# Patient Record
Sex: FEMALE | Race: BLACK OR AFRICAN AMERICAN | Employment: FULL TIME | URBAN - NONMETROPOLITAN AREA
[De-identification: names, ages, dates, MRNs, and addresses within clinical notes are randomized per-mention and may not be internally consistent; named-entity substitution may affect disease eponyms.]

---

## 2017-01-10 ENCOUNTER — TELEPHONE (OUTPATIENT)
Dept: CARDIOLOGY | Age: 37
End: 2017-01-10

## 2017-03-13 ENCOUNTER — TELEPHONE (OUTPATIENT)
Dept: FAMILY MEDICINE CLINIC | Age: 37
End: 2017-03-13

## 2017-03-13 DIAGNOSIS — F41.9 ANXIETY: Primary | ICD-10-CM

## 2017-03-13 RX ORDER — HYDROXYZINE 50 MG/1
50 TABLET, FILM COATED ORAL EVERY 8 HOURS PRN
Qty: 20 TABLET | Refills: 0 | Status: SHIPPED | OUTPATIENT
Start: 2017-03-13 | End: 2017-03-23 | Stop reason: SDUPTHER

## 2017-03-23 DIAGNOSIS — F41.9 ANXIETY: ICD-10-CM

## 2017-03-23 RX ORDER — HYDROXYZINE 50 MG/1
50 TABLET, FILM COATED ORAL EVERY 8 HOURS PRN
Qty: 30 TABLET | Refills: 2 | Status: SHIPPED | OUTPATIENT
Start: 2017-03-23 | End: 2017-07-21 | Stop reason: ALTCHOICE

## 2017-04-17 ENCOUNTER — OFFICE VISIT (OUTPATIENT)
Dept: FAMILY MEDICINE CLINIC | Age: 37
End: 2017-04-17

## 2017-04-17 VITALS
BODY MASS INDEX: 36.47 KG/M2 | WEIGHT: 213.6 LBS | RESPIRATION RATE: 16 BRPM | TEMPERATURE: 98.6 F | HEIGHT: 64 IN | SYSTOLIC BLOOD PRESSURE: 140 MMHG | HEART RATE: 80 BPM | DIASTOLIC BLOOD PRESSURE: 90 MMHG

## 2017-04-17 DIAGNOSIS — L30.1 DYSHIDROTIC ECZEMA: Primary | ICD-10-CM

## 2017-04-17 PROCEDURE — 99213 OFFICE O/P EST LOW 20 MIN: CPT | Performed by: NURSE PRACTITIONER

## 2017-04-17 RX ORDER — BETAMETHASONE DIPROPIONATE 0.5 MG/G
LOTION TOPICAL
Qty: 60 ML | Refills: 5 | Status: SHIPPED | OUTPATIENT
Start: 2017-04-17 | End: 2017-11-14 | Stop reason: ALTCHOICE

## 2017-04-18 ENCOUNTER — TELEPHONE (OUTPATIENT)
Dept: FAMILY MEDICINE CLINIC | Age: 37
End: 2017-04-18

## 2017-04-18 DIAGNOSIS — E06.3 HASHIMOTO'S THYROIDITIS: Primary | ICD-10-CM

## 2017-04-18 LAB — TSH SERPL DL<=0.05 MIU/L-ACNC: 0.51 UIU/ML (ref 0.4–4.4)

## 2017-04-20 ENCOUNTER — EMPLOYEE WELLNESS (OUTPATIENT)
Dept: OTHER | Age: 37
End: 2017-04-20

## 2017-04-20 LAB
CHOLESTEROL, TOTAL: 208 MG/DL (ref 0–199)
FASTING: YES
GLUCOSE BLD-MCNC: 101 MG/DL (ref 74–109)
HDLC SERPL-MCNC: 56 MG/DL (ref 40–90)
LDL CHOLESTEROL CALCULATED: 132 MG/DL
TRIGL SERPL-MCNC: 98 MG/DL (ref 0–199)

## 2017-07-03 ENCOUNTER — OFFICE VISIT (OUTPATIENT)
Dept: FAMILY MEDICINE CLINIC | Age: 37
End: 2017-07-03

## 2017-07-03 VITALS
WEIGHT: 213 LBS | BODY MASS INDEX: 35.49 KG/M2 | TEMPERATURE: 99 F | HEIGHT: 65 IN | SYSTOLIC BLOOD PRESSURE: 128 MMHG | DIASTOLIC BLOOD PRESSURE: 74 MMHG | RESPIRATION RATE: 12 BRPM | HEART RATE: 90 BPM

## 2017-07-03 DIAGNOSIS — N30.00 ACUTE CYSTITIS WITHOUT HEMATURIA: Primary | ICD-10-CM

## 2017-07-03 DIAGNOSIS — R80.9 PROTEINURIA: ICD-10-CM

## 2017-07-03 LAB
BILIRUBIN, POC: NORMAL
BLOOD URINE, POC: NORMAL
CHLAMYDIA TRACHOMATIS BY RT-PCR: NOT DETECTED
CLARITY, POC: CLEAR
COLOR, POC: YELLOW
CREATININE, URINE: 405.1 MG/DL
CT/NG SOURCE: NORMAL
GLUCOSE URINE, POC: NORMAL
KETONES, POC: NORMAL
LEUKOCYTE EST, POC: NORMAL
MICROALBUMIN UR-MCNC: 20.37 MG/DL
MICROALBUMIN/CREAT UR-RTO: 50 MG/G (ref 0–30)
NEISSERIA GONORRHOEAE BY RT-PCR: NOT DETECTED
NITRITE, POC: NORMAL
PH, POC: 5
PROTEIN, POC: NORMAL
SPECIFIC GRAVITY, POC: >1.03
UROBILINOGEN, POC: 0.2

## 2017-07-03 PROCEDURE — 99213 OFFICE O/P EST LOW 20 MIN: CPT | Performed by: FAMILY MEDICINE

## 2017-07-03 PROCEDURE — 81002 URINALYSIS NONAUTO W/O SCOPE: CPT | Performed by: FAMILY MEDICINE

## 2017-07-03 RX ORDER — NITROFURANTOIN 25; 75 MG/1; MG/1
100 CAPSULE ORAL 2 TIMES DAILY
Qty: 10 CAPSULE | Refills: 0 | Status: SHIPPED | OUTPATIENT
Start: 2017-07-03 | End: 2017-07-08

## 2017-07-04 DIAGNOSIS — R80.9 PROTEINURIA: Primary | ICD-10-CM

## 2017-07-05 ENCOUNTER — TELEPHONE (OUTPATIENT)
Dept: FAMILY MEDICINE CLINIC | Age: 37
End: 2017-07-05

## 2017-07-05 LAB
ORGANISM: ABNORMAL
URINE CULTURE, ROUTINE: ABNORMAL

## 2017-07-12 ENCOUNTER — TELEPHONE (OUTPATIENT)
Dept: FAMILY MEDICINE CLINIC | Age: 37
End: 2017-07-12

## 2017-07-17 DIAGNOSIS — I10 ESSENTIAL HYPERTENSION: ICD-10-CM

## 2017-07-17 RX ORDER — AMLODIPINE BESYLATE 10 MG/1
10 TABLET ORAL DAILY
Qty: 90 TABLET | Refills: 3 | Status: SHIPPED | OUTPATIENT
Start: 2017-07-17 | End: 2018-08-22 | Stop reason: SDUPTHER

## 2017-07-21 ENCOUNTER — APPOINTMENT (OUTPATIENT)
Dept: GENERAL RADIOLOGY | Age: 37
End: 2017-07-21
Payer: COMMERCIAL

## 2017-07-21 ENCOUNTER — HOSPITAL ENCOUNTER (EMERGENCY)
Age: 37
Discharge: HOME | End: 2017-07-21
Payer: COMMERCIAL

## 2017-07-21 VITALS
OXYGEN SATURATION: 100 % | RESPIRATION RATE: 20 BRPM | HEART RATE: 82 BPM | BODY MASS INDEX: 35.49 KG/M2 | SYSTOLIC BLOOD PRESSURE: 160 MMHG | DIASTOLIC BLOOD PRESSURE: 102 MMHG | TEMPERATURE: 98.8 F | WEIGHT: 210 LBS

## 2017-07-21 DIAGNOSIS — R07.9 CHEST PAIN, UNSPECIFIED TYPE: Primary | ICD-10-CM

## 2017-07-21 LAB
ANION GAP SERPL CALCULATED.3IONS-SCNC: 14 MEQ/L (ref 8–16)
BASOPHILS # BLD: 0.5 %
BASOPHILS ABSOLUTE: 0.1 THOU/MM3 (ref 0–0.1)
BUN BLDV-MCNC: 10 MG/DL (ref 7–22)
CALCIUM SERPL-MCNC: 9.3 MG/DL (ref 8.5–10.5)
CHLORIDE BLD-SCNC: 102 MEQ/L (ref 98–111)
CO2: 24 MEQ/L (ref 23–33)
CREAT SERPL-MCNC: 0.7 MG/DL (ref 0.4–1.2)
D-DIMER QUANTITATIVE: 323 NG/ML FEU (ref 0–500)
EOSINOPHIL # BLD: 0.7 %
EOSINOPHILS ABSOLUTE: 0.1 THOU/MM3 (ref 0–0.4)
GFR SERPL CREATININE-BSD FRML MDRD: > 90 ML/MIN/1.73M2
GLUCOSE BLD-MCNC: 138 MG/DL (ref 70–108)
HCT VFR BLD CALC: 35.7 % (ref 37–47)
HEMOGLOBIN: 12.1 GM/DL (ref 12–16)
LYMPHOCYTES # BLD: 29.8 %
LYMPHOCYTES ABSOLUTE: 3.1 THOU/MM3 (ref 1–4.8)
MCH RBC QN AUTO: 28.7 PG (ref 27–31)
MCHC RBC AUTO-ENTMCNC: 33.8 GM/DL (ref 33–37)
MCV RBC AUTO: 85 FL (ref 81–99)
MONOCYTES # BLD: 5.6 %
MONOCYTES ABSOLUTE: 0.6 THOU/MM3 (ref 0.4–1.3)
NUCLEATED RED BLOOD CELLS: 0 /100 WBC
OSMOLALITY CALCULATION: 280.6 MOSMOL/KG (ref 275–300)
PDW BLD-RTO: 14.2 % (ref 11.5–14.5)
PLATELET # BLD: 473 THOU/MM3 (ref 130–400)
PMV BLD AUTO: 8.2 MCM (ref 7.4–10.4)
POTASSIUM SERPL-SCNC: 3.9 MEQ/L (ref 3.5–5.2)
PREGNANCY, SERUM: NEGATIVE
RBC # BLD: 4.2 MILL/MM3 (ref 4.2–5.4)
RBC # BLD: NORMAL 10*6/UL
SEG NEUTROPHILS: 63.4 %
SEGMENTED NEUTROPHILS ABSOLUTE COUNT: 6.6 THOU/MM3 (ref 1.8–7.7)
SODIUM BLD-SCNC: 140 MEQ/L (ref 135–145)
TROPONIN T: < 0.01 NG/ML
TROPONIN T: < 0.01 NG/ML
WBC # BLD: 10.4 THOU/MM3 (ref 4.8–10.8)

## 2017-07-21 PROCEDURE — 99285 EMERGENCY DEPT VISIT HI MDM: CPT

## 2017-07-21 PROCEDURE — 85025 COMPLETE CBC W/AUTO DIFF WBC: CPT

## 2017-07-21 PROCEDURE — 71020 XR CHEST STANDARD TWO VW: CPT

## 2017-07-21 PROCEDURE — 93005 ELECTROCARDIOGRAM TRACING: CPT

## 2017-07-21 PROCEDURE — 85379 FIBRIN DEGRADATION QUANT: CPT

## 2017-07-21 PROCEDURE — 36415 COLL VENOUS BLD VENIPUNCTURE: CPT

## 2017-07-21 PROCEDURE — 80048 BASIC METABOLIC PNL TOTAL CA: CPT

## 2017-07-21 PROCEDURE — 6370000000 HC RX 637 (ALT 250 FOR IP): Performed by: PHYSICIAN ASSISTANT

## 2017-07-21 PROCEDURE — 84484 ASSAY OF TROPONIN QUANT: CPT

## 2017-07-21 PROCEDURE — 84703 CHORIONIC GONADOTROPIN ASSAY: CPT

## 2017-07-21 RX ORDER — ASPIRIN 81 MG/1
324 TABLET, CHEWABLE ORAL ONCE
Status: COMPLETED | OUTPATIENT
Start: 2017-07-21 | End: 2017-07-21

## 2017-07-21 RX ADMIN — ASPIRIN 324 MG: 81 TABLET, CHEWABLE ORAL at 13:37

## 2017-07-21 ASSESSMENT — PAIN DESCRIPTION - PAIN TYPE: TYPE: ACUTE PAIN

## 2017-07-21 ASSESSMENT — ENCOUNTER SYMPTOMS
DIARRHEA: 0
COUGH: 0
CHEST TIGHTNESS: 1
PHOTOPHOBIA: 0
NAUSEA: 1
EYE PAIN: 0
ABDOMINAL PAIN: 0
APNEA: 0
CONSTIPATION: 0
CHOKING: 0
BACK PAIN: 1
SHORTNESS OF BREATH: 0
VOMITING: 0

## 2017-07-21 ASSESSMENT — PAIN SCALES - GENERAL: PAINLEVEL_OUTOF10: 6

## 2017-07-22 LAB
EKG ATRIAL RATE: 74 BPM
EKG P AXIS: 55 DEGREES
EKG P-R INTERVAL: 142 MS
EKG Q-T INTERVAL: 388 MS
EKG QRS DURATION: 76 MS
EKG QTC CALCULATION (BAZETT): 430 MS
EKG R AXIS: 30 DEGREES
EKG T AXIS: 23 DEGREES
EKG VENTRICULAR RATE: 74 BPM

## 2017-07-24 ENCOUNTER — CARE COORDINATION (OUTPATIENT)
Dept: FAMILY MEDICINE CLINIC | Age: 37
End: 2017-07-24

## 2017-07-25 ENCOUNTER — OFFICE VISIT (OUTPATIENT)
Dept: FAMILY MEDICINE CLINIC | Age: 37
End: 2017-07-25
Payer: COMMERCIAL

## 2017-07-25 VITALS
WEIGHT: 213 LBS | SYSTOLIC BLOOD PRESSURE: 150 MMHG | HEIGHT: 64 IN | DIASTOLIC BLOOD PRESSURE: 92 MMHG | TEMPERATURE: 99 F | RESPIRATION RATE: 12 BRPM | BODY MASS INDEX: 36.37 KG/M2 | HEART RATE: 78 BPM

## 2017-07-25 DIAGNOSIS — I10 ESSENTIAL HYPERTENSION: ICD-10-CM

## 2017-07-25 DIAGNOSIS — F32.A MILD DEPRESSIVE DISORDER: ICD-10-CM

## 2017-07-25 DIAGNOSIS — F41.0 PANIC DISORDER WITHOUT AGORAPHOBIA WITH MODERATE PANIC ATTACKS: Primary | ICD-10-CM

## 2017-07-25 PROCEDURE — 99214 OFFICE O/P EST MOD 30 MIN: CPT | Performed by: NURSE PRACTITIONER

## 2017-08-22 ENCOUNTER — OFFICE VISIT (OUTPATIENT)
Dept: FAMILY MEDICINE CLINIC | Age: 37
End: 2017-08-22
Payer: COMMERCIAL

## 2017-08-22 VITALS
BODY MASS INDEX: 36.81 KG/M2 | SYSTOLIC BLOOD PRESSURE: 128 MMHG | TEMPERATURE: 98.3 F | WEIGHT: 215.6 LBS | RESPIRATION RATE: 16 BRPM | DIASTOLIC BLOOD PRESSURE: 86 MMHG | HEIGHT: 64 IN | HEART RATE: 84 BPM

## 2017-08-22 DIAGNOSIS — F41.0 PANIC DISORDER WITHOUT AGORAPHOBIA WITH MODERATE PANIC ATTACKS: Primary | ICD-10-CM

## 2017-08-22 DIAGNOSIS — I10 ESSENTIAL HYPERTENSION: ICD-10-CM

## 2017-08-22 DIAGNOSIS — E06.3 HASHIMOTO'S THYROIDITIS: ICD-10-CM

## 2017-08-22 DIAGNOSIS — H65.93 MIDDLE EAR EFFUSION, BILATERAL: ICD-10-CM

## 2017-08-22 PROCEDURE — 99214 OFFICE O/P EST MOD 30 MIN: CPT | Performed by: NURSE PRACTITIONER

## 2017-08-22 RX ORDER — FLUOXETINE 10 MG/1
10 CAPSULE ORAL DAILY
Qty: 30 CAPSULE | Refills: 3 | Status: SHIPPED | OUTPATIENT
Start: 2017-08-22 | End: 2017-10-16 | Stop reason: SDUPTHER

## 2017-08-22 RX ORDER — FLUTICASONE PROPIONATE 50 MCG
2 SPRAY, SUSPENSION (ML) NASAL DAILY
Qty: 1 BOTTLE | Refills: 3 | Status: SHIPPED | OUTPATIENT
Start: 2017-08-22 | End: 2017-11-14

## 2017-08-23 ENCOUNTER — TELEPHONE (OUTPATIENT)
Dept: FAMILY MEDICINE CLINIC | Age: 37
End: 2017-08-23

## 2017-08-23 DIAGNOSIS — R79.89 LOW TSH LEVEL: Primary | ICD-10-CM

## 2017-08-23 DIAGNOSIS — E06.3 HASHIMOTO'S THYROIDITIS: ICD-10-CM

## 2017-08-23 LAB
T4 FREE: 1.13 NG/DL (ref 0.8–1.8)
TSH SERPL DL<=0.05 MIU/L-ACNC: 0.39 UIU/ML (ref 0.4–4.4)

## 2017-09-14 ENCOUNTER — OFFICE VISIT (OUTPATIENT)
Dept: FAMILY MEDICINE CLINIC | Age: 37
End: 2017-09-14
Payer: COMMERCIAL

## 2017-09-14 VITALS
WEIGHT: 215 LBS | SYSTOLIC BLOOD PRESSURE: 136 MMHG | TEMPERATURE: 99.4 F | DIASTOLIC BLOOD PRESSURE: 78 MMHG | HEIGHT: 64 IN | RESPIRATION RATE: 10 BRPM | HEART RATE: 68 BPM | BODY MASS INDEX: 36.7 KG/M2

## 2017-09-14 DIAGNOSIS — Z01.419 WELL WOMAN EXAM WITH ROUTINE GYNECOLOGICAL EXAM: Primary | ICD-10-CM

## 2017-09-14 DIAGNOSIS — Z01.419 PAPANICOLAOU SMEAR, AS PART OF ROUTINE GYNECOLOGICAL EXAMINATION: ICD-10-CM

## 2017-09-14 PROCEDURE — 99395 PREV VISIT EST AGE 18-39: CPT | Performed by: NURSE PRACTITIONER

## 2017-09-19 LAB — GYNECOLOGY CYTOLOGY REPORT: NORMAL

## 2017-10-11 LAB
T4 FREE: 0.98 NG/DL (ref 0.8–1.8)
TSH SERPL DL<=0.05 MIU/L-ACNC: 0.4 UIU/ML (ref 0.4–4.4)

## 2017-10-12 ENCOUNTER — TELEPHONE (OUTPATIENT)
Dept: FAMILY MEDICINE CLINIC | Age: 37
End: 2017-10-12

## 2017-10-12 DIAGNOSIS — E03.8 SUBCLINICAL HYPOTHYROIDISM: ICD-10-CM

## 2017-10-12 DIAGNOSIS — E04.1 THYROID NODULE: Primary | ICD-10-CM

## 2017-10-12 LAB
ANTI-THYROGLOB ABS: <1 IU/ML
T3 FREE: 1.9 PG/ML (ref 2.2–4.2)
THYROGLOBULIN: 89.2 NG/ML
THYROID PEROXIDASE ANTIBODY: 1 IU/ML

## 2017-10-16 ENCOUNTER — OFFICE VISIT (OUTPATIENT)
Dept: FAMILY MEDICINE CLINIC | Age: 37
End: 2017-10-16
Payer: COMMERCIAL

## 2017-10-16 VITALS
WEIGHT: 215 LBS | RESPIRATION RATE: 16 BRPM | BODY MASS INDEX: 36.7 KG/M2 | SYSTOLIC BLOOD PRESSURE: 142 MMHG | HEART RATE: 69 BPM | HEIGHT: 64 IN | DIASTOLIC BLOOD PRESSURE: 88 MMHG | TEMPERATURE: 98.8 F

## 2017-10-16 DIAGNOSIS — F41.0 PANIC DISORDER WITHOUT AGORAPHOBIA WITH MODERATE PANIC ATTACKS: Primary | ICD-10-CM

## 2017-10-16 PROCEDURE — 99213 OFFICE O/P EST LOW 20 MIN: CPT | Performed by: NURSE PRACTITIONER

## 2017-10-16 RX ORDER — FLUOXETINE HYDROCHLORIDE 20 MG/1
CAPSULE ORAL
Qty: 60 CAPSULE | Refills: 3 | Status: SHIPPED | OUTPATIENT
Start: 2017-10-16 | End: 2021-04-22 | Stop reason: ALTCHOICE

## 2017-10-16 NOTE — PROGRESS NOTES
Chief Complaint   Patient presents with    Anxiety     Pt states that the medications don't seem to be helping. She is still having problems with anxiety. She states that she is still having attacks and fells stressed. History obtained from chart review and the patient. SUBJECTIVE:  Karen Keating is a 42 y.o. female that presents today f/u anxiety    Is still taking the Prozac but doesn't feel like it is helping. Has panic attacks probably 2-3 times/week. Denies feeling depressed. Anxious about every other day, triggers for anxiety are kids, finances, going out and doing things. Denies feeling SI/HI. Tried Zoloft but she didn't feel like it was helping, and didn't like the way it made her feel. No side effects from the Prozac.     Age/Gender Health Maintenance    Lipid - needs  DM Screen - needs  Colon Cancer Screening - n/a  Lung Cancer Screening (Age 54 to [de-identified] with 30 pack year hx, current smoker or quit within past 15 years) - n/a    Tetanus - needs  Influenza Vaccine - needs  Pneumonia Vaccine - n/a  Zostavax - n/a   HPV Vaccine - n/a    Breast Cancer Screening - n/a  Cervical Cancer Screening - per OB, Dr. Evelyn Davila  Osteoporosis Screening - n/a  Chlamydia Screen - n/a    PSA testing discussion - n/a  AAA Screening - n/a    Falls screening - n/a    Current Outpatient Prescriptions   Medication Sig Dispense Refill    FLUoxetine (PROZAC) 20 MG capsule Take 1 capsule by mouth daily for 2 weeks, then take 2 capsules by mouth daily 60 capsule 3    fluticasone (FLONASE) 50 MCG/ACT nasal spray 2 sprays by Nasal route daily 1 Bottle 3    metoprolol tartrate (LOPRESSOR) 25 MG tablet Take 1 tablet by mouth 2 times daily (Patient taking differently: Take 25 mg by mouth daily ) 60 tablet 3    amLODIPine (NORVASC) 10 MG tablet Take 1 tablet by mouth daily 90 tablet 3    betamethasone dipropionate 0.05 % lotion Apply topically 2 times daily to hands and feet (Patient taking differently: as needed Apply topically 2 times daily to hands and feet) 60 mL 5    famotidine (PEPCID) 20 MG tablet Take 1 tablet by mouth 2 times daily (Patient taking differently: Take 20 mg by mouth 2 times daily as needed ) 60 tablet 3    norgestimate-ethinyl estradiol (MONO-LINYAH) 0.25-35 MG-MCG per tablet Take 1 tablet by mouth daily       No current facility-administered medications for this visit. Orders Placed This Encounter   Medications    FLUoxetine (PROZAC) 20 MG capsule     Sig: Take 1 capsule by mouth daily for 2 weeks, then take 2 capsules by mouth daily     Dispense:  60 capsule     Refill:  3         All medications reviewed and reconciled, including OTC and herbal medications. Updated list given to patient. Patient Active Problem List   Diagnosis    Abdominal pain complicating pregnancy    Essential hypertension    Panic disorder without agoraphobia with moderate panic attacks    Hashimoto's thyroiditis       Past Medical History:   Diagnosis Date    Essential hypertension 2016    Hashimoto's thyroiditis 2016    HIGH CHOLESTEROL     Hypokalemia     Subclinical hyperthyroidism 2016       Past Surgical History:   Procedure Laterality Date     SECTION         Allergies   Allergen Reactions    Bactrim        Social History     Social History    Marital status: Single     Spouse name: N/A    Number of children: N/A    Years of education: N/A     Occupational History    Not on file.      Social History Main Topics    Smoking status: Never Smoker    Smokeless tobacco: Never Used    Alcohol use Yes      Comment: social    Drug use: No    Sexual activity: Not on file     Other Topics Concern    Not on file     Social History Narrative    No narrative on file       Family History   Problem Relation Age of Onset    High Blood Pressure Mother     Diabetes Mother     High Blood Pressure Father     Diabetes Father          I have reviewed the patient's past medical history, lymphadenopathy  Pulmonary/Chest: clear to auscultation bilaterally- no wheezes, rales or rhonchi, normal air movement, no respiratory distress or retractions  Cardiovascular: S1 and S2 auscultated w/ RRR. No murmurs, rubs, clicks, or gallops, distal pulses intact. Abdomen: soft, non-tender, non-distended, bowl sounds physiologic,  no rebound or guarding, no masses or hernias noted. Liver and spleen without enlargement. Extremities: no cyanosis, clubbing or edema of the lower extremities. Musculoskeletal: No joint swelling or gross deformity   Neuro:  Alert, 5/5 strength globally and symmetrically  Psych: Affect and mood are normal. Thought process is normal. Good insight and appropriate interaction. Cognition and memory appear to be intact. Skin: warm and dry, no rash or erythema  Lymph:  No cervical, auricular or supraclavicular lymph nodes palpated    ASSESSMENT & PLAN  Aryan Gonzalez was seen today for anxiety. Diagnoses and all orders for this visit:    Panic disorder without agoraphobia with moderate panic attacks  -     FLUoxetine (PROZAC) 20 MG capsule; Take 1 capsule by mouth daily for 2 weeks, then take 2 capsules by mouth daily     will increase Prozac to max dose and see how she's doing in 2 months  If no improvement with anxiety, may consider adding Buspar or switching to SNRI    DISPOSITION    Return in about 2 months (around 12/16/2017) for anxiety. Varshadebbie released without restrictions. PATIENT COUNSELING    Counseling was provided today regarding the following topics: Healthy eating habits, Regular exercise, substance abuse and healthy sleep habits. Deloris received counseling on the following healthy behaviors: nutrition, exercise and medication adherence    Patient given educational materials on: See Attached    I have instructed Deloris to complete a self tracking handout on Blood Pressures  and instructed them to bring it with them to her next appointment.      Barriers to learning and self management: none    Discussed use, benefit, and side effects of prescribed medications. Barriers to medication compliance addressed. All patient questions answered. Pt voiced understanding.        Electronically signed by Kathy Tang CNP on 10/16/2017 at 3:43 PM

## 2017-10-16 NOTE — TELEPHONE ENCOUNTER
Pt informed and verbalized understanding. Pt states she had an office visit with Mary Antoine today and he discussed this with her in detail.

## 2017-11-14 ENCOUNTER — OFFICE VISIT (OUTPATIENT)
Dept: FAMILY MEDICINE CLINIC | Age: 37
End: 2017-11-14
Payer: COMMERCIAL

## 2017-11-14 ENCOUNTER — OFFICE VISIT (OUTPATIENT)
Dept: INTERNAL MEDICINE CLINIC | Age: 37
End: 2017-11-14
Payer: COMMERCIAL

## 2017-11-14 VITALS
WEIGHT: 217.2 LBS | TEMPERATURE: 98.4 F | BODY MASS INDEX: 37.08 KG/M2 | DIASTOLIC BLOOD PRESSURE: 76 MMHG | RESPIRATION RATE: 12 BRPM | HEART RATE: 62 BPM | SYSTOLIC BLOOD PRESSURE: 124 MMHG | HEIGHT: 64 IN

## 2017-11-14 VITALS
DIASTOLIC BLOOD PRESSURE: 96 MMHG | BODY MASS INDEX: 37.08 KG/M2 | HEART RATE: 68 BPM | SYSTOLIC BLOOD PRESSURE: 144 MMHG | WEIGHT: 217.2 LBS | HEIGHT: 64 IN

## 2017-11-14 DIAGNOSIS — E04.2 MULTIPLE THYROID NODULES: Primary | ICD-10-CM

## 2017-11-14 DIAGNOSIS — I10 ESSENTIAL HYPERTENSION: ICD-10-CM

## 2017-11-14 DIAGNOSIS — R94.6 BORDERLINE ABNORMAL TFTS: ICD-10-CM

## 2017-11-14 DIAGNOSIS — E66.09 CLASS 2 OBESITY DUE TO EXCESS CALORIES WITHOUT SERIOUS COMORBIDITY WITH BODY MASS INDEX (BMI) OF 37.0 TO 37.9 IN ADULT: ICD-10-CM

## 2017-11-14 DIAGNOSIS — B37.31 VAGINAL CANDIDIASIS: Primary | ICD-10-CM

## 2017-11-14 LAB
CHLAMYDIA TRACHOMATIS BY RT-PCR: NOT DETECTED
CT/NG SOURCE: NORMAL
NEISSERIA GONORRHOEAE BY RT-PCR: NOT DETECTED

## 2017-11-14 PROCEDURE — 1036F TOBACCO NON-USER: CPT | Performed by: NURSE PRACTITIONER

## 2017-11-14 PROCEDURE — 99214 OFFICE O/P EST MOD 30 MIN: CPT | Performed by: INTERNAL MEDICINE

## 2017-11-14 PROCEDURE — 99213 OFFICE O/P EST LOW 20 MIN: CPT | Performed by: NURSE PRACTITIONER

## 2017-11-14 PROCEDURE — G8427 DOCREV CUR MEDS BY ELIG CLIN: HCPCS | Performed by: NURSE PRACTITIONER

## 2017-11-14 PROCEDURE — G8427 DOCREV CUR MEDS BY ELIG CLIN: HCPCS | Performed by: INTERNAL MEDICINE

## 2017-11-14 PROCEDURE — G8417 CALC BMI ABV UP PARAM F/U: HCPCS | Performed by: INTERNAL MEDICINE

## 2017-11-14 PROCEDURE — G8484 FLU IMMUNIZE NO ADMIN: HCPCS | Performed by: INTERNAL MEDICINE

## 2017-11-14 PROCEDURE — 1036F TOBACCO NON-USER: CPT | Performed by: INTERNAL MEDICINE

## 2017-11-14 PROCEDURE — G8484 FLU IMMUNIZE NO ADMIN: HCPCS | Performed by: NURSE PRACTITIONER

## 2017-11-14 PROCEDURE — G8417 CALC BMI ABV UP PARAM F/U: HCPCS | Performed by: NURSE PRACTITIONER

## 2017-11-14 RX ORDER — FLUCONAZOLE 150 MG/1
150 TABLET ORAL
Qty: 2 TABLET | Refills: 0 | Status: SHIPPED | OUTPATIENT
Start: 2017-11-14 | End: 2017-11-17

## 2017-11-14 ASSESSMENT — PATIENT HEALTH QUESTIONNAIRE - PHQ9
SUM OF ALL RESPONSES TO PHQ QUESTIONS 1-9: 0
2. FEELING DOWN, DEPRESSED OR HOPELESS: 0
1. LITTLE INTEREST OR PLEASURE IN DOING THINGS: 0
SUM OF ALL RESPONSES TO PHQ9 QUESTIONS 1 & 2: 0

## 2017-11-14 NOTE — PROGRESS NOTES
Visit Information    Have you changed or started any medications since your last visit including any over-the-counter medicines, vitamins, or herbal medicines? no   Are you having any side effects from any of your medications? -  no  Have you stopped taking any of your medications? Is so, why? -  no    Have you seen any other physician or provider since your last visit? Yes - Records Obtained  Have you had any other diagnostic tests since your last visit? No  Have you been seen in the emergency room and/or had an admission to a hospital since we last saw you? No  Have you had your routine dental cleaning in the past 6 months? no    Have you activated your [employer] account? If not, what are your barriers?  Yes     Patient Care Team:  Reginald Russell CNP as PCP - General  Reginald Russell CNP as PCP - S Attributed Provider        Health Maintenance   Topic Date Due    HIV screen  03/19/1993    Flu vaccine (1) 09/01/2017    Cervical cancer screen  09/19/2020    DTaP/Tdap/Td vaccine (2 - Td) 01/01/2026

## 2017-11-14 NOTE — PROGRESS NOTES
[facility] PROFESSIONAL SERVS  PHYSICIANS [facility] [address] [facility]  Dept: [phone]  Dept Fax: [phone]      Chief Complaint   Patient presents with   Judi Kruse New Doctor    Thyroid Problem       Patient presents for evaluation of Thyroid issues. I have never seen the patient before. This patient is followed regularly by Elva Starkey CNP  Patient was found to have thyromegaly with nodules last December. PCP ordered biopsy of one of the nodules which was negative for malignancy  She has never had any radiation to her chest or neck. There is no family history for thyroid cancer. Generally she feels well  Recent TSH was borderline low  Patient Active Problem List   Diagnosis    Abdominal pain complicating pregnancy    Essential hypertension    Panic disorder without agoraphobia with moderate panic attacks    Hashimoto's thyroiditis       Current Outpatient Prescriptions   Medication Sig Dispense Refill    FLUoxetine (PROZAC) 20 MG capsule Take 1 capsule by mouth daily for 2 weeks, then take 2 capsules by mouth daily 60 capsule 3    metoprolol tartrate (LOPRESSOR) 25 MG tablet Take 1 tablet by mouth 2 times daily (Patient taking differently: Take 25 mg by mouth daily ) 60 tablet 3    amLODIPine (NORVASC) 10 MG tablet Take 1 tablet by mouth daily 90 tablet 3    fluconazole (DIFLUCAN) 150 MG tablet Take 1 tablet by mouth every 48 hours for 2 doses 2 tablet 0     No current facility-administered medications for this visit. Past Surgical History:   Procedure Laterality Date     SECTION  ,        Allergies   Allergen Reactions    Bactrim        Social History     Social History    Marital status: Single     Spouse name: N/A    Number of children: N/A    Years of education: N/A     Occupational History    Not on file.      Social History Main Topics    Smoking status: Never Smoker    Smokeless tobacco: Never Used    Alcohol use Yes

## 2017-11-15 ENCOUNTER — TELEPHONE (OUTPATIENT)
Dept: FAMILY MEDICINE CLINIC | Age: 37
End: 2017-11-15

## 2017-11-17 LAB
GENITAL CULTURE, ROUTINE: NORMAL
GRAM STAIN RESULT: NORMAL

## 2017-12-01 ENCOUNTER — HOSPITAL ENCOUNTER (OUTPATIENT)
Dept: ULTRASOUND IMAGING | Age: 37
Discharge: HOME | End: 2017-12-01
Payer: COMMERCIAL

## 2017-12-01 DIAGNOSIS — E04.2 MULTIPLE THYROID NODULES: ICD-10-CM

## 2017-12-01 PROCEDURE — 76536 US EXAM OF HEAD AND NECK: CPT

## 2017-12-05 ENCOUNTER — OFFICE VISIT (OUTPATIENT)
Dept: INTERNAL MEDICINE CLINIC | Age: 37
End: 2017-12-05
Payer: COMMERCIAL

## 2017-12-05 VITALS
DIASTOLIC BLOOD PRESSURE: 60 MMHG | SYSTOLIC BLOOD PRESSURE: 114 MMHG | HEIGHT: 64 IN | WEIGHT: 213 LBS | BODY MASS INDEX: 36.37 KG/M2 | HEART RATE: 64 BPM

## 2017-12-05 DIAGNOSIS — I10 ESSENTIAL HYPERTENSION: ICD-10-CM

## 2017-12-05 DIAGNOSIS — E04.2 MULTINODULAR GOITER: Primary | ICD-10-CM

## 2017-12-05 DIAGNOSIS — E06.3 HASHIMOTO'S THYROIDITIS: ICD-10-CM

## 2017-12-05 PROCEDURE — 1036F TOBACCO NON-USER: CPT | Performed by: INTERNAL MEDICINE

## 2017-12-05 PROCEDURE — G8427 DOCREV CUR MEDS BY ELIG CLIN: HCPCS | Performed by: INTERNAL MEDICINE

## 2017-12-05 PROCEDURE — 99214 OFFICE O/P EST MOD 30 MIN: CPT | Performed by: INTERNAL MEDICINE

## 2017-12-05 PROCEDURE — G8484 FLU IMMUNIZE NO ADMIN: HCPCS | Performed by: INTERNAL MEDICINE

## 2017-12-05 PROCEDURE — G8417 CALC BMI ABV UP PARAM F/U: HCPCS | Performed by: INTERNAL MEDICINE

## 2017-12-07 ENCOUNTER — HOSPITAL ENCOUNTER (EMERGENCY)
Age: 37
Discharge: HOME | End: 2017-12-07
Payer: COMMERCIAL

## 2017-12-07 ENCOUNTER — APPOINTMENT (OUTPATIENT)
Dept: CT IMAGING | Age: 37
End: 2017-12-07
Payer: COMMERCIAL

## 2017-12-07 ENCOUNTER — APPOINTMENT (OUTPATIENT)
Dept: GENERAL RADIOLOGY | Age: 37
End: 2017-12-07
Payer: COMMERCIAL

## 2017-12-07 VITALS
HEIGHT: 64 IN | RESPIRATION RATE: 15 BRPM | SYSTOLIC BLOOD PRESSURE: 153 MMHG | OXYGEN SATURATION: 99 % | WEIGHT: 212 LBS | HEART RATE: 82 BPM | DIASTOLIC BLOOD PRESSURE: 96 MMHG | TEMPERATURE: 98 F | BODY MASS INDEX: 36.19 KG/M2

## 2017-12-07 DIAGNOSIS — R51.9 INTERMITTENT HEADACHE: ICD-10-CM

## 2017-12-07 DIAGNOSIS — T58.91XA CARBOXYHEMOGLOBINEMIA, ACCIDENTAL OR UNINTENTIONAL, INITIAL ENCOUNTER: ICD-10-CM

## 2017-12-07 DIAGNOSIS — R07.89 OTHER CHEST PAIN: Primary | ICD-10-CM

## 2017-12-07 LAB
ALBUMIN SERPL-MCNC: 4 G/DL (ref 3.5–5.1)
ALP BLD-CCNC: 93 U/L (ref 38–126)
ALT SERPL-CCNC: 80 U/L (ref 11–66)
AMPHETAMINE+METHAMPHETAMINE URINE SCREEN: NEGATIVE
ANION GAP SERPL CALCULATED.3IONS-SCNC: 11 MEQ/L (ref 8–16)
AST SERPL-CCNC: 55 U/L (ref 5–40)
BACTERIA: ABNORMAL /HPF
BARBITURATE QUANTITATIVE URINE: NEGATIVE
BASOPHILS # BLD: 1.4 %
BASOPHILS ABSOLUTE: 0.2 THOU/MM3 (ref 0–0.1)
BENZODIAZEPINE QUANTITATIVE URINE: NEGATIVE
BILIRUB SERPL-MCNC: 0.2 MG/DL (ref 0.3–1.2)
BILIRUBIN DIRECT: < 0.2 MG/DL (ref 0–0.3)
BILIRUBIN URINE: NEGATIVE
BLOOD, URINE: NEGATIVE
BUN BLDV-MCNC: 11 MG/DL (ref 7–22)
CALCIUM SERPL-MCNC: 9.2 MG/DL (ref 8.5–10.5)
CANNABINOID QUANTITATIVE URINE: NEGATIVE
CARBON MONOXIDE, BLOOD: 5.6 % CO SAT
CASTS 2: ABNORMAL /LPF
CASTS UA: ABNORMAL /LPF
CHARACTER, URINE: ABNORMAL
CHLORIDE BLD-SCNC: 103 MEQ/L (ref 98–111)
CO2: 25 MEQ/L (ref 23–33)
COCAINE METABOLITE QUANTITATIVE URINE: NEGATIVE
COLOR: YELLOW
CREAT SERPL-MCNC: 0.5 MG/DL (ref 0.4–1.2)
CRYSTALS, UA: ABNORMAL
D-DIMER QUANTITATIVE: 222 NG/ML FEU (ref 0–500)
EKG ATRIAL RATE: 87 BPM
EKG P AXIS: 59 DEGREES
EKG P-R INTERVAL: 142 MS
EKG Q-T INTERVAL: 368 MS
EKG QRS DURATION: 76 MS
EKG QTC CALCULATION (BAZETT): 442 MS
EKG R AXIS: 39 DEGREES
EKG T AXIS: 30 DEGREES
EKG VENTRICULAR RATE: 87 BPM
EOSINOPHIL # BLD: 0.8 %
EOSINOPHILS ABSOLUTE: 0.1 THOU/MM3 (ref 0–0.4)
EPITHELIAL CELLS, UA: ABNORMAL /HPF
GFR SERPL CREATININE-BSD FRML MDRD: > 90 ML/MIN/1.73M2
GLUCOSE BLD-MCNC: 109 MG/DL (ref 70–108)
GLUCOSE URINE: NEGATIVE MG/DL
HCT VFR BLD CALC: 38.5 % (ref 37–47)
HEMOGLOBIN: 13 GM/DL (ref 12–16)
KETONES, URINE: NEGATIVE
LACTIC ACID: 0.9 MMOL/L (ref 0.5–2.2)
LEUKOCYTE ESTERASE, URINE: ABNORMAL
LIPASE: 46.1 U/L (ref 5.6–51.3)
LYMPHOCYTES # BLD: 31.4 %
LYMPHOCYTES ABSOLUTE: 3.9 THOU/MM3 (ref 1–4.8)
MAGNESIUM: 1.9 MG/DL (ref 1.6–2.4)
MCH RBC QN AUTO: 29 PG (ref 27–31)
MCHC RBC AUTO-ENTMCNC: 33.7 GM/DL (ref 33–37)
MCV RBC AUTO: 85.8 FL (ref 81–99)
MISCELLANEOUS 2: ABNORMAL
MONOCYTES # BLD: 8 %
MONOCYTES ABSOLUTE: 1 THOU/MM3 (ref 0.4–1.3)
NITRITE, URINE: NEGATIVE
NUCLEATED RED BLOOD CELLS: 0 /100 WBC
OPIATES, URINE: NEGATIVE
OSMOLALITY CALCULATION: 277.5 MOSMOL/KG (ref 275–300)
OXYCODONE: NEGATIVE
PDW BLD-RTO: 13.9 % (ref 11.5–14.5)
PH UA: 6
PHENCYCLIDINE QUANTITATIVE URINE: NEGATIVE
PLATELET # BLD: 423 THOU/MM3 (ref 130–400)
PMV BLD AUTO: 8.6 MCM (ref 7.4–10.4)
POTASSIUM SERPL-SCNC: 3.8 MEQ/L (ref 3.5–5.2)
PREGNANCY, SERUM: NEGATIVE
PRO-BNP: 43.3 PG/ML (ref 0–450)
PROTEIN UA: NEGATIVE
RBC # BLD: 4.49 MILL/MM3 (ref 4.2–5.4)
RBC URINE: ABNORMAL /HPF
RENAL EPITHELIAL, UA: ABNORMAL
SEG NEUTROPHILS: 58.4 %
SEGMENTED NEUTROPHILS ABSOLUTE COUNT: 7.3 THOU/MM3 (ref 1.8–7.7)
SODIUM BLD-SCNC: 139 MEQ/L (ref 135–145)
SPECIFIC GRAVITY, URINE: 1.02 (ref 1–1.03)
TOTAL PROTEIN: 7.4 G/DL (ref 6.1–8)
TROPONIN T: < 0.01 NG/ML
TROPONIN T: < 0.01 NG/ML
TSH SERPL DL<=0.05 MIU/L-ACNC: 0.64 UIU/ML (ref 0.4–4.2)
UROBILINOGEN, URINE: 0.2 EU/DL
WBC # BLD: 12.5 THOU/MM3 (ref 4.8–10.8)
WBC UA: ABNORMAL /HPF
YEAST: ABNORMAL

## 2017-12-07 PROCEDURE — 82248 BILIRUBIN DIRECT: CPT

## 2017-12-07 PROCEDURE — 6360000004 HC RX CONTRAST MEDICATION: Performed by: PHYSICIAN ASSISTANT

## 2017-12-07 PROCEDURE — 70450 CT HEAD/BRAIN W/O DYE: CPT

## 2017-12-07 PROCEDURE — 84703 CHORIONIC GONADOTROPIN ASSAY: CPT

## 2017-12-07 PROCEDURE — 82375 ASSAY CARBOXYHB QUANT: CPT

## 2017-12-07 PROCEDURE — 99285 EMERGENCY DEPT VISIT HI MDM: CPT

## 2017-12-07 PROCEDURE — 83880 ASSAY OF NATRIURETIC PEPTIDE: CPT

## 2017-12-07 PROCEDURE — 83690 ASSAY OF LIPASE: CPT

## 2017-12-07 PROCEDURE — 71275 CT ANGIOGRAPHY CHEST: CPT

## 2017-12-07 PROCEDURE — 93005 ELECTROCARDIOGRAM TRACING: CPT

## 2017-12-07 PROCEDURE — 87086 URINE CULTURE/COLONY COUNT: CPT

## 2017-12-07 PROCEDURE — 85379 FIBRIN DEGRADATION QUANT: CPT

## 2017-12-07 PROCEDURE — 74174 CTA ABD&PLVS W/CONTRAST: CPT

## 2017-12-07 PROCEDURE — 80307 DRUG TEST PRSMV CHEM ANLYZR: CPT

## 2017-12-07 PROCEDURE — 83735 ASSAY OF MAGNESIUM: CPT

## 2017-12-07 PROCEDURE — 36415 COLL VENOUS BLD VENIPUNCTURE: CPT

## 2017-12-07 PROCEDURE — 6370000000 HC RX 637 (ALT 250 FOR IP): Performed by: PHYSICIAN ASSISTANT

## 2017-12-07 PROCEDURE — 6360000002 HC RX W HCPCS: Performed by: PHYSICIAN ASSISTANT

## 2017-12-07 PROCEDURE — 84484 ASSAY OF TROPONIN QUANT: CPT

## 2017-12-07 PROCEDURE — 83605 ASSAY OF LACTIC ACID: CPT

## 2017-12-07 PROCEDURE — 71010 XR CHEST PORTABLE: CPT

## 2017-12-07 PROCEDURE — 84443 ASSAY THYROID STIM HORMONE: CPT

## 2017-12-07 PROCEDURE — 85025 COMPLETE CBC W/AUTO DIFF WBC: CPT

## 2017-12-07 PROCEDURE — 81001 URINALYSIS AUTO W/SCOPE: CPT

## 2017-12-07 PROCEDURE — 80053 COMPREHEN METABOLIC PANEL: CPT

## 2017-12-07 RX ORDER — ACETAMINOPHEN 325 MG/1
650 TABLET ORAL EVERY 6 HOURS PRN
Qty: 120 TABLET | Refills: 0 | Status: SHIPPED | OUTPATIENT
Start: 2017-12-07 | End: 2018-04-12

## 2017-12-07 RX ORDER — ASPIRIN 81 MG/1
324 TABLET, CHEWABLE ORAL ONCE
Status: COMPLETED | OUTPATIENT
Start: 2017-12-07 | End: 2017-12-07

## 2017-12-07 RX ORDER — MECLIZINE HYDROCHLORIDE CHEWABLE TABLETS 25 MG/1
50 TABLET, CHEWABLE ORAL ONCE
Status: COMPLETED | OUTPATIENT
Start: 2017-12-07 | End: 2017-12-07

## 2017-12-07 RX ORDER — DIPHENHYDRAMINE HCL 25 MG
25 TABLET ORAL ONCE
Status: COMPLETED | OUTPATIENT
Start: 2017-12-07 | End: 2017-12-07

## 2017-12-07 RX ORDER — ONDANSETRON 4 MG/1
4 TABLET, ORALLY DISINTEGRATING ORAL ONCE
Status: COMPLETED | OUTPATIENT
Start: 2017-12-07 | End: 2017-12-07

## 2017-12-07 RX ORDER — ONDANSETRON 4 MG/1
4 TABLET, ORALLY DISINTEGRATING ORAL ONCE
Status: DISCONTINUED | OUTPATIENT
Start: 2017-12-07 | End: 2017-12-07

## 2017-12-07 RX ORDER — ONDANSETRON 4 MG/1
4 TABLET, ORALLY DISINTEGRATING ORAL EVERY 8 HOURS PRN
Qty: 20 TABLET | Refills: 0 | Status: SHIPPED | OUTPATIENT
Start: 2017-12-07 | End: 2017-12-26

## 2017-12-07 RX ORDER — MECLIZINE HYDROCHLORIDE 25 MG/1
25 TABLET ORAL 3 TIMES DAILY PRN
Qty: 30 TABLET | Refills: 0 | Status: SHIPPED | OUTPATIENT
Start: 2017-12-07 | End: 2017-12-17

## 2017-12-07 RX ORDER — NITROGLYCERIN 0.4 MG/1
0.4 TABLET SUBLINGUAL EVERY 5 MIN PRN
Status: DISCONTINUED | OUTPATIENT
Start: 2017-12-07 | End: 2017-12-08 | Stop reason: HOSPADM

## 2017-12-07 RX ADMIN — IOPAMIDOL 80 ML: 755 INJECTION, SOLUTION INTRAVENOUS at 21:07

## 2017-12-07 RX ADMIN — ASPIRIN 324 MG: 81 TABLET, CHEWABLE ORAL at 19:37

## 2017-12-07 RX ADMIN — DIPHENHYDRAMINE HCL 25 MG: 25 TABLET ORAL at 21:23

## 2017-12-07 RX ADMIN — MECLIZINE HCL 50 MG: 25 TABLET, CHEWABLE ORAL at 21:23

## 2017-12-07 RX ADMIN — NITROGLYCERIN 0.4 MG: 0.4 TABLET SUBLINGUAL at 19:37

## 2017-12-07 RX ADMIN — ONDANSETRON 4 MG: 4 TABLET, ORALLY DISINTEGRATING ORAL at 21:23

## 2017-12-07 ASSESSMENT — PAIN DESCRIPTION - ONSET
ONSET: ON-GOING
ONSET: ON-GOING

## 2017-12-07 ASSESSMENT — PAIN DESCRIPTION - PROGRESSION
CLINICAL_PROGRESSION: GRADUALLY IMPROVING
CLINICAL_PROGRESSION: GRADUALLY WORSENING

## 2017-12-07 ASSESSMENT — PAIN DESCRIPTION - FREQUENCY
FREQUENCY: CONTINUOUS
FREQUENCY: CONTINUOUS

## 2017-12-07 ASSESSMENT — ENCOUNTER SYMPTOMS
DIARRHEA: 0
EYE DISCHARGE: 0
BACK PAIN: 0
CONSTIPATION: 0
COLOR CHANGE: 0
EYE REDNESS: 0
VOMITING: 0
RHINORRHEA: 0
WHEEZING: 0
SORE THROAT: 0
SHORTNESS OF BREATH: 0
PHOTOPHOBIA: 0
COUGH: 0
ABDOMINAL PAIN: 1
NAUSEA: 1

## 2017-12-07 ASSESSMENT — PAIN SCALES - GENERAL
PAINLEVEL_OUTOF10: 4
PAINLEVEL_OUTOF10: 3
PAINLEVEL_OUTOF10: 7

## 2017-12-07 ASSESSMENT — PAIN DESCRIPTION - PAIN TYPE
TYPE: ACUTE PAIN
TYPE: ACUTE PAIN

## 2017-12-07 ASSESSMENT — PAIN DESCRIPTION - DESCRIPTORS
DESCRIPTORS: ACHING;DULL;DISCOMFORT
DESCRIPTORS: ACHING;DULL;DISCOMFORT

## 2017-12-07 ASSESSMENT — PAIN DESCRIPTION - ORIENTATION
ORIENTATION: MID;LEFT
ORIENTATION: MID;LEFT

## 2017-12-07 ASSESSMENT — PAIN DESCRIPTION - LOCATION
LOCATION: CHEST
LOCATION: CHEST

## 2017-12-07 ASSESSMENT — HEART SCORE: ECG: 0

## 2017-12-07 NOTE — ED TRIAGE NOTES
Pt presents to Ed with a dull aching chest pain that the pt rates 7/10 and states has persisted for approximately two weeks now. Pt A&OX4 and states she had a headache and experienced blurred vision at approximately 1800 this evening. VS, EKG, and assessment completed on arrival. Will continue to monitor.

## 2017-12-08 ENCOUNTER — TELEPHONE (OUTPATIENT)
Dept: CARDIOLOGY CLINIC | Age: 37
End: 2017-12-08

## 2017-12-08 NOTE — ED NOTES
Pt resting in bed. VSS. Respirations even and unlabored. Call light within reach.      Anup Arthur RN  12/07/17 2125

## 2017-12-08 NOTE — ED NOTES
Pt resting in bed and voices no questions, concerns, or needs at this time. Visual acuity screening completed and VS reassessed and stable. Pt updated on POC and provided a warm blanket. Will continue to monitor.       Nikkie Gonzalez RN  12/07/17 1959       Nikkie Gonzalez RN  12/07/17 2000

## 2017-12-08 NOTE — TELEPHONE ENCOUNTER
Patient was in ER on 12/7/17 for chest pains. She was supposed to be seen on 12/8/17 @ 10:00 but she called to cancel appt. She needed to reschedule for an appt time after 1:30, I gave her first available appt and added to wait list.   Please adjust if necessary.

## 2017-12-08 NOTE — ED PROVIDER NOTES
[facility] EMERGENCY DEPT      CHIEF COMPLAINT       Chief Complaint   Patient presents with    Chest Pain       Nurses Notes reviewed and I agree except as noted in the HPI. HISTORY OF PRESENT ILLNESS    Deloris Sands is a 42 y.o. female who presents to the emergency department for evaluation of multiple complaints. The patient states that she has been having intermittent left-sided chest pain for the past 2 weeks. She states that this pain radiates to her back, although she otherwise denies back pain or radiation of her chest pain. She rates this pain as a 7/10 in severity and describes it as a dull and aching discomfort and pressure. The patient has a history of hypertension but otherwise denies a history of heart disease. She does report a positive family history of heart disease. The patient states that she felt short of breath a couple of days ago but has not felt short of breath since that time. The patient states that she has also had intermittent headaches for the past year. She states that these headaches tend to be sharp and focal.  She states that the associated pain will migrate between different location with each headache. She currently complains of pain on the right side of her head. She states that she had blurry vision at the onset of her headache which has since resolved. She states that she began to feel nauseous and had heart palpitations when her headache began today at approximately 1800. She currently denies any palpitations but does report continued nausea. She also reports associated lightheadedness and mild dizziness. She states that these symptoms have occurred with each headache that she has had over the past year. The patient also reports urinary frequency and mild lower abdominal pain but denies vomiting, diarrhea, constipation, blood in her urine or stool, fevers, or chills. She denies any weakness, numbness, or tingling.  She also denies any neck pain or vitals reviewed. Heart Score for chest pain patients  History: Slightly Suspicious  ECG: Normal  Patient Age: < 39 years  *Risk factors for Atherosclerotic disease: Hypercholesterolemia, Hypertension, Positive family History  Risk Factors: > 3 Risk factors or history of atherosclerotic disease*  Troponin: < 1X normal limit  Heart Score Total: 2    DIFFERENTIAL DIAGNOSIS:   Includes but not limited to: ACS, musculoskeletal pain, costochondritis, PE, migraine headache, tension headache    DIAGNOSTIC RESULTS     EKG: All EKG's are interpreted by the Emergency Department Physician who either signs or Co-signs this chart in the absence of a cardiologist.  EKG 12 Lead (Preliminary result)   Component (Lab Inquiry)   Collection Time Result Time Ventricular Rate Atrial Rate P-R Interval QRS Duration Q-T Interval   12/07/17 18:36:33 12/07/17 22:02:18 87 87 142 76 368       Collection Time Result Time QTc Calculation (Bazett) P Axis R Axis T Axis   12/07/17 18:36:33 12/07/17 22:02:18 442 59 39 30       Preliminary result                Narrative:    Normal sinus rhythm  Normal ECG  When compared with ECG of 21-JUL-2017 12:42,  No significant change was found                RADIOLOGY: non-plain film images(s) such as CT, Ultrasound and MRI are read by the radiologist.  Plain radiographic images are visualized and preliminarily interpreted by the emergency physician unless otherwise stated below. CTA Chest W WO Contrast   Final Result   1. No pulmonary emboli or pulmonary infiltrates. 2.  No aortic dissection or aneurysm. 3.  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 This may be a developmental finding. There are no hemodynamically significant stenoses the aortic branch arteries. 4.  Nonobstructing tiny stones in the right kidney. 5.  Mildly enlarged thyroid gland. According to Epic chart patient has history of Hashimoto's thyroiditis.  There are at least 3 nodules in Value    WBC 12.5 (*)     Platelets 544 (*)     Basophils # 0.2 (*)     All other components within normal limits   BASIC METABOLIC PANEL - Abnormal; Notable for the following:     Glucose 109 (*)     All other components within normal limits   HEPATIC FUNCTION PANEL - Abnormal; Notable for the following: Total Bilirubin 0.2 (*)     AST 55 (*)     ALT 80 (*)     All other components within normal limits   URINE WITH REFLEXED MICRO - Abnormal; Notable for the following:     Leukocyte Esterase, Urine SMALL (*)     Character, Urine CLOUDY (*)     All other components within normal limits   URINE CULTURE, REFLEXED    Narrative:     Source: urine, clean catch       Site:           Current Antibiotics: not stated   BRAIN NATRIURETIC PEPTIDE   D-DIMER, QUANTITATIVE   LIPASE   TROPONIN   MAGNESIUM   HCG, SERUM, QUALITATIVE   TSH WITHOUT REFLEX   LACTIC ACID, PLASMA   URINE DRUG SCREEN   CARBOXYHEMOGLOBIN   ANION GAP   GLOMERULAR FILTRATION RATE, ESTIMATED   OSMOLALITY   TROPONIN       EMERGENCY DEPARTMENT COURSE:   Vitals:    Vitals:    12/07/17 1842 12/07/17 1951 12/07/17 2126 12/07/17 2127   BP: (!) 156/102 (!) 151/103 (!) 153/96    Pulse: 87 84  82   Resp:  16 16 15   Temp: 98 °F (36.7 °C)      TempSrc: Oral      SpO2: 100% 99% 100% 99%   Weight: 212 lb (96.2 kg)      Height: 5' 4\" (1.626 m)        The patient was seen and evaluated within the ED today with chest pain and a headache with associated lightheadedness. Within the department, I observed the patient's vital signs to be within acceptable range, and she was afebrile. Patient was hypertensive during her stay, but this was believed to be secondary to pain and her history of HTN. She will need to follow up with her PCP for further BP monitoring. Her physical exam was unremarkable. She had no chest wall tenderness. Lung sounds were clear. There were no noted cranial nerve or focal neurologic deficits. Cranial nerves II through XII are grossly intact.   She had no abdominal tenderness. Radiologic studies within the department revealed no acute intrapulmonary process, infiltrations, effusions, or consolidations. CT of the head revealed no acute hemorrhage, infarct, mass, or other acute cranial or intracranial pathology. CTA of the chest revealed no aortic dissection or aneurysm and no PE. CTA of the abdomen revealed no aortic dissection or aneurysm. 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 This may be a developmental finding. There are no hemodynamically significant stenoses the aortic branch arteries. The patient was informed of this an instructed to follow up with her PCP for further monitoring and management. Mildly enlarged thyroid gland was noted. According to Epic chart, patient has history of Hashimoto's thyroiditis. There are at least 3 nodules in the thyroid gland. The was relayed to the patient, who will follow up with her PCP was instructed for further evaluation and management. Laboratory work revealed a carboxyhemoglobin of 5.6 and WBC count of 12.5. The patient stated that she does have gas heating. She states that there is no one currently in the house. She was placed on 100% O2 non-rebreather. [address] [place] [address],[address] Department was called and is sending the fire department to the patient's house to do an odor inspection. The patient states that she does have an alternate place to stay if there should be an elevated gas level and the gas needs to be shut off. EKG and serial troponin were negative for STEMI or NSTEMI. Within the department, the patient was treated with Nitro, ASA, Benadryl, Antivert, and Zofran. I observed the patient's condition to improve during the duration of the stay. The patient declined possible admission and ACS rule out at this time because she is feeling better. Risks of refusal were discussed, such as the occurrence of a potentially fatal cardiac event.  The patient vocalized understanding of the risks of refusal. I explained my proposed course of treatment to the patient, who was amenable to my treatment and discharge decisions. She was discharged home in stable condition with prescriptions for Tylenol, Antivert, and Zofran, and the patient will return to the ED if the symptoms become more severe in nature or otherwise change. She was otherwise instructed to follow up tomorrow at her next-day cardiology appointment scheduled for 1000 tomorrow morning. I estimate there is LOW risk for SUBARACHNOID HEMORRHAGE, MENINGITIS, INTRACRANIAL HEMORRHAGE, SUBDURAL OR EPIDURAL HEMATOMA, OR STROKE, thus I consider the discharge disposition reasonable. The headache was not thunder-clap or abrupt in onset. There have been no abnormal neurological findings on exam or re-exam. There has been no fever or meningeal signs suspicious for meningitis or intracranial infection. The patient and I have discussed the diagnosis and risks, and we agree with discharging home to follow-up with her primary doctor. We also discussed returning to the Emergency Department immediately if new or worsening symptoms occur. We have discussed the symptoms which are most concerning (e.g., changing or worsening pain, weakness, vomiting, neck stiffness,  fever) that necessitate immediate return. I estimate there is LOW risk for PULMONARY EMBOLISM, ACUTE CORONARY SYNDROME, OR THORACIC AORTIC DISSECTION, thus I consider the discharge disposition reasonable. The patient and I have discussed the diagnosis and risks, and we agree with discharging home to follow-up with her primary doctor. We also discussed returning to the Emergency Department immediately if new or worsening symptoms occur. We have discussed the symptoms which are most concerning (e.g., bloody sputum, fever, worsening pain or shortness of breath, vomiting) that necessitate immediate return.      CRITICAL CARE:

## 2017-12-08 NOTE — ED NOTES
LFD back in room to discuss CO levels found in house with PT. VS reassessed and stable. Pt updated on POC and states she has no further needs, questions, or concerns at this time. Will continue to monitor.      Aurora Arrington RN  12/07/17 2227       Aurora Arrington RN  12/07/17 2221

## 2017-12-09 LAB
ORGANISM: ABNORMAL
URINE CULTURE REFLEX: ABNORMAL

## 2017-12-10 NOTE — PROGRESS NOTES
[facility] PROFESSIONAL SERVS  PHYSICIANS [facility] [address] [place] [employer], [place]  Dept: [phone]  Dept Fax: [phone]      Chief Complaint   Patient presents with    Hypothyroidism       Patient presents for evaluation of Thyroid issues. I saw her 3 weeks ago. This patient is followed regularly by Amado Juarez CNP  Patient was found to have thyromegaly with nodules last December. PCP ordered biopsy of one of the nodules which was negative for malignancy  She has never had any radiation to her chest or neck. There is no family history for thyroid cancer. Generally she feels well  I ordered repeat ultrasound of the thyroid  Patient Active Problem List   Diagnosis    Abdominal pain complicating pregnancy    Essential hypertension    Panic disorder without agoraphobia with moderate panic attacks    Hashimoto's thyroiditis       Current Outpatient Prescriptions   Medication Sig Dispense Refill    FLUoxetine (PROZAC) 20 MG capsule Take 1 capsule by mouth daily for 2 weeks, then take 2 capsules by mouth daily 60 capsule 3    metoprolol tartrate (LOPRESSOR) 25 MG tablet Take 1 tablet by mouth 2 times daily (Patient taking differently: Take 25 mg by mouth daily ) 60 tablet 3    amLODIPine (NORVASC) 10 MG tablet Take 1 tablet by mouth daily 90 tablet 3    acetaminophen (AMINOFEN) 325 MG tablet Take 2 tablets by mouth every 6 hours as needed for Pain 120 tablet 0    meclizine (ANTIVERT) 25 MG tablet Take 1 tablet by mouth 3 times daily as needed for Dizziness 30 tablet 0    ondansetron (ZOFRAN ODT) 4 MG disintegrating tablet Take 1 tablet by mouth every 8 hours as needed for Nausea 20 tablet 0     No current facility-administered medications for this visit. Review of Systems - negative  Blood pressure 114/60, pulse 64, height 5' 4.02\" (1.626 m), weight 213 lb (96.6 kg), not currently breastfeeding.     Physical Examination: Not repeated  Thyroid ultrasound(12-1-17):  Essentially stable appearance of a multinodular goiter. The left thyroid nodule is in the low suspicion category according to the MESHA, due to size it does fulfill criteria for fine-needle aspiration biopsy. It also shows increased    vascularity compared to the prior exam.   The right nodule is in the very low suspicion category, and does not fulfill criteria for fine-needle aspiration biopsy     . 1. Multinodular goiter     2. Essential hypertension     3. Hashimoto's thyroiditis         No orders of the defined types were placed in this encounter. Once again, patient has what appears to be multinodular goiter. Biopsy last December was negative for any cancer.   Follow up here 12 months  Borderline abnormal TFTs in October, TSH was borderline low, T3 low  Clinically, not relevant

## 2017-12-11 ENCOUNTER — CARE COORDINATION (OUTPATIENT)
Dept: CASE MANAGEMENT | Age: 37
End: 2017-12-11

## 2017-12-11 NOTE — CARE COORDINATION
Care Transition  ED Follow up Call    Reason for ED visit:  Chest pain, headache, carbon monoxide poisoning  How are you feeling? good  Status:     significantly improved      Do you have any questions related to your discharge instructions? no    Review of Instructions:    Discharged with new prescription? yes - Antivert    Review Medications:  Yes   Do you have any questions related to your medications? no    Understands what to report/when to return?:  Yes   Do you have a follow up appointment scheduled? Yes  Specific review if indicated (symptom, services, etc): Denies chest pain, headache, dizziness, SOB, blurred vision. Do you have any needs or concerns I can assist you with? No    Summary: Spoke with Deloris, introduced self/role. Denies needs at this time. States she had to cancel appointment with Dr. Rosy Bruner on 12/8/17. Appointment with Dr. Rosy Bruner rescheduled, also on wait list. Has follow up with PCP on 12/14/17. Encouraged patient to call providers promptly with any changes/concerns. Verbalized understanding.       FU appts/Provider:    Future Appointments  Date Time Provider Ravi Mccann   12/14/2017 2:40 PM Barrett Denver, CNP [address]   12/26/2017 3:15 PM Maria Esther Napoles MD [facility] [address]   12/4/2018 1:15 PM Ciara Navarrete MD [facility] [address]

## 2017-12-20 ENCOUNTER — OFFICE VISIT (OUTPATIENT)
Dept: FAMILY MEDICINE CLINIC | Age: 37
End: 2017-12-20
Payer: COMMERCIAL

## 2017-12-20 VITALS
TEMPERATURE: 99.3 F | SYSTOLIC BLOOD PRESSURE: 126 MMHG | RESPIRATION RATE: 12 BRPM | BODY MASS INDEX: 35.32 KG/M2 | HEIGHT: 65 IN | WEIGHT: 212 LBS | DIASTOLIC BLOOD PRESSURE: 82 MMHG | HEART RATE: 80 BPM

## 2017-12-20 DIAGNOSIS — F41.0 PANIC DISORDER WITHOUT AGORAPHOBIA WITH MODERATE PANIC ATTACKS: Primary | ICD-10-CM

## 2017-12-20 PROCEDURE — G8417 CALC BMI ABV UP PARAM F/U: HCPCS | Performed by: NURSE PRACTITIONER

## 2017-12-20 PROCEDURE — G8484 FLU IMMUNIZE NO ADMIN: HCPCS | Performed by: NURSE PRACTITIONER

## 2017-12-20 PROCEDURE — 99213 OFFICE O/P EST LOW 20 MIN: CPT | Performed by: NURSE PRACTITIONER

## 2017-12-20 PROCEDURE — 1036F TOBACCO NON-USER: CPT | Performed by: NURSE PRACTITIONER

## 2017-12-20 PROCEDURE — G8427 DOCREV CUR MEDS BY ELIG CLIN: HCPCS | Performed by: NURSE PRACTITIONER

## 2017-12-20 RX ORDER — VENLAFAXINE HYDROCHLORIDE 75 MG/1
75 CAPSULE, EXTENDED RELEASE ORAL DAILY
Qty: 30 CAPSULE | Refills: 3 | Status: SHIPPED | OUTPATIENT
Start: 2017-12-20 | End: 2018-04-12

## 2017-12-20 RX ORDER — CLONAZEPAM 0.5 MG/1
TABLET ORAL
Qty: 15 TABLET | Refills: 0 | Status: SHIPPED | OUTPATIENT
Start: 2017-12-20 | End: 2018-04-12 | Stop reason: ALTCHOICE

## 2017-12-20 NOTE — PROGRESS NOTES
Chief Complaint   Patient presents with    Follow-up     anxiety  about the same     Discuss Labs     C.T scan        History obtained from chart review and the patient. SUBJECTIVE:  Gerri Sanchez is a 42 y.o. female that presents today f/u anxiety    Was recently in the ER with CP and found that she had elevated levels of carbon monoxide. She had the fire dept out and they tested it and found elevated levels in the chimney. The turned the gas off, it has since been fixed. Had CT abdomen and pelvic and chest which showed 50% stenosis origin SMA. Is still taking the Prozac 40 mg but doesn't feel like it is helping. Has panic attacks probably 2-3 times/week. Denies feeling depressed. Anxious about every other day, triggers for anxiety are kids, finances, going out and doing things. Denies feeling SI/HI. Tried Zoloft but she didn't feel like it was helping, and didn't like the way it made her feel. No side effects from the Prozac. Age/Gender Health Maintenance    Lipid - needs  DM Screen - needs  Colon Cancer Screening - n/a  Lung Cancer Screening (Age 54 to [de-identified] with 30 pack year hx, current smoker or quit within past 15 years) - n/a    Tetanus - needs  Influenza Vaccine - needs  Pneumonia Vaccine - n/a  Zostavax - n/a   HPV Vaccine - n/a    Breast Cancer Screening - n/a  Cervical Cancer Screening - per OBDr. Pepe  Osteoporosis Screening - n/a  Chlamydia Screen - n/a    PSA testing discussion - n/a  AAA Screening - n/a    Falls screening - n/a    Current Outpatient Prescriptions   Medication Sig Dispense Refill    venlafaxine (EFFEXOR XR) 75 MG extended release capsule Take 1 capsule by mouth daily 30 capsule 3    clonazePAM (KLONOPIN) 0.5 MG tablet Take 1/2 tablet to 1 full tablet by mouth daily prn for panic attacks.  15 tablet 0    acetaminophen (AMINOFEN) 325 MG tablet Take 2 tablets by mouth every 6 hours as needed for Pain 120 tablet 0    ondansetron (ZOFRAN ODT) 4 MG disintegrating  Diabetes Mother     Heart Disease Mother     High Blood Pressure Father     Diabetes Father          I have reviewed the patient's past medical history, past surgical history, allergies, medications, social and family history and I have made updates where appropriate. Review of Systems  Positive responses are highlighted in bold    Constitutional:  Fever, Chills, Night Sweats, Fatigue, Unexpected changes in weight  Eyes:  Eye discharge, Eye pain, Eye redness, Visual disturbances   HENT:  Ear pain, Tinnitus, Nosebleeds, Trouble swallowing, Hearing loss, Sore throat  Cardiovascular:  Chest Pain, Palpitations, Orthopnea, Paroxysmal Nocturnal Dyspnea  Respiratory:  Cough, Wheezing, Shortness of breath, Chest tightness, Apnea  Gastrointestinal:  Nausea, Vomiting, Diarrhea, Constipation, Heartburn, Blood in stool  Genitourinary:  Difficulty or painful urination, Flank pain, Change in frequency, Urgency  Skin:  Color change, Rash, Itching, Wound  Psychiatric:  Hallucinations, Anxiety, Depression, Suicidal ideation  Hematological:  Enlarged glands, Easy bleeding, Easily bruising  Musculoskeletal:  Joint pain, Back pain, Gait problems, Joint swelling, Myalgias  Neurological:  Dizziness, Headaches, Presyncope, Numbness, Seizures, Tremors  Allergy:  Environmental allergies, Food allergies  Endocrine:  Heat Intolerance, Cold Intolerance, Polydipsia, Polyphagia, Polyuria    Lab Results   Component Value Date    TSH 0.644 12/07/2017       CTA chest  1.  No pulmonary emboli or pulmonary infiltrates. 2.  No aortic dissection or aneurysm. 3.  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 This may be a developmental finding. There are no hemodynamically significant stenoses the aortic branch arteries. 4.  Nonobstructing tiny stones in the right kidney. 5.  Mildly enlarged thyroid gland. According to Epic chart patient has history of Hashimoto's thyroiditis.  There are at least 3 nodules in the thyroid gland. Follow-up as clinically indicated. PHYSICAL EXAM:  Vitals:    12/20/17 1427   BP: 126/82   Pulse: 80   Resp: 12   Temp: 99.3 °F (37.4 °C)   TempSrc: Oral   Weight: 212 lb (96.2 kg)   Height: 5' 4.5\" (1.638 m)     Body mass index is 35.83 kg/m². VS Reviewed  General Appearance: A&O x 3, No acute distress,well developed and well- nourished  Head: normocephalic and atraumatic  Eyes: pupils equal, round, and reactive to light, extraocular eye movements intact, conjunctivae and eye lids without erythema  Neck: supple and non-tender without mass, mild thyromegaly but no thyroid nodules, no cervical lymphadenopathy  Pulmonary/Chest: clear to auscultation bilaterally- no wheezes, rales or rhonchi, normal air movement, no respiratory distress or retractions  Cardiovascular: S1 and S2 auscultated w/ RRR. No murmurs, rubs, clicks, or gallops, distal pulses intact. Abdomen: soft, non-tender, non-distended, bowl sounds physiologic,  no rebound or guarding, no masses or hernias noted. Liver and spleen without enlargement. Extremities: no cyanosis, clubbing or edema of the lower extremities. Musculoskeletal: No joint swelling or gross deformity   Neuro:  Alert, 5/5 strength globally and symmetrically  Psych: Affect and mood are normal. Thought process is normal. Good insight and appropriate interaction. Cognition and memory appear to be intact. Skin: warm and dry, no rash or erythema  Lymph:  No cervical, auricular or supraclavicular lymph nodes palpated    ASSESSMENT & PLAN  Alis House was seen today for follow-up and discuss labs. Diagnoses and all orders for this visit:    Panic disorder without agoraphobia with moderate panic attacks  -     venlafaxine (EFFEXOR XR) 75 MG extended release capsule; Take 1 capsule by mouth daily  -     clonazePAM (KLONOPIN) 0.5 MG tablet; Take 1/2 tablet to 1 full tablet by mouth daily prn for panic attacks.        Stop Prozac, had tried

## 2017-12-26 ENCOUNTER — OFFICE VISIT (OUTPATIENT)
Dept: CARDIOLOGY CLINIC | Age: 37
End: 2017-12-26
Payer: COMMERCIAL

## 2017-12-26 VITALS
DIASTOLIC BLOOD PRESSURE: 86 MMHG | HEIGHT: 64 IN | WEIGHT: 214 LBS | SYSTOLIC BLOOD PRESSURE: 132 MMHG | BODY MASS INDEX: 36.54 KG/M2 | HEART RATE: 62 BPM

## 2017-12-26 DIAGNOSIS — R07.89 CHEST PAIN, ATYPICAL: Primary | ICD-10-CM

## 2017-12-26 DIAGNOSIS — R00.2 INTERMITTENT PALPITATIONS: ICD-10-CM

## 2017-12-26 DIAGNOSIS — Z82.49 FAMILY HISTORY OF EARLY CAD: ICD-10-CM

## 2017-12-26 PROCEDURE — 99204 OFFICE O/P NEW MOD 45 MIN: CPT | Performed by: INTERNAL MEDICINE

## 2017-12-26 PROCEDURE — 1036F TOBACCO NON-USER: CPT | Performed by: INTERNAL MEDICINE

## 2017-12-26 PROCEDURE — G8417 CALC BMI ABV UP PARAM F/U: HCPCS | Performed by: INTERNAL MEDICINE

## 2017-12-26 PROCEDURE — G8427 DOCREV CUR MEDS BY ELIG CLIN: HCPCS | Performed by: INTERNAL MEDICINE

## 2017-12-26 PROCEDURE — G8484 FLU IMMUNIZE NO ADMIN: HCPCS | Performed by: INTERNAL MEDICINE

## 2017-12-26 RX ORDER — ASPIRIN 81 MG/1
81 TABLET ORAL DAILY
Qty: 30 TABLET | Refills: 3 | Status: SHIPPED | OUTPATIENT
Start: 2017-12-26 | End: 2018-10-24

## 2017-12-26 NOTE — PROGRESS NOTES
 venlafaxine (EFFEXOR XR) 75 MG extended release capsule Take 1 capsule by mouth daily 30 capsule 3    clonazePAM (KLONOPIN) 0.5 MG tablet Take 1/2 tablet to 1 full tablet by mouth daily prn for panic attacks. 15 tablet 0    acetaminophen (AMINOFEN) 325 MG tablet Take 2 tablets by mouth every 6 hours as needed for Pain 120 tablet 0    metoprolol tartrate (LOPRESSOR) 25 MG tablet Take 1 tablet by mouth 2 times daily (Patient taking differently: Take 25 mg by mouth daily ) 60 tablet 3    amLODIPine (NORVASC) 10 MG tablet Take 1 tablet by mouth daily 90 tablet 3     No current facility-administered medications for this visit. Review of Systems -     General ROS: negative  Psychological ROS: negative  Hematological and Lymphatic ROS: No history of blood clots or bleeding disorder. Respiratory ROS: no cough, shortness of breath, or wheezing  Cardiovascular ROS: no chest pain or dyspnea on exertion  Gastrointestinal ROS: negative  Genito-Urinary ROS: no dysuria, trouble voiding, or hematuria  Musculoskeletal ROS: negative  Neurological ROS: no TIA or stroke symptoms  Dermatological ROS: negative      Blood pressure 132/86, pulse 62, height 5' 4\" (1.626 m), weight 214 lb (97.1 kg), last menstrual period 12/17/2017, not currently breastfeeding.         Physical Examination:    General appearance - alert, well appearing, and in no distress  Mental status - alert, oriented to person, place, and time  Neck - supple, no significant adenopathy, no JVD, or carotid bruits  Chest - clear to auscultation, no wheezes, rales or rhonchi, symmetric air entry  Heart - normal rate, regular rhythm, normal S1, S2, no murmurs, rubs, clicks or gallops  Abdomen - soft, nontender, nondistended, no masses or organomegaly  Neurological - alert, oriented, normal speech, no focal findings or movement disorder noted  Musculoskeletal - no joint tenderness, deformity or swelling  Extremities - peripheral pulses normal, no pedal edema, Terry Cantu

## 2018-01-11 ENCOUNTER — HOSPITAL ENCOUNTER (OUTPATIENT)
Dept: NON INVASIVE DIAGNOSTICS | Age: 38
Discharge: HOME | End: 2018-01-11
Payer: COMMERCIAL

## 2018-01-11 VITALS — WEIGHT: 210 LBS | HEIGHT: 64 IN | BODY MASS INDEX: 35.85 KG/M2

## 2018-01-11 DIAGNOSIS — R00.2 INTERMITTENT PALPITATIONS: ICD-10-CM

## 2018-01-11 DIAGNOSIS — R07.89 CHEST PAIN, ATYPICAL: ICD-10-CM

## 2018-01-11 DIAGNOSIS — Z82.49 FAMILY HISTORY OF EARLY CAD: ICD-10-CM

## 2018-01-11 LAB
LV EF: 65 %
LV EF: 67 %
LVEF MODALITY: NORMAL
LVEF MODALITY: NORMAL

## 2018-01-11 PROCEDURE — 93226 XTRNL ECG REC<48 HR SCAN A/R: CPT

## 2018-01-11 PROCEDURE — 78452 HT MUSCLE IMAGE SPECT MULT: CPT

## 2018-01-11 PROCEDURE — 3430000000 HC RX DIAGNOSTIC RADIOPHARMACEUTICAL: Performed by: INTERNAL MEDICINE

## 2018-01-11 PROCEDURE — A9500 TC99M SESTAMIBI: HCPCS | Performed by: INTERNAL MEDICINE

## 2018-01-11 PROCEDURE — 93017 CV STRESS TEST TRACING ONLY: CPT

## 2018-01-11 PROCEDURE — 93225 XTRNL ECG REC<48 HRS REC: CPT

## 2018-01-11 PROCEDURE — 93306 TTE W/DOPPLER COMPLETE: CPT

## 2018-01-11 RX ADMIN — Medication 8.4 MILLICURIE: at 10:50

## 2018-01-11 RX ADMIN — Medication 28.7 MILLICURIE: at 12:16

## 2018-01-18 ENCOUNTER — OFFICE VISIT (OUTPATIENT)
Dept: CARDIOLOGY CLINIC | Age: 38
End: 2018-01-18
Payer: COMMERCIAL

## 2018-01-18 VITALS
HEIGHT: 64 IN | SYSTOLIC BLOOD PRESSURE: 160 MMHG | DIASTOLIC BLOOD PRESSURE: 92 MMHG | BODY MASS INDEX: 36.29 KG/M2 | HEART RATE: 70 BPM | WEIGHT: 212.6 LBS

## 2018-01-18 DIAGNOSIS — R00.2 INTERMITTENT PALPITATIONS: Primary | ICD-10-CM

## 2018-01-18 DIAGNOSIS — I10 ESSENTIAL HYPERTENSION: ICD-10-CM

## 2018-01-18 PROBLEM — R07.89 CHEST PAIN, ATYPICAL: Status: RESOLVED | Noted: 2017-12-26 | Resolved: 2018-01-18

## 2018-01-18 PROCEDURE — G8417 CALC BMI ABV UP PARAM F/U: HCPCS | Performed by: INTERNAL MEDICINE

## 2018-01-18 PROCEDURE — G8484 FLU IMMUNIZE NO ADMIN: HCPCS | Performed by: INTERNAL MEDICINE

## 2018-01-18 PROCEDURE — G8427 DOCREV CUR MEDS BY ELIG CLIN: HCPCS | Performed by: INTERNAL MEDICINE

## 2018-01-18 PROCEDURE — 99213 OFFICE O/P EST LOW 20 MIN: CPT | Performed by: INTERNAL MEDICINE

## 2018-01-18 PROCEDURE — 1036F TOBACCO NON-USER: CPT | Performed by: INTERNAL MEDICINE

## 2018-01-18 NOTE — PROGRESS NOTES
Chief Complaint   Patient presents with    Follow Up After Procedure     echo and holter   Patient here for a follow up from the hospital - chest pains 6 weeks back and sent to me    Patient is here to f/[facility] echo and holter. Denies cp, sob. NO cp in the last 4 week    C/o palpitations, dizzy, lightheaded and IDRIS from standing too long. Intermittent palpitations     NO sob or dizziness    FHX    Mother  at 46 - had sudden death at age 46  Father had CAD in mid 42's and  at age 55    Nonsmoker    Reviewed the ER records       Patient Active Problem List   Diagnosis    Abdominal pain complicating pregnancy    Essential hypertension    Panic disorder without agoraphobia with moderate panic attacks    Hashimoto's thyroiditis    Family history of early CAD    Intermittent palpitations       Past Surgical History:   Procedure Laterality Date     SECTION  ,        Allergies   Allergen Reactions    Bactrim         Family History   Problem Relation Age of Onset    High Blood Pressure Mother     Diabetes Mother     Heart Disease Mother     High Blood Pressure Father     Diabetes Father         Social History     Social History    Marital status: Single     Spouse name: N/A    Number of children: N/A    Years of education: N/A     Occupational History    Not on file.      Social History Main Topics    Smoking status: Never Smoker    Smokeless tobacco: Never Used    Alcohol use Yes      Comment: social    Drug use: No    Sexual activity: Not on file     Other Topics Concern    Not on file     Social History Narrative    No narrative on file       Current Outpatient Prescriptions   Medication Sig Dispense Refill    aspirin EC 81 MG EC tablet Take 1 tablet by mouth daily 30 tablet 3    venlafaxine (EFFEXOR XR) 75 MG extended release capsule Take 1 capsule by mouth daily 30 capsule 3    clonazePAM (KLONOPIN) 0.5 MG tablet Take 1/2 tablet to 1 full tablet by mouth daily

## 2018-02-27 ENCOUNTER — OFFICE VISIT (OUTPATIENT)
Dept: FAMILY MEDICINE CLINIC | Age: 38
End: 2018-02-27
Payer: COMMERCIAL

## 2018-02-27 VITALS
RESPIRATION RATE: 10 BRPM | HEART RATE: 63 BPM | TEMPERATURE: 99.3 F | SYSTOLIC BLOOD PRESSURE: 128 MMHG | WEIGHT: 213 LBS | HEIGHT: 64 IN | DIASTOLIC BLOOD PRESSURE: 96 MMHG | BODY MASS INDEX: 36.37 KG/M2

## 2018-02-27 DIAGNOSIS — I10 ESSENTIAL HYPERTENSION: Primary | ICD-10-CM

## 2018-02-27 DIAGNOSIS — F41.0 PANIC DISORDER WITHOUT AGORAPHOBIA WITH MODERATE PANIC ATTACKS: ICD-10-CM

## 2018-02-27 PROCEDURE — 1036F TOBACCO NON-USER: CPT | Performed by: NURSE PRACTITIONER

## 2018-02-27 PROCEDURE — G8482 FLU IMMUNIZE ORDER/ADMIN: HCPCS | Performed by: NURSE PRACTITIONER

## 2018-02-27 PROCEDURE — G8417 CALC BMI ABV UP PARAM F/U: HCPCS | Performed by: NURSE PRACTITIONER

## 2018-02-27 PROCEDURE — G8428 CUR MEDS NOT DOCUMENT: HCPCS | Performed by: NURSE PRACTITIONER

## 2018-02-27 PROCEDURE — 99214 OFFICE O/P EST MOD 30 MIN: CPT | Performed by: NURSE PRACTITIONER

## 2018-02-27 RX ORDER — METOPROLOL SUCCINATE 25 MG/1
25 TABLET, EXTENDED RELEASE ORAL DAILY
Qty: 90 TABLET | Refills: 3 | Status: SHIPPED | OUTPATIENT
Start: 2018-02-27 | End: 2018-04-12 | Stop reason: SDUPTHER

## 2018-02-27 NOTE — PROGRESS NOTES
Visit Information    Have you changed or started any medications since your last visit including any over-the-counter medicines, vitamins, or herbal medicines? no   Are you having any side effects from any of your medications? -  no  Have you stopped taking any of your medications? Is so, why? -  no    Have you seen any other physician or provider since your last visit? No  Have you had any other diagnostic tests since your last visit? No  Have you been seen in the emergency room and/or had an admission to a hospital since we last saw you? No  Have you had your routine dental cleaning in the past 6 months? no    Have you activated your [employer] account? If not, what are your barriers? Yes     Patient Care Team:  Vivi Marks CNP as PCP - General  Vivi Marks CNP as PCP - [facility] Attributed Provider    Medical History Review  Past Medical, Family, and Social History     Defer to provider.

## 2018-03-06 ENCOUNTER — HOSPITAL ENCOUNTER (OUTPATIENT)
Age: 38
Discharge: HOME | End: 2018-03-06
Payer: COMMERCIAL

## 2018-03-06 PROCEDURE — 87510 GARDNER VAG DNA DIR PROBE: CPT

## 2018-03-06 PROCEDURE — 87591 N.GONORRHOEAE DNA AMP PROB: CPT

## 2018-03-06 PROCEDURE — 87480 CANDIDA DNA DIR PROBE: CPT

## 2018-03-06 PROCEDURE — 87491 CHLMYD TRACH DNA AMP PROBE: CPT

## 2018-03-06 PROCEDURE — 87660 TRICHOMONAS VAGIN DIR PROBE: CPT

## 2018-03-09 LAB
C. TRACHOMATIS DNA ,URINE: NEGATIVE
CANDIDA SPECIES, DNA PROBE: NEGATIVE
GARDNERELLA VAGINALIS, DNA PROBE: NEGATIVE
GONORRHOEAE URINE PROBE: NEGATIVE
SPECIMEN SOURCE: NORMAL
SPECIMEN SOURCE: NORMAL
TRICHOMONAS VAGINALIS DNA: NEGATIVE

## 2018-03-20 VITALS — WEIGHT: 209 LBS | BODY MASS INDEX: 35.87 KG/M2

## 2018-04-12 ENCOUNTER — OFFICE VISIT (OUTPATIENT)
Dept: FAMILY MEDICINE CLINIC | Age: 38
End: 2018-04-12
Payer: COMMERCIAL

## 2018-04-12 VITALS
RESPIRATION RATE: 16 BRPM | TEMPERATURE: 99.2 F | DIASTOLIC BLOOD PRESSURE: 84 MMHG | WEIGHT: 212.8 LBS | HEIGHT: 64 IN | BODY MASS INDEX: 36.33 KG/M2 | HEART RATE: 80 BPM | SYSTOLIC BLOOD PRESSURE: 142 MMHG

## 2018-04-12 DIAGNOSIS — I10 ESSENTIAL HYPERTENSION: Primary | ICD-10-CM

## 2018-04-12 DIAGNOSIS — R00.2 HEART PALPITATIONS: ICD-10-CM

## 2018-04-12 PROCEDURE — G8417 CALC BMI ABV UP PARAM F/U: HCPCS | Performed by: NURSE PRACTITIONER

## 2018-04-12 PROCEDURE — 99214 OFFICE O/P EST MOD 30 MIN: CPT | Performed by: NURSE PRACTITIONER

## 2018-04-12 PROCEDURE — G8427 DOCREV CUR MEDS BY ELIG CLIN: HCPCS | Performed by: NURSE PRACTITIONER

## 2018-04-12 PROCEDURE — 1036F TOBACCO NON-USER: CPT | Performed by: NURSE PRACTITIONER

## 2018-04-12 RX ORDER — METOPROLOL SUCCINATE 50 MG/1
50 TABLET, EXTENDED RELEASE ORAL DAILY
Qty: 30 TABLET | Refills: 5 | Status: SHIPPED | OUTPATIENT
Start: 2018-04-12 | End: 2018-05-10 | Stop reason: SDUPTHER

## 2018-05-10 ENCOUNTER — OFFICE VISIT (OUTPATIENT)
Dept: FAMILY MEDICINE CLINIC | Age: 38
End: 2018-05-10
Payer: COMMERCIAL

## 2018-05-10 VITALS
BODY MASS INDEX: 36.02 KG/M2 | DIASTOLIC BLOOD PRESSURE: 82 MMHG | HEIGHT: 64 IN | TEMPERATURE: 99.3 F | WEIGHT: 211 LBS | HEART RATE: 70 BPM | RESPIRATION RATE: 12 BRPM | SYSTOLIC BLOOD PRESSURE: 130 MMHG

## 2018-05-10 DIAGNOSIS — I10 ESSENTIAL HYPERTENSION: ICD-10-CM

## 2018-05-10 PROCEDURE — G8427 DOCREV CUR MEDS BY ELIG CLIN: HCPCS | Performed by: NURSE PRACTITIONER

## 2018-05-10 PROCEDURE — 1036F TOBACCO NON-USER: CPT | Performed by: NURSE PRACTITIONER

## 2018-05-10 PROCEDURE — 99213 OFFICE O/P EST LOW 20 MIN: CPT | Performed by: NURSE PRACTITIONER

## 2018-05-10 PROCEDURE — G8417 CALC BMI ABV UP PARAM F/U: HCPCS | Performed by: NURSE PRACTITIONER

## 2018-05-10 RX ORDER — METOPROLOL SUCCINATE 50 MG/1
50 TABLET, EXTENDED RELEASE ORAL DAILY
Qty: 90 TABLET | Refills: 3 | Status: SHIPPED | OUTPATIENT
Start: 2018-05-10 | End: 2019-01-08 | Stop reason: SDUPTHER

## 2018-05-22 ENCOUNTER — HOSPITAL ENCOUNTER (OUTPATIENT)
Dept: WOMENS IMAGING | Age: 38
Discharge: HOME | End: 2018-05-22
Payer: COMMERCIAL

## 2018-05-22 DIAGNOSIS — Z12.31 VISIT FOR SCREENING MAMMOGRAM: ICD-10-CM

## 2018-05-22 PROCEDURE — 77063 BREAST TOMOSYNTHESIS BI: CPT

## 2018-07-23 ENCOUNTER — TELEPHONE (OUTPATIENT)
Dept: FAMILY MEDICINE CLINIC | Age: 38
End: 2018-07-23

## 2018-07-23 NOTE — TELEPHONE ENCOUNTER
I would say that if she was taking the necessary precautions at work to protect herself, then she should be fine to wait until tomorrow. No headache, light sensitivity? fevers? Just the neck pain? Just because she has pain in her neck doesn't mean she has meningitis.

## 2018-07-23 NOTE — TELEPHONE ENCOUNTER
Patient has been exposed to bacterial meningitis (exposed at [facility] where she works by a patient) she has some neck pain now which started yesterday. No fever. Is able to touch chin to chest.  She works until about 2 today. I put her on schedule for tomorrow afternoon since schedule full today. Would jo want to work in today instead?

## 2018-07-23 NOTE — TELEPHONE ENCOUNTER
Left detailed message on machine. Waiting for pt to return call and let us know if she has any other symptoms  Ok per signed hipaa

## 2018-07-24 ENCOUNTER — OFFICE VISIT (OUTPATIENT)
Dept: FAMILY MEDICINE CLINIC | Age: 38
End: 2018-07-24
Payer: COMMERCIAL

## 2018-07-24 VITALS
TEMPERATURE: 98.8 F | DIASTOLIC BLOOD PRESSURE: 76 MMHG | HEIGHT: 64 IN | WEIGHT: 218 LBS | BODY MASS INDEX: 37.22 KG/M2 | HEART RATE: 68 BPM | RESPIRATION RATE: 16 BRPM | SYSTOLIC BLOOD PRESSURE: 122 MMHG

## 2018-07-24 DIAGNOSIS — S16.1XXA STRAIN OF NECK MUSCLE, INITIAL ENCOUNTER: Primary | ICD-10-CM

## 2018-07-24 PROCEDURE — 1036F TOBACCO NON-USER: CPT | Performed by: NURSE PRACTITIONER

## 2018-07-24 PROCEDURE — 99213 OFFICE O/P EST LOW 20 MIN: CPT | Performed by: NURSE PRACTITIONER

## 2018-07-24 PROCEDURE — G8427 DOCREV CUR MEDS BY ELIG CLIN: HCPCS | Performed by: NURSE PRACTITIONER

## 2018-07-24 PROCEDURE — G8417 CALC BMI ABV UP PARAM F/U: HCPCS | Performed by: NURSE PRACTITIONER

## 2018-07-24 PROCEDURE — 96372 THER/PROPH/DIAG INJ SC/IM: CPT | Performed by: NURSE PRACTITIONER

## 2018-07-24 RX ORDER — TIZANIDINE 4 MG/1
4 TABLET ORAL EVERY 8 HOURS PRN
Qty: 30 TABLET | Refills: 0 | Status: SHIPPED | OUTPATIENT
Start: 2018-07-24 | End: 2018-12-12

## 2018-07-24 RX ORDER — KETOROLAC TROMETHAMINE 30 MG/ML
30 INJECTION, SOLUTION INTRAMUSCULAR; INTRAVENOUS ONCE
Status: COMPLETED | OUTPATIENT
Start: 2018-07-24 | End: 2018-07-24

## 2018-07-24 RX ORDER — PREDNISONE 20 MG/1
40 TABLET ORAL DAILY
Qty: 6 TABLET | Refills: 0 | Status: SHIPPED | OUTPATIENT
Start: 2018-07-24 | End: 2018-07-27

## 2018-07-24 RX ADMIN — KETOROLAC TROMETHAMINE 30 MG: 30 INJECTION, SOLUTION INTRAMUSCULAR; INTRAVENOUS at 15:06

## 2018-07-24 NOTE — PATIENT INSTRUCTIONS
You may receive a survey about your visit with us today. The feedback from our patients helps us identify what is working well and where the service to all patients can be enhanced. Thank you! Patient Education        Neck Spasm: Exercises  Your Care Instructions  Here are some examples of typical rehabilitation exercises for your condition. Start each exercise slowly. Ease off the exercise if you start to have pain. Your doctor or physical therapist will tell you when you can start these exercises and which ones will work best for you. How to do the exercises  Levator scapula stretch    1. Sit in a firm chair, or stand up straight. 2. Gently tilt your head toward your left shoulder. 3. Turn your head to look down into your armpit, bending your head slightly forward. Let the weight of your head stretch your neck muscles. 4. Hold for 15 to 30 seconds. 5. Return to your starting position. 6. Follow the same instructions above, but tilt your head toward your right shoulder. 7. Repeat 2 to 4 times toward each shoulder. Upper trapezius stretch    1. Sit in a firm chair, or stand up straight. 2. This stretch works best if you keep your shoulder down as you lean away from it. To help you remember to do this, start by relaxing your shoulders and lightly holding on to your thighs or your chair. 3. Tilt your head toward your shoulder and hold for 15 to 30 seconds. Let the weight of your head stretch your muscles. 4. If you would like a little added stretch, place your arm behind your back. Use the arm opposite of the direction you are tilting your head. For example, if you are tilting your head to the left, place your right arm behind your back. 5. Repeat 2 to 4 times toward each shoulder. Neck rotation    1. Sit in a firm chair, or stand up straight. 2. Keeping your chin level, turn your head to the right, and hold for 15 to 30 seconds. 3. Turn your head to the left, and hold for 15 to 30 seconds.   4. Repeat

## 2018-07-24 NOTE — TELEPHONE ENCOUNTER
Left detailed message informing pt that she should be ok to wait until her appt today as long as she used necessary precautions at work to protect herself. Instructed her to contact our office if needing to be sooner and to keep her appt for today.  (ok per signed HIPAA)

## 2018-07-24 NOTE — PROGRESS NOTES
After obtaining consent, and per orders of Chirag Grimaldo, injection of depo medrol 20 mg I.M. given in right deltoid by Marco A Vines LPN. Patient instructed to report any adverse reaction to me immediately.
After obtaining consent, and per orders of Mark Leon , injection of Toradol 30 mg I.m. given in left deltoid by Yunier Godfrey LPN.  Patient instructed to report any adverse reaction to me immediately
(TORADOL) injection 30 mg; Inject 1 mL into the muscle once  -     MethylPREDNISolone Acetate SUSP 20 mg; Inject 20 mg into the muscle once  -     predniSONE (DELTASONE) 20 MG tablet; Take 2 tablets by mouth daily for 3 days  -     tiZANidine (ZANAFLEX) 4 MG tablet; Take 1 tablet by mouth every 8 hours as needed (neck pain, muscle spasm)      - heating pad  - con't ibuprofen  - neck stretches/exercises  - benefits, risks and SE discussed    Return if symptoms worsen or fail to improve. Deloris received counseling on the following healthy behaviors: medication adherence  Reviewed prior labs and health maintenance. Continue current medications, diet and exercise. Discussed use, benefit, and side effects of prescribed medications. Barriers to medication compliance addressed. Patient given educational materials - see patient instructions. All patient questions answered. Patient voiced understanding.

## 2018-08-22 DIAGNOSIS — I10 ESSENTIAL HYPERTENSION: ICD-10-CM

## 2018-08-22 RX ORDER — AMLODIPINE BESYLATE 10 MG/1
TABLET ORAL
Qty: 90 TABLET | Refills: 3 | Status: SHIPPED | OUTPATIENT
Start: 2018-08-22 | End: 2019-01-08 | Stop reason: SDUPTHER

## 2018-09-05 ENCOUNTER — OFFICE VISIT (OUTPATIENT)
Dept: FAMILY MEDICINE CLINIC | Age: 38
End: 2018-09-05
Payer: COMMERCIAL

## 2018-09-05 VITALS
HEIGHT: 64 IN | TEMPERATURE: 98.8 F | BODY MASS INDEX: 37.8 KG/M2 | WEIGHT: 221.4 LBS | SYSTOLIC BLOOD PRESSURE: 136 MMHG | RESPIRATION RATE: 12 BRPM | HEART RATE: 82 BPM | DIASTOLIC BLOOD PRESSURE: 84 MMHG

## 2018-09-05 DIAGNOSIS — K21.9 GASTROESOPHAGEAL REFLUX DISEASE, ESOPHAGITIS PRESENCE NOT SPECIFIED: Primary | ICD-10-CM

## 2018-09-05 DIAGNOSIS — R10.2 PELVIC PAIN: ICD-10-CM

## 2018-09-05 DIAGNOSIS — M54.50 ACUTE RIGHT-SIDED LOW BACK PAIN WITHOUT SCIATICA: ICD-10-CM

## 2018-09-05 LAB
BILIRUBIN, POC: NEGATIVE
BLOOD URINE, POC: NEGATIVE
CHLAMYDIA TRACHOMATIS BY RT-PCR: NOT DETECTED
CLARITY, POC: CLEAR
COLOR, POC: YELLOW
CONTROL: NORMAL
CT/NG SOURCE: NORMAL
GLUCOSE URINE, POC: NEGATIVE
KETONES, POC: NEGATIVE
LEUKOCYTE EST, POC: NEGATIVE
NEISSERIA GONORRHOEAE BY RT-PCR: NOT DETECTED
NITRITE, POC: NEGATIVE
PH, POC: 6
PREGNANCY TEST URINE, POC: NEGATIVE
PROTEIN, POC: NORMAL
SPECIFIC GRAVITY, POC: >=1.03
UROBILINOGEN, POC: NORMAL

## 2018-09-05 PROCEDURE — G8427 DOCREV CUR MEDS BY ELIG CLIN: HCPCS | Performed by: NURSE PRACTITIONER

## 2018-09-05 PROCEDURE — G8417 CALC BMI ABV UP PARAM F/U: HCPCS | Performed by: NURSE PRACTITIONER

## 2018-09-05 PROCEDURE — 81025 URINE PREGNANCY TEST: CPT | Performed by: NURSE PRACTITIONER

## 2018-09-05 PROCEDURE — 99214 OFFICE O/P EST MOD 30 MIN: CPT | Performed by: NURSE PRACTITIONER

## 2018-09-05 PROCEDURE — 81003 URINALYSIS AUTO W/O SCOPE: CPT | Performed by: NURSE PRACTITIONER

## 2018-09-05 PROCEDURE — 96372 THER/PROPH/DIAG INJ SC/IM: CPT | Performed by: NURSE PRACTITIONER

## 2018-09-05 PROCEDURE — 1036F TOBACCO NON-USER: CPT | Performed by: NURSE PRACTITIONER

## 2018-09-05 RX ORDER — PREDNISONE 20 MG/1
40 TABLET ORAL DAILY
Qty: 10 TABLET | Refills: 0 | Status: SHIPPED | OUTPATIENT
Start: 2018-09-05 | End: 2018-09-10

## 2018-09-05 RX ORDER — OMEPRAZOLE 20 MG/1
20 CAPSULE, DELAYED RELEASE ORAL DAILY
Qty: 30 CAPSULE | Refills: 3 | Status: SHIPPED | OUTPATIENT
Start: 2018-09-05 | End: 2018-10-24

## 2018-09-05 RX ORDER — METHYLPREDNISOLONE ACETATE 40 MG/ML
40 INJECTION, SUSPENSION INTRA-ARTICULAR; INTRALESIONAL; INTRAMUSCULAR; SOFT TISSUE ONCE
Status: COMPLETED | OUTPATIENT
Start: 2018-09-05 | End: 2018-09-05

## 2018-09-05 RX ORDER — KETOROLAC TROMETHAMINE 30 MG/ML
30 INJECTION, SOLUTION INTRAMUSCULAR; INTRAVENOUS ONCE
Status: COMPLETED | OUTPATIENT
Start: 2018-09-05 | End: 2018-09-05

## 2018-09-05 RX ADMIN — KETOROLAC TROMETHAMINE 30 MG: 30 INJECTION, SOLUTION INTRAMUSCULAR; INTRAVENOUS at 14:34

## 2018-09-05 RX ADMIN — METHYLPREDNISOLONE ACETATE 40 MG: 40 INJECTION, SUSPENSION INTRA-ARTICULAR; INTRALESIONAL; INTRAMUSCULAR; SOFT TISSUE at 14:34

## 2018-09-05 NOTE — PROGRESS NOTES
Allergies   Allergen Reactions    Bactrim        Social History     Social History    Marital status: Single     Spouse name: N/A    Number of children: N/A    Years of education: N/A     Occupational History    Not on file. Social History Main Topics    Smoking status: Never Smoker    Smokeless tobacco: Never Used    Alcohol use Yes      Comment: social    Drug use: No    Sexual activity: Not on file     Other Topics Concern    Not on file     Social History Narrative    No narrative on file       Family History   Problem Relation Age of Onset    High Blood Pressure Mother     Diabetes Mother     Heart Disease Mother     High Blood Pressure Father     Diabetes Father          I have reviewed the patient's past medical history, past surgical history, allergies, medications, social and family history and I have made updates where appropriate.       Review of Systems  Positive responses are highlighted in bold    Constitutional:  Fever, Chills, Night Sweats, Fatigue, Unexpected changes in weight  Eyes:  Eye discharge, Eye pain, Eye redness, Visual disturbances   HENT:  Ear pain, Tinnitus, Nosebleeds, Trouble swallowing, Hearing loss, Sore throat  Cardiovascular:  Chest Pain, Palpitations, Orthopnea, Paroxysmal Nocturnal Dyspnea  Respiratory:  Cough, Wheezing, Shortness of breath, Chest tightness, Apnea  Gastrointestinal:  Nausea, Vomiting, Diarrhea, Constipation, Heartburn, Blood in stool  Genitourinary:  Difficulty or painful urination, Flank pain, Change in frequency, Urgency  Skin:  Color change, Rash, Itching, Wound  Psychiatric:  Hallucinations, Anxiety, Depression, Suicidal ideation  Hematological:  Enlarged glands, Easy bleeding, Easily bruising  Musculoskeletal:  Joint pain, Back pain, Gait problems, Joint swelling, Myalgias  Neurological:  Dizziness, Headaches, Presyncope, Numbness, Seizures, Tremors  Allergy:  Environmental allergies, Food allergies  Endocrine:  Heat Intolerance, Cold Intolerance, Polydipsia, Polyphagia, Polyuria    Lab Results   Component Value Date    TSH 0.644 12/07/2017       PHYSICAL EXAM:  Vitals:    09/05/18 1403 09/05/18 1426 09/05/18 1435   BP: (!) 148/86 (!) 144/88 136/84   Pulse: 82     Resp: 12     Temp: 98.8 °F (37.1 °C)     Weight: 221 lb 6.4 oz (100.4 kg)     Height: 5' 4\" (1.626 m)       Body mass index is 38 kg/m². VS Reviewed  General Appearance: A&O x 3, No acute distress,well developed and well- nourished  Head: normocephalic and atraumatic  Eyes: pupils equal, round, and reactive to light, extraocular eye movements intact, conjunctivae and eye lids without erythema  Neck: supple and non-tender without mass, mild thyromegaly but no thyroid nodules, no cervical lymphadenopathy  Pulmonary/Chest: clear to auscultation bilaterally- no wheezes, rales or rhonchi, normal air movement, no respiratory distress or retractions  Cardiovascular: S1 and S2 auscultated w/ RRR. No murmurs, rubs, clicks, or gallops, distal pulses intact. Abdomen: soft, tender suprapubic/pelvic areas, non-distended, bowl sounds physiologic,  no rebound or guarding, no masses or hernias noted. Liver and spleen without enlargement. Extremities: no cyanosis, clubbing or edema of the lower extremities. Musculoskeletal: No joint swelling or gross deformity   Lumbar: + right sided low back pain and muscle spasm, negative straight leg maneuver, neurovascularly intact bilateral lower extremities  Neuro:  Alert, 5/5 strength globally and symmetrically  Psych: Affect and mood are normal. Thought process is normal. Good insight and appropriate interaction. Cognition and memory appear to be intact. Skin: warm and dry, no rash or erythema  Lymph:  No cervical, auricular or supraclavicular lymph nodes palpated    ASSESSMENT & PLAN  Deloris was seen today for back pain and gastroesophageal reflux.     Diagnoses and all orders for this visit:    Gastroesophageal reflux disease, esophagitis presence not specified  -     omeprazole (PRILOSEC) 20 MG delayed release capsule; Take 1 capsule by mouth Daily    Pelvic pain  -     POCT Urinalysis No Micro (Auto)  -     C. Trachomatis / N. Gonorrhoeae, DNA Probe; Future  -     Urine Culture; Future  -     POCT urine pregnancy  -     US NON OB TRANSVAGINAL; Future    Acute right-sided low back pain without sciatica  -     ketorolac (TORADOL) injection 30 mg; Inject 1 mL into the muscle once  -     methylPREDNISolone acetate (DEPO-MEDROL) injection 40 mg; Inject 1 mL into the muscle once  -     predniSONE (DELTASONE) 20 MG tablet; Take 2 tablets by mouth daily for 5 days       - urinalysis clean, urine preg negative  - send urine for GC/chlamydia  - US pelvis  - Start PPI  - advised to take Zanaflex (already has Rx for this) and start prednisone  - home exercises/stretches  - f/u if no improvement, if abdominal pain worsens, go to ED    DISPOSITION    Return if symptoms worsen or fail to improve. Deloris released without restrictions. PATIENT COUNSELING    Counseling was provided today regarding the following topics: Healthy eating habits, Regular exercise, substance abuse and healthy sleep habits. Deloris received counseling on the following healthy behaviors: nutrition, exercise and medication adherence    Patient given educational materials on: See Attached    I have instructed Deloris to complete a self tracking handout on Blood Pressures  and instructed them to bring it with them to her next appointment. Barriers to learning and self management: none    Discussed use, benefit, and side effects of prescribed medications. Barriers to medication compliance addressed. All patient questions answered. Pt voiced understanding.        Electronically signed by MARÍA Badillo CNP on 9/5/2018 at 2:47 PM

## 2018-09-05 NOTE — PATIENT INSTRUCTIONS
the wall to straighten your knee. You should feel a gentle stretch down the back of your leg. 3. Hold the stretch for at least 15 to 30 seconds. Do not arch your back, point your toes, or bend either knee. Keep one heel touching the floor and the other heel touching the wall. 4. Repeat with your other leg. 5. Do 2 to 4 times for each leg. Hip flexor stretch    1. Kneel on the floor with one knee bent and one leg behind you. Place your forward knee over your foot. Keep your other knee touching the floor. 2. Slowly push your hips forward until you feel a stretch in the upper thigh of your rear leg. 3. Hold the stretch for at least 15 to 30 seconds. Repeat with your other leg. 4. Do 2 to 4 times on each side. Wall sit    1. Stand with your back 10 to 12 inches away from a wall. 2. Lean into the wall until your back is flat against it. 3. Slowly slide down until your knees are slightly bent, pressing your lower back into the wall. 4. Hold for about 6 seconds, then slide back up the wall. 5. Repeat 8 to 12 times. Follow-up care is a key part of your treatment and safety. Be sure to make and go to all appointments, and call your doctor if you are having problems. It's also a good idea to know your test results and keep a list of the medicines you take. Where can you learn more? Go to https://[employer]light[employer].poLight. org and sign in to your [employer] account. Enter Y660 in the [facility] box to learn more about \"Low Back Pain: Exercises. \"     If you do not have an account, please click on the \"Sign Up Now\" link. Current as of: November 29, 2017  Content Version: 11.7  © 1843-8284 [employer], [employer]orporated. Care instructions adapted under license by [facility] ([facility]). If you have questions about a medical condition or this instruction, always ask your healthcare professional. [address] any warranty or liability for your use of this information.           Sciatica: Exercises  Your Care Instructions  Here are some examples of typical rehabilitation exercises for your condition. Start each exercise slowly. Ease off the exercise if you start to have pain. Your doctor or physical therapist will tell you when you can start these exercises and which ones will work best for you. When you are not being active, find a comfortable position for rest. Some people are comfortable on the floor or a medium-firm bed with a small pillow under their head and another under their knees. Some people prefer to lie on their side with a pillow between their knees. Don't stay in one position for too long. Take short walks (10 to 20 minutes) every 2 to 3 hours. Avoid slopes, hills, and stairs until you feel better. Walk only distances you can manage without pain, especially leg pain. How to do the exercises  Back stretches    5. Get down on your hands and knees on the floor. 6. Relax your head and allow it to droop. Round your back up toward the ceiling until you feel a nice stretch in your upper, middle, and lower back. Hold this stretch for as long as it feels comfortable, or about 15 to 30 seconds. 7. Return to the starting position with a flat back while you are on your hands and knees. 8. Let your back sway by pressing your stomach toward the floor. Lift your buttocks toward the ceiling. 9. Hold this position for 15 to 30 seconds. 10. Repeat 2 to 4 times. Follow-up care is a key part of your treatment and safety. Be sure to make and go to all appointments, and call your doctor if you are having problems. It's also a good idea to know your test results and keep a list of the medicines you take. Where can you learn more? Go to [email or web address]://darby.[email or web address] org and sign in to your CSID account. Enter Z157 in the [employer] box to learn more about \"Sciatica: Exercises. \"     If you do not have an account, please click on the \"Sign Up Now\" link.   Current as of:

## 2018-09-06 ENCOUNTER — TELEPHONE (OUTPATIENT)
Dept: FAMILY MEDICINE CLINIC | Age: 38
End: 2018-09-06

## 2018-09-06 LAB
ORGANISM: ABNORMAL
URINE CULTURE REFLEX: ABNORMAL

## 2018-09-06 NOTE — TELEPHONE ENCOUNTER
----- Message from Rand Friday, MARÍA - CNP sent at 9/6/2018  4:11 PM EDT -----  Let Cecy Maverick know her urine culture came back normal, no growth of bacteria.

## 2018-09-10 ENCOUNTER — HOSPITAL ENCOUNTER (OUTPATIENT)
Dept: ULTRASOUND IMAGING | Age: 38
Discharge: HOME | End: 2018-09-10
Payer: COMMERCIAL

## 2018-09-10 DIAGNOSIS — R10.2 PELVIC PAIN: ICD-10-CM

## 2018-09-10 PROCEDURE — 76830 TRANSVAGINAL US NON-OB: CPT

## 2018-09-11 ENCOUNTER — TELEPHONE (OUTPATIENT)
Dept: FAMILY MEDICINE CLINIC | Age: 38
End: 2018-09-11

## 2018-09-11 NOTE — LETTER
[address]  [address] [address]  Phone: [phone]  Fax: [phone]    Julio Dillon CNP        September 12, 2018    [address] [place] [id number]      Dear Melvin Barajasing:    We have made several attempts to contact you by phone and have   been unsuccessful. Please call our office at your earliest convenience  At [phone] opt [phone].       Thank you,            Julio Dillon CNP

## 2018-10-24 ENCOUNTER — HOSPITAL ENCOUNTER (EMERGENCY)
Age: 38
Discharge: HOME | End: 2018-10-24
Payer: COMMERCIAL

## 2018-10-24 ENCOUNTER — APPOINTMENT (OUTPATIENT)
Dept: GENERAL RADIOLOGY | Age: 38
End: 2018-10-24
Payer: COMMERCIAL

## 2018-10-24 ENCOUNTER — APPOINTMENT (OUTPATIENT)
Dept: CT IMAGING | Age: 38
End: 2018-10-24
Payer: COMMERCIAL

## 2018-10-24 VITALS
SYSTOLIC BLOOD PRESSURE: 162 MMHG | DIASTOLIC BLOOD PRESSURE: 94 MMHG | HEIGHT: 64 IN | RESPIRATION RATE: 19 BRPM | OXYGEN SATURATION: 98 % | TEMPERATURE: 98.5 F | WEIGHT: 215 LBS | HEART RATE: 78 BPM | BODY MASS INDEX: 36.7 KG/M2

## 2018-10-24 DIAGNOSIS — R07.89 MUSCULOSKELETAL CHEST PAIN: Primary | ICD-10-CM

## 2018-10-24 LAB
ALBUMIN SERPL-MCNC: 4 G/DL (ref 3.5–5.1)
ALP BLD-CCNC: 78 U/L (ref 38–126)
ALT SERPL-CCNC: 33 U/L (ref 11–66)
AMPHETAMINE+METHAMPHETAMINE URINE SCREEN: NEGATIVE
ANION GAP SERPL CALCULATED.3IONS-SCNC: 14 MEQ/L (ref 8–16)
AST SERPL-CCNC: 22 U/L (ref 5–40)
BARBITURATE QUANTITATIVE URINE: NEGATIVE
BASOPHILS # BLD: 0.3 %
BASOPHILS ABSOLUTE: 0 THOU/MM3 (ref 0–0.1)
BENZODIAZEPINE QUANTITATIVE URINE: NEGATIVE
BILIRUB SERPL-MCNC: < 0.2 MG/DL (ref 0.3–1.2)
BILIRUBIN DIRECT: < 0.2 MG/DL (ref 0–0.3)
BILIRUBIN URINE: NEGATIVE
BLOOD, URINE: NEGATIVE
BUN BLDV-MCNC: 14 MG/DL (ref 7–22)
CALCIUM SERPL-MCNC: 9.2 MG/DL (ref 8.5–10.5)
CANNABINOID QUANTITATIVE URINE: NEGATIVE
CHARACTER, URINE: CLEAR
CHLORIDE BLD-SCNC: 105 MEQ/L (ref 98–111)
CO2: 22 MEQ/L (ref 23–33)
COCAINE METABOLITE QUANTITATIVE URINE: NEGATIVE
COLOR: YELLOW
CREAT SERPL-MCNC: 0.6 MG/DL (ref 0.4–1.2)
D-DIMER QUANTITATIVE: 323 NG/ML FEU (ref 0–500)
EKG ATRIAL RATE: 85 BPM
EKG P AXIS: 64 DEGREES
EKG P-R INTERVAL: 138 MS
EKG Q-T INTERVAL: 378 MS
EKG QRS DURATION: 76 MS
EKG QTC CALCULATION (BAZETT): 449 MS
EKG R AXIS: 40 DEGREES
EKG T AXIS: 34 DEGREES
EKG VENTRICULAR RATE: 85 BPM
EOSINOPHIL # BLD: 0.7 %
EOSINOPHILS ABSOLUTE: 0.1 THOU/MM3 (ref 0–0.4)
ERYTHROCYTE [DISTWIDTH] IN BLOOD BY AUTOMATED COUNT: 13.2 % (ref 11.5–14.5)
ERYTHROCYTE [DISTWIDTH] IN BLOOD BY AUTOMATED COUNT: 41.3 FL (ref 35–45)
GFR SERPL CREATININE-BSD FRML MDRD: > 90 ML/MIN/1.73M2
GLUCOSE BLD-MCNC: 156 MG/DL (ref 70–108)
GLUCOSE URINE: NEGATIVE MG/DL
HCT VFR BLD CALC: 34.1 % (ref 37–47)
HEMOGLOBIN: 11.2 GM/DL (ref 12–16)
IMMATURE GRANS (ABS): 0.04 THOU/MM3 (ref 0–0.07)
IMMATURE GRANULOCYTES: 0.3 %
KETONES, URINE: NEGATIVE
LEUKOCYTE ESTERASE, URINE: NEGATIVE
LIPASE: 42.7 U/L (ref 5.6–51.3)
LYMPHOCYTES # BLD: 27.2 %
LYMPHOCYTES ABSOLUTE: 3.3 THOU/MM3 (ref 1–4.8)
MCH RBC QN AUTO: 28.3 PG (ref 26–33)
MCHC RBC AUTO-ENTMCNC: 32.8 GM/DL (ref 32.2–35.5)
MCV RBC AUTO: 86.1 FL (ref 81–99)
MONOCYTES # BLD: 6.2 %
MONOCYTES ABSOLUTE: 0.8 THOU/MM3 (ref 0.4–1.3)
NITRITE, URINE: NEGATIVE
NUCLEATED RED BLOOD CELLS: 0 /100 WBC
OPIATES, URINE: NEGATIVE
OSMOLALITY CALCULATION: 284.9 MOSMOL/KG (ref 275–300)
OXYCODONE: NEGATIVE
PH UA: 5.5
PHENCYCLIDINE QUANTITATIVE URINE: NEGATIVE
PLATELET # BLD: 491 THOU/MM3 (ref 130–400)
PMV BLD AUTO: 9.8 FL (ref 9.4–12.4)
POTASSIUM SERPL-SCNC: 3.6 MEQ/L (ref 3.5–5.2)
PREGNANCY, SERUM: NEGATIVE
PROTEIN UA: NEGATIVE
RBC # BLD: 3.96 MILL/MM3 (ref 4.2–5.4)
SEG NEUTROPHILS: 65.3 %
SEGMENTED NEUTROPHILS ABSOLUTE COUNT: 8 THOU/MM3 (ref 1.8–7.7)
SODIUM BLD-SCNC: 141 MEQ/L (ref 135–145)
SPECIFIC GRAVITY, URINE: 1.03 (ref 1–1.03)
TOTAL PROTEIN: 7 G/DL (ref 6.1–8)
TROPONIN T: < 0.01 NG/ML
TROPONIN T: < 0.01 NG/ML
UROBILINOGEN, URINE: 0.2 EU/DL
WBC # BLD: 12.2 THOU/MM3 (ref 4.8–10.8)

## 2018-10-24 PROCEDURE — 6360000004 HC RX CONTRAST MEDICATION: Performed by: PHYSICIAN ASSISTANT

## 2018-10-24 PROCEDURE — 83690 ASSAY OF LIPASE: CPT

## 2018-10-24 PROCEDURE — 74174 CTA ABD&PLVS W/CONTRAST: CPT

## 2018-10-24 PROCEDURE — 93010 ELECTROCARDIOGRAM REPORT: CPT | Performed by: INTERNAL MEDICINE

## 2018-10-24 PROCEDURE — 80053 COMPREHEN METABOLIC PANEL: CPT

## 2018-10-24 PROCEDURE — 84703 CHORIONIC GONADOTROPIN ASSAY: CPT

## 2018-10-24 PROCEDURE — 96374 THER/PROPH/DIAG INJ IV PUSH: CPT

## 2018-10-24 PROCEDURE — 82248 BILIRUBIN DIRECT: CPT

## 2018-10-24 PROCEDURE — 71046 X-RAY EXAM CHEST 2 VIEWS: CPT

## 2018-10-24 PROCEDURE — 81003 URINALYSIS AUTO W/O SCOPE: CPT

## 2018-10-24 PROCEDURE — 99285 EMERGENCY DEPT VISIT HI MDM: CPT

## 2018-10-24 PROCEDURE — 80307 DRUG TEST PRSMV CHEM ANLYZR: CPT

## 2018-10-24 PROCEDURE — 85379 FIBRIN DEGRADATION QUANT: CPT

## 2018-10-24 PROCEDURE — 6370000000 HC RX 637 (ALT 250 FOR IP): Performed by: PHYSICIAN ASSISTANT

## 2018-10-24 PROCEDURE — 85025 COMPLETE CBC W/AUTO DIFF WBC: CPT

## 2018-10-24 PROCEDURE — 71275 CT ANGIOGRAPHY CHEST: CPT

## 2018-10-24 PROCEDURE — 84484 ASSAY OF TROPONIN QUANT: CPT

## 2018-10-24 PROCEDURE — 36415 COLL VENOUS BLD VENIPUNCTURE: CPT

## 2018-10-24 PROCEDURE — 6360000002 HC RX W HCPCS: Performed by: PHYSICIAN ASSISTANT

## 2018-10-24 PROCEDURE — 93005 ELECTROCARDIOGRAM TRACING: CPT | Performed by: PHYSICIAN ASSISTANT

## 2018-10-24 RX ORDER — PANTOPRAZOLE SODIUM 40 MG/1
40 TABLET, DELAYED RELEASE ORAL ONCE
Status: COMPLETED | OUTPATIENT
Start: 2018-10-24 | End: 2018-10-24

## 2018-10-24 RX ORDER — KETOROLAC TROMETHAMINE 30 MG/ML
30 INJECTION, SOLUTION INTRAMUSCULAR; INTRAVENOUS ONCE
Status: COMPLETED | OUTPATIENT
Start: 2018-10-24 | End: 2018-10-24

## 2018-10-24 RX ORDER — ONDANSETRON 4 MG/1
4 TABLET, ORALLY DISINTEGRATING ORAL ONCE
Status: COMPLETED | OUTPATIENT
Start: 2018-10-24 | End: 2018-10-24

## 2018-10-24 RX ORDER — IBUPROFEN 600 MG/1
600 TABLET ORAL EVERY 6 HOURS PRN
Qty: 30 TABLET | Refills: 0 | Status: ON HOLD | OUTPATIENT
Start: 2018-10-24 | End: 2019-02-11 | Stop reason: DRUGHIGH

## 2018-10-24 RX ORDER — SUCRALFATE 1 G/1
1 TABLET ORAL ONCE
Status: COMPLETED | OUTPATIENT
Start: 2018-10-24 | End: 2018-10-24

## 2018-10-24 RX ADMIN — LIDOCAINE HYDROCHLORIDE: 20 SOLUTION ORAL; TOPICAL at 19:01

## 2018-10-24 RX ADMIN — IOPAMIDOL 90 ML: 755 INJECTION, SOLUTION INTRAVENOUS at 19:47

## 2018-10-24 RX ADMIN — ONDANSETRON 4 MG: 4 TABLET, ORALLY DISINTEGRATING ORAL at 19:00

## 2018-10-24 RX ADMIN — SUCRALFATE 1 G: 1 TABLET ORAL at 19:00

## 2018-10-24 RX ADMIN — PANTOPRAZOLE SODIUM 40 MG: 40 TABLET, DELAYED RELEASE ORAL at 19:00

## 2018-10-24 RX ADMIN — KETOROLAC TROMETHAMINE 30 MG: 30 INJECTION, SOLUTION INTRAMUSCULAR at 20:55

## 2018-10-24 ASSESSMENT — ENCOUNTER SYMPTOMS
ABDOMINAL PAIN: 0
EYE REDNESS: 0
SORE THROAT: 0
EYE DISCHARGE: 0
VOMITING: 0
NAUSEA: 1
COUGH: 0
CONSTIPATION: 0
COLOR CHANGE: 0
DIARRHEA: 0
RHINORRHEA: 0
BACK PAIN: 1
WHEEZING: 0
SHORTNESS OF BREATH: 0

## 2018-10-24 ASSESSMENT — PAIN DESCRIPTION - ORIENTATION
ORIENTATION: MID
ORIENTATION: MID

## 2018-10-24 ASSESSMENT — HEART SCORE: ECG: 1

## 2018-10-24 ASSESSMENT — PAIN DESCRIPTION - PAIN TYPE
TYPE: ACUTE PAIN
TYPE: ACUTE PAIN

## 2018-10-24 ASSESSMENT — PAIN SCALES - GENERAL
PAINLEVEL_OUTOF10: 6
PAINLEVEL_OUTOF10: 6

## 2018-10-24 ASSESSMENT — PAIN DESCRIPTION - LOCATION
LOCATION: CHEST;BACK
LOCATION: CHEST
LOCATION: CHEST

## 2018-10-24 ASSESSMENT — PAIN DESCRIPTION - DESCRIPTORS: DESCRIPTORS: CONSTANT;SQUEEZING

## 2018-10-24 NOTE — ED NOTES
Patient presents to ED with complaint of mid chest pain radiates into back started last night. Patient complains of nausea. Patient complains of chest pain with deep breaths. Respirations unlabored. Lung sound clear, dim in bases. Patient denies cough. EKG obtained. Patient placed on cardiac monitor.      Dianna Reyes RN  10/24/18 4683

## 2018-10-24 NOTE — ED PROVIDER NOTES
difficulty urinating, dysuria, hematuria and vaginal discharge. Musculoskeletal: Positive for back pain (radiating from chest). Negative for arthralgias, myalgias, neck pain and neck stiffness. Skin: Negative for color change, pallor and rash. Neurological: Negative for dizziness, syncope, weakness, light-headedness, numbness and headaches. Hematological: Negative for adenopathy. Psychiatric/Behavioral: Negative for agitation, confusion, dysphoric mood and suicidal ideas. The patient is not nervous/anxious. Heart Score for chest pain patients  History: Slightly Suspicious  ECG: Non-Specifc repolarization disturbance/LBTB/PM  Patient Age: < 39 years  *Risk factors for Atherosclerotic disease: Positive family History, Hypertension  Risk Factors: 1 or 2 risk factors  Troponin: < 1X normal limit  Heart Score Total: 2    PAST MEDICAL HISTORY    has a past medical history of Essential hypertension; Hashimoto's thyroiditis; HIGH CHOLESTEROL; Hypokalemia; and Subclinical hyperthyroidism. SURGICAL HISTORY      has a past surgical history that includes  section (, ). CURRENT MEDICATIONS       Previous Medications    AMLODIPINE (NORVASC) 10 MG TABLET    take 1 tablet by mouth once daily    METOPROLOL SUCCINATE (TOPROL XL) 50 MG EXTENDED RELEASE TABLET    Take 1 tablet by mouth daily    TIZANIDINE (ZANAFLEX) 4 MG TABLET    Take 1 tablet by mouth every 8 hours as needed (neck pain, muscle spasm)    UNKNOWN TO PATIENT    daily Birth Control       ALLERGIES     is allergic to bactrim. FAMILY HISTORY     indicated that her mother is . She indicated that her father is . family history includes Diabetes in her father and mother; Heart Disease in her mother; High Blood Pressure in her father and mother. SOCIAL HISTORY      reports that she has never smoked. She has never used smokeless tobacco. She reports that she drinks alcohol.  She reports that she does not use drugs.    PHYSICAL EXAM     INITIAL VITALS:  height is 5' 4\" (1.626 m) and weight is 215 lb (97.5 kg). Her oral temperature is 98.5 °F (36.9 °C). Her blood pressure is 162/94 (abnormal) and her pulse is 78. Her respiration is 19 and oxygen saturation is 98%. Physical Exam   Constitutional: She is oriented to person, place, and time. She appears well-developed and well-nourished. No distress. HENT:   Head: Normocephalic and atraumatic. Right Ear: External ear normal.   Left Ear: External ear normal.   Nose: Nose normal.   Mouth/Throat: Oropharynx is clear and moist.   Eyes: Pupils are equal, round, and reactive to light. Conjunctivae and EOM are normal. Right eye exhibits no discharge. Left eye exhibits no discharge. No scleral icterus. Neck: Normal range of motion. Neck supple. Cardiovascular: Normal rate, regular rhythm, normal heart sounds, intact distal pulses and normal pulses. Exam reveals no gallop and no friction rub. No murmur heard. Pulmonary/Chest: Effort normal and breath sounds normal. No respiratory distress. She has no wheezes. She has no rales. She exhibits no tenderness. Abdominal: Soft. Bowel sounds are normal. She exhibits no distension and no mass. There is no tenderness. There is no rebound and no guarding. No hernia. Musculoskeletal: Normal range of motion. She exhibits no edema, tenderness or deformity. Cervical back: Normal. She exhibits normal range of motion, no tenderness, no swelling, no deformity, no pain and no spasm. Thoracic back: Normal. She exhibits normal range of motion, no tenderness, no swelling, no deformity, no pain and no spasm. Lumbar back: Normal. She exhibits normal range of motion, no tenderness, no swelling, no deformity, no pain and no spasm. There was no midline spinal or paraspinal tenderness of the cervical, thoracic, or lumbar spine. Lymphadenopathy:     She has no cervical adenopathy.    Neurological: She is alert and the thoracic aorta. 2. No acute intrathoracic findings. Final report electronically signed by Dr. Lelia Prabhakar on 10/24/2018 8:32 PM      XR CHEST STANDARD (2 VW)   Final Result   No acute disease. **This report has been created using voice recognition software. It may contain minor errors which are inherent in voice recognition technology. **      Final report electronically signed by Dr. Sheldon Kee on 10/24/2018 7:09 PM          LABS:     Labs Reviewed   CBC WITH AUTO DIFFERENTIAL - Abnormal; Notable for the following:        Result Value    WBC 12.2 (*)     RBC 3.96 (*)     Hemoglobin 11.2 (*)     Hematocrit 34.1 (*)     Platelets 583 (*)     Segs Absolute 8.0 (*)     All other components within normal limits   BASIC METABOLIC PANEL - Abnormal; Notable for the following:     CO2 22 (*)     Glucose 156 (*)     All other components within normal limits   HEPATIC FUNCTION PANEL - Abnormal; Notable for the following: Total Bilirubin <0.2 (*)     All other components within normal limits   D-DIMER, QUANTITATIVE   LIPASE   TROPONIN   URINE DRUG SCREEN   URINE RT REFLEX TO CULTURE   HCG, SERUM, QUALITATIVE   ANION GAP   GLOMERULAR FILTRATION RATE, ESTIMATED   OSMOLALITY   TROPONIN       EMERGENCY DEPARTMENT COURSE:   Vitals:    Vitals:    10/24/18 1750 10/24/18 1852 10/24/18 1928 10/24/18 2027   BP: (!) 162/93 (!) 145/90 (!) 148/72 (!) 162/94   Pulse: 88 81 83 78   Resp: 16 18 18 19   Temp: 98.5 °F (36.9 °C)      TempSrc: Oral      SpO2: 97% 100% 100% 98%   Weight: 215 lb (97.5 kg)      Height: 5' 4\" (1.626 m)          6:33 PM: The patient was seen and evaluated. MDM:  The patient was seen and evaluated within the ED today with midsternal chest pain that radiates to her back. Within the department, I observed the patient's vital signs to be within acceptable range. On exam, I appreciated clear lung sounds. There was no chest wall or abdominal tenderness.  There was no midline spinal or discharged in stable condition. Will return if symptoms change or worsen, or for any sign or symptom deemed emergent by the patient or family members. Follow up as an outpatient or sooner if symptoms warrant. PATIENT REFERRED TO:  MARÍA Maurer CNP  [address]  [address] [id number] [phone]    Call in 2 days  As needed, If symptoms worsen    [facility]  [address]  Go on 10/25/2018  As needed, If symptoms worsen, For re-check at 10:30 AM      DISCHARGE MEDICATIONS:  New Prescriptions    IBUPROFEN (ADVIL;MOTRIN) 600 MG TABLET    Take 1 tablet by mouth every 6 hours as needed for Pain       (Please note that portions of this note were completed with a voice recognition program.  Efforts weremade to edit the dictations but occasionally words aremis-transcribed.)    The patient was given an opportunity to see the Emergency Attending. Thepatient voiced understanding that I was a Mid-LevelProvider and was in agreement with being seen independently by myself. Scribe:  Kellie Crockett 10/24/18 6:33 PM Scribing for and in the presence of Gerson Brown PA-C. Signed by: Yony Joy, 10/24/18 9:11 PM    Provider:  I personallyperformed the services described in the documentation,reviewed and edited the documentation which was dictated to the scribe in my presence, and it accurately records my words and actions.     Gerson Brown PA-C 10/24/18 9:11 PM       Gerson Brown PA-C  10/24/18 1767

## 2018-11-02 ENCOUNTER — OFFICE VISIT (OUTPATIENT)
Dept: FAMILY MEDICINE CLINIC | Age: 38
End: 2018-11-02
Payer: COMMERCIAL

## 2018-11-02 VITALS
WEIGHT: 219 LBS | DIASTOLIC BLOOD PRESSURE: 82 MMHG | RESPIRATION RATE: 16 BRPM | HEART RATE: 76 BPM | TEMPERATURE: 98.3 F | SYSTOLIC BLOOD PRESSURE: 122 MMHG | HEIGHT: 66 IN | BODY MASS INDEX: 35.2 KG/M2

## 2018-11-02 DIAGNOSIS — M54.16 LUMBAR RADICULOPATHY: Primary | ICD-10-CM

## 2018-11-02 PROCEDURE — 1036F TOBACCO NON-USER: CPT | Performed by: NURSE PRACTITIONER

## 2018-11-02 PROCEDURE — G8484 FLU IMMUNIZE NO ADMIN: HCPCS | Performed by: NURSE PRACTITIONER

## 2018-11-02 PROCEDURE — 99213 OFFICE O/P EST LOW 20 MIN: CPT | Performed by: NURSE PRACTITIONER

## 2018-11-02 PROCEDURE — G8427 DOCREV CUR MEDS BY ELIG CLIN: HCPCS | Performed by: NURSE PRACTITIONER

## 2018-11-02 PROCEDURE — G8417 CALC BMI ABV UP PARAM F/U: HCPCS | Performed by: NURSE PRACTITIONER

## 2018-11-02 ASSESSMENT — PATIENT HEALTH QUESTIONNAIRE - PHQ9
2. FEELING DOWN, DEPRESSED OR HOPELESS: 0
1. LITTLE INTEREST OR PLEASURE IN DOING THINGS: 0
SUM OF ALL RESPONSES TO PHQ QUESTIONS 1-9: 0
SUM OF ALL RESPONSES TO PHQ QUESTIONS 1-9: 0
SUM OF ALL RESPONSES TO PHQ9 QUESTIONS 1 & 2: 0

## 2018-11-02 NOTE — PATIENT INSTRUCTIONS
Exercises  Your Care Instructions  Here are some examples of typical rehabilitation exercises for your condition. Start each exercise slowly. Ease off the exercise if you start to have pain. Your doctor or physical therapist will tell you when you can start these exercises and which ones will work best for you. When you are not being active, find a comfortable position for rest. Some people are comfortable on the floor or a medium-firm bed with a small pillow under their head and another under their knees. Some people prefer to lie on their side with a pillow between their knees. Don't stay in one position for too long. Take short walks (10 to 20 minutes) every 2 to 3 hours. Avoid slopes, hills, and stairs until you feel better. Walk only distances you can manage without pain, especially leg pain. How to do the exercises  Back stretches    5. Get down on your hands and knees on the floor. 6. Relax your head and allow it to droop. Round your back up toward the ceiling until you feel a nice stretch in your upper, middle, and lower back. Hold this stretch for as long as it feels comfortable, or about 15 to 30 seconds. 7. Return to the starting position with a flat back while you are on your hands and knees. 8. Let your back sway by pressing your stomach toward the floor. Lift your buttocks toward the ceiling. 9. Hold this position for 15 to 30 seconds. 10. Repeat 2 to 4 times. Follow-up care is a key part of your treatment and safety. Be sure to make and go to all appointments, and call your doctor if you are having problems. It's also a good idea to know your test results and keep a list of the medicines you take. Where can you learn more? Go to https://darby.[employer]. org and sign in to your [employer] account. Enter T435 in the [employer] box to learn more about \"Sciatica: Exercises. \"     If you do not have an account, please click on the \"Sign Up Now\" link.   Current as of: November 29, 2017  Content Version: 11.7  © 8378-9744 [employer] Incorporated. Care instructions adapted under license by [facility] ([facility]). If you have questions about a medical condition or this instruction, always ask your healthcare professional. [address] any warranty or liability for your use of this information.

## 2018-11-20 ENCOUNTER — TELEPHONE (OUTPATIENT)
Dept: FAMILY MEDICINE CLINIC | Age: 38
End: 2018-11-20

## 2018-11-20 DIAGNOSIS — M54.16 LUMBAR RADICULOPATHY: Primary | ICD-10-CM

## 2018-12-12 ENCOUNTER — HOSPITAL ENCOUNTER (EMERGENCY)
Age: 38
Discharge: HOME | End: 2018-12-12
Attending: FAMILY MEDICINE
Payer: COMMERCIAL

## 2018-12-12 ENCOUNTER — APPOINTMENT (OUTPATIENT)
Dept: GENERAL RADIOLOGY | Age: 38
End: 2018-12-12
Payer: COMMERCIAL

## 2018-12-12 VITALS
TEMPERATURE: 98.6 F | OXYGEN SATURATION: 100 % | RESPIRATION RATE: 18 BRPM | HEART RATE: 73 BPM | DIASTOLIC BLOOD PRESSURE: 84 MMHG | SYSTOLIC BLOOD PRESSURE: 134 MMHG

## 2018-12-12 DIAGNOSIS — R74.8 ELEVATED LIVER ENZYMES: ICD-10-CM

## 2018-12-12 DIAGNOSIS — R07.9 CHEST PAIN, UNSPECIFIED TYPE: Primary | ICD-10-CM

## 2018-12-12 LAB
ALBUMIN SERPL-MCNC: 3.6 G/DL (ref 3.5–5.1)
ALBUMIN SERPL-MCNC: 4.1 G/DL (ref 3.5–5.1)
ALP BLD-CCNC: 78 U/L (ref 38–126)
ALP BLD-CCNC: 94 U/L (ref 38–126)
ALT SERPL-CCNC: 58 U/L (ref 11–66)
ALT SERPL-CCNC: 75 U/L (ref 11–66)
ANION GAP SERPL CALCULATED.3IONS-SCNC: 14 MEQ/L (ref 8–16)
AST SERPL-CCNC: 79 U/L (ref 5–40)
AST SERPL-CCNC: 84 U/L (ref 5–40)
BASOPHILS # BLD: 0.4 %
BASOPHILS ABSOLUTE: 0 THOU/MM3 (ref 0–0.1)
BILIRUB SERPL-MCNC: 0.2 MG/DL (ref 0.3–1.2)
BILIRUB SERPL-MCNC: < 0.2 MG/DL (ref 0.3–1.2)
BILIRUBIN DIRECT: < 0.2 MG/DL (ref 0–0.3)
BUN BLDV-MCNC: 8 MG/DL (ref 7–22)
CALCIUM SERPL-MCNC: 8.2 MG/DL (ref 8.5–10.5)
CHLORIDE BLD-SCNC: 108 MEQ/L (ref 98–111)
CO2: 21 MEQ/L (ref 23–33)
CREAT SERPL-MCNC: 0.4 MG/DL (ref 0.4–1.2)
D-DIMER QUANTITATIVE: 412 NG/ML FEU (ref 0–500)
EKG ATRIAL RATE: 92 BPM
EKG P AXIS: 59 DEGREES
EKG P-R INTERVAL: 140 MS
EKG Q-T INTERVAL: 366 MS
EKG QRS DURATION: 74 MS
EKG QTC CALCULATION (BAZETT): 452 MS
EKG R AXIS: 27 DEGREES
EKG T AXIS: 39 DEGREES
EKG VENTRICULAR RATE: 92 BPM
EOSINOPHIL # BLD: 0.8 %
EOSINOPHILS ABSOLUTE: 0.1 THOU/MM3 (ref 0–0.4)
ERYTHROCYTE [DISTWIDTH] IN BLOOD BY AUTOMATED COUNT: 13.3 % (ref 11.5–14.5)
ERYTHROCYTE [DISTWIDTH] IN BLOOD BY AUTOMATED COUNT: 41.5 FL (ref 35–45)
GFR SERPL CREATININE-BSD FRML MDRD: > 90 ML/MIN/1.73M2
GLUCOSE BLD-MCNC: 107 MG/DL (ref 70–108)
HCT VFR BLD CALC: 37.6 % (ref 37–47)
HEMOGLOBIN: 12.4 GM/DL (ref 12–16)
IMMATURE GRANS (ABS): 0.03 THOU/MM3 (ref 0–0.07)
IMMATURE GRANULOCYTES: 0.3 %
LYMPHOCYTES # BLD: 34 %
LYMPHOCYTES ABSOLUTE: 3.6 THOU/MM3 (ref 1–4.8)
MCH RBC QN AUTO: 28.7 PG (ref 26–33)
MCHC RBC AUTO-ENTMCNC: 33 GM/DL (ref 32.2–35.5)
MCV RBC AUTO: 87 FL (ref 81–99)
MONOCYTES # BLD: 5.6 %
MONOCYTES ABSOLUTE: 0.6 THOU/MM3 (ref 0.4–1.3)
NUCLEATED RED BLOOD CELLS: 0 /100 WBC
OSMOLALITY CALCULATION: 283.8 MOSMOL/KG (ref 275–300)
PLATELET # BLD: 362 THOU/MM3 (ref 130–400)
PMV BLD AUTO: 10.8 FL (ref 9.4–12.4)
POTASSIUM SERPL-SCNC: 3.8 MEQ/L (ref 3.5–5.2)
PREGNANCY, SERUM: NEGATIVE
PRO-BNP: 45.7 PG/ML (ref 0–450)
RBC # BLD: 4.32 MILL/MM3 (ref 4.2–5.4)
SEG NEUTROPHILS: 58.9 %
SEGMENTED NEUTROPHILS ABSOLUTE COUNT: 6.2 THOU/MM3 (ref 1.8–7.7)
SODIUM BLD-SCNC: 143 MEQ/L (ref 135–145)
TOTAL PROTEIN: 6 G/DL (ref 6.1–8)
TOTAL PROTEIN: 8.2 G/DL (ref 6.1–8)
TROPONIN T: < 0.01 NG/ML
TROPONIN T: < 0.01 NG/ML
WBC # BLD: 10.5 THOU/MM3 (ref 4.8–10.8)

## 2018-12-12 PROCEDURE — 36415 COLL VENOUS BLD VENIPUNCTURE: CPT

## 2018-12-12 PROCEDURE — 80076 HEPATIC FUNCTION PANEL: CPT

## 2018-12-12 PROCEDURE — 71045 X-RAY EXAM CHEST 1 VIEW: CPT

## 2018-12-12 PROCEDURE — 96374 THER/PROPH/DIAG INJ IV PUSH: CPT

## 2018-12-12 PROCEDURE — 84703 CHORIONIC GONADOTROPIN ASSAY: CPT

## 2018-12-12 PROCEDURE — 2500000003 HC RX 250 WO HCPCS: Performed by: FAMILY MEDICINE

## 2018-12-12 PROCEDURE — 80053 COMPREHEN METABOLIC PANEL: CPT

## 2018-12-12 PROCEDURE — 6370000000 HC RX 637 (ALT 250 FOR IP): Performed by: FAMILY MEDICINE

## 2018-12-12 PROCEDURE — 85025 COMPLETE CBC W/AUTO DIFF WBC: CPT

## 2018-12-12 PROCEDURE — 84484 ASSAY OF TROPONIN QUANT: CPT

## 2018-12-12 PROCEDURE — S0028 INJECTION, FAMOTIDINE, 20 MG: HCPCS | Performed by: FAMILY MEDICINE

## 2018-12-12 PROCEDURE — 83880 ASSAY OF NATRIURETIC PEPTIDE: CPT

## 2018-12-12 PROCEDURE — 85379 FIBRIN DEGRADATION QUANT: CPT

## 2018-12-12 PROCEDURE — 93005 ELECTROCARDIOGRAM TRACING: CPT | Performed by: FAMILY MEDICINE

## 2018-12-12 PROCEDURE — 99285 EMERGENCY DEPT VISIT HI MDM: CPT

## 2018-12-12 RX ORDER — ASPIRIN 81 MG/1
324 TABLET, CHEWABLE ORAL ONCE
Status: COMPLETED | OUTPATIENT
Start: 2018-12-12 | End: 2018-12-12

## 2018-12-12 RX ADMIN — FAMOTIDINE 20 MG: 10 INJECTION, SOLUTION INTRAVENOUS at 18:16

## 2018-12-12 RX ADMIN — ASPIRIN 81 MG 324 MG: 81 TABLET ORAL at 18:16

## 2018-12-12 RX ADMIN — LIDOCAINE HYDROCHLORIDE: 20 SOLUTION ORAL; TOPICAL at 18:16

## 2018-12-12 ASSESSMENT — ENCOUNTER SYMPTOMS
ABDOMINAL PAIN: 0
DIARRHEA: 0
BACK PAIN: 0
EYE PAIN: 0
NAUSEA: 1
WHEEZING: 0
SHORTNESS OF BREATH: 0
RHINORRHEA: 0
VOMITING: 0
COUGH: 1
EYE DISCHARGE: 0
SORE THROAT: 0

## 2018-12-12 ASSESSMENT — PAIN DESCRIPTION - PAIN TYPE: TYPE: ACUTE PAIN

## 2018-12-12 ASSESSMENT — PAIN DESCRIPTION - LOCATION: LOCATION: CHEST

## 2018-12-12 ASSESSMENT — HEART SCORE: ECG: 0

## 2018-12-12 ASSESSMENT — PAIN DESCRIPTION - DESCRIPTORS: DESCRIPTORS: STABBING

## 2018-12-12 ASSESSMENT — PAIN SCALES - GENERAL
PAINLEVEL_OUTOF10: 5
PAINLEVEL_OUTOF10: 5

## 2018-12-12 NOTE — ED PROVIDER NOTES
Total Protein 6.0 (*)     Total Bilirubin <0.2 (*)     All other components within normal limits   HEPATIC FUNCTION PANEL - Abnormal; Notable for the following: Total Bilirubin 0.2 (*)     AST 84 (*)     ALT 75 (*)     Total Protein 8.2 (*)     All other components within normal limits   CBC WITH AUTO DIFFERENTIAL   HCG, SERUM, QUALITATIVE   TROPONIN   BRAIN NATRIURETIC PEPTIDE   D-DIMER, QUANTITATIVE   ANION GAP   GLOMERULAR FILTRATION RATE, ESTIMATED   OSMOLALITY   TROPONIN       EMERGENCY DEPARTMENT COURSE:   Vitals:    Vitals:    12/12/18 1739 12/12/18 1801 12/12/18 1834 12/12/18 1924   BP:  (!) 143/97 (!) 144/88 134/84   Pulse:  80 73 73   Resp:  18 18 18   Temp:       TempSrc:       SpO2: 99% 100% 100% 100%       6:18 PM: The patient was seen and evaluated. MDM:  The patient was seen within the ED today for the evaluation of chest pain. The patient arrived in no acute distress and in stable condition. Within the department, I observed the patient's vital signs to be within acceptable range. On exam, I appreciated normal heart and lung sounds. Radiological studies within the department revealed no acute findings. Laboratory work was reassuring showing a negative troponin. Within the department, the patient was treated with pepcid, gi cocktain and 324mg aspirin. I observed the patient's condition to improve during the duration of her stay. I explained my proposed course of treatment to the patient, who was amenable to my decision, and I answered all questions that were asked. She was discharged home in stable condition with no new prescriptions, and the patient will return to the ED if her symptoms become more severe in nature or otherwise change. I advised the patien to follow-up with her PCP in 2 days. I also discussed return to ED precautions with the patient who verbalized understanding. I also made the patient an appointment to see cardiology 12/14/2018.  Patient was also encouraged to have her liver function test rechecked with primary care physician in one week. CRITICAL CARE:   None      CONSULTS:  None     PROCEDURES:  None      FINAL IMPRESSION      1. Chest pain, unspecified type    2. Elevated liver enzymes          DISPOSITION/PLAN   Discharge     PATIENT REFERRED TO:  MARÍA Constantino - CNP  [address]  [phone]    Schedule an appointment as soon as possible for a visit in 2 days        DISCHARGE MEDICATIONS:  Discharge Medication List as of 12/12/2018  9:20 PM          (Please note that portions of this note were completed with a voice recognition program.  Efforts were made to edit the dictations but occasionally words are mis-transcribed.)    Scribe:  Primitivo Purcell 12/12/18 6:18 PM Scribing for and in the presence of Octavio Ashraf MD.    Signed by: Yony Siegel, 12/12/18 9:30 PM    Provider:  I personally performed the services described in the documentation, reviewed and edited the documentation which was dictated to the scribe in my presence, and it accurately records my words and actions.     Octavio Ashraf MD 12/12/18 9:30 PM       Octavio Ashraf MD  12/13/18 1150       Octavio Ashraf MD  12/13/18 4406

## 2018-12-12 NOTE — ED TRIAGE NOTES
Patient to ED for intermittent chest pressure. Patient denies cardiac history. Describes pain as stabbing and orients pain along left sternal border. Patient denies shortness of breath. Reports that she has had a headache with this chest pain.

## 2018-12-13 PROCEDURE — 93010 ELECTROCARDIOGRAM REPORT: CPT | Performed by: INTERNAL MEDICINE

## 2018-12-14 ENCOUNTER — HOSPITAL ENCOUNTER (OUTPATIENT)
Dept: PHYSICAL THERAPY | Age: 38
Setting detail: THERAPIES SERIES
Discharge: HOME | End: 2018-12-14
Payer: COMMERCIAL

## 2018-12-14 NOTE — PROGRESS NOTES
[facility]  PHYSICAL THERAPY MISSED TREATMENT NOTE  OUTPATIENT REHABILITATION    Date: 2018  Patient Name: Seble Jerome        MRN: [id number]   : 1980  (34 y.o.)  Gender: female   Referring Practitioner: MARÍA Dominguez CNP  Diagnosis: M54.16 (ICD-10-CM) - Lumbar radiculopathy    PT Visit Information  PT Insurance Information: [employer] -No visit limit. Aquatics and modalities are covered. No Show: 1    REASON FOR MISSED TREATMENT:  No Show/No Call. This was evaluation appointment. Gopi kristal  [id number], [address] [facility]

## 2019-01-08 ENCOUNTER — NURSE ONLY (OUTPATIENT)
Dept: LAB | Age: 39
End: 2019-01-08

## 2019-01-08 ENCOUNTER — OFFICE VISIT (OUTPATIENT)
Dept: FAMILY MEDICINE CLINIC | Age: 39
End: 2019-01-08
Payer: COMMERCIAL

## 2019-01-08 VITALS
WEIGHT: 218 LBS | BODY MASS INDEX: 45.76 KG/M2 | HEIGHT: 58 IN | TEMPERATURE: 98.5 F | SYSTOLIC BLOOD PRESSURE: 154 MMHG | HEART RATE: 86 BPM | RESPIRATION RATE: 16 BRPM | DIASTOLIC BLOOD PRESSURE: 90 MMHG

## 2019-01-08 DIAGNOSIS — R74.8 ELEVATED LIVER ENZYMES: ICD-10-CM

## 2019-01-08 DIAGNOSIS — F41.0 PANIC DISORDER WITHOUT AGORAPHOBIA WITH MODERATE PANIC ATTACKS: Primary | ICD-10-CM

## 2019-01-08 DIAGNOSIS — I10 ESSENTIAL HYPERTENSION: ICD-10-CM

## 2019-01-08 LAB
ALBUMIN SERPL-MCNC: 3.8 G/DL (ref 3.5–5.1)
ALP BLD-CCNC: 83 U/L (ref 38–126)
ALT SERPL-CCNC: 38 U/L (ref 11–66)
AST SERPL-CCNC: 43 U/L (ref 5–40)
BILIRUB SERPL-MCNC: 0.2 MG/DL (ref 0.3–1.2)
BILIRUBIN DIRECT: < 0.2 MG/DL (ref 0–0.3)
HAV IGM SER IA-ACNC: NEGATIVE
HBV SURFACE AB TITR SER: POSITIVE {TITER}
HEPATITIS B CORE IGM ANTIBODY: NEGATIVE
HEPATITIS B SURFACE ANTIGEN: NEGATIVE
HEPATITIS C ANTIBODY: NEGATIVE
TOTAL PROTEIN: 6.9 G/DL (ref 6.1–8)

## 2019-01-08 PROCEDURE — 1036F TOBACCO NON-USER: CPT | Performed by: NURSE PRACTITIONER

## 2019-01-08 PROCEDURE — G8427 DOCREV CUR MEDS BY ELIG CLIN: HCPCS | Performed by: NURSE PRACTITIONER

## 2019-01-08 PROCEDURE — 99214 OFFICE O/P EST MOD 30 MIN: CPT | Performed by: NURSE PRACTITIONER

## 2019-01-08 PROCEDURE — G8484 FLU IMMUNIZE NO ADMIN: HCPCS | Performed by: NURSE PRACTITIONER

## 2019-01-08 PROCEDURE — G8417 CALC BMI ABV UP PARAM F/U: HCPCS | Performed by: NURSE PRACTITIONER

## 2019-01-08 RX ORDER — NORGESTIMATE AND ETHINYL ESTRADIOL 0.25-0.035
1 KIT ORAL DAILY
Refills: 1 | COMMUNITY
Start: 2018-12-17 | End: 2020-06-08 | Stop reason: ALTCHOICE

## 2019-01-08 RX ORDER — AMLODIPINE BESYLATE 10 MG/1
TABLET ORAL
Qty: 90 TABLET | Refills: 3 | Status: SHIPPED | OUTPATIENT
Start: 2019-01-08 | End: 2020-01-08

## 2019-01-08 RX ORDER — METOPROLOL SUCCINATE 50 MG/1
50 TABLET, EXTENDED RELEASE ORAL DAILY
Qty: 90 TABLET | Refills: 3 | Status: SHIPPED | OUTPATIENT
Start: 2019-01-08 | End: 2019-02-07 | Stop reason: SDUPTHER

## 2019-01-08 RX ORDER — CLONAZEPAM 0.5 MG/1
0.5 TABLET ORAL 2 TIMES DAILY PRN
Qty: 60 TABLET | Refills: 0 | Status: SHIPPED | OUTPATIENT
Start: 2019-01-08 | End: 2019-10-22

## 2019-01-09 ENCOUNTER — TELEPHONE (OUTPATIENT)
Dept: FAMILY MEDICINE CLINIC | Age: 39
End: 2019-01-09

## 2019-01-10 LAB
HAV AB SERPL IA-ACNC: NEGATIVE
HEPATITIS B CORE TOTAL ANTIBODY: NEGATIVE

## 2019-02-07 ENCOUNTER — OFFICE VISIT (OUTPATIENT)
Dept: FAMILY MEDICINE CLINIC | Age: 39
End: 2019-02-07
Payer: COMMERCIAL

## 2019-02-07 VITALS
SYSTOLIC BLOOD PRESSURE: 152 MMHG | HEIGHT: 65 IN | HEART RATE: 80 BPM | DIASTOLIC BLOOD PRESSURE: 94 MMHG | TEMPERATURE: 98.7 F | BODY MASS INDEX: 36.15 KG/M2 | RESPIRATION RATE: 16 BRPM | WEIGHT: 217 LBS

## 2019-02-07 DIAGNOSIS — I10 ESSENTIAL HYPERTENSION: ICD-10-CM

## 2019-02-07 DIAGNOSIS — F41.0 PANIC DISORDER WITHOUT AGORAPHOBIA WITH MODERATE PANIC ATTACKS: ICD-10-CM

## 2019-02-07 PROCEDURE — G8427 DOCREV CUR MEDS BY ELIG CLIN: HCPCS | Performed by: NURSE PRACTITIONER

## 2019-02-07 PROCEDURE — G8417 CALC BMI ABV UP PARAM F/U: HCPCS | Performed by: NURSE PRACTITIONER

## 2019-02-07 PROCEDURE — 99214 OFFICE O/P EST MOD 30 MIN: CPT | Performed by: NURSE PRACTITIONER

## 2019-02-07 PROCEDURE — G8484 FLU IMMUNIZE NO ADMIN: HCPCS | Performed by: NURSE PRACTITIONER

## 2019-02-07 PROCEDURE — 1036F TOBACCO NON-USER: CPT | Performed by: NURSE PRACTITIONER

## 2019-02-07 RX ORDER — METOPROLOL SUCCINATE 100 MG/1
100 TABLET, EXTENDED RELEASE ORAL DAILY
Qty: 90 TABLET | Refills: 1 | Status: SHIPPED | OUTPATIENT
Start: 2019-02-07 | End: 2020-01-08

## 2019-02-07 RX ORDER — SERTRALINE HYDROCHLORIDE 100 MG/1
100 TABLET, FILM COATED ORAL DAILY
Qty: 90 TABLET | Refills: 0 | Status: SHIPPED | OUTPATIENT
Start: 2019-02-07 | End: 2019-07-25 | Stop reason: SDUPTHER

## 2019-02-07 ASSESSMENT — PATIENT HEALTH QUESTIONNAIRE - PHQ9
1. LITTLE INTEREST OR PLEASURE IN DOING THINGS: 0
SUM OF ALL RESPONSES TO PHQ QUESTIONS 1-9: 0
SUM OF ALL RESPONSES TO PHQ QUESTIONS 1-9: 0
SUM OF ALL RESPONSES TO PHQ9 QUESTIONS 1 & 2: 0
2. FEELING DOWN, DEPRESSED OR HOPELESS: 0

## 2019-02-11 ENCOUNTER — HOSPITAL ENCOUNTER (OUTPATIENT)
Age: 39
Setting detail: OBSERVATION
Discharge: HOME | End: 2019-02-12
Attending: HOSPITALIST | Admitting: HOSPITALIST
Payer: COMMERCIAL

## 2019-02-11 ENCOUNTER — APPOINTMENT (OUTPATIENT)
Dept: MRI IMAGING | Age: 39
End: 2019-02-11
Payer: COMMERCIAL

## 2019-02-11 ENCOUNTER — APPOINTMENT (OUTPATIENT)
Dept: CT IMAGING | Age: 39
End: 2019-02-11
Payer: COMMERCIAL

## 2019-02-11 DIAGNOSIS — D75.839 THROMBOCYTOSIS: ICD-10-CM

## 2019-02-11 DIAGNOSIS — R29.818 ACUTE FOCAL NEUROLOGICAL DEFICIT: ICD-10-CM

## 2019-02-11 DIAGNOSIS — R42 DIZZINESS: ICD-10-CM

## 2019-02-11 DIAGNOSIS — R51.9 ACUTE NONINTRACTABLE HEADACHE, UNSPECIFIED HEADACHE TYPE: Primary | ICD-10-CM

## 2019-02-11 PROBLEM — G44.1 INTRACTABLE VASCULAR HEADACHE: Status: ACTIVE | Noted: 2019-02-11

## 2019-02-11 PROBLEM — G44.89 OTHER HEADACHE SYNDROME: Status: ACTIVE | Noted: 2019-02-11

## 2019-02-11 LAB
ALBUMIN SERPL-MCNC: 4.1 G/DL (ref 3.5–5.1)
ALP BLD-CCNC: 99 U/L (ref 38–126)
ALT SERPL-CCNC: 60 U/L (ref 11–66)
ANION GAP SERPL CALCULATED.3IONS-SCNC: 12 MEQ/L (ref 8–16)
AST SERPL-CCNC: 38 U/L (ref 5–40)
BACTERIA: ABNORMAL /HPF
BASOPHILS # BLD: 0.4 %
BASOPHILS ABSOLUTE: 0 THOU/MM3 (ref 0–0.1)
BILIRUB SERPL-MCNC: 0.2 MG/DL (ref 0.3–1.2)
BILIRUBIN DIRECT: < 0.2 MG/DL (ref 0–0.3)
BILIRUBIN URINE: NEGATIVE
BLOOD, URINE: ABNORMAL
BUN BLDV-MCNC: 11 MG/DL (ref 7–22)
CALCIUM SERPL-MCNC: 8.8 MG/DL (ref 8.5–10.5)
CASTS 2: ABNORMAL /LPF
CASTS UA: ABNORMAL /LPF
CHARACTER, URINE: CLEAR
CHLORIDE BLD-SCNC: 102 MEQ/L (ref 98–111)
CO2: 21 MEQ/L (ref 23–33)
COLOR: YELLOW
CREAT SERPL-MCNC: 0.5 MG/DL (ref 0.4–1.2)
CRYSTALS, UA: ABNORMAL
EKG ATRIAL RATE: 70 BPM
EKG P AXIS: 26 DEGREES
EKG P-R INTERVAL: 154 MS
EKG Q-T INTERVAL: 408 MS
EKG QRS DURATION: 76 MS
EKG QTC CALCULATION (BAZETT): 440 MS
EKG R AXIS: 28 DEGREES
EKG T AXIS: 7 DEGREES
EKG VENTRICULAR RATE: 70 BPM
EOSINOPHIL # BLD: 1.1 %
EOSINOPHILS ABSOLUTE: 0.1 THOU/MM3 (ref 0–0.4)
EPITHELIAL CELLS, UA: ABNORMAL /HPF
ERYTHROCYTE [DISTWIDTH] IN BLOOD BY AUTOMATED COUNT: 13.4 % (ref 11.5–14.5)
ERYTHROCYTE [DISTWIDTH] IN BLOOD BY AUTOMATED COUNT: 42.1 FL (ref 35–45)
GFR SERPL CREATININE-BSD FRML MDRD: > 90 ML/MIN/1.73M2
GLUCOSE BLD-MCNC: 101 MG/DL (ref 70–108)
GLUCOSE BLD-MCNC: 119 MG/DL (ref 70–108)
GLUCOSE URINE: NEGATIVE MG/DL
HCT VFR BLD CALC: 38.5 % (ref 37–47)
HEMOGLOBIN: 12.3 GM/DL (ref 12–16)
IMMATURE GRANS (ABS): 0.03 THOU/MM3 (ref 0–0.07)
IMMATURE GRANULOCYTES: 0.3 %
KETONES, URINE: NEGATIVE
LEUKOCYTE ESTERASE, URINE: NEGATIVE
LIPASE: 42.8 U/L (ref 5.6–51.3)
LYMPHOCYTES # BLD: 31.2 %
LYMPHOCYTES ABSOLUTE: 3.3 THOU/MM3 (ref 1–4.8)
MCH RBC QN AUTO: 27.6 PG (ref 26–33)
MCHC RBC AUTO-ENTMCNC: 31.9 GM/DL (ref 32.2–35.5)
MCV RBC AUTO: 86.5 FL (ref 81–99)
MISCELLANEOUS 2: ABNORMAL
MONOCYTES # BLD: 6.8 %
MONOCYTES ABSOLUTE: 0.7 THOU/MM3 (ref 0.4–1.3)
NITRITE, URINE: NEGATIVE
NUCLEATED RED BLOOD CELLS: 0 /100 WBC
OSMOLALITY CALCULATION: 270.6 MOSMOL/KG (ref 275–300)
PH UA: 6.5
PLATELET # BLD: 503 THOU/MM3 (ref 130–400)
PMV BLD AUTO: 9.4 FL (ref 9.4–12.4)
POTASSIUM SERPL-SCNC: 3.9 MEQ/L (ref 3.5–5.2)
PROTEIN UA: NEGATIVE
RBC # BLD: 4.45 MILL/MM3 (ref 4.2–5.4)
RBC URINE: ABNORMAL /HPF
RENAL EPITHELIAL, UA: ABNORMAL
SEG NEUTROPHILS: 60.2 %
SEGMENTED NEUTROPHILS ABSOLUTE COUNT: 6.4 THOU/MM3 (ref 1.8–7.7)
SODIUM BLD-SCNC: 135 MEQ/L (ref 135–145)
SPECIFIC GRAVITY, URINE: > 1.03 (ref 1–1.03)
TOTAL PROTEIN: 6.9 G/DL (ref 6.1–8)
UROBILINOGEN, URINE: 0.2 EU/DL
WBC # BLD: 10.7 THOU/MM3 (ref 4.8–10.8)
WBC UA: ABNORMAL /HPF
YEAST: ABNORMAL

## 2019-02-11 PROCEDURE — 6360000002 HC RX W HCPCS: Performed by: FAMILY MEDICINE

## 2019-02-11 PROCEDURE — 94760 N-INVAS EAR/PLS OXIMETRY 1: CPT

## 2019-02-11 PROCEDURE — 2580000003 HC RX 258: Performed by: HOSPITALIST

## 2019-02-11 PROCEDURE — 6370000000 HC RX 637 (ALT 250 FOR IP): Performed by: NURSE PRACTITIONER

## 2019-02-11 PROCEDURE — 6370000000 HC RX 637 (ALT 250 FOR IP): Performed by: HOSPITALIST

## 2019-02-11 PROCEDURE — 70498 CT ANGIOGRAPHY NECK: CPT

## 2019-02-11 PROCEDURE — 99285 EMERGENCY DEPT VISIT HI MDM: CPT

## 2019-02-11 PROCEDURE — G0378 HOSPITAL OBSERVATION PER HR: HCPCS

## 2019-02-11 PROCEDURE — 96374 THER/PROPH/DIAG INJ IV PUSH: CPT

## 2019-02-11 PROCEDURE — 99220 PR INITIAL OBSERVATION CARE/DAY 70 MINUTES: CPT | Performed by: PSYCHIATRY & NEUROLOGY

## 2019-02-11 PROCEDURE — 99219 PR INITIAL OBSERVATION CARE/DAY 50 MINUTES: CPT | Performed by: HOSPITALIST

## 2019-02-11 PROCEDURE — 6360000004 HC RX CONTRAST MEDICATION: Performed by: NURSE PRACTITIONER

## 2019-02-11 PROCEDURE — 82948 REAGENT STRIP/BLOOD GLUCOSE: CPT

## 2019-02-11 PROCEDURE — 83690 ASSAY OF LIPASE: CPT

## 2019-02-11 PROCEDURE — 70496 CT ANGIOGRAPHY HEAD: CPT

## 2019-02-11 PROCEDURE — 80076 HEPATIC FUNCTION PANEL: CPT

## 2019-02-11 PROCEDURE — 93005 ELECTROCARDIOGRAM TRACING: CPT | Performed by: HOSPITALIST

## 2019-02-11 PROCEDURE — 70551 MRI BRAIN STEM W/O DYE: CPT

## 2019-02-11 PROCEDURE — 80048 BASIC METABOLIC PNL TOTAL CA: CPT

## 2019-02-11 PROCEDURE — 81001 URINALYSIS AUTO W/SCOPE: CPT

## 2019-02-11 PROCEDURE — 70450 CT HEAD/BRAIN W/O DYE: CPT

## 2019-02-11 PROCEDURE — 85025 COMPLETE CBC W/AUTO DIFF WBC: CPT

## 2019-02-11 PROCEDURE — 36415 COLL VENOUS BLD VENIPUNCTURE: CPT

## 2019-02-11 PROCEDURE — 2709999900 HC NON-CHARGEABLE SUPPLY

## 2019-02-11 PROCEDURE — 70544 MR ANGIOGRAPHY HEAD W/O DYE: CPT

## 2019-02-11 RX ORDER — POTASSIUM CHLORIDE 20 MEQ/1
40 TABLET, EXTENDED RELEASE ORAL PRN
Status: DISCONTINUED | OUTPATIENT
Start: 2019-02-11 | End: 2019-02-12 | Stop reason: HOSPADM

## 2019-02-11 RX ORDER — POTASSIUM CHLORIDE 20MEQ/15ML
40 LIQUID (ML) ORAL PRN
Status: DISCONTINUED | OUTPATIENT
Start: 2019-02-11 | End: 2019-02-12 | Stop reason: HOSPADM

## 2019-02-11 RX ORDER — MORPHINE SULFATE 2 MG/ML
1 INJECTION, SOLUTION INTRAMUSCULAR; INTRAVENOUS ONCE
Status: COMPLETED | OUTPATIENT
Start: 2019-02-11 | End: 2019-02-11

## 2019-02-11 RX ORDER — SERTRALINE HYDROCHLORIDE 100 MG/1
100 TABLET, FILM COATED ORAL DAILY
Status: DISCONTINUED | OUTPATIENT
Start: 2019-02-11 | End: 2019-02-12 | Stop reason: HOSPADM

## 2019-02-11 RX ORDER — AMLODIPINE BESYLATE 10 MG/1
10 TABLET ORAL DAILY
Status: DISCONTINUED | OUTPATIENT
Start: 2019-02-11 | End: 2019-02-12 | Stop reason: HOSPADM

## 2019-02-11 RX ORDER — METOPROLOL SUCCINATE 100 MG/1
100 TABLET, EXTENDED RELEASE ORAL DAILY
Status: DISCONTINUED | OUTPATIENT
Start: 2019-02-11 | End: 2019-02-12 | Stop reason: HOSPADM

## 2019-02-11 RX ORDER — CLONAZEPAM 0.5 MG/1
0.5 TABLET ORAL 2 TIMES DAILY PRN
Status: DISCONTINUED | OUTPATIENT
Start: 2019-02-11 | End: 2019-02-12 | Stop reason: HOSPADM

## 2019-02-11 RX ORDER — SODIUM CHLORIDE 0.9 % (FLUSH) 0.9 %
10 SYRINGE (ML) INJECTION PRN
Status: DISCONTINUED | OUTPATIENT
Start: 2019-02-11 | End: 2019-02-12 | Stop reason: HOSPADM

## 2019-02-11 RX ORDER — POTASSIUM CHLORIDE 7.45 MG/ML
10 INJECTION INTRAVENOUS PRN
Status: DISCONTINUED | OUTPATIENT
Start: 2019-02-11 | End: 2019-02-12 | Stop reason: HOSPADM

## 2019-02-11 RX ORDER — IBUPROFEN 200 MG
600 TABLET ORAL EVERY 6 HOURS PRN
Status: ON HOLD | COMMUNITY
End: 2019-02-12 | Stop reason: HOSPADM

## 2019-02-11 RX ORDER — ASPIRIN 81 MG/1
81 TABLET, CHEWABLE ORAL DAILY
Status: DISCONTINUED | OUTPATIENT
Start: 2019-02-12 | End: 2019-02-12 | Stop reason: HOSPADM

## 2019-02-11 RX ORDER — ASPIRIN 81 MG/1
81 TABLET, CHEWABLE ORAL ONCE
Status: COMPLETED | OUTPATIENT
Start: 2019-02-11 | End: 2019-02-11

## 2019-02-11 RX ORDER — ONDANSETRON 2 MG/ML
4 INJECTION INTRAMUSCULAR; INTRAVENOUS EVERY 6 HOURS PRN
Status: DISCONTINUED | OUTPATIENT
Start: 2019-02-11 | End: 2019-02-12 | Stop reason: HOSPADM

## 2019-02-11 RX ORDER — ACETAMINOPHEN 325 MG/1
650 TABLET ORAL EVERY 6 HOURS PRN
Status: DISCONTINUED | OUTPATIENT
Start: 2019-02-11 | End: 2019-02-12 | Stop reason: HOSPADM

## 2019-02-11 RX ORDER — SODIUM CHLORIDE 0.9 % (FLUSH) 0.9 %
10 SYRINGE (ML) INJECTION EVERY 12 HOURS SCHEDULED
Status: DISCONTINUED | OUTPATIENT
Start: 2019-02-11 | End: 2019-02-12 | Stop reason: HOSPADM

## 2019-02-11 RX ADMIN — ACETAMINOPHEN 650 MG: 325 TABLET ORAL at 20:49

## 2019-02-11 RX ADMIN — METOPROLOL SUCCINATE 100 MG: 100 TABLET, EXTENDED RELEASE ORAL at 20:50

## 2019-02-11 RX ADMIN — AMLODIPINE BESYLATE 10 MG: 10 TABLET ORAL at 20:50

## 2019-02-11 RX ADMIN — Medication 10 ML: at 20:49

## 2019-02-11 RX ADMIN — MORPHINE SULFATE 1 MG: 2 INJECTION, SOLUTION INTRAMUSCULAR; INTRAVENOUS at 17:05

## 2019-02-11 RX ADMIN — SERTRALINE 100 MG: 100 TABLET, FILM COATED ORAL at 20:51

## 2019-02-11 RX ADMIN — IOPAMIDOL 80 ML: 755 INJECTION, SOLUTION INTRAVENOUS at 03:17

## 2019-02-11 RX ADMIN — ASPIRIN 81 MG: 81 TABLET, CHEWABLE ORAL at 04:25

## 2019-02-11 RX ADMIN — ACETAMINOPHEN 650 MG: 325 TABLET ORAL at 09:57

## 2019-02-11 RX ADMIN — Medication 10 ML: at 10:48

## 2019-02-11 ASSESSMENT — PAIN DESCRIPTION - LOCATION
LOCATION: HEAD
LOCATION: HEAD

## 2019-02-11 ASSESSMENT — PAIN DESCRIPTION - ONSET: ONSET: ON-GOING

## 2019-02-11 ASSESSMENT — PAIN SCALES - GENERAL
PAINLEVEL_OUTOF10: 5
PAINLEVEL_OUTOF10: 5
PAINLEVEL_OUTOF10: 4
PAINLEVEL_OUTOF10: 5
PAINLEVEL_OUTOF10: 7
PAINLEVEL_OUTOF10: 6
PAINLEVEL_OUTOF10: 4

## 2019-02-11 ASSESSMENT — PAIN DESCRIPTION - FREQUENCY: FREQUENCY: CONTINUOUS

## 2019-02-11 ASSESSMENT — PAIN DESCRIPTION - PROGRESSION: CLINICAL_PROGRESSION: GRADUALLY WORSENING

## 2019-02-11 ASSESSMENT — PAIN DESCRIPTION - ORIENTATION
ORIENTATION: RIGHT;LEFT
ORIENTATION: ANTERIOR

## 2019-02-11 ASSESSMENT — PAIN DESCRIPTION - DIRECTION: RADIATING_TOWARDS: FRONT

## 2019-02-11 ASSESSMENT — PAIN DESCRIPTION - PAIN TYPE
TYPE: ACUTE PAIN
TYPE: ACUTE PAIN

## 2019-02-11 ASSESSMENT — PAIN DESCRIPTION - DESCRIPTORS: DESCRIPTORS: HEADACHE

## 2019-02-12 ENCOUNTER — TELEPHONE (OUTPATIENT)
Dept: FAMILY MEDICINE CLINIC | Age: 39
End: 2019-02-12

## 2019-02-12 VITALS
WEIGHT: 214.4 LBS | TEMPERATURE: 98.2 F | HEIGHT: 65 IN | BODY MASS INDEX: 35.72 KG/M2 | OXYGEN SATURATION: 99 % | SYSTOLIC BLOOD PRESSURE: 130 MMHG | DIASTOLIC BLOOD PRESSURE: 77 MMHG | HEART RATE: 70 BPM | RESPIRATION RATE: 16 BRPM

## 2019-02-12 LAB
ANION GAP SERPL CALCULATED.3IONS-SCNC: 12 MEQ/L (ref 8–16)
BUN BLDV-MCNC: 10 MG/DL (ref 7–22)
CALCIUM SERPL-MCNC: 8.9 MG/DL (ref 8.5–10.5)
CHLORIDE BLD-SCNC: 103 MEQ/L (ref 98–111)
CHOLESTEROL, TOTAL: 205 MG/DL (ref 100–199)
CO2: 25 MEQ/L (ref 23–33)
CREAT SERPL-MCNC: 0.6 MG/DL (ref 0.4–1.2)
ERYTHROCYTE [DISTWIDTH] IN BLOOD BY AUTOMATED COUNT: 13.4 % (ref 11.5–14.5)
ERYTHROCYTE [DISTWIDTH] IN BLOOD BY AUTOMATED COUNT: 41.9 FL (ref 35–45)
GFR SERPL CREATININE-BSD FRML MDRD: > 90 ML/MIN/1.73M2
GLUCOSE BLD-MCNC: 111 MG/DL (ref 70–108)
HCT VFR BLD CALC: 37.9 % (ref 37–47)
HDLC SERPL-MCNC: 45 MG/DL
HEMOGLOBIN: 12.2 GM/DL (ref 12–16)
LACTIC ACID: 1.1 MMOL/L (ref 0.5–2.2)
LDL CHOLESTEROL CALCULATED: 143 MG/DL
MCH RBC QN AUTO: 27.9 PG (ref 26–33)
MCHC RBC AUTO-ENTMCNC: 32.2 GM/DL (ref 32.2–35.5)
MCV RBC AUTO: 86.5 FL (ref 81–99)
PLATELET # BLD: 496 THOU/MM3 (ref 130–400)
PMV BLD AUTO: 9.7 FL (ref 9.4–12.4)
POTASSIUM SERPL-SCNC: 4.3 MEQ/L (ref 3.5–5.2)
RBC # BLD: 4.38 MILL/MM3 (ref 4.2–5.4)
SODIUM BLD-SCNC: 140 MEQ/L (ref 135–145)
TRIGL SERPL-MCNC: 87 MG/DL (ref 0–199)
WBC # BLD: 10.8 THOU/MM3 (ref 4.8–10.8)

## 2019-02-12 PROCEDURE — G0378 HOSPITAL OBSERVATION PER HR: HCPCS

## 2019-02-12 PROCEDURE — 83605 ASSAY OF LACTIC ACID: CPT

## 2019-02-12 PROCEDURE — 6370000000 HC RX 637 (ALT 250 FOR IP): Performed by: HOSPITALIST

## 2019-02-12 PROCEDURE — 85027 COMPLETE CBC AUTOMATED: CPT

## 2019-02-12 PROCEDURE — 99217 PR OBSERVATION CARE DISCHARGE MANAGEMENT: CPT | Performed by: FAMILY MEDICINE

## 2019-02-12 PROCEDURE — 80048 BASIC METABOLIC PNL TOTAL CA: CPT

## 2019-02-12 PROCEDURE — 80061 LIPID PANEL: CPT

## 2019-02-12 PROCEDURE — 36415 COLL VENOUS BLD VENIPUNCTURE: CPT

## 2019-02-12 PROCEDURE — 93010 ELECTROCARDIOGRAM REPORT: CPT | Performed by: INTERNAL MEDICINE

## 2019-02-12 PROCEDURE — 99225 PR SBSQ OBSERVATION CARE/DAY 25 MINUTES: CPT | Performed by: FAMILY MEDICINE

## 2019-02-12 RX ORDER — ASPIRIN 81 MG/1
81 TABLET, CHEWABLE ORAL DAILY
Qty: 30 TABLET | Refills: 0 | Status: SHIPPED | OUTPATIENT
Start: 2019-02-13 | End: 2019-03-26 | Stop reason: SDUPTHER

## 2019-02-12 RX ADMIN — ASPIRIN 81 MG 81 MG: 81 TABLET ORAL at 06:03

## 2019-02-12 ASSESSMENT — PAIN SCALES - GENERAL
PAINLEVEL_OUTOF10: 4
PAINLEVEL_OUTOF10: 0

## 2019-02-18 ENCOUNTER — TELEPHONE (OUTPATIENT)
Dept: FAMILY MEDICINE CLINIC | Age: 39
End: 2019-02-18

## 2019-02-19 ENCOUNTER — OFFICE VISIT (OUTPATIENT)
Dept: NEUROLOGY | Age: 39
End: 2019-02-19
Payer: COMMERCIAL

## 2019-02-19 VITALS
DIASTOLIC BLOOD PRESSURE: 78 MMHG | HEIGHT: 64 IN | WEIGHT: 211 LBS | SYSTOLIC BLOOD PRESSURE: 144 MMHG | BODY MASS INDEX: 36.02 KG/M2 | HEART RATE: 66 BPM

## 2019-02-19 DIAGNOSIS — Q28.2 CEREBRAL AVM: Primary | ICD-10-CM

## 2019-02-19 DIAGNOSIS — G44.1 INTRACTABLE VASCULAR HEADACHE: ICD-10-CM

## 2019-02-19 DIAGNOSIS — G44.89 OTHER HEADACHE SYNDROME: ICD-10-CM

## 2019-02-19 PROCEDURE — G8484 FLU IMMUNIZE NO ADMIN: HCPCS | Performed by: PSYCHIATRY & NEUROLOGY

## 2019-02-19 PROCEDURE — 1036F TOBACCO NON-USER: CPT | Performed by: PSYCHIATRY & NEUROLOGY

## 2019-02-19 PROCEDURE — 99205 OFFICE O/P NEW HI 60 MIN: CPT | Performed by: PSYCHIATRY & NEUROLOGY

## 2019-02-19 PROCEDURE — G8427 DOCREV CUR MEDS BY ELIG CLIN: HCPCS | Performed by: PSYCHIATRY & NEUROLOGY

## 2019-02-19 PROCEDURE — G8417 CALC BMI ABV UP PARAM F/U: HCPCS | Performed by: PSYCHIATRY & NEUROLOGY

## 2019-02-20 ENCOUNTER — TELEPHONE (OUTPATIENT)
Dept: FAMILY MEDICINE CLINIC | Age: 39
End: 2019-02-20

## 2019-02-20 ENCOUNTER — OFFICE VISIT (OUTPATIENT)
Dept: FAMILY MEDICINE CLINIC | Age: 39
End: 2019-02-20
Payer: COMMERCIAL

## 2019-02-20 ENCOUNTER — NURSE ONLY (OUTPATIENT)
Dept: LAB | Age: 39
End: 2019-02-20

## 2019-02-20 VITALS
BODY MASS INDEX: 36.56 KG/M2 | WEIGHT: 213 LBS | SYSTOLIC BLOOD PRESSURE: 110 MMHG | TEMPERATURE: 99.3 F | HEART RATE: 60 BPM | RESPIRATION RATE: 16 BRPM | DIASTOLIC BLOOD PRESSURE: 82 MMHG

## 2019-02-20 DIAGNOSIS — D75.839 THROMBOCYTOSIS: ICD-10-CM

## 2019-02-20 DIAGNOSIS — Q28.2 CEREBRAL AVM: Primary | ICD-10-CM

## 2019-02-20 DIAGNOSIS — G44.89 OTHER HEADACHE SYNDROME: ICD-10-CM

## 2019-02-20 DIAGNOSIS — I10 ESSENTIAL HYPERTENSION: ICD-10-CM

## 2019-02-20 DIAGNOSIS — F41.0 PANIC DISORDER WITHOUT AGORAPHOBIA WITH MODERATE PANIC ATTACKS: ICD-10-CM

## 2019-02-20 LAB
ERYTHROCYTE [DISTWIDTH] IN BLOOD BY AUTOMATED COUNT: 13.2 % (ref 11.5–14.5)
ERYTHROCYTE [DISTWIDTH] IN BLOOD BY AUTOMATED COUNT: 41.5 FL (ref 35–45)
HCT VFR BLD CALC: 37.7 % (ref 37–47)
HEMOGLOBIN: 12.2 GM/DL (ref 12–16)
MCH RBC QN AUTO: 28 PG (ref 26–33)
MCHC RBC AUTO-ENTMCNC: 32.4 GM/DL (ref 32.2–35.5)
MCV RBC AUTO: 86.5 FL (ref 81–99)
PLATELET # BLD: 524 THOU/MM3 (ref 130–400)
PMV BLD AUTO: 9.9 FL (ref 9.4–12.4)
RBC # BLD: 4.36 MILL/MM3 (ref 4.2–5.4)
WBC # BLD: 11.8 THOU/MM3 (ref 4.8–10.8)

## 2019-02-20 PROCEDURE — 99495 TRANSJ CARE MGMT MOD F2F 14D: CPT | Performed by: NURSE PRACTITIONER

## 2019-02-22 ENCOUNTER — TELEPHONE (OUTPATIENT)
Dept: NEUROLOGY | Age: 39
End: 2019-02-22

## 2019-03-08 ENCOUNTER — HOSPITAL ENCOUNTER (OUTPATIENT)
Dept: INTERVENTIONAL RADIOLOGY/VASCULAR | Age: 39
Discharge: HOME | End: 2019-03-10
Payer: COMMERCIAL

## 2019-03-08 VITALS
SYSTOLIC BLOOD PRESSURE: 136 MMHG | TEMPERATURE: 96.9 F | HEART RATE: 64 BPM | HEIGHT: 64 IN | DIASTOLIC BLOOD PRESSURE: 78 MMHG | BODY MASS INDEX: 35 KG/M2 | RESPIRATION RATE: 19 BRPM | WEIGHT: 205 LBS | OXYGEN SATURATION: 99 %

## 2019-03-08 DIAGNOSIS — Q27.30 AVM (ARTERIOVENOUS MALFORMATION): ICD-10-CM

## 2019-03-08 LAB
GFR NON-AFRICAN AMERICAN: >60 ML/MIN
GFR SERPL CREATININE-BSD FRML MDRD: >60 ML/MIN
GFR SERPL CREATININE-BSD FRML MDRD: NORMAL ML/MIN/{1.73_M2}
GLUCOSE BLD-MCNC: 123 MG/DL (ref 74–100)
POC CHLORIDE: 109 MMOL/L (ref 98–107)
POC CREATININE: 0.82 MG/DL (ref 0.51–1.19)
POC HEMATOCRIT: 41 % (ref 36–46)
POC HEMOGLOBIN: 13.9 G/DL (ref 12–16)
POC POTASSIUM: 3.9 MMOL/L (ref 3.5–4.5)
POC SODIUM: 143 MMOL/L (ref 138–146)

## 2019-03-08 PROCEDURE — 82565 ASSAY OF CREATININE: CPT

## 2019-03-08 PROCEDURE — 84295 ASSAY OF SERUM SODIUM: CPT

## 2019-03-08 PROCEDURE — C1887 CATHETER, GUIDING: HCPCS

## 2019-03-08 PROCEDURE — C1760 CLOSURE DEV, VASC: HCPCS

## 2019-03-08 PROCEDURE — 7100000011 HC PHASE II RECOVERY - ADDTL 15 MIN

## 2019-03-08 PROCEDURE — 84132 ASSAY OF SERUM POTASSIUM: CPT

## 2019-03-08 PROCEDURE — 36226 PLACE CATH VERTEBRAL ART: CPT | Performed by: PSYCHIATRY & NEUROLOGY

## 2019-03-08 PROCEDURE — 36227 PLACE CATH XTRNL CAROTID: CPT | Performed by: PSYCHIATRY & NEUROLOGY

## 2019-03-08 PROCEDURE — C1769 GUIDE WIRE: HCPCS

## 2019-03-08 PROCEDURE — 36224 PLACE CATH CAROTD ART: CPT | Performed by: PSYCHIATRY & NEUROLOGY

## 2019-03-08 PROCEDURE — 6360000002 HC RX W HCPCS: Performed by: NEUROLOGICAL SURGERY

## 2019-03-08 PROCEDURE — 82435 ASSAY OF BLOOD CHLORIDE: CPT

## 2019-03-08 PROCEDURE — 99152 MOD SED SAME PHYS/QHP 5/>YRS: CPT | Performed by: PSYCHIATRY & NEUROLOGY

## 2019-03-08 PROCEDURE — 2580000003 HC RX 258: Performed by: PSYCHIATRY & NEUROLOGY

## 2019-03-08 PROCEDURE — 2709999900 HC NON-CHARGEABLE SUPPLY

## 2019-03-08 PROCEDURE — C1894 INTRO/SHEATH, NON-LASER: HCPCS

## 2019-03-08 PROCEDURE — 85014 HEMATOCRIT: CPT

## 2019-03-08 PROCEDURE — 82947 ASSAY GLUCOSE BLOOD QUANT: CPT

## 2019-03-08 PROCEDURE — 7100000010 HC PHASE II RECOVERY - FIRST 15 MIN

## 2019-03-08 PROCEDURE — 99212 OFFICE O/P EST SF 10 MIN: CPT | Performed by: PSYCHIATRY & NEUROLOGY

## 2019-03-08 RX ORDER — FENTANYL CITRATE 50 UG/ML
INJECTION, SOLUTION INTRAMUSCULAR; INTRAVENOUS
Status: COMPLETED | OUTPATIENT
Start: 2019-03-08 | End: 2019-03-08

## 2019-03-08 RX ORDER — ACETAMINOPHEN 325 MG/1
650 TABLET ORAL EVERY 4 HOURS PRN
Status: DISCONTINUED | OUTPATIENT
Start: 2019-03-08 | End: 2019-03-11 | Stop reason: HOSPADM

## 2019-03-08 RX ORDER — SODIUM CHLORIDE 9 MG/ML
INJECTION, SOLUTION INTRAVENOUS CONTINUOUS
Status: DISCONTINUED | OUTPATIENT
Start: 2019-03-08 | End: 2019-03-11 | Stop reason: HOSPADM

## 2019-03-08 RX ORDER — MIDAZOLAM HYDROCHLORIDE 1 MG/ML
INJECTION INTRAMUSCULAR; INTRAVENOUS
Status: COMPLETED | OUTPATIENT
Start: 2019-03-08 | End: 2019-03-08

## 2019-03-08 RX ORDER — IODIXANOL 270 MG/ML
74 INJECTION, SOLUTION INTRAVASCULAR
Status: DISCONTINUED | OUTPATIENT
Start: 2019-03-08 | End: 2019-03-11 | Stop reason: HOSPADM

## 2019-03-08 RX ADMIN — FENTANYL CITRATE 50 MCG: 50 INJECTION INTRAMUSCULAR; INTRAVENOUS at 09:30

## 2019-03-08 RX ADMIN — Medication 2 G: at 09:24

## 2019-03-08 RX ADMIN — MIDAZOLAM HYDROCHLORIDE 1 MG: 1 INJECTION, SOLUTION INTRAMUSCULAR; INTRAVENOUS at 09:30

## 2019-03-08 RX ADMIN — SODIUM CHLORIDE: 9 INJECTION, SOLUTION INTRAVENOUS at 07:37

## 2019-03-08 ASSESSMENT — PAIN SCALES - GENERAL: PAINLEVEL_OUTOF10: 0

## 2019-03-18 ENCOUNTER — HOSPITAL ENCOUNTER (OUTPATIENT)
Dept: INFUSION THERAPY | Age: 39
Discharge: HOME | End: 2019-03-18
Payer: COMMERCIAL

## 2019-03-18 ENCOUNTER — OFFICE VISIT (OUTPATIENT)
Dept: ONCOLOGY | Age: 39
End: 2019-03-18
Payer: COMMERCIAL

## 2019-03-18 VITALS
RESPIRATION RATE: 16 BRPM | HEART RATE: 74 BPM | DIASTOLIC BLOOD PRESSURE: 87 MMHG | WEIGHT: 214.6 LBS | OXYGEN SATURATION: 98 % | HEIGHT: 64 IN | TEMPERATURE: 98 F | SYSTOLIC BLOOD PRESSURE: 154 MMHG | BODY MASS INDEX: 36.64 KG/M2

## 2019-03-18 DIAGNOSIS — D75.838 REACTIVE THROMBOCYTOSIS: Primary | ICD-10-CM

## 2019-03-18 PROCEDURE — 99211 OFF/OP EST MAY X REQ PHY/QHP: CPT

## 2019-03-18 PROCEDURE — G8417 CALC BMI ABV UP PARAM F/U: HCPCS | Performed by: INTERNAL MEDICINE

## 2019-03-18 PROCEDURE — 1036F TOBACCO NON-USER: CPT | Performed by: INTERNAL MEDICINE

## 2019-03-18 PROCEDURE — 99203 OFFICE O/P NEW LOW 30 MIN: CPT | Performed by: INTERNAL MEDICINE

## 2019-03-18 PROCEDURE — G8484 FLU IMMUNIZE NO ADMIN: HCPCS | Performed by: INTERNAL MEDICINE

## 2019-03-18 PROCEDURE — G8427 DOCREV CUR MEDS BY ELIG CLIN: HCPCS | Performed by: INTERNAL MEDICINE

## 2019-03-18 NOTE — PROGRESS NOTES
[facility] [address], [address]  [address]  Dept: [phone]  Dept Fax: [phone]: [phone]    Visit Date:3/18/2019     Raymundo Short is a 37 y.o. female who presents today for:   Chief Complaint   Patient presents with    New Patient     Establish Care*Thrombocytosis        HPI:   This is a 75-year-old female referred to the medical oncology/hematology clinic with a chronic thrombocytosis. Looking at her medical electronic record the patient had a platelet count elevated to 500,000 in 2013. In the 2016 her platelet count was 260,903. More recently in 2019 her platelet count  Was 986,615. She also has chronically elevated white blood cell count which since 2012 fluctuates between normal 10,000 and as high as 15,300. Most recent WBC from 2019 was 11.8. In the past she had few episodes of anemia when her hemoglobin dropped to 10. Most recent hemoglobin on 2019 was 12.2. Her MCV has been within the normal range.     HPI   Past Medical History:   Diagnosis Date    Essential hypertension 2016    Hashimoto's thyroiditis 2016    HIGH CHOLESTEROL     Hypokalemia     Subclinical hyperthyroidism 2016      Past Surgical History:   Procedure Laterality Date     SECTION  2014      Family History   Problem Relation Age of Onset    High Blood Pressure Mother     Diabetes Mother     Heart Disease Mother     High Blood Pressure Father     Diabetes Father       Social History     Tobacco Use    Smoking status: Never Smoker    Smokeless tobacco: Never Used   Substance Use Topics    Alcohol use: Yes     Comment: social      Current Outpatient Medications   Medication Sig Dispense Refill    metoprolol succinate (TOPROL XL) 100 MG extended release tablet Take 1 tablet by mouth daily 90 tablet 1    sertraline (ZOLOFT) 100 MG tablet Take 1 tablet by mouth daily 90 and time. She has normal reflexes. No cranial nerve deficit. She exhibits normal muscle tone. Skin: Skin is warm. No rash noted. No erythema. Psychiatric: She has a normal mood and affect. Her behavior is normal. Judgment and thought content normal.   Vitals reviewed. BP (!) 154/87 (Site: Left Upper Arm, Position: Sitting, Cuff Size: Medium Adult)   Pulse 74   Temp 98 °F (36.7 °C) (Oral)   Resp 16   Ht 5' 4.02\" (1.626 m)   Wt 214 lb 9.6 oz (97.3 kg)   LMP 02/12/2019   SpO2 98%   BMI 36.82 kg/m²      ECOG status is 1. Imaging studies and labs:   Ir Angiogram Carotid Cerebral Bilateral    Result Date: 3/8/2019  Date of procedure: 3/8/2019 Procedure: Diagnostic cerebral angiogram Indication: diagnostic cerebral angiogram Procedures: 1. Selective right common carotid artery angiogram 2. Selective right internal carotid artery cerebral angiogram 3. Selective right external carotid artery angiogram 4. Selective right vertebral artery cerebral angiogram 5. Selective left common carotid artery angiogram 6. Selective left internal carotid artery cerebral angiogram 7. Right common femoral artery angiogram 8. IV sedation: start 0924 am, End 1029 am Neurointerventionalist: Madyson Chand M.D. [place]: Dr. Kush Chaidez, Dr. Junito Trujillo Comparison: None Fluoroscopy time: 20.2 minutes Contrast: 74 cc Visipaque-270 Side of Access: RCF Closure device: vascade Sedation: IV sedation fentanyl 100 mcg and Versed 1 mg. Patient arrived to the angio suite: 0924 am Puncture obtained at: 0929 am Vascular access removed at: 1029 am Consent: After explaining the risks and benefits to the patient and the patient's family, including but not limited to stroke, coma, death, vessel injury, dissection, tear, occlusion, and X-ray dye allergic type reaction, a signed consent form was obtained. Anesthesia: IV moderate sedation was supervised by Dr. Madyson Chand.  The patient was independently monitored by a Registered Nurse assigned to the Department of Radiology?using automated blood pressure, EKG and pulse oximetry. ? The detailed Conscious Record is permanently stored in the [place] [id number]. ? The following is the conscious sedation record including Start and End times:? Fentanyl and Versed from 0924 am to 1029 am . Clinical History: 34years old female with hx of headache found ot have possible AVM in the right sylvian fissure area based on MRI/A, she was referred for diagnostic catheter angiogram. Description and findings: The patient's right groin was prepped and draped in standard sterile fashion and under local anesthesia with conscious sedation, the right common femoral artery was accessed with a single-wall needle technique, and a 5 [place] intravascular sheath was placed within the right common femoral artery, establishing arterial access. A 5 [place] multipurpose catheter was then advanced over a guide wire to the level of the aortic arch, and used to selectively catheterize the right common carotid artery. Right CCA technique: The right common carotid artery was selectively catheterized under fluoroscopic guidance and digital subtraction angiography images were obtained in biplane projections of the right cervical carotid artery. Interpretation: The right common carotid artery injection demonstrates normal antegrade flow into the external and internal carotid arteries with normal filling of the external carotid artery branches. Course and caliber of the cervical portion of the right internal carotid artery are unremarkable. Further inspection demonstrates no evidence of dissection, stenosis, aneurysm, or other vascular abnormality within the right CCA into the cervical segment of the right ICA. Right ICA technique: The right internal carotid artery was then selectively catheterized, and digital subtraction angiography of the intracranial right internal carotid circulation was performed in frontal, and lateral projections. hours.  BMP:No results for input(s): NA, K, CL, CO2, PHOS, BUN, CREATININE, GLUCOSE in the last 72 hours. Invalid input(s): CA  PT/INR: No results for input(s): PROTIME, INR in the last 72 hours. APTT: No results for input(s): APTT in the last 72 hours. Assessment/Plan:   1. Isolated thrombocytosis. Thrombocytosis is defined as a platelet count >462,986/TACWAV. Causes of thrombocytosis include:  Reactive processes - Iron deficiency/blood loss, infection, inflammation, post-splenectomy, Myocardial infarction, Coronary artery bypass procedures. Autonomous processes - Myeloproliferative neoplasms (MPN) and other hematologic malignancies, familial thrombocytosis. In patient case this chronic and fluctuating thrombocytosis is most likely reactive related to inflammation and heart history       Diagnosis Orders   1. Reactive thrombocytosis          Plan:   Return if symptoms worsen or fail to improve. Orders Placed:   No orders of the defined types were placed in this encounter. Medications Prescribed:   No orders of the defined types were placed in this encounter. Discussed use, benefit, and side effectsof prescribed medications. All patient questions answered. Pt voiced understanding. Instructed to continue current medications, diet and exercise. Patient agreed with treatment plan. Follow up as directed.     Electronically signed by Lillie Dover MD on 3/18/19 at 3:54 PM

## 2019-03-26 ENCOUNTER — OFFICE VISIT (OUTPATIENT)
Dept: FAMILY MEDICINE CLINIC | Age: 39
End: 2019-03-26
Payer: COMMERCIAL

## 2019-03-26 VITALS
WEIGHT: 216.6 LBS | HEART RATE: 60 BPM | SYSTOLIC BLOOD PRESSURE: 146 MMHG | TEMPERATURE: 98.4 F | RESPIRATION RATE: 16 BRPM | BODY MASS INDEX: 36.98 KG/M2 | HEIGHT: 64 IN | DIASTOLIC BLOOD PRESSURE: 92 MMHG

## 2019-03-26 DIAGNOSIS — F41.0 PANIC DISORDER WITHOUT AGORAPHOBIA WITH MODERATE PANIC ATTACKS: ICD-10-CM

## 2019-03-26 DIAGNOSIS — M25.562 ACUTE PAIN OF LEFT KNEE: ICD-10-CM

## 2019-03-26 DIAGNOSIS — I10 ESSENTIAL HYPERTENSION: Primary | ICD-10-CM

## 2019-03-26 PROCEDURE — G8484 FLU IMMUNIZE NO ADMIN: HCPCS | Performed by: NURSE PRACTITIONER

## 2019-03-26 PROCEDURE — 1036F TOBACCO NON-USER: CPT | Performed by: NURSE PRACTITIONER

## 2019-03-26 PROCEDURE — G8417 CALC BMI ABV UP PARAM F/U: HCPCS | Performed by: NURSE PRACTITIONER

## 2019-03-26 PROCEDURE — G8427 DOCREV CUR MEDS BY ELIG CLIN: HCPCS | Performed by: NURSE PRACTITIONER

## 2019-03-26 PROCEDURE — 99214 OFFICE O/P EST MOD 30 MIN: CPT | Performed by: NURSE PRACTITIONER

## 2019-03-26 RX ORDER — LOSARTAN POTASSIUM 25 MG/1
25 TABLET ORAL DAILY
Qty: 90 TABLET | Refills: 1 | Status: SHIPPED | OUTPATIENT
Start: 2019-03-26 | End: 2019-10-22

## 2019-03-26 RX ORDER — TRAMADOL HYDROCHLORIDE 50 MG/1
50 TABLET ORAL EVERY 6 HOURS PRN
Qty: 12 TABLET | Refills: 0 | Status: SHIPPED | OUTPATIENT
Start: 2019-03-26 | End: 2019-03-29

## 2019-03-26 RX ORDER — ASPIRIN 81 MG/1
81 TABLET, CHEWABLE ORAL DAILY
Qty: 90 TABLET | Refills: 0 | Status: SHIPPED | OUTPATIENT
Start: 2019-03-26 | End: 2019-06-03 | Stop reason: ALTCHOICE

## 2019-04-02 ENCOUNTER — HOSPITAL ENCOUNTER (OUTPATIENT)
Age: 39
Discharge: HOME | End: 2019-04-02
Payer: COMMERCIAL

## 2019-04-02 ENCOUNTER — TELEPHONE (OUTPATIENT)
Dept: FAMILY MEDICINE CLINIC | Age: 39
End: 2019-04-02

## 2019-04-02 ENCOUNTER — HOSPITAL ENCOUNTER (OUTPATIENT)
Dept: GENERAL RADIOLOGY | Age: 39
Discharge: HOME | End: 2019-04-02
Payer: COMMERCIAL

## 2019-04-02 DIAGNOSIS — S82.132A CLOSED FRACTURE OF MEDIAL PORTION OF LEFT TIBIAL PLATEAU, INITIAL ENCOUNTER: ICD-10-CM

## 2019-04-02 DIAGNOSIS — M25.462 EFFUSION OF LEFT KNEE: Primary | ICD-10-CM

## 2019-04-02 DIAGNOSIS — M25.562 ACUTE PAIN OF LEFT KNEE: ICD-10-CM

## 2019-04-02 PROCEDURE — 73562 X-RAY EXAM OF KNEE 3: CPT

## 2019-04-02 NOTE — TELEPHONE ENCOUNTER
----- Message from Joelle Sender, APRN - CNP sent at 4/2/2019  3:00 PM EDT -----  Let Malcolm Fitzpatrick know her xray is showing a possible loose \"articular body\" basically a loose cartilage or piece of bone in her knee. The xray is also showing a possible stress fracture of the medial tibia. Recommending an MRI of her knee to get a better image.  Order placed

## 2019-04-04 ENCOUNTER — TELEPHONE (OUTPATIENT)
Dept: NEUROSURGERY | Age: 39
End: 2019-04-04

## 2019-04-04 NOTE — TELEPHONE ENCOUNTER
Patient would like to know if she should continue her Asprin 81 mg daily please advise     Last office note 2/19/2019  Brenden Holt is a 34 y.o. female who presents with headache, dizziness, blurred vision, not feeling right and off balance as she walks on the 11th of February, 2019.      Had headache and dizziness before     Denies any head trauma     No family history of brain aneurysms or AVM     She presents now for further evaluation and management of the abnormal tangles of blood vessels suggestive of right sylvian AVM     Would start with cerebral angiogram     1.  does not have any pertinent problems on file.

## 2019-04-11 ENCOUNTER — HOSPITAL ENCOUNTER (EMERGENCY)
Age: 39
Discharge: HOME | End: 2019-04-11
Attending: EMERGENCY MEDICINE
Payer: COMMERCIAL

## 2019-04-11 ENCOUNTER — APPOINTMENT (OUTPATIENT)
Dept: CT IMAGING | Age: 39
End: 2019-04-11
Payer: COMMERCIAL

## 2019-04-11 VITALS
RESPIRATION RATE: 18 BRPM | TEMPERATURE: 97.9 F | HEIGHT: 64 IN | OXYGEN SATURATION: 99 % | DIASTOLIC BLOOD PRESSURE: 88 MMHG | WEIGHT: 217 LBS | SYSTOLIC BLOOD PRESSURE: 156 MMHG | HEART RATE: 68 BPM | BODY MASS INDEX: 37.05 KG/M2

## 2019-04-11 DIAGNOSIS — N20.0 KIDNEY STONE: Primary | ICD-10-CM

## 2019-04-11 LAB
BACTERIA: ABNORMAL /HPF
BILIRUBIN URINE: NEGATIVE
BLOOD, URINE: ABNORMAL
CASTS 2: ABNORMAL /LPF
CASTS UA: ABNORMAL /LPF
CHARACTER, URINE: ABNORMAL
COLOR: YELLOW
CRYSTALS, UA: ABNORMAL
EPITHELIAL CELLS, UA: ABNORMAL /HPF
GLUCOSE URINE: NEGATIVE MG/DL
KETONES, URINE: NEGATIVE
LEUKOCYTE ESTERASE, URINE: NEGATIVE
MISCELLANEOUS 2: ABNORMAL
NITRITE, URINE: NEGATIVE
PH UA: 5.5 (ref 5–9)
PREGNANCY, URINE: NEGATIVE
PROTEIN UA: ABNORMAL
RBC URINE: ABNORMAL /HPF
RENAL EPITHELIAL, UA: ABNORMAL
SPECIFIC GRAVITY, URINE: 1.02 (ref 1–1.03)
UROBILINOGEN, URINE: 0.2 EU/DL (ref 0–1)
WBC UA: ABNORMAL /HPF
YEAST: ABNORMAL

## 2019-04-11 PROCEDURE — 81001 URINALYSIS AUTO W/SCOPE: CPT

## 2019-04-11 PROCEDURE — 99284 EMERGENCY DEPT VISIT MOD MDM: CPT

## 2019-04-11 PROCEDURE — 74176 CT ABD & PELVIS W/O CONTRAST: CPT

## 2019-04-11 PROCEDURE — 96374 THER/PROPH/DIAG INJ IV PUSH: CPT

## 2019-04-11 PROCEDURE — 6360000002 HC RX W HCPCS: Performed by: EMERGENCY MEDICINE

## 2019-04-11 PROCEDURE — 81025 URINE PREGNANCY TEST: CPT

## 2019-04-11 RX ORDER — KETOROLAC TROMETHAMINE 10 MG/1
10 TABLET, FILM COATED ORAL 2 TIMES DAILY PRN
Qty: 8 TABLET | Refills: 0 | Status: SHIPPED | OUTPATIENT
Start: 2019-04-11 | End: 2019-10-22

## 2019-04-11 RX ORDER — ONDANSETRON 4 MG/1
4 TABLET, ORALLY DISINTEGRATING ORAL EVERY 8 HOURS PRN
Qty: 20 TABLET | Refills: 0 | Status: SHIPPED | OUTPATIENT
Start: 2019-04-11 | End: 2019-10-22

## 2019-04-11 RX ORDER — KETOROLAC TROMETHAMINE 30 MG/ML
15 INJECTION, SOLUTION INTRAMUSCULAR; INTRAVENOUS ONCE
Status: COMPLETED | OUTPATIENT
Start: 2019-04-11 | End: 2019-04-11

## 2019-04-11 RX ORDER — HYDROCODONE BITARTRATE AND ACETAMINOPHEN 5; 325 MG/1; MG/1
1 TABLET ORAL EVERY 6 HOURS PRN
Qty: 15 TABLET | Refills: 0 | Status: SHIPPED | OUTPATIENT
Start: 2019-04-11 | End: 2019-04-14

## 2019-04-11 RX ADMIN — KETOROLAC TROMETHAMINE 15 MG: 30 INJECTION, SOLUTION INTRAMUSCULAR; INTRAVENOUS at 10:40

## 2019-04-11 ASSESSMENT — ENCOUNTER SYMPTOMS
CONSTIPATION: 0
NAUSEA: 0
COUGH: 0
DIARRHEA: 0
SHORTNESS OF BREATH: 0
EYE DISCHARGE: 0
RHINORRHEA: 0
WHEEZING: 0
SORE THROAT: 0
BACK PAIN: 1
VOMITING: 0
EYE PAIN: 0
ABDOMINAL PAIN: 1
BLOOD IN STOOL: 0

## 2019-04-11 ASSESSMENT — PAIN SCALES - GENERAL: PAINLEVEL_OUTOF10: 8

## 2019-04-11 ASSESSMENT — PAIN DESCRIPTION - PAIN TYPE: TYPE: ACUTE PAIN

## 2019-04-11 ASSESSMENT — PAIN DESCRIPTION - DESCRIPTORS: DESCRIPTORS: STABBING

## 2019-04-11 ASSESSMENT — PAIN DESCRIPTION - FREQUENCY: FREQUENCY: CONTINUOUS

## 2019-04-11 ASSESSMENT — PAIN DESCRIPTION - LOCATION: LOCATION: FLANK

## 2019-04-11 ASSESSMENT — PAIN SCALES - WONG BAKER: WONGBAKER_NUMERICALRESPONSE: 8

## 2019-04-11 ASSESSMENT — PAIN DESCRIPTION - ONSET: ONSET: GRADUAL

## 2019-04-11 ASSESSMENT — PAIN DESCRIPTION - PROGRESSION: CLINICAL_PROGRESSION: GRADUALLY WORSENING

## 2019-04-11 NOTE — ED NOTES
Pt presents to the ER for R side pain and flank pain. Pt states that it began 2 days ago and is continually getting worse. Pt has a squeezing pain after urination and she states that her pain is an 8 out of 10. Pt only has history of high BP.      Bethany Turcios  04/11/19 1016

## 2019-04-11 NOTE — CARE COORDINATION
ED Care Transition    2019    Patient Name: Radha Andrade   : 1980  MRN: [id number]    JED Score: 3  PCP: MARÍA Raines CNP   Specialist: yes - Dr. Jose Alfredo Landon   Active ACC/CTC: no               Utilization Review:  ED visits:  3  Admissions: 1 - 19- 19 Headache, HTN, Mild non-AG metabolic acidosis, Thrombocytosis     Problem List:  Patient Active Problem List   Diagnosis    Abdominal pain complicating pregnancy    Essential hypertension    Panic disorder without agoraphobia with moderate panic attacks    Hashimoto's thyroiditis    Family history of early CAD    Intermittent palpitations    Other headache syndrome    Intractable vascular headache    Cerebral AVM    AVM (arteriovenous malformation)     Summary:  Met with Donna, introduced self/role. Presented to ED for evaluation of flank pain. Plan is for discharge. Appointment with Dr. Jose Alfredo Landon tomorrow at [phone]. Educated on Norco, Toradol, and Zofran, and side effects of medications to monitor for. Educated on s/s to report or return, verbalized understanding. Denies further needs/assistance/concerns/questions at this time.        Follow up appointments:    Future Appointments   Date Time Provider Ravi Mccann   2019  6:30 PM STR MRI RM1 STRZ MRI STR Radiolog   2019  3:00 PM MARÍA Raines  [facility]       Review Due Health Maintenance:  Health Maintenance Due   Topic Date Due    HIV screen  1993    Diabetes screen  2018     Sam Solo RN, BSN  [phone]

## 2019-04-11 NOTE — ED PROVIDER NOTES
[facility] COMPLAINT       Chief Complaint   Patient presents with    Flank Pain     Right       Nurses Notes reviewed and I agree except as noted in the HPI. HISTORY OF PRESENT ILLNESS    Donna Henderson is a 37 y.o. female with a past medical history of hypertension and high cholesterol who presents to the Emergency Department for the evaluation of RLQ abdominal pain that radiates into her right flank and right lower back. She states the pain started 2 days ago but became constant yesterday. She rates her current pain as an 8/10 in severity and describes it as stabbing in character. The patient reports associated urinary frequency and a shooting pain after she urinates. She denies fever, chills, diarrhea, constipation, chest pain or shortness of breath. The patient denies ever having this problem before or ever having a kidney stone. She denies a chance of pregnancy and states she just got off her menstrual. No further complaints at the time of the initial encounter. The HPI was provided by the patient. REVIEW OF SYSTEMS     Review of Systems   Constitutional: Negative for appetite change, chills, fatigue and fever. HENT: Negative for congestion, ear pain, rhinorrhea and sore throat. Eyes: Negative for pain, discharge and visual disturbance. Respiratory: Negative for cough, shortness of breath and wheezing. Cardiovascular: Negative for chest pain, palpitations and leg swelling. Gastrointestinal: Positive for abdominal pain (RLQ). Negative for blood in stool, constipation, diarrhea, nausea and vomiting. Genitourinary: Positive for flank pain (right) and frequency. Negative for decreased urine volume, difficulty urinating, dysuria, hematuria, pelvic pain, urgency, vaginal discharge and vaginal pain. Musculoskeletal: Positive for back pain (right lower). Negative for arthralgias, joint swelling and neck pain.    Skin: Negative for pallor and rash. Neurological: Negative for dizziness, syncope, weakness, light-headedness, numbness and headaches. Hematological: Negative for adenopathy. Psychiatric/Behavioral: Negative for confusion and suicidal ideas. The patient is not nervous/anxious. PAST MEDICAL HISTORY    has a past medical history of Essential hypertension, Hashimoto's thyroiditis, HIGH CHOLESTEROL, Hypokalemia, and Subclinical hyperthyroidism. SURGICAL HISTORY      has a past surgical history that includes  section (, ). CURRENT MEDICATIONS       Previous Medications    AMLODIPINE (NORVASC) 10 MG TABLET    take 1 tablet by mouth once daily    ASPIRIN 81 MG CHEWABLE TABLET    Take 1 tablet by mouth daily    CLONAZEPAM (KLONOPIN) 0.5 MG TABLET    Take 1 tablet by mouth 2 times daily as needed for Anxiety for up to 30 days. Rambo Naik LOSARTAN (COZAAR) 25 MG TABLET    Take 1 tablet by mouth daily    METOPROLOL SUCCINATE (TOPROL XL) 100 MG EXTENDED RELEASE TABLET    Take 1 tablet by mouth daily    MONO-LINYAH 0.25-35 MG-MCG PER TABLET    Take 1 tablet by mouth daily     SERTRALINE (ZOLOFT) 100 MG TABLET    Take 1 tablet by mouth daily       ALLERGIES     is allergic to bactrim. FAMILY HISTORY     indicated that her mother is . She indicated that her father is . family history includes Diabetes in her father and mother; Heart Disease in her mother; High Blood Pressure in her father and mother. SOCIAL HISTORY      reports that she has never smoked. She has never used smokeless tobacco. She reports that she drinks alcohol. She reports that she does not use drugs. PHYSICAL EXAM     INITIAL VITALS:  height is 5' 4\" (1.626 m) and weight is 217 lb (98.4 kg). Her oral temperature is 97.9 °F (36.6 °C). Her blood pressure is 156/88 (abnormal) and her pulse is 68. Her respiration is 18 and oxygen saturation is 99%.     Physical Exam   Constitutional: She is oriented to person, place, and time. She appears well-developed and well-nourished. No distress. HENT:   Head: Normocephalic and atraumatic. Right Ear: External ear normal.   Left Ear: External ear normal.   Eyes: Conjunctivae are normal.   Neck: Normal range of motion. Neck supple. No thyromegaly present. Cardiovascular: Normal rate, regular rhythm and normal heart sounds. No murmur heard. Pulmonary/Chest: Effort normal and breath sounds normal. No respiratory distress. She has no decreased breath sounds. She has no wheezes. She has no rhonchi. She has no rales. She exhibits no tenderness. Abdominal: Soft. Bowel sounds are normal. She exhibits no distension. There is no tenderness. There is no CVA tenderness. Musculoskeletal: Normal range of motion. She exhibits no edema. Neurological: She is alert and oriented to person, place, and time. No cranial nerve deficit. Skin: Skin is warm and dry. No rash noted. She is not diaphoretic. No erythema. No pallor. Psychiatric: She has a normal mood and affect. Her behavior is normal. Judgment and thought content normal.   Nursing note and vitals reviewed. DIFFERENTIALDIAGNOSIS:   Kidney stone, UTI, pyelonephritis     DIAGNOSTIC RESULTS     EKG: All EKG's are interpreted by the Emergency Department Physician who either signs or Co-signs this chart in the absence of a cardiologist.  EKG interpreted by Edwin Bailey DO:    None    RADIOLOGY: non-plain film images(s) such as CT, Ultrasound and MRI are read by the radiologist.    CT ABDOMEN PELVIS WO CONTRAST Additional Contrast? None   Final Result   1. There are a couple 0.2 cm nonobstructing calculus within the lower pole the right renal pelvis. There is also an obstructing 0.3 cm calculus at the right UPJ with mild right pelvocaliectasis. The right kidney is otherwise unremarkable. **This report has been created using voice recognition software.   It may contain minor errors which are inherent in voice recognition technology. **      Final report electronically signed by Dr. Silvestre Cabrera on 4/11/2019 11:16 AM          LABS:   Labs Reviewed   URINE WITH REFLEXED MICRO - Abnormal; Notable for the following components:       Result Value    Blood, Urine LARGE (*)     Protein, UA TRACE (*)     Character, Urine CLOUDY (*)     All other components within normal limits   PREGNANCY, URINE       EMERGENCYDEPARTMENT COURSE:   Vitals:    Vitals:    04/11/19 1012   BP: (!) 156/88   Pulse: 68   Resp: 18   Temp: 97.9 °F (36.6 °C)   TempSrc: Oral   SpO2: 99%   Weight: 217 lb (98.4 kg)   Height: 5' 4\" (1.626 m)       10:35 AM: The patient was seen and evaluated. 12:16 PM: Discussed results, diagnosis and plan with the patient. Questions were addressed. Disposition and follow-up were agreed upon. Specific discharge instructions explained, including reasons to return to the emergency department. MDM:  The patient presents to the ED with complaints of RLQ abdominal pain that radiates into her right flank and right lower back. The patient was in a hypertensive state, but not in any acute distress. The patient's physical exam revealed normal heart sounds and lung sounds were clear throughout. A soft and non-tender abdomen. Bowel sounds were normal. No CVA tenderness. Within the department, the patient was treated with Toradol. Patient's status improved during the duration of their stay. Labs and imaging were ordered, and reviewed with the patient. UA showed signs of infection. CT abdomen and pelvis without contrast showed a couple 0.2 cm nonobstructing calculus within the lower pole the right renal pelvis. There is also an obstructing 0.3 cm calculus at the right UPJ with mild right pelvocaliectasis. I explained my proposed course of treatment with the patient, and she was amenable to my decision.  The patient will be discharged home with Norco, Toradol and Zofran ODT, and will need to follow-up with Isacc Damon MD in 4/12/2019 at 1pm.

## 2019-04-11 NOTE — LETTER
[address] [facility] [address] [address] EMERGENCY DEPT  [facility] [address]  Phone: [phone]             April 11, 2019    Patient: Raymundo Short   YOB: 1980   Date of Visit: 4/11/2019       To Whom It May Concern:    Raymundo Short was seen and treated in our emergency department on 4/11/2019. She may return to work on 4/12/19.       Sincerely,             Signature:__________________________________

## 2019-04-11 NOTE — ED NOTES
Pt reports intermittent right sided flank pain since Sunday. States she took tramadol yesterday with minimal relief. Denies n/v/d, no pain with urination. Appears in no acute distress at this time. Warm blankets provided, denies further needs or concerns at this time.  Will monitor     Pollo Bettencourt RN  04/11/19 1037

## 2019-04-12 ENCOUNTER — OFFICE VISIT (OUTPATIENT)
Dept: UROLOGY | Age: 39
End: 2019-04-12
Payer: COMMERCIAL

## 2019-04-12 VITALS
WEIGHT: 217 LBS | SYSTOLIC BLOOD PRESSURE: 118 MMHG | HEIGHT: 64 IN | DIASTOLIC BLOOD PRESSURE: 86 MMHG | BODY MASS INDEX: 37.05 KG/M2

## 2019-04-12 DIAGNOSIS — N32.81 OVERACTIVE BLADDER: ICD-10-CM

## 2019-04-12 DIAGNOSIS — R35.1 NOCTURIA: ICD-10-CM

## 2019-04-12 DIAGNOSIS — R10.9 RIGHT FLANK PAIN: Primary | ICD-10-CM

## 2019-04-12 DIAGNOSIS — N20.1 RIGHT URETERAL STONE: ICD-10-CM

## 2019-04-12 PROCEDURE — G8427 DOCREV CUR MEDS BY ELIG CLIN: HCPCS | Performed by: NURSE PRACTITIONER

## 2019-04-12 PROCEDURE — G8417 CALC BMI ABV UP PARAM F/U: HCPCS | Performed by: NURSE PRACTITIONER

## 2019-04-12 PROCEDURE — 1036F TOBACCO NON-USER: CPT | Performed by: NURSE PRACTITIONER

## 2019-04-12 PROCEDURE — 99204 OFFICE O/P NEW MOD 45 MIN: CPT | Performed by: NURSE PRACTITIONER

## 2019-04-12 RX ORDER — OXYBUTYNIN CHLORIDE 5 MG/1
5 TABLET, EXTENDED RELEASE ORAL DAILY
Qty: 30 TABLET | Refills: 11 | Status: SHIPPED | OUTPATIENT
Start: 2019-04-12 | End: 2019-06-03

## 2019-04-12 RX ORDER — TAMSULOSIN HYDROCHLORIDE 0.4 MG/1
0.4 CAPSULE ORAL DAILY
Qty: 5 CAPSULE | Refills: 0 | Status: SHIPPED | OUTPATIENT
Start: 2019-04-12 | End: 2019-06-03

## 2019-04-12 NOTE — PROGRESS NOTES
Adam Maria is a 37 y.o. female is a new patient who was referred here by [facility] ED. Deloris Sands was seen in the ED on 19 due to acute onset of right flank pain. The pain started just prior to presentation while she was at work. It is located in the right flank and is constant. It is described as an ache. There are no aggravating factors. There is also associated nausea & vomiting. CT abd/pelvis showed a 3 mm obstructing stone at the right UPJ. Their pain was controlled so they were discharged with pain medication. They deny dysuria, hematuria, fever, or chills. She also reports a long history of overactive bladder. She reports urinary frequency & urgency during the day. She also gets up 2-3 times at night to urinate. She denies any history of recurrent UTI. Past Medical History:   Diagnosis Date    Essential hypertension 2016    Hashimoto's thyroiditis 2016    HIGH CHOLESTEROL     Hypokalemia     Subclinical hyperthyroidism 2016       Past Surgical History:   Procedure Laterality Date     SECTION  2014       Current Outpatient Medications on File Prior to Visit   Medication Sig Dispense Refill    HYDROcodone-acetaminophen (NORCO) 5-325 MG per tablet Take 1 tablet by mouth every 6 hours as needed for Pain for up to 3 days.  15 tablet 0    ketorolac (TORADOL) 10 MG tablet Take 1 tablet by mouth 2 times daily as needed for Pain Do not take more than 4 pills brad 24 hour period 8 tablet 0    ondansetron (ZOFRAN ODT) 4 MG disintegrating tablet Take 1 tablet by mouth every 8 hours as needed for Nausea 20 tablet 0    aspirin 81 MG chewable tablet Take 1 tablet by mouth daily 90 tablet 0    losartan (COZAAR) 25 MG tablet Take 1 tablet by mouth daily 90 tablet 1    metoprolol succinate (TOPROL XL) 100 MG extended release tablet Take 1 tablet by mouth daily 90 tablet 1    sertraline (ZOLOFT) 100 MG tablet Take 1 tablet by mouth daily 90 tablet 0    [address] 0.25-35 MG-MCG per tablet Take 1 tablet by mouth daily   1    amLODIPine (NORVASC) 10 MG tablet take 1 tablet by mouth once daily 90 tablet 3    clonazePAM (KLONOPIN) 0.5 MG tablet Take 1 tablet by mouth 2 times daily as needed for Anxiety for up to 30 days. . 60 tablet 0    [DISCONTINUED] FLUoxetine (PROZAC) 20 MG capsule Take 1 capsule by mouth daily for 2 weeks, then take 2 capsules by mouth daily 60 capsule 3    [DISCONTINUED] metoprolol tartrate (LOPRESSOR) 25 MG tablet Take 1 tablet by mouth 2 times daily (Patient taking differently: Take 25 mg by mouth daily ) 60 tablet 3     No current facility-administered medications on file prior to visit. Allergies   Allergen Reactions    Bactrim        Social History     Tobacco Use    Smoking status: Never Smoker    Smokeless tobacco: Never Used   Substance Use Topics    Alcohol use: Yes     Comment: social    Drug use: No       Family History   Problem Relation Age of Onset    High Blood Pressure Mother     Diabetes Mother     Heart Disease Mother     High Blood Pressure Father     Diabetes Father        Review of Systems   Constitutional: Negative for chills, fatigue, fever or weight loss. Eyes: Denies reported visual changes. Cardiovascular: Negative for chest pain, palpitations, tachycardia or edema. Respiratory: Denies cough or SOB. GI: Denies any constipation or diarrhea. : see HPI. Musculoskeletal: Patient denies low back pain or painful or reduced range of  motion of the back or extremities. Neurological: The patient denies any symptoms of neurological impairment or TIA's; no history of stroke. Lymphatic: Denies swollen glands in neck, axillary or inguinal areas. Psychiatric: Denies anxiety or depression. Skin: Denies rash or lesions. The remainder of the complete ROS is negative. Physical Exam  Nursing note and vitals reviewed.   Constitutional: Alert and oriented times 3, no acute distress and cooperative to examination with appropriate mood and affect. HENT:   Head:        Normocephalic and atraumatic. Mouth/Throat:         Mucous membranes are normal.   Eyes:         EOM are normal. No scleral icterus. PERRLA. Neck:        Supple, symmetrical, trachea midline, no adenopathy, thyroid symmetric, not enlarged and no tenderness. Cardiovascular:        Normal rate, regular rhythm, S1 S2 heart sounds. No murmurs, rub, or gallops. Pulses:       Radial pulses are 2+/4 bilateral and equal. Posterior tibialis 2+/4 bilateral and equal  Pulmonary/Chest:      Chest symmetric with normal A/P diameter,  CTA with no wheezes, rales, or rhonchi noted. Normal respiratory rate and rhthym. No use of accessory muscles. Abdominal:         Soft. No tenderness. No rebound or guarding. No CVA tenderness. Musculoskeletal:         Normal range of motion. No evidence of edema or tenderness of lower extremities. Lymphadenopathy:        No cervical adenopathy. Bilateral supraclavicular adenopathy absent. Extremities: No cyanosis, clubbing, or edema present. Neurological:        Alert and oriented. No cranial nerve deficit. There are no focalizing motor or sensory deficits. CN II-XII are grossly intact. Skin:       Skin color, texture, turgor normal. No rashes or lesions. Psychiatric:        Normal mood and affect.      Labs   WBC:    Lab Results   Component Value Date    WBC 11.8 02/20/2019     Hemoglobin/Hematocrit:    Lab Results   Component Value Date    HGB 12.2 02/20/2019    HCT 37.7 02/20/2019     BMP:    Lab Results   Component Value Date     02/12/2019    K 4.3 02/12/2019     02/12/2019    CO2 25 02/12/2019    BUN 10 02/12/2019    LABALBU 4.1 02/11/2019    CREATININE 0.82 03/08/2019    CREATININE 0.6 02/12/2019    CALCIUM 8.9 02/12/2019    LABGLOM >60 03/08/2019    LABGLOM >90 02/12/2019     PT/INR:  No results found for: PROTIME, INR  PTT:  No results found for: APTT[APTT  Urinalysis:   Lab Results   Component Value Date    BLOODU LARGE 04/11/2019    NITRU NEGATIVE 04/11/2019    LEUKOCYTESUR NEGATIVE 04/11/2019    COLORU YELLOW 04/11/2019    WBCUA 2-4 04/11/2019    WBCUA 0-5 07/27/2017    BACTERIA NONE 04/11/2019    CRYST NONE SEEN 12/08/2012     Urine Culture:    Lab Results   Component Value Date    LABURIN  07/03/2017     No growth-preliminary  Growth of Contaminants. The mixture of organisms present  are not a common cause of urinary tract infections and  probably represent skin matt or distal urethral matt. Blood Culture:  No results found for: [facility]      Radiology  The patient has had a CT Stone Protocol which I have reviewed along with its accompanying report. The study demonstrates a 3 mm right UPJ stone. Assessment    Right Ureteral Stone  Right Flank Pain  Right Hydronephrosis  Overactive Bladder    Plan    Patient's pain has moved and she is now having bladder pressure and \"feels like she is pregnant. \" The stone is likely at the UVJ now. Continue pain medication PRN. Pain is well controlled. Start Flomax. Stop if any side effects occur. We discussed stone prevention. She was given a sheet on dietary changes. She also expressed concern regarding urinary frequency & urgency which has gotten worse over the years. She would like to try some treatment. Start Oxybutynin 5 mg daily for OAB symptoms. We discussed side effects at length. Follow-up in 1 year or sooner if problems arise.     Zoey Umaña CNP  4/12/2019 11:58 AM

## 2019-04-15 ENCOUNTER — CARE COORDINATION (OUTPATIENT)
Dept: CASE MANAGEMENT | Age: 39
End: 2019-04-15

## 2019-04-15 NOTE — CARE COORDINATION
Care Transition  ED Follow up Call    Reason for ED visit: Right Flank Pain - Kidney stone   How are you feeling? \"good now\"  Status:     significantly improved      Do you have any questions related to your discharge instructions? no    Review of Instructions:    Discharged with new prescription? yes - Norco, toradol, zofran, flomax, oxybutynin     Review Medications:  Yes   Do you have any questions related to your medications? no    Understands what to report/when to return?:  Yes   Do you have a follow up appointment scheduled? Yes  Specific review if indicated (symptom, services, etc): Spoke with Hermila, introduced self/role. Reports she has passed the stone, denies any further symptoms. Had Urology follow up appointment. Denies further needs/assistance/concerns/questions at this time.        Do you have any needs or concerns I can assist you with? no     FU appts/Provider:    Future Appointments   Date Time Provider Ravi Mccann   4/16/2019  6:30 PM STR MRI RM1 STRZ MRI STR Radiolog   5/1/2019  3:00 PM MARÍA Shane - CNP [facility]   4/17/2020 12:30 PM Gerhardt Pica, APRN - [address] Urology [facility] [place] RN, [place]  [phone]

## 2019-04-16 ENCOUNTER — HOSPITAL ENCOUNTER (OUTPATIENT)
Dept: MRI IMAGING | Age: 39
Discharge: HOME | End: 2019-04-16
Payer: COMMERCIAL

## 2019-04-16 DIAGNOSIS — S82.132A CLOSED FRACTURE OF MEDIAL PORTION OF LEFT TIBIAL PLATEAU, INITIAL ENCOUNTER: ICD-10-CM

## 2019-04-16 DIAGNOSIS — M25.462 EFFUSION OF LEFT KNEE: ICD-10-CM

## 2019-04-16 PROCEDURE — 73721 MRI JNT OF LWR EXTRE W/O DYE: CPT

## 2019-04-17 ENCOUNTER — TELEPHONE (OUTPATIENT)
Dept: FAMILY MEDICINE CLINIC | Age: 39
End: 2019-04-17

## 2019-04-17 DIAGNOSIS — M94.262 CHONDROMALACIA OF LEFT KNEE: ICD-10-CM

## 2019-04-17 DIAGNOSIS — M25.462 EFFUSION OF LEFT KNEE: Primary | ICD-10-CM

## 2019-04-17 NOTE — TELEPHONE ENCOUNTER
----- Message from Juleen Romberg, APRN - CNP sent at 4/17/2019 12:36 PM EDT -----  Let Camryn Zhang know her knee MRI does show she has a probably loose body or bony structure in her knee, and small joint effusion. She also has some degeneration of the cartilage underneath her knee cap. I would refer to orthopedic for further care. Referral placed.

## 2019-04-19 ASSESSMENT — ENCOUNTER SYMPTOMS
SHORTNESS OF BREATH: 1
BLOOD IN STOOL: 0
RECTAL PAIN: 0
EYE DISCHARGE: 0
ABDOMINAL PAIN: 0
VOMITING: 0
FACIAL SWELLING: 0
COLOR CHANGE: 0
ABDOMINAL DISTENTION: 0
DIARRHEA: 0
SORE THROAT: 0
WHEEZING: 0
TROUBLE SWALLOWING: 0
BACK PAIN: 1
NAUSEA: 0
CONSTIPATION: 0
COUGH: 0
CHEST TIGHTNESS: 0

## 2019-04-30 ENCOUNTER — NURSE ONLY (OUTPATIENT)
Dept: LAB | Age: 39
End: 2019-04-30

## 2019-04-30 DIAGNOSIS — I10 ESSENTIAL HYPERTENSION: ICD-10-CM

## 2019-04-30 LAB
ANION GAP SERPL CALCULATED.3IONS-SCNC: 12 MEQ/L (ref 8–16)
BUN BLDV-MCNC: 10 MG/DL (ref 7–22)
CALCIUM SERPL-MCNC: 9 MG/DL (ref 8.5–10.5)
CHLORIDE BLD-SCNC: 109 MEQ/L (ref 98–111)
CO2: 22 MEQ/L (ref 23–33)
CREAT SERPL-MCNC: 0.7 MG/DL (ref 0.4–1.2)
GFR SERPL CREATININE-BSD FRML MDRD: > 90 ML/MIN/1.73M2
GLUCOSE BLD-MCNC: 102 MG/DL (ref 70–108)
POTASSIUM SERPL-SCNC: 4 MEQ/L (ref 3.5–5.2)
SODIUM BLD-SCNC: 143 MEQ/L (ref 135–145)

## 2019-05-01 ENCOUNTER — TELEPHONE (OUTPATIENT)
Dept: FAMILY MEDICINE CLINIC | Age: 39
End: 2019-05-01

## 2019-05-01 ENCOUNTER — OFFICE VISIT (OUTPATIENT)
Dept: FAMILY MEDICINE CLINIC | Age: 39
End: 2019-05-01
Payer: COMMERCIAL

## 2019-05-01 VITALS
BODY MASS INDEX: 36.7 KG/M2 | WEIGHT: 215 LBS | DIASTOLIC BLOOD PRESSURE: 72 MMHG | TEMPERATURE: 98.7 F | RESPIRATION RATE: 16 BRPM | HEIGHT: 64 IN | HEART RATE: 87 BPM | SYSTOLIC BLOOD PRESSURE: 130 MMHG

## 2019-05-01 DIAGNOSIS — I10 ESSENTIAL HYPERTENSION: Primary | ICD-10-CM

## 2019-05-01 PROCEDURE — G8417 CALC BMI ABV UP PARAM F/U: HCPCS | Performed by: NURSE PRACTITIONER

## 2019-05-01 PROCEDURE — 99213 OFFICE O/P EST LOW 20 MIN: CPT | Performed by: NURSE PRACTITIONER

## 2019-05-01 PROCEDURE — 1036F TOBACCO NON-USER: CPT | Performed by: NURSE PRACTITIONER

## 2019-05-01 PROCEDURE — G8427 DOCREV CUR MEDS BY ELIG CLIN: HCPCS | Performed by: NURSE PRACTITIONER

## 2019-05-01 NOTE — TELEPHONE ENCOUNTER
Unable to leave message, pt does have appt today @3pm    ----- Message from MARÍA Roland CNP sent at 5/1/2019  7:52 AM EDT -----  Let Melyri Cyndi know her repeat kidney function test is normal.

## 2019-05-01 NOTE — TELEPHONE ENCOUNTER
[facility]   Voiced understanding and denied any questions at this time

## 2019-05-01 NOTE — PROGRESS NOTES
Chief Complaint   Patient presents with    Hypertension     feeling good at home       History obtained from chart review and the patient. SUBJECTIVE:  Adam Maria is a 37 y.o. female that presents today for follow up HTN    HTN    Does patient check BP regularly at home? - No  Current Medication regimen - Norvasc Toprol Losartan  Tolerating medications well? - yes    Shortness of breath or chest pain? no   Headache or visual complaints? no  Neurologic changes like confusion? No  Extremity edema?  No    BP Readings from Last 3 Encounters:   05/01/19 130/72   04/12/19 118/86   04/11/19 (!) 156/88       Age/Gender Health Maintenance    Lipid   Lab Results   Component Value Date    CHOL 205 (H) 02/12/2019    CHOL 208 (H) 04/20/2017    CHOL 199 09/23/2015     Lab Results   Component Value Date    TRIG 87 02/12/2019    TRIG 98 04/20/2017    TRIG 100 09/23/2015     Lab Results   Component Value Date    HDL 45 02/12/2019    HDL 56 04/20/2017    HDL 47 09/23/2015     Lab Results   Component Value Date    LDLCALC 143 02/12/2019    LDLCALC 132 04/20/2017    LDLCALC 132 (H) 09/23/2015     Lab Results   Component Value Date    LABVLDL 20 09/23/2015     Lab Results   Component Value Date    CHOLHDLRATIO 4.2 09/23/2015     DM Screen - needs  Colon Cancer Screening - 48  Lung Cancer Screening (Age 54 to [de-identified] with 30 pack year hx, current smoker or quit within past 15 years) - n/a    Tetanus - needs  Influenza Vaccine - needs  Pneumonia Vaccine - 65  Zostavax - 50    Breast Cancer Screening - 50  Cervical Cancer Screening - per OB, Dr. Kinjal Munroe  Osteoporosis Screening - 70    AAA Screening - n/a    Falls screening - n/a    Current Outpatient Medications   Medication Sig Dispense Refill    ondansetron (ZOFRAN ODT) 4 MG disintegrating tablet Take 1 tablet by mouth every 8 hours as needed for Nausea 20 tablet 0    losartan (COZAAR) 25 MG tablet Take 1 tablet by mouth daily 90 tablet 1    metoprolol succinate (TOPROL XL) 100 MG extended release tablet Take 1 tablet by mouth daily 90 tablet 1    sertraline (ZOLOFT) 100 MG tablet Take 1 tablet by mouth daily 90 tablet 0    MONO-LINYAH 0.25-35 MG-MCG per tablet Take 1 tablet by mouth daily   1    amLODIPine (NORVASC) 10 MG tablet take 1 tablet by mouth once daily 90 tablet 3    clonazePAM (KLONOPIN) 0.5 MG tablet Take 1 tablet by mouth 2 times daily as needed for Anxiety for up to 30 days. . 60 tablet 0    tamsulosin (FLOMAX) 0.4 MG capsule Take 1 capsule by mouth daily 5 capsule 0    oxybutynin (DITROPAN XL) 5 MG extended release tablet Take 1 tablet by mouth daily 30 tablet 11    ketorolac (TORADOL) 10 MG tablet Take 1 tablet by mouth 2 times daily as needed for Pain Do not take more than 4 pills brad 24 hour period 8 tablet 0    aspirin 81 MG chewable tablet Take 1 tablet by mouth daily 90 tablet 0     No current facility-administered medications for this visit. No orders of the defined types were placed in this encounter. All medications reviewed and reconciled, including OTC and herbal medications. Updated list given to patient.        Patient Active Problem List   Diagnosis    Abdominal pain complicating pregnancy    Essential hypertension    Panic disorder without agoraphobia with moderate panic attacks    Hashimoto's thyroiditis    Family history of early CAD    Intermittent palpitations    Other headache syndrome    Intractable vascular headache    Cerebral AVM    AVM (arteriovenous malformation)       Past Medical History:   Diagnosis Date    Essential hypertension 2016    Hashimoto's thyroiditis 2016    HIGH CHOLESTEROL     Hypokalemia     Subclinical hyperthyroidism 2016       Past Surgical History:   Procedure Laterality Date     SECTION  ,        Allergies   Allergen Reactions    Bactrim        Social History     Socioeconomic History    Marital status: Single     Spouse name: Not on file    Number of children: Not on file    Years of education: Not on file    Highest education level: Not on file   Occupational History    Not on file   Social Needs    Financial resource strain: Not on file    Food insecurity:     Worry: Not on file     Inability: Not on file    Transportation needs:     Medical: Not on file     Non-medical: Not on file   Tobacco Use    Smoking status: Never Smoker    Smokeless tobacco: Never Used   Substance and Sexual Activity    Alcohol use: Yes     Comment: social    Drug use: No    Sexual activity: Not on file   Lifestyle    Physical activity:     Days per week: Not on file     Minutes per session: Not on file    Stress: Not on file   Relationships    Social connections:     Talks on phone: Not on file     Gets together: Not on file     Attends Voodoo service: Not on file     Active member of club or organization: Not on file     Attends meetings of clubs or organizations: Not on file     Relationship status: Not on file    Intimate partner violence:     Fear of current or ex partner: Not on file     Emotionally abused: Not on file     Physically abused: Not on file     Forced sexual activity: Not on file   Other Topics Concern    Not on file   Social History Narrative    Not on file       Family History   Problem Relation Age of Onset    High Blood Pressure Mother     Diabetes Mother     Heart Disease Mother     High Blood Pressure Father     Diabetes Father          I have reviewed the patient's past medical history, past surgical history, allergies, medications, social and family history and I have made updates where appropriate.       Review of Systems  Positive responses are highlighted in bold    Constitutional:  Fever, Chills, Night Sweats, Fatigue, Unexpected changes in weight  Eyes:  Eye discharge, Eye pain, Eye redness, Visual disturbances   HENT:  Ear pain, Tinnitus, Nosebleeds, Trouble swallowing, Hearing loss, Sore throat  Cardiovascular:  Chest Pain, nodules, no cervical lymphadenopathy  Pulmonary/Chest: clear to auscultation bilaterally- no wheezes, rales or rhonchi, normal air movement, no respiratory distress or retractions  Cardiovascular: S1 and S2 auscultated w/ RRR. No murmurs, rubs, clicks, or gallops, distal pulses intact. Abdomen: soft, nontender non-distended, bowl sounds physiologic,  no rebound or guarding, no masses or hernias noted. Liver and spleen without enlargement. Extremities: no cyanosis, clubbing or edema of the lower extremities. Musculoskeletal: No joint swelling or gross deformity   Neuro:  Alert, 5/5 strength globally and symmetrically  Psych: Affect and mood are normal. Thought process is normal. Good insight and appropriate interaction. Cognition and memory appear to be intact. Skin: warm and dry, no rash or erythema  Lymph:  No cervical, auricular or supraclavicular lymph nodes palpated    ASSESSMENT & PLAN  Mary Coronado was seen today for hypertension. Diagnoses and all orders for this visit:    Essential hypertension       - BP much improved and well controlled  - con't Toprol, Norvasc and Losartan  - see back prn    DISPOSITION    Return if symptoms worsen or fail to improve. [place] released without restrictions. PATIENT COUNSELING    Counseling was provided today regarding the following topics: Healthy eating habits, Regular exercise, substance abuse and healthy sleep habits. Deloris received counseling on the following healthy behaviors: nutrition, exercise and medication adherence    Patient given educational materials on: See Attached    I have instructed Deloris to complete a self tracking handout on Blood Pressures  and instructed them to bring it with them to her next appointment. Barriers to learning and self management: none    Discussed use, benefit, and side effects of prescribed medications. Barriers to medication compliance addressed. All patient questions answered.   Pt voiced

## 2019-05-15 ENCOUNTER — EMPLOYEE WELLNESS (OUTPATIENT)
Dept: OTHER | Age: 39
End: 2019-05-15

## 2019-05-15 LAB
CHOLESTEROL, TOTAL: 194 MG/DL (ref 0–199)
FASTING: YES
GLUCOSE BLD-MCNC: 112 MG/DL (ref 74–109)
HDLC SERPL-MCNC: 49 MG/DL (ref 40–90)
LDL CHOLESTEROL CALCULATED: 105 MG/DL
TRIGL SERPL-MCNC: 199 MG/DL (ref 0–199)

## 2019-05-28 VITALS — BODY MASS INDEX: 37.25 KG/M2 | WEIGHT: 217 LBS

## 2019-06-03 ENCOUNTER — HOSPITAL ENCOUNTER (OUTPATIENT)
Dept: GENERAL RADIOLOGY | Age: 39
Discharge: HOME | End: 2019-06-03
Payer: COMMERCIAL

## 2019-06-03 ENCOUNTER — TELEPHONE (OUTPATIENT)
Dept: FAMILY MEDICINE CLINIC | Age: 39
End: 2019-06-03

## 2019-06-03 ENCOUNTER — OFFICE VISIT (OUTPATIENT)
Dept: FAMILY MEDICINE CLINIC | Age: 39
End: 2019-06-03
Payer: COMMERCIAL

## 2019-06-03 ENCOUNTER — HOSPITAL ENCOUNTER (OUTPATIENT)
Age: 39
Discharge: HOME | End: 2019-06-03
Payer: COMMERCIAL

## 2019-06-03 VITALS
TEMPERATURE: 99.5 F | WEIGHT: 216 LBS | HEART RATE: 80 BPM | SYSTOLIC BLOOD PRESSURE: 136 MMHG | BODY MASS INDEX: 38.27 KG/M2 | HEIGHT: 63 IN | DIASTOLIC BLOOD PRESSURE: 88 MMHG | RESPIRATION RATE: 12 BRPM

## 2019-06-03 DIAGNOSIS — J40 BRONCHITIS: Primary | ICD-10-CM

## 2019-06-03 DIAGNOSIS — M62.838 NECK MUSCLE SPASM: ICD-10-CM

## 2019-06-03 DIAGNOSIS — J40 BRONCHITIS: ICD-10-CM

## 2019-06-03 PROCEDURE — 1036F TOBACCO NON-USER: CPT | Performed by: NURSE PRACTITIONER

## 2019-06-03 PROCEDURE — G8427 DOCREV CUR MEDS BY ELIG CLIN: HCPCS | Performed by: NURSE PRACTITIONER

## 2019-06-03 PROCEDURE — 71046 X-RAY EXAM CHEST 2 VIEWS: CPT

## 2019-06-03 PROCEDURE — 99213 OFFICE O/P EST LOW 20 MIN: CPT | Performed by: NURSE PRACTITIONER

## 2019-06-03 PROCEDURE — G8417 CALC BMI ABV UP PARAM F/U: HCPCS | Performed by: NURSE PRACTITIONER

## 2019-06-03 RX ORDER — TIZANIDINE 4 MG/1
4 TABLET ORAL EVERY 8 HOURS PRN
Qty: 30 TABLET | Refills: 0 | Status: SHIPPED | OUTPATIENT
Start: 2019-06-03 | End: 2019-11-24 | Stop reason: ALTCHOICE

## 2019-06-03 RX ORDER — DOXYCYCLINE HYCLATE 100 MG
100 TABLET ORAL 2 TIMES DAILY
Qty: 20 TABLET | Refills: 0 | Status: SHIPPED | OUTPATIENT
Start: 2019-06-03 | End: 2019-06-13

## 2019-06-03 RX ORDER — BENZONATATE 100 MG/1
100-200 CAPSULE ORAL 3 TIMES DAILY PRN
Qty: 60 CAPSULE | Refills: 0 | Status: SHIPPED | OUTPATIENT
Start: 2019-06-03 | End: 2019-06-10

## 2019-06-03 NOTE — PROGRESS NOTES
Chief Complaint   Patient presents with    Cough     cough, rattling in chest, cough up yellow/green mucous for past 2 weeks        History obtained from chart review and the patient. SUBJECTIVE:  Roxanne Lewis is a 37 y.o. female that presents today for URI symptoms    URI Symptoms    HPI:      Symptoms have been present for 2 week(s). Symptoms are unchanged since they initially started. Fever? No  Runny nose or congestion? No   Cough? Yes - productive cough  Sore throat? No  Headache, fatigue, joint pains, muscle aches? Some headaches and fatigue  Shortness of breath/Wheezing? No  Nausea/Vomiting/Diarrhea? No  Double Sickening? No  Sick contacts? No    Patient has tried nothing without improvement. Complains also of right sided neck stiffness and decreased ROM of her head. Pain worse with turning her head. Ongoing for several days and she has been having some headaches with it.     Age/Gender Health Maintenance    Lipid   Lab Results   Component Value Date    CHOL 194 05/15/2019    CHOL 205 (H) 02/12/2019    CHOL 208 (H) 04/20/2017     Lab Results   Component Value Date    TRIG 199 05/15/2019    TRIG 87 02/12/2019    TRIG 98 04/20/2017     Lab Results   Component Value Date    HDL 49 05/15/2019    HDL 45 02/12/2019    HDL 56 04/20/2017     Lab Results   Component Value Date    LDLCALC 105 05/15/2019    [facility] 143 02/12/2019    LDLCALC 132 04/20/2017     Lab Results   Component Value Date    LABVLDL 20 09/23/2015     Lab Results   Component Value Date    CHOLHDLRATIO 4.2 09/23/2015     DM Screen - needs  Colon Cancer Screening - 46  Lung Cancer Screening (Age 54 to [de-identified] with 30 pack year hx, current smoker or quit within past 15 years) - n/a    Tetanus - needs  Influenza Vaccine - needs  Pneumonia Vaccine - 65  Zostavax - 50    Breast Cancer Screening - 50  Cervical Cancer Screening - per OB, Dr. Bobby Lora  Osteoporosis Screening - 70    AAA Screening - n/a    Falls screening - Active Problem List   Diagnosis    Abdominal pain complicating pregnancy    Essential hypertension    Panic disorder without agoraphobia with moderate panic attacks    Hashimoto's thyroiditis    Family history of early CAD    Intermittent palpitations    Other headache syndrome    Intractable vascular headache    Cerebral AVM    AVM (arteriovenous malformation)       Past Medical History:   Diagnosis Date    Essential hypertension 2016    Hashimoto's thyroiditis 2016    HIGH CHOLESTEROL     Hypokalemia     Subclinical hyperthyroidism 2016       Past Surgical History:   Procedure Laterality Date     SECTION  ,        Allergies   Allergen Reactions    Bactrim        Social History     Socioeconomic History    Marital status: Single     Spouse name: Not on file    Number of children: Not on file    Years of education: Not on file    Highest education level: Not on file   Occupational History    Not on file   Social Needs    Financial resource strain: Not on file    Food insecurity:     Worry: Not on file     Inability: Not on file    Transportation needs:     Medical: Not on file     Non-medical: Not on file   Tobacco Use    Smoking status: Never Smoker    Smokeless tobacco: Never Used   Substance and Sexual Activity    Alcohol use: Yes     Comment: social    Drug use: No    Sexual activity: Not on file   Lifestyle    Physical activity:     Days per week: Not on file     Minutes per session: Not on file    Stress: Not on file   Relationships    Social connections:     Talks on phone: Not on file     Gets together: Not on file     Attends Islam service: Not on file     Active member of club or organization: Not on file     Attends meetings of clubs or organizations: Not on file     Relationship status: Not on file    Intimate partner violence:     Fear of current or ex partner: Not on file     Emotionally abused: Not on file     Physically abused: Not on file     Forced sexual activity: Not on file   Other Topics Concern    Not on file   Social History Narrative    Not on file       Family History   Problem Relation Age of Onset    High Blood Pressure Mother     Diabetes Mother     Heart Disease Mother     High Blood Pressure Father     Diabetes Father          I have reviewed the patient's past medical history, past surgical history, allergies, medications, social and family history and I have made updates where appropriate.       Review of Systems  Positive responses are highlighted in bold    Constitutional:  Fever, Chills, Night Sweats, Fatigue, Unexpected changes in weight  Eyes:  Eye discharge, Eye pain, Eye redness, Visual disturbances   HENT:  Ear pain, Tinnitus, Nosebleeds, Trouble swallowing, Hearing loss, Sore throat  Cardiovascular:  Chest Pain, Palpitations, Orthopnea, Paroxysmal Nocturnal Dyspnea  Respiratory:  Cough, Wheezing, Shortness of breath, Chest tightness, Apnea  Gastrointestinal:  Nausea, Vomiting, Diarrhea, Constipation, Heartburn, Blood in stool  Genitourinary:  Difficulty or painful urination, Flank pain, Change in frequency, Urgency  Skin:  Color change, Rash, Itching, Wound  Psychiatric:  Hallucinations, Anxiety, Depression, Suicidal ideation  Hematological:  Enlarged glands, Easy bleeding, Easily bruising  Musculoskeletal:  Joint pain, Back pain, Gait problems, Joint swelling, Myalgias  Neurological:  Dizziness, Headaches, Presyncope, Numbness, Seizures, Tremors  Allergy:  Environmental allergies, Food allergies  Endocrine:  Heat Intolerance, Cold Intolerance, Polydipsia, Polyphagia, Polyuria    Lab Results   Component Value Date    TSH 0.644 12/07/2017     Lab Results   Component Value Date     04/30/2019    K 4.0 04/30/2019     04/30/2019    CO2 22 (L) 04/30/2019    BUN 10 04/30/2019    CREATININE 0.7 04/30/2019    GLUCOSE 112 (H) 05/15/2019    CALCIUM 9.0 04/30/2019    PROT 6.9 02/11/2019    LABALBU 4.1 02/11/2019    BILITOT 0.2 (L) 02/11/2019    ALKPHOS 99 02/11/2019    AST 38 02/11/2019    ALT 60 02/11/2019    LABGLOM >90 04/30/2019       Lab Results   Component Value Date    WBC 11.8 (H) 02/20/2019    HGB 12.2 02/20/2019    HCT 37.7 02/20/2019    MCV 86.5 02/20/2019     (H) 02/20/2019       PHYSICAL EXAM:  Vitals:    06/03/19 1452   BP: 136/88   Pulse: 80   Resp: 12   Temp: 99.5 °F (37.5 °C)   TempSrc: Oral   Weight: 216 lb (98 kg)   Height: 5' 3\" (1.6 m)     Body mass index is 38.26 kg/m². VS Reviewed  General Appearance: A&O x 3, No acute distress,well developed and well- nourished  Head: normocephalic and atraumatic  Eyes: pupils equal, round, and reactive to light, extraocular eye movements intact, conjunctivae and eye lids without erythema  Neck: supple and non-tender without mass, mild thyromegaly but no thyroid nodules, no cervical lymphadenopathy  Pulmonary/Chest: rales left base  Cardiovascular: S1 and S2 auscultated w/ RRR. No murmurs, rubs, clicks, or gallops, distal pulses intact. Abdomen: soft, nontender non-distended, bowl sounds physiologic,  no rebound or guarding, no masses or hernias noted. Liver and spleen without enlargement. Extremities: no cyanosis, clubbing or edema of the lower extremities. Musculoskeletal: No joint swelling or gross deformity   Neck: + right sided muscle spasm over trapezius muscle  Neuro:  Alert, 5/5 strength globally and symmetrically  Psych: Affect and mood are normal. Thought process is normal. Good insight and appropriate interaction. Cognition and memory appear to be intact. Skin: warm and dry, no rash or erythema  Lymph:  No cervical, auricular or supraclavicular lymph nodes palpated    ASSESSMENT & PLAN  Deloris was seen today for cough. Diagnoses and all orders for this visit:    Bronchitis  -     doxycycline hyclate (VIBRA-TABS) 100 MG tablet;  Take 1 tablet by mouth 2 times daily for 10 days  -     benzonatate (TESSALON) 100 MG capsule; Take 1-2 capsules by mouth 3 times daily as needed for Cough  -     XR CHEST STANDARD (2 VW); Future    Neck muscle spasm  -     tiZANidine (ZANAFLEX) 4 MG tablet; Take 1 tablet by mouth every 8 hours as needed (neck muscle spasm)       - obtain chest xray, clinical exam concerning for pneumonia  - start Doxy and tessalon perles for cough  - Zanaflex for muscle spasms, advised also try heating pad    DISPOSITION    Return if symptoms worsen or fail to improve. Deloris released without restrictions. PATIENT COUNSELING    Counseling was provided today regarding the following topics: Healthy eating habits, Regular exercise, substance abuse and healthy sleep habits. Deloris received counseling on the following healthy behaviors: nutrition, exercise and medication adherence    Patient given educational materials on: See Attached    I have instructed Deloris to complete a self tracking handout on Blood Pressures  and instructed them to bring it with them to her next appointment. Barriers to learning and self management: none    Discussed use, benefit, and side effects of prescribed medications. Barriers to medication compliance addressed. All patient questions answered. Pt voiced understanding.        Electronically signed by MARÍA Macias CNP on 6/3/2019 at 3:07 PM

## 2019-06-03 NOTE — TELEPHONE ENCOUNTER
----- Message from Percival Baumgarten, APRN - CNP sent at 6/3/2019  3:40 PM EDT -----  Let Jt Junior know her chest xray is normal, no evidence of pneumonia.

## 2019-06-03 NOTE — TELEPHONE ENCOUNTER
Per HIPAA, left detailed message for patient letting them know the results of their CXR. Patient was advised to call the office with any questions.

## 2019-06-24 ENCOUNTER — HOSPITAL ENCOUNTER (OUTPATIENT)
Dept: WOMENS IMAGING | Age: 39
Discharge: HOME | End: 2019-06-24
Payer: COMMERCIAL

## 2019-06-24 DIAGNOSIS — Z12.31 VISIT FOR SCREENING MAMMOGRAM: ICD-10-CM

## 2019-06-24 PROCEDURE — 77063 BREAST TOMOSYNTHESIS BI: CPT

## 2019-09-05 ENCOUNTER — OFFICE VISIT (OUTPATIENT)
Dept: FAMILY MEDICINE CLINIC | Age: 39
End: 2019-09-05
Payer: COMMERCIAL

## 2019-09-05 VITALS
WEIGHT: 221 LBS | RESPIRATION RATE: 16 BRPM | HEIGHT: 63 IN | SYSTOLIC BLOOD PRESSURE: 138 MMHG | TEMPERATURE: 98.8 F | BODY MASS INDEX: 39.16 KG/M2 | DIASTOLIC BLOOD PRESSURE: 78 MMHG | HEART RATE: 88 BPM

## 2019-09-05 DIAGNOSIS — R10.2 PELVIC PAIN: Primary | ICD-10-CM

## 2019-09-05 DIAGNOSIS — R35.0 URINARY FREQUENCY: ICD-10-CM

## 2019-09-05 DIAGNOSIS — M54.16 LUMBAR RADICULOPATHY: ICD-10-CM

## 2019-09-05 LAB
BILIRUBIN, POC: NEGATIVE
BLOOD URINE, POC: NEGATIVE
CHLAMYDIA TRACHOMATIS BY RT-PCR: NOT DETECTED
CLARITY, POC: CLEAR
COLOR, POC: YELLOW
CT/NG SOURCE: NORMAL
GLUCOSE URINE, POC: NEGATIVE
KETONES, POC: NEGATIVE
LEUKOCYTE EST, POC: NEGATIVE
NEISSERIA GONORRHOEAE BY RT-PCR: NOT DETECTED
NITRITE, POC: NEGATIVE
PH, POC: 5.5
PROTEIN, POC: NORMAL
SPECIFIC GRAVITY, POC: 1.02
UROBILINOGEN, POC: NORMAL

## 2019-09-05 PROCEDURE — 1036F TOBACCO NON-USER: CPT | Performed by: NURSE PRACTITIONER

## 2019-09-05 PROCEDURE — G8417 CALC BMI ABV UP PARAM F/U: HCPCS | Performed by: NURSE PRACTITIONER

## 2019-09-05 PROCEDURE — 99214 OFFICE O/P EST MOD 30 MIN: CPT | Performed by: NURSE PRACTITIONER

## 2019-09-05 PROCEDURE — G8427 DOCREV CUR MEDS BY ELIG CLIN: HCPCS | Performed by: NURSE PRACTITIONER

## 2019-09-05 PROCEDURE — 81003 URINALYSIS AUTO W/O SCOPE: CPT | Performed by: NURSE PRACTITIONER

## 2019-09-05 RX ORDER — METRONIDAZOLE 500 MG/1
500 TABLET ORAL 3 TIMES DAILY
Qty: 30 TABLET | Refills: 0 | Status: SHIPPED | OUTPATIENT
Start: 2019-09-05 | End: 2019-09-15

## 2019-09-05 RX ORDER — GABAPENTIN 300 MG/1
CAPSULE ORAL
Qty: 270 CAPSULE | Refills: 0 | Status: SHIPPED | OUTPATIENT
Start: 2019-09-05 | End: 2019-10-22

## 2019-09-05 NOTE — PROGRESS NOTES
together: Not on file     Attends Episcopal service: Not on file     Active member of club or organization: Not on file     Attends meetings of clubs or organizations: Not on file     Relationship status: Not on file    Intimate partner violence:     Fear of current or ex partner: Not on file     Emotionally abused: Not on file     Physically abused: Not on file     Forced sexual activity: Not on file   Other Topics Concern    Not on file   Social History Narrative    Not on file       Family History   Problem Relation Age of Onset    High Blood Pressure Mother     Diabetes Mother     Heart Disease Mother     High Blood Pressure Father     Diabetes Father          I have reviewed the patient's past medical history, past surgical history, allergies, medications, social and family history and I have made updates where appropriate.       Review of Systems  Positive responses are highlighted in bold    Constitutional:  Fever, Chills, Night Sweats, Fatigue, Unexpected changes in weight  Eyes:  Eye discharge, Eye pain, Eye redness, Visual disturbances   HENT:  Ear pain, Tinnitus, Nosebleeds, Trouble swallowing, Hearing loss, Sore throat  Cardiovascular:  Chest Pain, Palpitations, Orthopnea, Paroxysmal Nocturnal Dyspnea  Respiratory:  Cough, Wheezing, Shortness of breath, Chest tightness, Apnea  Gastrointestinal:  Nausea, Vomiting, Diarrhea, Constipation, Heartburn, Blood in stool  Genitourinary:  Difficulty or painful urination, Flank pain, Change in frequency, Urgency  Skin:  Color change, Rash, Itching, Wound  Psychiatric:  Hallucinations, Anxiety, Depression, Suicidal ideation  Hematological:  Enlarged glands, Easy bleeding, Easily bruising  Musculoskeletal:  Joint pain, Back pain, Gait problems, Joint swelling, Myalgias  Neurological:  Dizziness, Headaches, Presyncope, Numbness, Seizures, Tremors  Allergy:  Environmental allergies, Food allergies  Endocrine:  Heat Intolerance, Cold Intolerance, Polydipsia, leg  Neuro:  Alert, 5/5 strength globally and symmetrically  Psych: Affect and mood are normal. Thought process is normal. Good insight and appropriate interaction. Cognition and memory appear to be intact. Skin: warm and dry, no rash or erythema  Lymph:  No cervical, auricular or supraclavicular lymph nodes palpated  : normal labia and external vaginal, vagina with small amount of thin brown drainage    Vaginal exam chaperoned by Judy Miles LPN    [address] Dhiraj Jones was seen today for lower back pain. Diagnoses and all orders for this visit:    Pelvic pain  -     POCT Urinalysis No Micro (Auto)  -     Urine Culture; Future  -     C. Trachomatis / N. Gonorrhoeae, DNA Probe; Future  -     Genital Culture; Future  -     metroNIDAZOLE (FLAGYL) 500 MG tablet; Take 1 tablet by mouth 3 times daily for 10 days    Urinary frequency  -     POCT Urinalysis No Micro (Auto)  -     Urine Culture; Future  -     C. Trachomatis / N. Gonorrhoeae, DNA Probe; Future    Lumbar radiculopathy  -     MRI LUMBAR SPINE WO CONTRAST; Future  -     gabapentin (NEURONTIN) 300 MG capsule; Take 1 capsule PO daily x 1 day, then take 1 capsule PO BID x 1 day, then take 1 capsule PO TID. Intended supply: 90 days       - UA normal, will send for culture and GC/chlamydia  - suspect BV, treat with Flagyl and send for genital culture  - failed outpatient home therapy for back  - start Gabapentin for pain and obtain MRI lumbar spine    DISPOSITION    Return in about 6 weeks (around 10/17/2019) for BACK PAIN. Deloris released without restrictions. PATIENT COUNSELING    Counseling was provided today regarding the following topics: Healthy eating habits, Regular exercise, substance abuse and healthy sleep habits.     Deloris received counseling on the following healthy behaviors: nutrition, exercise and medication adherence    Patient given educational materials on: See Attached    I have instructed Deloris to complete a self tracking handout on Blood Pressures  and instructed them to bring it with them to her next appointment. Barriers to learning and self management: none    Discussed use, benefit, and side effects of prescribed medications. Barriers to medication compliance addressed. All patient questions answered. Pt voiced understanding.        Electronically signed by MARÍA Figueroa CNP on 9/5/2019 at 5:06 PM

## 2019-09-06 ENCOUNTER — TELEPHONE (OUTPATIENT)
Dept: FAMILY MEDICINE CLINIC | Age: 39
End: 2019-09-06

## 2019-09-06 DIAGNOSIS — R10.2 PELVIC PAIN: Primary | ICD-10-CM

## 2019-09-06 NOTE — TELEPHONE ENCOUNTER
----- Message from MARÍA Hess CNP sent at 9/6/2019 12:41 PM EDT -----  Let Alejandra Marrero know so far the urine and vaginal cultures are normal. How is she feeling? Any improvement with the pelvic pain?

## 2019-09-07 LAB
ORGANISM: ABNORMAL
URINE CULTURE, ROUTINE: ABNORMAL

## 2019-09-08 LAB
GENITAL CULTURE, ROUTINE: NORMAL
GRAM STAIN RESULT: NORMAL

## 2019-09-09 ENCOUNTER — HOSPITAL ENCOUNTER (OUTPATIENT)
Dept: ULTRASOUND IMAGING | Age: 39
Discharge: HOME | End: 2019-09-09
Payer: COMMERCIAL

## 2019-09-09 ENCOUNTER — TELEPHONE (OUTPATIENT)
Dept: FAMILY MEDICINE CLINIC | Age: 39
End: 2019-09-09

## 2019-09-09 DIAGNOSIS — R10.2 PELVIC PAIN: ICD-10-CM

## 2019-09-09 PROCEDURE — 76830 TRANSVAGINAL US NON-OB: CPT

## 2019-09-09 PROCEDURE — 76856 US EXAM PELVIC COMPLETE: CPT

## 2019-09-09 NOTE — TELEPHONE ENCOUNTER
----- Message from MARÍA Lovett - CNP sent at 9/9/2019  7:26 AM EDT -----  Let Owen De La Paz know her urine culture came back no growth of bacteria. How is she feeling? Did the pelvic pain go away?

## 2019-09-10 ENCOUNTER — TELEPHONE (OUTPATIENT)
Dept: FAMILY MEDICINE CLINIC | Age: 39
End: 2019-09-10

## 2019-09-14 DIAGNOSIS — K21.9 GASTROESOPHAGEAL REFLUX DISEASE, ESOPHAGITIS PRESENCE NOT SPECIFIED: ICD-10-CM

## 2019-09-16 RX ORDER — OMEPRAZOLE 20 MG/1
CAPSULE, DELAYED RELEASE ORAL
Qty: 30 CAPSULE | Refills: 3 | Status: SHIPPED | OUTPATIENT
Start: 2019-09-16 | End: 2020-06-08 | Stop reason: ALTCHOICE

## 2019-10-01 ENCOUNTER — HOSPITAL ENCOUNTER (OUTPATIENT)
Dept: MRI IMAGING | Age: 39
Discharge: HOME | End: 2019-10-01
Payer: COMMERCIAL

## 2019-10-01 DIAGNOSIS — M54.16 LUMBAR RADICULOPATHY: ICD-10-CM

## 2019-10-01 PROCEDURE — 72148 MRI LUMBAR SPINE W/O DYE: CPT

## 2019-10-02 ENCOUNTER — TELEPHONE (OUTPATIENT)
Dept: FAMILY MEDICINE CLINIC | Age: 39
End: 2019-10-02

## 2019-10-02 DIAGNOSIS — M51.26 HERNIATED INTERVERTEBRAL DISC OF LUMBAR SPINE: ICD-10-CM

## 2019-10-02 DIAGNOSIS — Q76.49 CONGENITAL SPINAL STENOSIS OF LUMBAR REGION: Primary | ICD-10-CM

## 2019-10-21 RX ORDER — METHYLPREDNISOLONE ACETATE 80 MG/ML
80 INJECTION, SUSPENSION INTRA-ARTICULAR; INTRALESIONAL; INTRAMUSCULAR; SOFT TISSUE ONCE
Status: CANCELLED | OUTPATIENT
Start: 2019-10-21 | End: 2019-10-21

## 2019-10-21 RX ORDER — BUPIVACAINE HYDROCHLORIDE 2.5 MG/ML
5 INJECTION, SOLUTION EPIDURAL; INFILTRATION; INTRACAUDAL ONCE
Status: CANCELLED | OUTPATIENT
Start: 2019-10-21 | End: 2019-10-21

## 2019-10-22 ENCOUNTER — HOSPITAL ENCOUNTER (OUTPATIENT)
Dept: INTERVENTIONAL RADIOLOGY/VASCULAR | Age: 39
Discharge: HOME | End: 2019-10-22
Payer: COMMERCIAL

## 2019-10-22 VITALS
HEART RATE: 74 BPM | BODY MASS INDEX: 37.56 KG/M2 | TEMPERATURE: 98.6 F | WEIGHT: 220 LBS | DIASTOLIC BLOOD PRESSURE: 91 MMHG | RESPIRATION RATE: 16 BRPM | OXYGEN SATURATION: 97 % | HEIGHT: 64 IN | SYSTOLIC BLOOD PRESSURE: 148 MMHG

## 2019-10-22 PROCEDURE — 2500000003 HC RX 250 WO HCPCS

## 2019-10-22 PROCEDURE — 62323 NJX INTERLAMINAR LMBR/SAC: CPT

## 2019-10-22 PROCEDURE — 2709999900 HC NON-CHARGEABLE SUPPLY

## 2019-10-22 PROCEDURE — 6360000004 HC RX CONTRAST MEDICATION: Performed by: RADIOLOGY

## 2019-10-22 PROCEDURE — 6360000002 HC RX W HCPCS

## 2019-10-22 RX ORDER — ACETAMINOPHEN AND CODEINE PHOSPHATE 120; 12 MG/5ML; MG/5ML
1 SOLUTION ORAL DAILY
Status: ON HOLD | COMMUNITY
End: 2020-09-11 | Stop reason: HOSPADM

## 2019-10-22 RX ORDER — BUPIVACAINE HYDROCHLORIDE 2.5 MG/ML
5 INJECTION, SOLUTION EPIDURAL; INFILTRATION; INTRACAUDAL ONCE
Status: COMPLETED | OUTPATIENT
Start: 2019-10-22 | End: 2019-10-22

## 2019-10-22 RX ORDER — METHYLPREDNISOLONE ACETATE 80 MG/ML
80 INJECTION, SUSPENSION INTRA-ARTICULAR; INTRALESIONAL; INTRAMUSCULAR; SOFT TISSUE ONCE
Status: COMPLETED | OUTPATIENT
Start: 2019-10-22 | End: 2019-10-22

## 2019-10-22 RX ADMIN — METHYLPREDNISOLONE ACETATE 80 MG: 80 INJECTION, SUSPENSION INTRA-ARTICULAR; INTRALESIONAL; INTRAMUSCULAR; SOFT TISSUE at 15:15

## 2019-10-22 RX ADMIN — IOHEXOL 1 ML: 180 INJECTION INTRAVENOUS at 15:15

## 2019-10-22 RX ADMIN — BUPIVACAINE HYDROCHLORIDE 2 ML: 2.5 INJECTION, SOLUTION EPIDURAL; INFILTRATION; INTRACAUDAL at 15:15

## 2019-10-22 ASSESSMENT — PAIN DESCRIPTION - LOCATION: LOCATION: BACK;LEG

## 2019-10-22 ASSESSMENT — PAIN DESCRIPTION - DESCRIPTORS: DESCRIPTORS: BURNING;ACHING;SORE

## 2019-10-22 ASSESSMENT — PAIN SCALES - GENERAL
PAINLEVEL_OUTOF10: 8
PAINLEVEL_OUTOF10: 6
PAINLEVEL_OUTOF10: 6

## 2019-10-22 ASSESSMENT — PAIN DESCRIPTION - PAIN TYPE: TYPE: CHRONIC PAIN

## 2019-10-22 ASSESSMENT — PAIN DESCRIPTION - ORIENTATION: ORIENTATION: RIGHT;LEFT;LOWER

## 2019-11-18 ENCOUNTER — NURSE ONLY (OUTPATIENT)
Dept: LAB | Age: 39
End: 2019-11-18

## 2019-11-18 LAB
C-REACTIVE PROTEIN: 0.65 MG/DL (ref 0–1)
TSH SERPL DL<=0.05 MIU/L-ACNC: 0.48 UIU/ML (ref 0.4–4.2)

## 2019-11-19 LAB — IGA: 259 MG/DL (ref 70–400)

## 2019-11-20 ENCOUNTER — NURSE ONLY (OUTPATIENT)
Dept: LAB | Age: 39
End: 2019-11-20

## 2019-11-20 LAB
ADENOVIRUS F 40 41 PCR: NOT DETECTED
ASTROVIRUS PCR: NOT DETECTED
C DIFF TOXIN/ANTIGEN: NEGATIVE
CAMPYLOBACTER PCR: NOT DETECTED
CLOSTRIDIUM DIFFICILE, PCR: NORMAL
CRYPTOSPORIDIUM PCR: NOT DETECTED
CYCLOSPORA CAYETANENSIS PCR: NOT DETECTED
E COLI 0157 PCR: NORMAL
E COLI ENTEROAGGREGATIVE PCR: NOT DETECTED
E COLI ENTEROPATHOGENIC PCR: NOT DETECTED
E COLI ENTEROTOXIGENIC PCR: NOT DETECTED
E COLI SHIGA LIKE TOXIN PCR: NOT DETECTED
E COLI SHIGELLA/ENTEROINVASIVE PCR: NOT DETECTED
E HISTOLYTICA GI FILM ARRAY: NOT DETECTED
GIARDIA LAMBLIA PCR: NOT DETECTED
NOROVIRUS GI GII PCR: NOT DETECTED
PLESIOMONAS SHIGELLOIDES PCR: NOT DETECTED
ROTAVIRUS A PCR: NOT DETECTED
SALMONELLA PCR: NOT DETECTED
SAPOVIRUS PCR: NOT DETECTED
VIBRIO CHOLERAE PCR: NOT DETECTED
VIBRIO PCR: NOT DETECTED
YERSINIA ENTEROCOLITICA PCR: NOT DETECTED

## 2019-11-21 LAB
TISSUE TRANSGLUTAMINASE ANTIBODY: 1 U/ML (ref 0–5)
TISSUE TRANSGLUTAMINASE IGA: 1 U/ML (ref 0–3)

## 2019-11-24 ENCOUNTER — APPOINTMENT (OUTPATIENT)
Dept: GENERAL RADIOLOGY | Age: 39
End: 2019-11-24
Payer: COMMERCIAL

## 2019-11-24 ENCOUNTER — HOSPITAL ENCOUNTER (EMERGENCY)
Age: 39
Discharge: HOME | End: 2019-11-24
Payer: COMMERCIAL

## 2019-11-24 VITALS
SYSTOLIC BLOOD PRESSURE: 158 MMHG | WEIGHT: 222 LBS | RESPIRATION RATE: 16 BRPM | DIASTOLIC BLOOD PRESSURE: 101 MMHG | HEIGHT: 64 IN | TEMPERATURE: 99 F | OXYGEN SATURATION: 100 % | HEART RATE: 83 BPM | BODY MASS INDEX: 37.9 KG/M2

## 2019-11-24 DIAGNOSIS — M25.512 ACUTE PAIN OF LEFT SHOULDER: ICD-10-CM

## 2019-11-24 DIAGNOSIS — M54.2 NECK PAIN ON LEFT SIDE: ICD-10-CM

## 2019-11-24 DIAGNOSIS — M79.602 LEFT ARM PAIN: Primary | ICD-10-CM

## 2019-11-24 LAB
ANION GAP SERPL CALCULATED.3IONS-SCNC: 12 MEQ/L (ref 8–16)
BASOPHILS # BLD: 0.3 %
BASOPHILS ABSOLUTE: 0 THOU/MM3 (ref 0–0.1)
BUN BLDV-MCNC: 14 MG/DL (ref 7–22)
CALCIUM SERPL-MCNC: 9.3 MG/DL (ref 8.5–10.5)
CHLORIDE BLD-SCNC: 100 MEQ/L (ref 98–111)
CO2: 24 MEQ/L (ref 23–33)
CREAT SERPL-MCNC: 0.8 MG/DL (ref 0.4–1.2)
EKG ATRIAL RATE: 85 BPM
EKG P AXIS: 57 DEGREES
EKG P-R INTERVAL: 148 MS
EKG Q-T INTERVAL: 374 MS
EKG QRS DURATION: 72 MS
EKG QTC CALCULATION (BAZETT): 445 MS
EKG R AXIS: 34 DEGREES
EKG T AXIS: 30 DEGREES
EKG VENTRICULAR RATE: 85 BPM
EOSINOPHIL # BLD: 0.5 %
EOSINOPHILS ABSOLUTE: 0.1 THOU/MM3 (ref 0–0.4)
ERYTHROCYTE [DISTWIDTH] IN BLOOD BY AUTOMATED COUNT: 14.6 % (ref 11.5–14.5)
ERYTHROCYTE [DISTWIDTH] IN BLOOD BY AUTOMATED COUNT: 46.5 FL (ref 35–45)
GFR SERPL CREATININE-BSD FRML MDRD: > 90 ML/MIN/1.73M2
GLUCOSE BLD-MCNC: 101 MG/DL (ref 70–108)
HCT VFR BLD CALC: 36.6 % (ref 37–47)
HEMOGLOBIN: 11.7 GM/DL (ref 12–16)
IMMATURE GRANS (ABS): 0.02 THOU/MM3 (ref 0–0.07)
IMMATURE GRANULOCYTES: 0.2 %
LYMPHOCYTES # BLD: 27.1 %
LYMPHOCYTES ABSOLUTE: 3 THOU/MM3 (ref 1–4.8)
MAGNESIUM: 1.9 MG/DL (ref 1.6–2.4)
MCH RBC QN AUTO: 27.7 PG (ref 26–33)
MCHC RBC AUTO-ENTMCNC: 32 GM/DL (ref 32.2–35.5)
MCV RBC AUTO: 86.7 FL (ref 81–99)
MONOCYTES # BLD: 7 %
MONOCYTES ABSOLUTE: 0.8 THOU/MM3 (ref 0.4–1.3)
NUCLEATED RED BLOOD CELLS: 0 /100 WBC
OSMOLALITY CALCULATION: 272.6 MOSMOL/KG (ref 275–300)
PLATELET # BLD: 449 THOU/MM3 (ref 130–400)
PMV BLD AUTO: 9.3 FL (ref 9.4–12.4)
POTASSIUM SERPL-SCNC: 3.6 MEQ/L (ref 3.5–5.2)
RBC # BLD: 4.22 MILL/MM3 (ref 4.2–5.4)
SEG NEUTROPHILS: 64.9 %
SEGMENTED NEUTROPHILS ABSOLUTE COUNT: 7.2 THOU/MM3 (ref 1.8–7.7)
SODIUM BLD-SCNC: 136 MEQ/L (ref 135–145)
TROPONIN T: < 0.01 NG/ML
WBC # BLD: 11.1 THOU/MM3 (ref 4.8–10.8)

## 2019-11-24 PROCEDURE — 83735 ASSAY OF MAGNESIUM: CPT

## 2019-11-24 PROCEDURE — 80048 BASIC METABOLIC PNL TOTAL CA: CPT

## 2019-11-24 PROCEDURE — 84484 ASSAY OF TROPONIN QUANT: CPT

## 2019-11-24 PROCEDURE — 2709999900 HC NON-CHARGEABLE SUPPLY

## 2019-11-24 PROCEDURE — 36415 COLL VENOUS BLD VENIPUNCTURE: CPT

## 2019-11-24 PROCEDURE — 72040 X-RAY EXAM NECK SPINE 2-3 VW: CPT

## 2019-11-24 PROCEDURE — 96372 THER/PROPH/DIAG INJ SC/IM: CPT

## 2019-11-24 PROCEDURE — 99283 EMERGENCY DEPT VISIT LOW MDM: CPT

## 2019-11-24 PROCEDURE — 85025 COMPLETE CBC W/AUTO DIFF WBC: CPT

## 2019-11-24 PROCEDURE — 93005 ELECTROCARDIOGRAM TRACING: CPT | Performed by: PHYSICIAN ASSISTANT

## 2019-11-24 PROCEDURE — 71046 X-RAY EXAM CHEST 2 VIEWS: CPT

## 2019-11-24 PROCEDURE — 6360000002 HC RX W HCPCS: Performed by: PHYSICIAN ASSISTANT

## 2019-11-24 PROCEDURE — 73030 X-RAY EXAM OF SHOULDER: CPT

## 2019-11-24 RX ORDER — KETOROLAC TROMETHAMINE 30 MG/ML
30 INJECTION, SOLUTION INTRAMUSCULAR; INTRAVENOUS ONCE
Status: COMPLETED | OUTPATIENT
Start: 2019-11-24 | End: 2019-11-24

## 2019-11-24 RX ORDER — CYCLOBENZAPRINE HCL 10 MG
10 TABLET ORAL 3 TIMES DAILY PRN
Qty: 12 TABLET | Refills: 0 | Status: SHIPPED | OUTPATIENT
Start: 2019-11-24 | End: 2021-03-02 | Stop reason: ALTCHOICE

## 2019-11-24 RX ORDER — IBUPROFEN 800 MG/1
800 TABLET ORAL EVERY 8 HOURS PRN
Qty: 12 TABLET | Refills: 0 | Status: ON HOLD | OUTPATIENT
Start: 2019-11-24 | End: 2020-09-11 | Stop reason: HOSPADM

## 2019-11-24 RX ADMIN — KETOROLAC TROMETHAMINE 30 MG: 30 INJECTION, SOLUTION INTRAMUSCULAR at 18:10

## 2019-11-24 ASSESSMENT — ENCOUNTER SYMPTOMS
DIARRHEA: 0
SORE THROAT: 0
NAUSEA: 0
ABDOMINAL PAIN: 0
SHORTNESS OF BREATH: 0
COUGH: 0
BACK PAIN: 0
PHOTOPHOBIA: 0
VOMITING: 0
RHINORRHEA: 0

## 2019-11-24 ASSESSMENT — PAIN DESCRIPTION - LOCATION: LOCATION: ARM;NECK;SHOULDER

## 2019-11-24 ASSESSMENT — PAIN SCALES - GENERAL
PAINLEVEL_OUTOF10: 6
PAINLEVEL_OUTOF10: 6

## 2019-11-24 ASSESSMENT — PAIN DESCRIPTION - ORIENTATION: ORIENTATION: LEFT

## 2019-11-24 ASSESSMENT — PAIN DESCRIPTION - PAIN TYPE: TYPE: ACUTE PAIN

## 2019-11-25 ENCOUNTER — CARE COORDINATION (OUTPATIENT)
Dept: OTHER | Facility: CLINIC | Age: 39
End: 2019-11-25

## 2019-12-27 ENCOUNTER — APPOINTMENT (OUTPATIENT)
Dept: CT IMAGING | Age: 39
End: 2019-12-27
Payer: COMMERCIAL

## 2019-12-27 ENCOUNTER — HOSPITAL ENCOUNTER (OUTPATIENT)
Age: 39
Setting detail: OBSERVATION
Discharge: HOME | End: 2019-12-28
Attending: EMERGENCY MEDICINE | Admitting: FAMILY MEDICINE
Payer: COMMERCIAL

## 2019-12-27 ENCOUNTER — APPOINTMENT (OUTPATIENT)
Dept: MRI IMAGING | Age: 39
End: 2019-12-27
Payer: COMMERCIAL

## 2019-12-27 DIAGNOSIS — I67.4 HYPERTENSIVE ENCEPHALOPATHY: Primary | ICD-10-CM

## 2019-12-27 PROBLEM — G44.89 OTHER HEADACHE SYNDROME: Status: RESOLVED | Noted: 2019-02-11 | Resolved: 2019-12-27

## 2019-12-27 PROBLEM — R29.810 FACIAL DROOP: Status: ACTIVE | Noted: 2019-12-27

## 2019-12-27 PROBLEM — G44.1 INTRACTABLE VASCULAR HEADACHE: Status: RESOLVED | Noted: 2019-02-11 | Resolved: 2019-12-27

## 2019-12-27 LAB
ALBUMIN SERPL-MCNC: 4.1 G/DL (ref 3.5–5.1)
ALP BLD-CCNC: 85 U/L (ref 38–126)
ALT SERPL-CCNC: 40 U/L (ref 11–66)
ANION GAP SERPL CALCULATED.3IONS-SCNC: 12 MEQ/L (ref 8–16)
APTT: 32.1 SECONDS (ref 22–38)
AST SERPL-CCNC: 25 U/L (ref 5–40)
BACTERIA: ABNORMAL /HPF
BASOPHILS # BLD: 0.3 %
BASOPHILS ABSOLUTE: 0 THOU/MM3 (ref 0–0.1)
BILIRUB SERPL-MCNC: 0.3 MG/DL (ref 0.3–1.2)
BILIRUBIN DIRECT: < 0.2 MG/DL (ref 0–0.3)
BILIRUBIN URINE: NEGATIVE
BLOOD, URINE: ABNORMAL
BUN BLDV-MCNC: 12 MG/DL (ref 7–22)
CALCIUM SERPL-MCNC: 9.5 MG/DL (ref 8.5–10.5)
CASTS 2: ABNORMAL /LPF
CASTS UA: ABNORMAL /LPF
CHARACTER, URINE: CLEAR
CHLORIDE BLD-SCNC: 103 MEQ/L (ref 98–111)
CO2: 25 MEQ/L (ref 23–33)
COLOR: YELLOW
CREAT SERPL-MCNC: 0.5 MG/DL (ref 0.4–1.2)
CRYSTALS, UA: ABNORMAL
EKG ATRIAL RATE: 69 BPM
EKG P AXIS: 37 DEGREES
EKG P-R INTERVAL: 144 MS
EKG Q-T INTERVAL: 414 MS
EKG QRS DURATION: 74 MS
EKG QTC CALCULATION (BAZETT): 443 MS
EKG R AXIS: 37 DEGREES
EKG T AXIS: 16 DEGREES
EKG VENTRICULAR RATE: 69 BPM
EOSINOPHIL # BLD: 0.7 %
EOSINOPHILS ABSOLUTE: 0.1 THOU/MM3 (ref 0–0.4)
EPITHELIAL CELLS, UA: ABNORMAL /HPF
ERYTHROCYTE [DISTWIDTH] IN BLOOD BY AUTOMATED COUNT: 14.6 % (ref 11.5–14.5)
ERYTHROCYTE [DISTWIDTH] IN BLOOD BY AUTOMATED COUNT: 47 FL (ref 35–45)
ETHYL ALCOHOL, SERUM: < 0.01 %
GFR SERPL CREATININE-BSD FRML MDRD: > 90 ML/MIN/1.73M2
GLUCOSE BLD-MCNC: 119 MG/DL (ref 70–108)
GLUCOSE URINE: NEGATIVE MG/DL
HCT VFR BLD CALC: 38 % (ref 37–47)
HEMOGLOBIN: 12.1 GM/DL (ref 12–16)
IMMATURE GRANS (ABS): 0.02 THOU/MM3 (ref 0–0.07)
IMMATURE GRANULOCYTES: 0.2 %
INR BLD: 0.96 (ref 0.85–1.13)
KETONES, URINE: NEGATIVE
LEUKOCYTE ESTERASE, URINE: NEGATIVE
LIPASE: 23.3 U/L (ref 5.6–51.3)
LYMPHOCYTES # BLD: 24.1 %
LYMPHOCYTES ABSOLUTE: 2.6 THOU/MM3 (ref 1–4.8)
MAGNESIUM: 1.8 MG/DL (ref 1.6–2.4)
MCH RBC QN AUTO: 27.8 PG (ref 26–33)
MCHC RBC AUTO-ENTMCNC: 31.8 GM/DL (ref 32.2–35.5)
MCV RBC AUTO: 87.4 FL (ref 81–99)
MISCELLANEOUS 2: ABNORMAL
MONOCYTES # BLD: 5.9 %
MONOCYTES ABSOLUTE: 0.6 THOU/MM3 (ref 0.4–1.3)
NITRITE, URINE: NEGATIVE
NUCLEATED RED BLOOD CELLS: 0 /100 WBC
OSMOLALITY CALCULATION: 280.3 MOSMOL/KG (ref 275–300)
PH UA: 5.5 (ref 5–9)
PLATELET # BLD: 453 THOU/MM3 (ref 130–400)
PMV BLD AUTO: 9.4 FL (ref 9.4–12.4)
POTASSIUM SERPL-SCNC: 3.9 MEQ/L (ref 3.5–5.2)
PRO-BNP: 89.5 PG/ML (ref 0–450)
PROTEIN UA: 30
RBC # BLD: 4.35 MILL/MM3 (ref 4.2–5.4)
RBC URINE: ABNORMAL /HPF
RENAL EPITHELIAL, UA: ABNORMAL
SEG NEUTROPHILS: 68.8 %
SEGMENTED NEUTROPHILS ABSOLUTE COUNT: 7.4 THOU/MM3 (ref 1.8–7.7)
SODIUM BLD-SCNC: 140 MEQ/L (ref 135–145)
SPECIFIC GRAVITY, URINE: 1.03 (ref 1–1.03)
TOTAL PROTEIN: 7.2 G/DL (ref 6.1–8)
TROPONIN T: < 0.01 NG/ML
TSH SERPL DL<=0.05 MIU/L-ACNC: 0.63 UIU/ML (ref 0.4–4.2)
UROBILINOGEN, URINE: 0.2 EU/DL (ref 0–1)
WBC # BLD: 10.7 THOU/MM3 (ref 4.8–10.8)
WBC UA: ABNORMAL /HPF
YEAST: ABNORMAL

## 2019-12-27 PROCEDURE — 82248 BILIRUBIN DIRECT: CPT

## 2019-12-27 PROCEDURE — 6370000000 HC RX 637 (ALT 250 FOR IP): Performed by: EMERGENCY MEDICINE

## 2019-12-27 PROCEDURE — 83690 ASSAY OF LIPASE: CPT

## 2019-12-27 PROCEDURE — 96372 THER/PROPH/DIAG INJ SC/IM: CPT

## 2019-12-27 PROCEDURE — 96375 TX/PRO/DX INJ NEW DRUG ADDON: CPT

## 2019-12-27 PROCEDURE — 2580000003 HC RX 258: Performed by: FAMILY MEDICINE

## 2019-12-27 PROCEDURE — 83880 ASSAY OF NATRIURETIC PEPTIDE: CPT

## 2019-12-27 PROCEDURE — 36415 COLL VENOUS BLD VENIPUNCTURE: CPT

## 2019-12-27 PROCEDURE — 6360000002 HC RX W HCPCS: Performed by: FAMILY MEDICINE

## 2019-12-27 PROCEDURE — 85730 THROMBOPLASTIN TIME PARTIAL: CPT

## 2019-12-27 PROCEDURE — 6370000000 HC RX 637 (ALT 250 FOR IP): Performed by: FAMILY MEDICINE

## 2019-12-27 PROCEDURE — 6360000004 HC RX CONTRAST MEDICATION: Performed by: EMERGENCY MEDICINE

## 2019-12-27 PROCEDURE — G0378 HOSPITAL OBSERVATION PER HR: HCPCS

## 2019-12-27 PROCEDURE — 84443 ASSAY THYROID STIM HORMONE: CPT

## 2019-12-27 PROCEDURE — 2500000003 HC RX 250 WO HCPCS: Performed by: EMERGENCY MEDICINE

## 2019-12-27 PROCEDURE — G0480 DRUG TEST DEF 1-7 CLASSES: HCPCS

## 2019-12-27 PROCEDURE — 99497 ADVNCD CARE PLAN 30 MIN: CPT | Performed by: PSYCHIATRY & NEUROLOGY

## 2019-12-27 PROCEDURE — 70498 CT ANGIOGRAPHY NECK: CPT

## 2019-12-27 PROCEDURE — 96374 THER/PROPH/DIAG INJ IV PUSH: CPT

## 2019-12-27 PROCEDURE — 96376 TX/PRO/DX INJ SAME DRUG ADON: CPT

## 2019-12-27 PROCEDURE — 70496 CT ANGIOGRAPHY HEAD: CPT

## 2019-12-27 PROCEDURE — 70551 MRI BRAIN STEM W/O DYE: CPT

## 2019-12-27 PROCEDURE — 93005 ELECTROCARDIOGRAM TRACING: CPT | Performed by: EMERGENCY MEDICINE

## 2019-12-27 PROCEDURE — 99285 EMERGENCY DEPT VISIT HI MDM: CPT

## 2019-12-27 PROCEDURE — 85610 PROTHROMBIN TIME: CPT

## 2019-12-27 PROCEDURE — 6360000002 HC RX W HCPCS: Performed by: EMERGENCY MEDICINE

## 2019-12-27 PROCEDURE — 99220 PR INITIAL OBSERVATION CARE/DAY 70 MINUTES: CPT | Performed by: FAMILY MEDICINE

## 2019-12-27 PROCEDURE — 81001 URINALYSIS AUTO W/SCOPE: CPT

## 2019-12-27 PROCEDURE — 70450 CT HEAD/BRAIN W/O DYE: CPT

## 2019-12-27 PROCEDURE — 2580000003 HC RX 258: Performed by: EMERGENCY MEDICINE

## 2019-12-27 PROCEDURE — 83735 ASSAY OF MAGNESIUM: CPT

## 2019-12-27 PROCEDURE — 85025 COMPLETE CBC W/AUTO DIFF WBC: CPT

## 2019-12-27 PROCEDURE — 84484 ASSAY OF TROPONIN QUANT: CPT

## 2019-12-27 PROCEDURE — 94760 N-INVAS EAR/PLS OXIMETRY 1: CPT

## 2019-12-27 PROCEDURE — 80053 COMPREHEN METABOLIC PANEL: CPT

## 2019-12-27 RX ORDER — LABETALOL 20 MG/4 ML (5 MG/ML) INTRAVENOUS SYRINGE
10 ONCE
Status: COMPLETED | OUTPATIENT
Start: 2019-12-27 | End: 2019-12-27

## 2019-12-27 RX ORDER — ACETAMINOPHEN 500 MG
1000 TABLET ORAL EVERY 6 HOURS PRN
Status: ON HOLD | COMMUNITY
End: 2020-09-11 | Stop reason: HOSPADM

## 2019-12-27 RX ORDER — CYCLOBENZAPRINE HCL 10 MG
10 TABLET ORAL 3 TIMES DAILY PRN
Status: DISCONTINUED | OUTPATIENT
Start: 2019-12-27 | End: 2019-12-28 | Stop reason: HOSPADM

## 2019-12-27 RX ORDER — METOPROLOL SUCCINATE 100 MG/1
100 TABLET, EXTENDED RELEASE ORAL DAILY
Status: DISCONTINUED | OUTPATIENT
Start: 2019-12-27 | End: 2019-12-28 | Stop reason: HOSPADM

## 2019-12-27 RX ORDER — ASPIRIN 81 MG/1
81 TABLET ORAL DAILY
Status: DISCONTINUED | OUTPATIENT
Start: 2019-12-27 | End: 2019-12-28 | Stop reason: HOSPADM

## 2019-12-27 RX ORDER — HYDRALAZINE HYDROCHLORIDE 20 MG/ML
10 INJECTION INTRAMUSCULAR; INTRAVENOUS EVERY 6 HOURS PRN
Status: DISCONTINUED | OUTPATIENT
Start: 2019-12-27 | End: 2019-12-28 | Stop reason: HOSPADM

## 2019-12-27 RX ORDER — SODIUM CHLORIDE 0.9 % (FLUSH) 0.9 %
10 SYRINGE (ML) INJECTION PRN
Status: DISCONTINUED | OUTPATIENT
Start: 2019-12-27 | End: 2019-12-28 | Stop reason: HOSPADM

## 2019-12-27 RX ORDER — PANTOPRAZOLE SODIUM 40 MG/1
40 TABLET, DELAYED RELEASE ORAL
Status: DISCONTINUED | OUTPATIENT
Start: 2019-12-28 | End: 2019-12-28 | Stop reason: HOSPADM

## 2019-12-27 RX ORDER — SERTRALINE HYDROCHLORIDE 100 MG/1
100 TABLET, FILM COATED ORAL DAILY
Status: DISCONTINUED | OUTPATIENT
Start: 2019-12-27 | End: 2019-12-28 | Stop reason: HOSPADM

## 2019-12-27 RX ORDER — ONDANSETRON 2 MG/ML
4 INJECTION INTRAMUSCULAR; INTRAVENOUS EVERY 6 HOURS PRN
Status: DISCONTINUED | OUTPATIENT
Start: 2019-12-27 | End: 2019-12-28 | Stop reason: HOSPADM

## 2019-12-27 RX ORDER — IBUPROFEN 800 MG/1
800 TABLET ORAL EVERY 8 HOURS PRN
Status: DISCONTINUED | OUTPATIENT
Start: 2019-12-27 | End: 2019-12-28 | Stop reason: HOSPADM

## 2019-12-27 RX ORDER — LOPERAMIDE HYDROCHLORIDE 2 MG/1
2 CAPSULE ORAL 4 TIMES DAILY PRN
Status: DISCONTINUED | OUTPATIENT
Start: 2019-12-27 | End: 2019-12-28 | Stop reason: HOSPADM

## 2019-12-27 RX ORDER — SODIUM CHLORIDE 0.9 % (FLUSH) 0.9 %
10 SYRINGE (ML) INJECTION EVERY 12 HOURS SCHEDULED
Status: DISCONTINUED | OUTPATIENT
Start: 2019-12-27 | End: 2019-12-28 | Stop reason: HOSPADM

## 2019-12-27 RX ORDER — ACETAMINOPHEN 500 MG
1000 TABLET ORAL ONCE
Status: COMPLETED | OUTPATIENT
Start: 2019-12-27 | End: 2019-12-27

## 2019-12-27 RX ORDER — 0.9 % SODIUM CHLORIDE 0.9 %
1000 INTRAVENOUS SOLUTION INTRAVENOUS ONCE
Status: COMPLETED | OUTPATIENT
Start: 2019-12-27 | End: 2019-12-27

## 2019-12-27 RX ORDER — ATORVASTATIN CALCIUM 80 MG/1
80 TABLET, FILM COATED ORAL NIGHTLY
Status: DISCONTINUED | OUTPATIENT
Start: 2019-12-27 | End: 2019-12-28 | Stop reason: HOSPADM

## 2019-12-27 RX ORDER — AMLODIPINE BESYLATE 10 MG/1
10 TABLET ORAL DAILY
Status: DISCONTINUED | OUTPATIENT
Start: 2019-12-27 | End: 2019-12-28 | Stop reason: HOSPADM

## 2019-12-27 RX ORDER — HYDRALAZINE HYDROCHLORIDE 20 MG/ML
20 INJECTION INTRAMUSCULAR; INTRAVENOUS ONCE
Status: COMPLETED | OUTPATIENT
Start: 2019-12-27 | End: 2019-12-27

## 2019-12-27 RX ORDER — ASPIRIN 300 MG/1
300 SUPPOSITORY RECTAL DAILY
Status: DISCONTINUED | OUTPATIENT
Start: 2019-12-27 | End: 2019-12-28 | Stop reason: HOSPADM

## 2019-12-27 RX ADMIN — IOPAMIDOL 80 ML: 755 INJECTION, SOLUTION INTRAVENOUS at 20:25

## 2019-12-27 RX ADMIN — IBUPROFEN 800 MG: 800 TABLET, FILM COATED ORAL at 21:27

## 2019-12-27 RX ADMIN — ACETAMINOPHEN 1000 MG: 500 TABLET ORAL at 16:56

## 2019-12-27 RX ADMIN — ASPIRIN 81 MG: 81 TABLET ORAL at 20:04

## 2019-12-27 RX ADMIN — AMLODIPINE BESYLATE 10 MG: 10 TABLET ORAL at 20:04

## 2019-12-27 RX ADMIN — LABETALOL 20 MG/4 ML (5 MG/ML) INTRAVENOUS SYRINGE 10 MG: at 09:02

## 2019-12-27 RX ADMIN — SODIUM CHLORIDE 1000 ML: 9 INJECTION, SOLUTION INTRAVENOUS at 09:05

## 2019-12-27 RX ADMIN — IOPAMIDOL 80 ML: 755 INJECTION, SOLUTION INTRAVENOUS at 07:35

## 2019-12-27 RX ADMIN — HYDRALAZINE HYDROCHLORIDE 20 MG: 20 INJECTION, SOLUTION INTRAMUSCULAR; INTRAVENOUS at 11:12

## 2019-12-27 RX ADMIN — ATORVASTATIN CALCIUM 80 MG: 80 TABLET, FILM COATED ORAL at 20:04

## 2019-12-27 RX ADMIN — SERTRALINE 100 MG: 100 TABLET, FILM COATED ORAL at 20:04

## 2019-12-27 RX ADMIN — HYDRALAZINE HYDROCHLORIDE 10 MG: 20 INJECTION, SOLUTION INTRAMUSCULAR; INTRAVENOUS at 21:27

## 2019-12-27 RX ADMIN — Medication 10 ML: at 20:08

## 2019-12-27 RX ADMIN — ENOXAPARIN SODIUM 40 MG: 40 INJECTION SUBCUTANEOUS at 20:04

## 2019-12-27 RX ADMIN — METOPROLOL SUCCINATE 100 MG: 100 TABLET, FILM COATED, EXTENDED RELEASE ORAL at 20:04

## 2019-12-27 ASSESSMENT — ENCOUNTER SYMPTOMS
SHORTNESS OF BREATH: 0
EYE REDNESS: 0
BACK PAIN: 0
PHOTOPHOBIA: 0
EYE ITCHING: 0
VOMITING: 0
DIARRHEA: 0
CHOKING: 0
VOICE CHANGE: 0
WHEEZING: 0
ABDOMINAL PAIN: 0
EYE PAIN: 0
CHEST TIGHTNESS: 0
BLOOD IN STOOL: 0
RHINORRHEA: 0
COUGH: 0
NAUSEA: 0
ABDOMINAL DISTENTION: 0
EYE DISCHARGE: 0
SORE THROAT: 0
SINUS PRESSURE: 0
TROUBLE SWALLOWING: 0
CONSTIPATION: 0

## 2019-12-27 ASSESSMENT — PAIN DESCRIPTION - PROGRESSION: CLINICAL_PROGRESSION: NOT CHANGED

## 2019-12-27 ASSESSMENT — PAIN DESCRIPTION - LOCATION: LOCATION: HEAD

## 2019-12-27 ASSESSMENT — PAIN DESCRIPTION - PAIN TYPE
TYPE: ACUTE PAIN
TYPE: ACUTE PAIN

## 2019-12-27 ASSESSMENT — PAIN - FUNCTIONAL ASSESSMENT: PAIN_FUNCTIONAL_ASSESSMENT: ACTIVITIES ARE NOT PREVENTED

## 2019-12-27 ASSESSMENT — PAIN DESCRIPTION - FREQUENCY: FREQUENCY: CONTINUOUS

## 2019-12-27 ASSESSMENT — PAIN SCALES - GENERAL
PAINLEVEL_OUTOF10: 8

## 2019-12-27 ASSESSMENT — PAIN DESCRIPTION - DESCRIPTORS
DESCRIPTORS: HEADACHE
DESCRIPTORS: HEADACHE

## 2019-12-27 ASSESSMENT — PAIN DESCRIPTION - ONSET: ONSET: ON-GOING

## 2019-12-27 ASSESSMENT — PAIN DESCRIPTION - ORIENTATION: ORIENTATION: RIGHT;LEFT

## 2019-12-28 VITALS
TEMPERATURE: 97.7 F | SYSTOLIC BLOOD PRESSURE: 159 MMHG | WEIGHT: 223.3 LBS | HEART RATE: 90 BPM | DIASTOLIC BLOOD PRESSURE: 99 MMHG | HEIGHT: 64 IN | BODY MASS INDEX: 38.12 KG/M2 | RESPIRATION RATE: 20 BRPM | OXYGEN SATURATION: 99 %

## 2019-12-28 PROBLEM — G51.0 BELL'S PALSY: Status: ACTIVE | Noted: 2019-12-28

## 2019-12-28 LAB
ANION GAP SERPL CALCULATED.3IONS-SCNC: 12 MEQ/L (ref 8–16)
AVERAGE GLUCOSE: 123 MG/DL (ref 70–126)
BUN BLDV-MCNC: 9 MG/DL (ref 7–22)
CALCIUM SERPL-MCNC: 9.4 MG/DL (ref 8.5–10.5)
CHLORIDE BLD-SCNC: 100 MEQ/L (ref 98–111)
CHOLESTEROL, TOTAL: 195 MG/DL (ref 100–199)
CO2: 23 MEQ/L (ref 23–33)
CREAT SERPL-MCNC: 0.4 MG/DL (ref 0.4–1.2)
ERYTHROCYTE [DISTWIDTH] IN BLOOD BY AUTOMATED COUNT: 14.9 % (ref 11.5–14.5)
ERYTHROCYTE [DISTWIDTH] IN BLOOD BY AUTOMATED COUNT: 47.4 FL (ref 35–45)
GFR SERPL CREATININE-BSD FRML MDRD: > 90 ML/MIN/1.73M2
GLUCOSE BLD-MCNC: 132 MG/DL (ref 70–108)
HBA1C MFR BLD: 6.1 % (ref 4.4–6.4)
HCT VFR BLD CALC: 39.1 % (ref 37–47)
HDLC SERPL-MCNC: 51 MG/DL
HEMOGLOBIN: 12.4 GM/DL (ref 12–16)
LDL CHOLESTEROL CALCULATED: 118 MG/DL
MCH RBC QN AUTO: 27.9 PG (ref 26–33)
MCHC RBC AUTO-ENTMCNC: 31.7 GM/DL (ref 32.2–35.5)
MCV RBC AUTO: 88.1 FL (ref 81–99)
PLATELET # BLD: 479 THOU/MM3 (ref 130–400)
PMV BLD AUTO: 9.8 FL (ref 9.4–12.4)
POTASSIUM REFLEX MAGNESIUM: 3.7 MEQ/L (ref 3.5–5.2)
RBC # BLD: 4.44 MILL/MM3 (ref 4.2–5.4)
SODIUM BLD-SCNC: 135 MEQ/L (ref 135–145)
TRIGL SERPL-MCNC: 129 MG/DL (ref 0–199)
WBC # BLD: 11.1 THOU/MM3 (ref 4.8–10.8)

## 2019-12-28 PROCEDURE — 36415 COLL VENOUS BLD VENIPUNCTURE: CPT

## 2019-12-28 PROCEDURE — G0378 HOSPITAL OBSERVATION PER HR: HCPCS

## 2019-12-28 PROCEDURE — 6370000000 HC RX 637 (ALT 250 FOR IP): Performed by: FAMILY MEDICINE

## 2019-12-28 PROCEDURE — 97165 OT EVAL LOW COMPLEX 30 MIN: CPT

## 2019-12-28 PROCEDURE — 80061 LIPID PANEL: CPT

## 2019-12-28 PROCEDURE — 99217 PR OBSERVATION CARE DISCHARGE MANAGEMENT: CPT | Performed by: FAMILY MEDICINE

## 2019-12-28 PROCEDURE — 92610 EVALUATE SWALLOWING FUNCTION: CPT

## 2019-12-28 PROCEDURE — 2580000003 HC RX 258: Performed by: FAMILY MEDICINE

## 2019-12-28 PROCEDURE — 2709999900 HC NON-CHARGEABLE SUPPLY

## 2019-12-28 PROCEDURE — 85027 COMPLETE CBC AUTOMATED: CPT

## 2019-12-28 PROCEDURE — 80048 BASIC METABOLIC PNL TOTAL CA: CPT

## 2019-12-28 PROCEDURE — 99220 PR INITIAL OBSERVATION CARE/DAY 70 MINUTES: CPT | Performed by: PSYCHIATRY & NEUROLOGY

## 2019-12-28 PROCEDURE — 92523 SPEECH SOUND LANG COMPREHEN: CPT

## 2019-12-28 PROCEDURE — 83036 HEMOGLOBIN GLYCOSYLATED A1C: CPT

## 2019-12-28 RX ORDER — PREDNISONE 20 MG/1
60 TABLET ORAL DAILY
Status: DISCONTINUED | OUTPATIENT
Start: 2019-12-28 | End: 2019-12-28 | Stop reason: HOSPADM

## 2019-12-28 RX ORDER — VALACYCLOVIR HYDROCHLORIDE 1 G/1
1000 TABLET, FILM COATED ORAL 3 TIMES DAILY
Qty: 21 TABLET | Refills: 0 | Status: SHIPPED | OUTPATIENT
Start: 2019-12-28 | End: 2020-01-04

## 2019-12-28 RX ORDER — ACYCLOVIR 400 MG/1
400 TABLET ORAL
Status: DISCONTINUED | OUTPATIENT
Start: 2019-12-28 | End: 2019-12-28 | Stop reason: HOSPADM

## 2019-12-28 RX ORDER — PREDNISONE 20 MG/1
60 TABLET ORAL DAILY
Qty: 21 TABLET | Refills: 0 | Status: SHIPPED | OUTPATIENT
Start: 2019-12-28 | End: 2020-01-04

## 2019-12-28 RX ADMIN — ASPIRIN 81 MG: 81 TABLET ORAL at 09:43

## 2019-12-28 RX ADMIN — Medication 10 ML: at 09:44

## 2019-12-28 RX ADMIN — CYCLOBENZAPRINE 10 MG: 10 TABLET, FILM COATED ORAL at 04:54

## 2019-12-28 RX ADMIN — PANTOPRAZOLE SODIUM 40 MG: 40 TABLET, DELAYED RELEASE ORAL at 09:43

## 2019-12-28 RX ADMIN — SERTRALINE 100 MG: 100 TABLET, FILM COATED ORAL at 09:43

## 2019-12-30 ENCOUNTER — TELEPHONE (OUTPATIENT)
Dept: FAMILY MEDICINE CLINIC | Age: 39
End: 2019-12-30

## 2019-12-30 ENCOUNTER — TELEPHONE (OUTPATIENT)
Dept: NEUROLOGY | Age: 39
End: 2019-12-30

## 2019-12-30 DIAGNOSIS — E04.1 THYROID NODULE: Primary | ICD-10-CM

## 2020-01-02 ENCOUNTER — HOSPITAL ENCOUNTER (OUTPATIENT)
Dept: ULTRASOUND IMAGING | Age: 40
Discharge: HOME | End: 2020-01-02
Payer: COMMERCIAL

## 2020-01-02 ENCOUNTER — OFFICE VISIT (OUTPATIENT)
Dept: FAMILY MEDICINE CLINIC | Age: 40
End: 2020-01-02
Payer: COMMERCIAL

## 2020-01-02 VITALS
WEIGHT: 227.8 LBS | RESPIRATION RATE: 10 BRPM | BODY MASS INDEX: 37.95 KG/M2 | TEMPERATURE: 98.9 F | HEIGHT: 65 IN | HEART RATE: 88 BPM | DIASTOLIC BLOOD PRESSURE: 88 MMHG | SYSTOLIC BLOOD PRESSURE: 122 MMHG

## 2020-01-02 PROCEDURE — G8427 DOCREV CUR MEDS BY ELIG CLIN: HCPCS | Performed by: NURSE PRACTITIONER

## 2020-01-02 PROCEDURE — G8484 FLU IMMUNIZE NO ADMIN: HCPCS | Performed by: NURSE PRACTITIONER

## 2020-01-02 PROCEDURE — 99214 OFFICE O/P EST MOD 30 MIN: CPT | Performed by: NURSE PRACTITIONER

## 2020-01-02 PROCEDURE — 76536 US EXAM OF HEAD AND NECK: CPT

## 2020-01-02 PROCEDURE — G8417 CALC BMI ABV UP PARAM F/U: HCPCS | Performed by: NURSE PRACTITIONER

## 2020-01-02 PROCEDURE — 1036F TOBACCO NON-USER: CPT | Performed by: NURSE PRACTITIONER

## 2020-01-02 ASSESSMENT — PATIENT HEALTH QUESTIONNAIRE - PHQ9
SUM OF ALL RESPONSES TO PHQ9 QUESTIONS 1 & 2: 0
SUM OF ALL RESPONSES TO PHQ QUESTIONS 1-9: 0
SUM OF ALL RESPONSES TO PHQ QUESTIONS 1-9: 0
1. LITTLE INTEREST OR PLEASURE IN DOING THINGS: 0
2. FEELING DOWN, DEPRESSED OR HOPELESS: 0

## 2020-01-02 NOTE — PROGRESS NOTES
Chief Complaint   Patient presents with    Follow-up     12.27.2019 [address] [facility] F/U for Right Ramirez's palsy       History obtained from chart review and the patient. SUBJECTIVE:  Deloris Hung is a 37 y.o. female that presents today for follow up recent hospitalization for Bell's Palsy    Patient was recently admitted to [facility] from 12/27-12/28 for right facial droop, newly diagnosed with Bell's Palsy. See hospital d/c summary:    Discharge Diagnoses:   1. Right facial droop and numbness c/w Bell's palsy   2. Accelerated HTN/hypertensive urgency -- NOT encephalopathy as pt is not confused   3. leukocytosis  4. Diarrhea  5. HLD  6. Chronic thrombocytosis   7. hyperglycemia  8. Hx subclinical hyperthyroidism/hashimoto's thyroiditis   9. Bilateral thyroid nodules  10. Left upper back lipoma  11. Obesity Body mass index is 38.11 kg/m².      Hospital Course:   Deloris Hung is a 37 y.o. female admitted to [address] [place] [address] on 12/27/2019 for right facial droop. See H&P by Chelo Douglas MD on 12/27/19 for admitting details.      2. Right facial droop and numbness -- d/w Dr. Marlyn Pinedo neuro 12/28 and sx more c/w Bell's palsy 12/28 -- d/w Dr. Carmen Horton 12/28 and recommended tx -> thus started and home with steroids 60 mg daily x 1 week and valacyclovir 1000 mg tid x 1 week -- no ASA needed  --  per eval by Dr. Mtz Expose via tele stroke -> MRI brain done stat in ER 12/27 = no acute abn and with \"Minimally asymmetrically prominent flow voids at the right sylvian fissure, similar to prior exam\" --> per Dr. Sumit Ward note pt had diagnostic angiogram by Dr. Wilfredo Franco 3/8/19 which was normal and NO AVM or aneurysm  -- c/s neurology here per Dr. Mtz Expose request  -- per Dr. Smith Lopez NO CTA head/neck needed but done per Dr Marlyn Pinedo 12/27/19 and no acute issues but with \"stable appearing intracranial vasculature.  There is a tangle of vessels in  Right sylvian fissure unchanged\"  -- per Dr. Mtz Expose -> NO ASA at this time as none    Discussed use, benefit, and side effects of prescribed medications. Barriers to medication compliance addressed. All patient questions answered. Pt voiced understanding.        Electronically signed by MARÍA Collins CNP on 1/2/2020 at 2:49 PM

## 2020-01-03 ENCOUNTER — TELEPHONE (OUTPATIENT)
Dept: FAMILY MEDICINE CLINIC | Age: 40
End: 2020-01-03

## 2020-01-03 NOTE — TELEPHONE ENCOUNTER
----- Message from MARÍA Laws CNP sent at 1/3/2020  8:51 AM EST -----  Let Janell Mackay know the US of her thyroid is stable, she has multiple nodules but no concerning characteristics.  Let me know if any questions

## 2020-01-06 ENCOUNTER — TELEPHONE (OUTPATIENT)
Dept: FAMILY MEDICINE CLINIC | Age: 40
End: 2020-01-06

## 2020-01-06 NOTE — TELEPHONE ENCOUNTER
Pt was notified our ofc has completed/faxed/scanned her FMLA paperwork & she verbalized understanding.

## 2020-01-15 ENCOUNTER — TELEPHONE (OUTPATIENT)
Dept: FAMILY MEDICINE CLINIC | Age: 40
End: 2020-01-15

## 2020-01-15 NOTE — TELEPHONE ENCOUNTER
Attempted to contact the pt to notify her our ofc has completed/faxed/scanned her [employer] Disability/intermittent FMLA paperwork. No answer/no VM so no msg could be left.

## 2020-03-25 ENCOUNTER — HOSPITAL ENCOUNTER (OUTPATIENT)
Age: 40
Discharge: HOME | End: 2020-03-25
Payer: COMMERCIAL

## 2020-03-25 PROCEDURE — 82705 FATS/LIPIDS FECES QUAL: CPT

## 2020-03-25 PROCEDURE — 87338 HPYLORI STOOL AG IA: CPT

## 2020-03-27 LAB
FECAL FAT, QUALITATIVE: NORMAL
HELICOBACTER PYLORI ANTIGEN, STOOL: NORMAL

## 2020-04-07 ENCOUNTER — OFFICE VISIT (OUTPATIENT)
Dept: PRIMARY CARE CLINIC | Age: 40
End: 2020-04-07
Payer: COMMERCIAL

## 2020-04-07 VITALS
SYSTOLIC BLOOD PRESSURE: 139 MMHG | RESPIRATION RATE: 12 BRPM | OXYGEN SATURATION: 98 % | HEART RATE: 71 BPM | DIASTOLIC BLOOD PRESSURE: 81 MMHG | TEMPERATURE: 98.8 F

## 2020-04-07 LAB
INFLUENZA VIRUS A RNA: NORMAL
INFLUENZA VIRUS B RNA: NORMAL
STREPTOCOCCUS A RNA: NORMAL

## 2020-04-07 PROCEDURE — 87651 STREP A DNA AMP PROBE: CPT | Performed by: NURSE PRACTITIONER

## 2020-04-07 PROCEDURE — 87502 INFLUENZA DNA AMP PROBE: CPT | Performed by: NURSE PRACTITIONER

## 2020-04-07 PROCEDURE — G8417 CALC BMI ABV UP PARAM F/U: HCPCS | Performed by: NURSE PRACTITIONER

## 2020-04-07 PROCEDURE — 1036F TOBACCO NON-USER: CPT | Performed by: NURSE PRACTITIONER

## 2020-04-07 PROCEDURE — 99213 OFFICE O/P EST LOW 20 MIN: CPT | Performed by: NURSE PRACTITIONER

## 2020-04-07 PROCEDURE — G8427 DOCREV CUR MEDS BY ELIG CLIN: HCPCS | Performed by: NURSE PRACTITIONER

## 2020-04-07 ASSESSMENT — ENCOUNTER SYMPTOMS
ABDOMINAL PAIN: 0
SORE THROAT: 1
RHINORRHEA: 1
WHEEZING: 0
EYE PAIN: 0
COUGH: 1
COLOR CHANGE: 0
DIARRHEA: 0
EYE REDNESS: 0
VOMITING: 0
EYES NEGATIVE: 1
GASTROINTESTINAL NEGATIVE: 1
ALLERGIC/IMMUNOLOGIC NEGATIVE: 1
SHORTNESS OF BREATH: 0
TROUBLE SWALLOWING: 0
NAUSEA: 0

## 2020-04-07 NOTE — PATIENT INSTRUCTIONS
Counts include 234 beds at the [facility]   [phone]  Select option 8    Advance Care Planning  People with COVID-19 may have no symptoms, mild symptoms, such as fever, cough, and shortness of breath or they may have more severe illness, developing severe and fatal pneumonia. As a result, Advance Care Planning with attention to naming a health care decision maker (someone you trust to make healthcare decisions for you if you could not speak for yourself) and sharing other health care preferences is important BEFORE a possible health crisis. Please contact your Primary Care Provider to discuss Advance Care Planning. Preventing the Spread of Coronavirus Disease 2019 in Homes and Residential Communities  For the most recent information go to [employer].    Prevention steps for People with confirmed or suspected COVID-19 (including persons under investigation) who do not need to be hospitalized  and   People with confirmed COVID-19 who were hospitalized and determined to be medically stable to go home    Your healthcare provider and public health staff will evaluate whether you can be cared for at home. If it is determined that you do not need to be hospitalized and can be isolated at home, you will be monitored by staff from your local or state health department. You should follow the prevention steps below until a healthcare provider or local or state health department says you can return to your normal activities. Stay home except to get medical care  People who are mildly ill with COVID-19 are able to isolate at home during their illness. You should restrict activities outside your home, except for getting medical care. Do not go to work, school, or public areas. Avoid using public transportation, ride-sharing, or taxis.   Separate yourself from other people and animals in your home  People: As much as possible, you should stay in a specific room and away from other coughing, or sneezing; going to the bathroom; and before eating or preparing food. If soap and water are not readily available, use an alcohol-based hand  with at least 60% alcohol, covering all surfaces of your hands and rubbing them together until they feel dry. Soap and water are the best option if hands are visibly dirty. Avoid touching your eyes, nose, and mouth with unwashed hands. Avoid sharing personal household items  You should not share dishes, drinking glasses, cups, eating utensils, towels, or bedding with other people or pets in your home. After using these items, they should be washed thoroughly with soap and water. Clean all high-touch surfaces everyday  High touch surfaces include counters, tabletops, doorknobs, bathroom fixtures, toilets, phones, keyboards, tablets, and bedside tables. Also, clean any surfaces that may have blood, stool, or body fluids on them. Use a household cleaning spray or wipe, according to the label instructions. Labels contain instructions for safe and effective use of the cleaning product including precautions you should take when applying the product, such as wearing gloves and making sure you have good ventilation during use of the product. Monitor your symptoms  Seek prompt medical attention if your illness is worsening (e.g., difficulty breathing). Before seeking care, call your healthcare provider and tell them that you have, or are being evaluated for, COVID-19. Put on a facemask before you enter the facility. These steps will help the healthcare providers office to keep other people in the office or waiting room from getting infected or exposed. Ask your healthcare provider to call the local or [facility] health department. Persons who are placed under active monitoring or facilitated self-monitoring should follow instructions provided by their local health department or occupational health professionals, as appropriate.  When working with your local health department check their available hours. If you have a medical emergency and need to call 911, notify the dispatch personnel that you have, or are being evaluated for COVID-19. If possible, put on a facemask before emergency medical services arrive. Discontinuing home isolation  Patients with confirmed COVID-19 should remain under home isolation precautions until the risk of secondary transmission to others is thought to be low. The decision to discontinue home isolation precautions should be made on a case-by-case basis, in consultation with healthcare providers and state and local health departments.

## 2020-04-08 ENCOUNTER — CARE COORDINATION (OUTPATIENT)
Dept: OTHER | Facility: CLINIC | Age: 40
End: 2020-04-08

## 2020-04-10 ENCOUNTER — TELEMEDICINE (OUTPATIENT)
Dept: PRIMARY CARE CLINIC | Age: 40
End: 2020-04-10
Payer: COMMERCIAL

## 2020-04-10 ENCOUNTER — CARE COORDINATION (OUTPATIENT)
Dept: OTHER | Facility: CLINIC | Age: 40
End: 2020-04-10

## 2020-04-10 PROCEDURE — G8428 CUR MEDS NOT DOCUMENT: HCPCS | Performed by: FAMILY MEDICINE

## 2020-04-10 PROCEDURE — 99213 OFFICE O/P EST LOW 20 MIN: CPT | Performed by: FAMILY MEDICINE

## 2020-04-10 RX ORDER — AZITHROMYCIN 250 MG/1
250 TABLET, FILM COATED ORAL SEE ADMIN INSTRUCTIONS
Qty: 6 TABLET | Refills: 0 | Status: SHIPPED | OUTPATIENT
Start: 2020-04-10 | End: 2020-04-15

## 2020-04-10 ASSESSMENT — ENCOUNTER SYMPTOMS
VOMITING: 0
WHEEZING: 0
SORE THROAT: 1
EYES NEGATIVE: 1
BACK PAIN: 0
ABDOMINAL PAIN: 0
RHINORRHEA: 0
NAUSEA: 0
COUGH: 1
CHEST TIGHTNESS: 1
DIARRHEA: 0

## 2020-04-10 NOTE — PROGRESS NOTES
High Blood Pressure Father     Diabetes Father    ,   Immunization History   Administered Date(s) Administered    Influenza Vaccine, unspecified formulation 10/01/2016    Influenza Virus Vaccine 11/01/2017, 11/05/2018, 11/27/2019    Tdap (Boostrix, Adacel) 01/01/2016   ,   Health Maintenance   Topic Date Due    HIV screen  03/19/1993    Cervical cancer screen  09/19/2020    A1C test (Diabetic or Prediabetic)  12/28/2020    Potassium monitoring  12/28/2020    Creatinine monitoring  12/28/2020    Lipid screen  12/28/2024    DTaP/Tdap/Td vaccine (2 - Td) 01/01/2026    Flu vaccine  Completed    Hepatitis A vaccine  Aged Out    Hepatitis B vaccine  Aged Out    Hib vaccine  Aged Out    Meningococcal (ACWY) vaccine  Aged Out    Pneumococcal 0-64 years Vaccine  Aged Out       PHYSICAL EXAMINATION:  [ INSTRUCTIONS:  \"[x]\" Indicates a positive item  \"[]\" Indicates a negative item  -- DELETE ALL ITEMS NOT EXAMINED]  Vital Signs: (As obtained by patient/caregiver or practitioner observation)      Constitutional: [x] Appears well-developed and well-nourished [x] No apparent distress      [] Abnormal-   Mental status  [x] Alert and awake  [x] Oriented to person/place/time [x]Able to follow commands      Eyes:  EOM    [x]  Normal  [] Abnormal-  Sclera  [x]  Normal  [] Abnormal -         Discharge [x]  None visible  [] Abnormal -    HENT:   [x] Normocephalic, atraumatic. [] Abnormal   t.      External Ears [x] Normal  [] Abnormal-     Neck: [x] No visualized mass     Pulmonary/Chest: [x] Respiratory effort normal.  [x] No visualized signs of difficulty breathing or respiratory distress        [] Abnormal-          Neurological:        [x] No Facial Asymmetry (Cranial nerve 7 motor function) (limited exam to video visit)          [x] No gaze palsy        [] Abnormal-         Skin:        [x] No significant exanthematous lesions or discoloration noted on facial skin         [] Abnormal-            Psychiatric: note.

## 2020-04-13 ENCOUNTER — CARE COORDINATION (OUTPATIENT)
Dept: OTHER | Facility: CLINIC | Age: 40
End: 2020-04-13

## 2020-04-13 NOTE — CARE COORDINATION
shared with anyone else aside from those you identify as part of your care team, and will only be used to assist you with any identified care needs. Please contact me so that I can provide the services that you need at this challenging time. Resources that are available to you:    Visit [employer]more or download the Proviation rosita for free from the rosita store. Use the Coupon Code [id number] for your free COVID-19 visit. Please note this service is for non-emergency COVID-19 visits only. If you are having a medical emergency, call 911 immediately.  Conduit exposure line [phone]   [facility] and your local  department of health    Have a blessed day,    Shanti Ashraf RN BSN  Associate Care Manager  Phone: [phone]  Email: [email or web address]

## 2020-04-15 ENCOUNTER — CARE COORDINATION (OUTPATIENT)
Dept: OTHER | Facility: CLINIC | Age: 40
End: 2020-04-15

## 2020-04-17 ENCOUNTER — VIRTUAL VISIT (OUTPATIENT)
Dept: UROLOGY | Age: 40
End: 2020-04-17
Payer: COMMERCIAL

## 2020-04-17 PROCEDURE — 99214 OFFICE O/P EST MOD 30 MIN: CPT | Performed by: NURSE PRACTITIONER

## 2020-04-17 RX ORDER — OXYBUTYNIN CHLORIDE 10 MG/1
10 TABLET, EXTENDED RELEASE ORAL DAILY
Qty: 30 TABLET | Refills: 2 | Status: SHIPPED | OUTPATIENT
Start: 2020-04-17 | End: 2020-06-08 | Stop reason: ALTCHOICE

## 2020-04-17 ASSESSMENT — ENCOUNTER SYMPTOMS
EYE ITCHING: 0
COUGH: 0
EYE DISCHARGE: 0
SHORTNESS OF BREATH: 0
ABDOMINAL PAIN: 1
BACK PAIN: 0
BLOOD IN STOOL: 0
ABDOMINAL PAIN: 0
BACK PAIN: 1

## 2020-04-17 NOTE — PROGRESS NOTES
Ann Schwarz PA-C   ibuprofen (ADVIL;MOTRIN) 800 MG tablet Take 1 tablet by mouth every 8 hours as needed for Pain Yes Ann Schwarz PA-C   norethindrone (MICRONOR) 0.35 MG tablet Take 1 tablet by mouth daily Yes Historical Provider, MD   omeprazole (PRILOSEC) 20 MG delayed release capsule take 1 capsule by mouth once daily Yes MARÍA Pedro CNP   sertraline (ZOLOFT) 100 MG tablet take 1 tablet by mouth once daily Yes MARÍA Pedro CNP   MONO-LINYAH 0.25-35 MG-MCG per tablet Take 1 tablet by mouth daily  Yes Historical Provider, MD   FLUoxetine (PROZAC) 20 MG capsule Take 1 capsule by mouth daily for 2 weeks, then take 2 capsules by mouth daily  MARÍA Pedro CNP   metoprolol tartrate (LOPRESSOR) 25 MG tablet Take 1 tablet by mouth 2 times daily  Patient taking differently: Take 25 mg by mouth daily   BeaverMARÍA Alexander CNP       Social History     Tobacco Use    Smoking status: Never Smoker    Smokeless tobacco: Never Used   Substance Use Topics    Alcohol use: Yes     Comment: social    Drug use: No        Allergies   Allergen Reactions    Bactrim     Sulfamethoxazole-Trimethoprim    ,   Past Medical History:   Diagnosis Date    Essential hypertension 2016    GERD (gastroesophageal reflux disease)     Hashimoto's thyroiditis 2016    HIGH CHOLESTEROL     Hyperlipidemia     Hypokalemia     Subclinical hyperthyroidism 2016   ,   Past Surgical History:   Procedure Laterality Date     SECTION  ,     ENDOSCOPY, COLON, DIAGNOSTIC     ,   Family History   Problem Relation Age of Onset    High Blood Pressure Mother     Diabetes Mother     Heart Disease Mother     High Blood Pressure Father     Diabetes Father        PHYSICAL EXAMINATION:    Constitutional: [x] Appears well-developed and well-nourished [x] No apparent distress      [] Abnormal-   Mental status  [x] Alert and awake  [x] Oriented to person/place/time [x]Able to the patient's risk of exposure to COVID-19 and provide necessary medical care. The patient (and/or legal guardian) has also been advised to contact this office for worsening conditions or problems, and seek emergency medical treatment and/or call 911 if deemed necessary. Services were provided through a video synchronous discussion virtually to substitute for in-person clinic visit. Patient and provider were located at their individual homes. --MARÍA Spencer CNP on 4/17/2020 at 12:43 PM    An electronic signature was used to authenticate this note.

## 2020-05-29 ENCOUNTER — OFFICE VISIT (OUTPATIENT)
Dept: FAMILY MEDICINE CLINIC | Age: 40
End: 2020-05-29
Payer: COMMERCIAL

## 2020-05-29 ENCOUNTER — NURSE ONLY (OUTPATIENT)
Dept: LAB | Age: 40
End: 2020-05-29

## 2020-05-29 VITALS
OXYGEN SATURATION: 99 % | RESPIRATION RATE: 14 BRPM | WEIGHT: 228 LBS | DIASTOLIC BLOOD PRESSURE: 80 MMHG | HEIGHT: 64 IN | SYSTOLIC BLOOD PRESSURE: 136 MMHG | HEART RATE: 88 BPM | BODY MASS INDEX: 38.93 KG/M2 | TEMPERATURE: 97.3 F

## 2020-05-29 LAB
AVERAGE GLUCOSE: 117 MG/DL (ref 70–126)
HBA1C MFR BLD: 5.9 % (ref 4.4–6.4)
TSH SERPL DL<=0.05 MIU/L-ACNC: 0.67 UIU/ML (ref 0.4–4.2)

## 2020-05-29 PROCEDURE — 99214 OFFICE O/P EST MOD 30 MIN: CPT | Performed by: NURSE PRACTITIONER

## 2020-05-29 PROCEDURE — 1036F TOBACCO NON-USER: CPT | Performed by: NURSE PRACTITIONER

## 2020-05-29 PROCEDURE — G8417 CALC BMI ABV UP PARAM F/U: HCPCS | Performed by: NURSE PRACTITIONER

## 2020-05-29 PROCEDURE — G8427 DOCREV CUR MEDS BY ELIG CLIN: HCPCS | Performed by: NURSE PRACTITIONER

## 2020-05-29 RX ORDER — PERMETHRIN 50 MG/G
CREAM TOPICAL
Qty: 1 TUBE | Refills: 0 | Status: SHIPPED | OUTPATIENT
Start: 2020-05-29 | End: 2020-07-07 | Stop reason: ALTCHOICE

## 2020-05-29 RX ORDER — AMOXICILLIN 875 MG/1
875 TABLET, COATED ORAL 2 TIMES DAILY
Qty: 20 TABLET | Refills: 0 | Status: SHIPPED | OUTPATIENT
Start: 2020-05-29 | End: 2020-06-08 | Stop reason: ALTCHOICE

## 2020-05-29 RX ORDER — HYDROXYZINE HYDROCHLORIDE 25 MG/1
25 TABLET, FILM COATED ORAL 4 TIMES DAILY PRN
Qty: 120 TABLET | Refills: 0 | Status: SHIPPED | OUTPATIENT
Start: 2020-05-29 | End: 2020-06-08

## 2020-05-31 LAB — THROAT/NOSE CULTURE: NORMAL

## 2020-06-01 ENCOUNTER — TELEPHONE (OUTPATIENT)
Dept: FAMILY MEDICINE CLINIC | Age: 40
End: 2020-06-01

## 2020-06-01 PROBLEM — R73.03 PREDIABETES: Status: ACTIVE | Noted: 2020-06-01

## 2020-06-01 RX ORDER — METFORMIN HYDROCHLORIDE 500 MG/1
500 TABLET, EXTENDED RELEASE ORAL
Qty: 90 TABLET | Refills: 1 | Status: SHIPPED | OUTPATIENT
Start: 2020-06-01 | End: 2021-01-26 | Stop reason: ALTCHOICE

## 2020-06-01 NOTE — TELEPHONE ENCOUNTER
----- Message from MARÍA Heck CNP sent at 6/1/2020  7:48 AM EDT -----  Let Deansancho Wright know her thyroid was normal and her A1C was improved some at 5.9. throat culture was negative. Is her sore throat better? i'm going to start her on metformin to see if this will also help with some weight loss. Rx sent.

## 2020-06-01 NOTE — TELEPHONE ENCOUNTER
Called patient regarding directives and received a recording that stated \"we're sorry. All circuits are busy now. please try your call again later\"

## 2020-06-08 NOTE — PROGRESS NOTES
Following instructions given to patient, who states understanding:    NPO after midnight  [employer] and 's license  Wear comfortable clean clothing  Do not bring jewelry   Shower night before and morning of surgery with a liquid antibacterial soap  Bring medications in original bottles  Follow all instructions given by your physician   needed at discharge  Call [phone] for any questions  Report to SDS on 2nd floor  If you would become ill prior to surgery, please call the surgeon  One visitor per patient, please wear masks, coming for lab and covid test Fri 6/12/20

## 2020-06-09 ENCOUNTER — TELEPHONE (OUTPATIENT)
Dept: FAMILY MEDICINE CLINIC | Age: 40
End: 2020-06-09

## 2020-06-10 NOTE — H&P
Department of Obstetrics and Gynecology   History & Physical    Pt Name: Rebecca Parks  MRN: [id number] [place] #: [de-identified]  YOB: 1980    HPI: The patient is a 41 y.o.  female  who presents today for diagnostic laparoscopy, possible KWABENA, removal of hydrosalpinx for pelvic pain, failed conservative management. She presented to the office c/o pelvic pain. it is related to intercourse. She had tried conservative management with NSAIDS and tylenol without much relief. U/s showed hydrosalpinx. Allergies: Allergies as of 2020 - Review Complete 2020   Allergen Reaction Noted    Bactrim  2012    Sulfamethoxazole-trimethoprim  2012       Medications:    Current Facility-Administered Medications:     lactated ringers infusion, , Intravenous, Continuous, Charlette Tapia MD    ondansetron [facility]) injection 8 mg, 8 mg, Intravenous, Q8H PRN, Charlette Tapia MD    famotidine (PEPCID) injection 20 mg, 20 mg, Intravenous, Once, Waunita Cap, DO    OB History: [id number] c-sections, 1 ectopic and 3 SABs    Gyn History: +h/o abnormal pap smear, s/p cryo therapy, +h/o STDs (trich-2018). +h/o endometriosis    Past Medical History:   Past Medical History:   Diagnosis Date    Bell palsy     right side droop    Essential hypertension 2016    GERD (gastroesophageal reflux disease)     Hashimoto's thyroiditis 2016    HIGH CHOLESTEROL     Hyperlipidemia     Hypokalemia     Prediabetes 2020    Subclinical hyperthyroidism 2016       Past Surgical History:   Past Surgical History:   Procedure Laterality Date     SECTION  ,     DILATION AND CURETTAGE OF UTERUS      ENDOSCOPY, COLON, DIAGNOSTIC         Social History:   Social History     Tobacco Use   Smoking Status Never Smoker   Smokeless Tobacco Never Used        Family History: Noncontributory; Denies h/o cancer.      ROS:  Negative except as stated in HPI    PE:  Vitals:    06/15/20 0900   BP: 135/80   Pulse: 74   Resp: 18   Temp: 96.7 °F (35.9 °C)   SpO2: 100%       General: well nourished, well developed, in no acute distress  Resp: breathing unlabored  Abdomen: Nontender, no rebound, no guarding  Pelvic: deferred to the OR    Labs:   Lab Results   Component Value Date    WBC 11.9 (H) 06/15/2020    HGB 12.7 06/15/2020    HCT 40.5 06/15/2020    MCV 87.1 06/15/2020     (H) 06/15/2020     6/25/20 urine preg test: negative    2020 gct: neg    Imagin/2019 u/s: UTERINE POSITION: anteverted   UTERINE TEXTURE: homogeneous   UTERUS: L: 9.0 centimeters W: 5.2 centimeters AP: 4.7 centimeters   ESTIMATED UTERINE VOLUME: 117 ML   UTERINE LININ.7 millimeters   UTERINE CAVITY: Two hyperechoic sturctures seen 1=1.0cm 2= 1.3cm clots vs polyps   CUL DE SAC: no fluid present   PRESENCE OF FIBROIDS: negative   RIGHT OVARY: 3.4 centimeters by 2.1 centimeters by 1.7 centimeters ; normal consistency ;   LEFT OVARY: 3.2 centimeters by 2.0 centimeters by 1.6 centimeters ; normal consistency ;   CERVIX: nabothian cyst(s) present   Fluid filled tubular structure in the left adnexa measuring 2.1 cm   INTERPRETATION: persistent hydrosalpinx        Assessment: 41 y.o.  female with pelvic pain, failed conservative management    Plan:   Proceed with diagnostic l/s, possible KWABENA, possible ablation of endometriosis, possible removal of hydrosalpinx. I discussed that I may see too much scar tissue that a hysterectomy may be her best option-in that case we will schedule that and discuss options. She is understanding. She declines hysterectomy at this time and prefers more conservative management. I discussed the risks of the procedure including but not limited to bleeding, infection, injury to bowel/bladder. All questions answered.     Harsha Strickland  9:40 AM  6/15/2020

## 2020-06-12 ENCOUNTER — HOSPITAL ENCOUNTER (OUTPATIENT)
Age: 40
Discharge: HOME | End: 2020-06-12
Payer: COMMERCIAL

## 2020-06-12 LAB
BASOPHILS # BLD: 0.4 %
BASOPHILS ABSOLUTE: 0 THOU/MM3 (ref 0–0.1)
EOSINOPHIL # BLD: 0.8 %
EOSINOPHILS ABSOLUTE: 0.1 THOU/MM3 (ref 0–0.4)
ERYTHROCYTE [DISTWIDTH] IN BLOOD BY AUTOMATED COUNT: 14.7 % (ref 11.5–14.5)
ERYTHROCYTE [DISTWIDTH] IN BLOOD BY AUTOMATED COUNT: 47.1 FL (ref 35–45)
HCT VFR BLD CALC: 38.3 % (ref 37–47)
HEMOGLOBIN: 12.2 GM/DL (ref 12–16)
IMMATURE GRANS (ABS): 0.02 THOU/MM3 (ref 0–0.07)
IMMATURE GRANULOCYTES: 0.2 %
LYMPHOCYTES # BLD: 26.6 %
LYMPHOCYTES ABSOLUTE: 2.9 THOU/MM3 (ref 1–4.8)
MCH RBC QN AUTO: 27.7 PG (ref 26–33)
MCHC RBC AUTO-ENTMCNC: 31.9 GM/DL (ref 32.2–35.5)
MCV RBC AUTO: 87 FL (ref 81–99)
MONOCYTES # BLD: 6.8 %
MONOCYTES ABSOLUTE: 0.7 THOU/MM3 (ref 0.4–1.3)
NUCLEATED RED BLOOD CELLS: 0 /100 WBC
PLATELET # BLD: 466 THOU/MM3 (ref 130–400)
PMV BLD AUTO: 9.5 FL (ref 9.4–12.4)
RBC # BLD: 4.4 MILL/MM3 (ref 4.2–5.4)
SEG NEUTROPHILS: 65.2 %
SEGMENTED NEUTROPHILS ABSOLUTE COUNT: 7.1 THOU/MM3 (ref 1.8–7.7)
WBC # BLD: 10.9 THOU/MM3 (ref 4.8–10.8)

## 2020-06-12 PROCEDURE — U0002 COVID-19 LAB TEST NON-CDC: HCPCS

## 2020-06-12 PROCEDURE — 36415 COLL VENOUS BLD VENIPUNCTURE: CPT

## 2020-06-12 PROCEDURE — 85025 COMPLETE CBC W/AUTO DIFF WBC: CPT

## 2020-06-14 LAB
PERFORMING LAB: NORMAL
REPORT: NORMAL
SARS-COV-2: NOT DETECTED

## 2020-06-15 ENCOUNTER — HOSPITAL ENCOUNTER (OUTPATIENT)
Age: 40
Setting detail: OUTPATIENT SURGERY
Discharge: HOME | End: 2020-06-15
Attending: OBSTETRICS & GYNECOLOGY | Admitting: OBSTETRICS & GYNECOLOGY
Payer: COMMERCIAL

## 2020-06-15 ENCOUNTER — ANESTHESIA EVENT (OUTPATIENT)
Dept: OPERATING ROOM | Age: 40
End: 2020-06-15
Payer: COMMERCIAL

## 2020-06-15 ENCOUNTER — ANESTHESIA (OUTPATIENT)
Dept: OPERATING ROOM | Age: 40
End: 2020-06-15
Payer: COMMERCIAL

## 2020-06-15 VITALS
TEMPERATURE: 96.4 F | OXYGEN SATURATION: 100 % | DIASTOLIC BLOOD PRESSURE: 97 MMHG | RESPIRATION RATE: 24 BRPM | SYSTOLIC BLOOD PRESSURE: 155 MMHG

## 2020-06-15 VITALS
RESPIRATION RATE: 16 BRPM | DIASTOLIC BLOOD PRESSURE: 66 MMHG | TEMPERATURE: 98.7 F | HEIGHT: 62 IN | HEART RATE: 66 BPM | BODY MASS INDEX: 41.04 KG/M2 | OXYGEN SATURATION: 96 % | SYSTOLIC BLOOD PRESSURE: 114 MMHG | WEIGHT: 223 LBS

## 2020-06-15 LAB
ABO: NORMAL
ANTIBODY SCREEN: NORMAL
ERYTHROCYTE [DISTWIDTH] IN BLOOD BY AUTOMATED COUNT: 14.6 % (ref 11.5–14.5)
ERYTHROCYTE [DISTWIDTH] IN BLOOD BY AUTOMATED COUNT: 46.5 FL (ref 35–45)
GLUCOSE BLD-MCNC: 120 MG/DL (ref 70–108)
HCT VFR BLD CALC: 40.5 % (ref 37–47)
HEMOGLOBIN: 12.7 GM/DL (ref 12–16)
MCH RBC QN AUTO: 27.3 PG (ref 26–33)
MCHC RBC AUTO-ENTMCNC: 31.4 GM/DL (ref 32.2–35.5)
MCV RBC AUTO: 87.1 FL (ref 81–99)
PLATELET # BLD: 484 THOU/MM3 (ref 130–400)
PMV BLD AUTO: 9.9 FL (ref 9.4–12.4)
PREGNANCY, URINE: NEGATIVE
RBC # BLD: 4.65 MILL/MM3 (ref 4.2–5.4)
RH FACTOR: NORMAL
WBC # BLD: 11.9 THOU/MM3 (ref 4.8–10.8)

## 2020-06-15 PROCEDURE — 86900 BLOOD TYPING SEROLOGIC ABO: CPT

## 2020-06-15 PROCEDURE — 3700000001 HC ADD 15 MINUTES (ANESTHESIA): Performed by: OBSTETRICS & GYNECOLOGY

## 2020-06-15 PROCEDURE — 7100000001 HC PACU RECOVERY - ADDTL 15 MIN: Performed by: OBSTETRICS & GYNECOLOGY

## 2020-06-15 PROCEDURE — 2500000003 HC RX 250 WO HCPCS

## 2020-06-15 PROCEDURE — 2720000010 HC SURG SUPPLY STERILE: Performed by: OBSTETRICS & GYNECOLOGY

## 2020-06-15 PROCEDURE — 2500000003 HC RX 250 WO HCPCS: Performed by: OBSTETRICS & GYNECOLOGY

## 2020-06-15 PROCEDURE — 6370000000 HC RX 637 (ALT 250 FOR IP): Performed by: OBSTETRICS & GYNECOLOGY

## 2020-06-15 PROCEDURE — 86850 RBC ANTIBODY SCREEN: CPT

## 2020-06-15 PROCEDURE — 3600000004 HC SURGERY LEVEL 4 BASE: Performed by: OBSTETRICS & GYNECOLOGY

## 2020-06-15 PROCEDURE — 3600000014 HC SURGERY LEVEL 4 ADDTL 15MIN: Performed by: OBSTETRICS & GYNECOLOGY

## 2020-06-15 PROCEDURE — 7100000010 HC PHASE II RECOVERY - FIRST 15 MIN: Performed by: OBSTETRICS & GYNECOLOGY

## 2020-06-15 PROCEDURE — 82948 REAGENT STRIP/BLOOD GLUCOSE: CPT

## 2020-06-15 PROCEDURE — 7100000011 HC PHASE II RECOVERY - ADDTL 15 MIN: Performed by: OBSTETRICS & GYNECOLOGY

## 2020-06-15 PROCEDURE — 7100000000 HC PACU RECOVERY - FIRST 15 MIN: Performed by: OBSTETRICS & GYNECOLOGY

## 2020-06-15 PROCEDURE — 2500000003 HC RX 250 WO HCPCS: Performed by: NURSE ANESTHETIST, CERTIFIED REGISTERED

## 2020-06-15 PROCEDURE — 3700000000 HC ANESTHESIA ATTENDED CARE: Performed by: OBSTETRICS & GYNECOLOGY

## 2020-06-15 PROCEDURE — 86901 BLOOD TYPING SEROLOGIC RH(D): CPT

## 2020-06-15 PROCEDURE — 36415 COLL VENOUS BLD VENIPUNCTURE: CPT

## 2020-06-15 PROCEDURE — 2580000003 HC RX 258: Performed by: NURSE ANESTHETIST, CERTIFIED REGISTERED

## 2020-06-15 PROCEDURE — 2709999900 HC NON-CHARGEABLE SUPPLY: Performed by: OBSTETRICS & GYNECOLOGY

## 2020-06-15 PROCEDURE — 81025 URINE PREGNANCY TEST: CPT

## 2020-06-15 PROCEDURE — 85027 COMPLETE CBC AUTOMATED: CPT

## 2020-06-15 PROCEDURE — 6360000002 HC RX W HCPCS: Performed by: NURSE ANESTHETIST, CERTIFIED REGISTERED

## 2020-06-15 RX ORDER — ONDANSETRON 2 MG/ML
4 INJECTION INTRAMUSCULAR; INTRAVENOUS
Status: DISCONTINUED | OUTPATIENT
Start: 2020-06-15 | End: 2020-06-15 | Stop reason: HOSPADM

## 2020-06-15 RX ORDER — SODIUM CHLORIDE, SODIUM LACTATE, POTASSIUM CHLORIDE, CALCIUM CHLORIDE 600; 310; 30; 20 MG/100ML; MG/100ML; MG/100ML; MG/100ML
INJECTION, SOLUTION INTRAVENOUS CONTINUOUS
Status: DISCONTINUED | OUTPATIENT
Start: 2020-06-15 | End: 2020-06-15 | Stop reason: HOSPADM

## 2020-06-15 RX ORDER — LABETALOL 20 MG/4 ML (5 MG/ML) INTRAVENOUS SYRINGE
5 EVERY 10 MIN PRN
Status: DISCONTINUED | OUTPATIENT
Start: 2020-06-15 | End: 2020-06-15 | Stop reason: HOSPADM

## 2020-06-15 RX ORDER — FENTANYL CITRATE 50 UG/ML
INJECTION, SOLUTION INTRAMUSCULAR; INTRAVENOUS PRN
Status: DISCONTINUED | OUTPATIENT
Start: 2020-06-15 | End: 2020-06-15 | Stop reason: SDUPTHER

## 2020-06-15 RX ORDER — KETOROLAC TROMETHAMINE 30 MG/ML
INJECTION, SOLUTION INTRAMUSCULAR; INTRAVENOUS PRN
Status: DISCONTINUED | OUTPATIENT
Start: 2020-06-15 | End: 2020-06-15 | Stop reason: SDUPTHER

## 2020-06-15 RX ORDER — HYDROCODONE BITARTRATE AND ACETAMINOPHEN 5; 325 MG/1; MG/1
1 TABLET ORAL EVERY 6 HOURS PRN
Qty: 28 TABLET | Refills: 0 | Status: SHIPPED | OUTPATIENT
Start: 2020-06-15 | End: 2020-06-22

## 2020-06-15 RX ORDER — NEOSTIGMINE METHYLSULFATE 5 MG/5 ML
SYRINGE (ML) INTRAVENOUS PRN
Status: DISCONTINUED | OUTPATIENT
Start: 2020-06-15 | End: 2020-06-15 | Stop reason: SDUPTHER

## 2020-06-15 RX ORDER — MORPHINE SULFATE 2 MG/ML
2 INJECTION, SOLUTION INTRAMUSCULAR; INTRAVENOUS
Status: DISCONTINUED | OUTPATIENT
Start: 2020-06-15 | End: 2020-06-15 | Stop reason: HOSPADM

## 2020-06-15 RX ORDER — ONDANSETRON 2 MG/ML
INJECTION INTRAMUSCULAR; INTRAVENOUS PRN
Status: DISCONTINUED | OUTPATIENT
Start: 2020-06-15 | End: 2020-06-15 | Stop reason: SDUPTHER

## 2020-06-15 RX ORDER — LIDOCAINE HYDROCHLORIDE 20 MG/ML
INJECTION, SOLUTION INTRAVENOUS PRN
Status: DISCONTINUED | OUTPATIENT
Start: 2020-06-15 | End: 2020-06-15 | Stop reason: SDUPTHER

## 2020-06-15 RX ORDER — SUCCINYLCHOLINE/SOD CL,ISO/PF 200MG/10ML
SYRINGE (ML) INTRAVENOUS PRN
Status: DISCONTINUED | OUTPATIENT
Start: 2020-06-15 | End: 2020-06-15 | Stop reason: SDUPTHER

## 2020-06-15 RX ORDER — ACETAMINOPHEN 325 MG/1
650 TABLET ORAL EVERY 4 HOURS PRN
Status: DISCONTINUED | OUTPATIENT
Start: 2020-06-15 | End: 2020-06-15 | Stop reason: HOSPADM

## 2020-06-15 RX ORDER — SODIUM CHLORIDE 9 MG/ML
INJECTION, SOLUTION INTRAVENOUS CONTINUOUS PRN
Status: DISCONTINUED | OUTPATIENT
Start: 2020-06-15 | End: 2020-06-15 | Stop reason: SDUPTHER

## 2020-06-15 RX ORDER — DEXAMETHASONE SODIUM PHOSPHATE 4 MG/ML
INJECTION, SOLUTION INTRA-ARTICULAR; INTRALESIONAL; INTRAMUSCULAR; INTRAVENOUS; SOFT TISSUE PRN
Status: DISCONTINUED | OUTPATIENT
Start: 2020-06-15 | End: 2020-06-15 | Stop reason: SDUPTHER

## 2020-06-15 RX ORDER — KETOROLAC TROMETHAMINE 30 MG/ML
30 INJECTION, SOLUTION INTRAMUSCULAR; INTRAVENOUS
Status: DISCONTINUED | OUTPATIENT
Start: 2020-06-15 | End: 2020-06-15 | Stop reason: HOSPADM

## 2020-06-15 RX ORDER — 0.9 % SODIUM CHLORIDE 0.9 %
500 INTRAVENOUS SOLUTION INTRAVENOUS
Status: DISCONTINUED | OUTPATIENT
Start: 2020-06-15 | End: 2020-06-15 | Stop reason: HOSPADM

## 2020-06-15 RX ORDER — PROPOFOL 10 MG/ML
INJECTION, EMULSION INTRAVENOUS PRN
Status: DISCONTINUED | OUTPATIENT
Start: 2020-06-15 | End: 2020-06-15 | Stop reason: SDUPTHER

## 2020-06-15 RX ORDER — MIDAZOLAM HYDROCHLORIDE 1 MG/ML
INJECTION INTRAMUSCULAR; INTRAVENOUS PRN
Status: DISCONTINUED | OUTPATIENT
Start: 2020-06-15 | End: 2020-06-15 | Stop reason: SDUPTHER

## 2020-06-15 RX ORDER — ONDANSETRON 2 MG/ML
8 INJECTION INTRAMUSCULAR; INTRAVENOUS EVERY 8 HOURS PRN
Status: DISCONTINUED | OUTPATIENT
Start: 2020-06-15 | End: 2020-06-15 | Stop reason: HOSPADM

## 2020-06-15 RX ORDER — HYDROCODONE BITARTRATE AND ACETAMINOPHEN 5; 325 MG/1; MG/1
1 TABLET ORAL EVERY 4 HOURS PRN
Status: DISCONTINUED | OUTPATIENT
Start: 2020-06-15 | End: 2020-06-15 | Stop reason: HOSPADM

## 2020-06-15 RX ORDER — ROCURONIUM BROMIDE 10 MG/ML
INJECTION, SOLUTION INTRAVENOUS PRN
Status: DISCONTINUED | OUTPATIENT
Start: 2020-06-15 | End: 2020-06-15 | Stop reason: SDUPTHER

## 2020-06-15 RX ORDER — DIPHENHYDRAMINE HYDROCHLORIDE 50 MG/ML
12.5 INJECTION INTRAMUSCULAR; INTRAVENOUS
Status: DISCONTINUED | OUTPATIENT
Start: 2020-06-15 | End: 2020-06-15 | Stop reason: HOSPADM

## 2020-06-15 RX ORDER — GLYCOPYRROLATE 1 MG/5 ML
SYRINGE (ML) INTRAVENOUS PRN
Status: DISCONTINUED | OUTPATIENT
Start: 2020-06-15 | End: 2020-06-15 | Stop reason: SDUPTHER

## 2020-06-15 RX ORDER — PANTOPRAZOLE SODIUM 20 MG/1
20 TABLET, DELAYED RELEASE ORAL DAILY
COMMUNITY
End: 2020-09-02 | Stop reason: ALTCHOICE

## 2020-06-15 RX ORDER — SODIUM CHLORIDE, SODIUM LACTATE, POTASSIUM CHLORIDE, CALCIUM CHLORIDE 600; 310; 30; 20 MG/100ML; MG/100ML; MG/100ML; MG/100ML
INJECTION, SOLUTION INTRAVENOUS SEE ADMIN INSTRUCTIONS
Status: DISCONTINUED | OUTPATIENT
Start: 2020-06-15 | End: 2020-06-15 | Stop reason: HOSPADM

## 2020-06-15 RX ORDER — MORPHINE SULFATE 2 MG/ML
4 INJECTION, SOLUTION INTRAMUSCULAR; INTRAVENOUS
Status: DISCONTINUED | OUTPATIENT
Start: 2020-06-15 | End: 2020-06-15 | Stop reason: HOSPADM

## 2020-06-15 RX ORDER — BUPIVACAINE HYDROCHLORIDE 5 MG/ML
INJECTION, SOLUTION EPIDURAL; INTRACAUDAL PRN
Status: DISCONTINUED | OUTPATIENT
Start: 2020-06-15 | End: 2020-06-15 | Stop reason: ALTCHOICE

## 2020-06-15 RX ORDER — HYDRALAZINE HYDROCHLORIDE 20 MG/ML
5 INJECTION INTRAMUSCULAR; INTRAVENOUS EVERY 10 MIN PRN
Status: DISCONTINUED | OUTPATIENT
Start: 2020-06-15 | End: 2020-06-15 | Stop reason: HOSPADM

## 2020-06-15 RX ORDER — HYDROCODONE BITARTRATE AND ACETAMINOPHEN 5; 325 MG/1; MG/1
2 TABLET ORAL EVERY 4 HOURS PRN
Status: DISCONTINUED | OUTPATIENT
Start: 2020-06-15 | End: 2020-06-15 | Stop reason: HOSPADM

## 2020-06-15 RX ORDER — METOCLOPRAMIDE HYDROCHLORIDE 5 MG/ML
10 INJECTION INTRAMUSCULAR; INTRAVENOUS
Status: DISCONTINUED | OUTPATIENT
Start: 2020-06-15 | End: 2020-06-15 | Stop reason: HOSPADM

## 2020-06-15 RX ADMIN — Medication 0.8 MG: at 11:46

## 2020-06-15 RX ADMIN — LIDOCAINE HYDROCHLORIDE 100 MG: 20 INJECTION, SOLUTION INTRAVENOUS at 11:04

## 2020-06-15 RX ADMIN — ROCURONIUM BROMIDE 20 MG: 10 INJECTION INTRAVENOUS at 11:12

## 2020-06-15 RX ADMIN — Medication 140 MG: at 11:04

## 2020-06-15 RX ADMIN — SODIUM CHLORIDE: 9 INJECTION, SOLUTION INTRAVENOUS at 10:45

## 2020-06-15 RX ADMIN — KETOROLAC TROMETHAMINE 30 MG: 30 INJECTION, SOLUTION INTRAMUSCULAR at 11:37

## 2020-06-15 RX ADMIN — PROPOFOL 150 MG: 10 INJECTION, EMULSION INTRAVENOUS at 11:04

## 2020-06-15 RX ADMIN — DEXAMETHASONE SODIUM PHOSPHATE 8 MG: 4 INJECTION, SOLUTION INTRAMUSCULAR; INTRAVENOUS at 11:21

## 2020-06-15 RX ADMIN — FENTANYL CITRATE 100 MCG: 50 INJECTION, SOLUTION INTRAMUSCULAR; INTRAVENOUS at 11:04

## 2020-06-15 RX ADMIN — Medication 5 MG: at 11:46

## 2020-06-15 RX ADMIN — MIDAZOLAM HYDROCHLORIDE 2 MG: 1 INJECTION, SOLUTION INTRAMUSCULAR; INTRAVENOUS at 10:58

## 2020-06-15 RX ADMIN — ONDANSETRON HYDROCHLORIDE 4 MG: 4 INJECTION, SOLUTION INTRAMUSCULAR; INTRAVENOUS at 11:37

## 2020-06-15 RX ADMIN — FAMOTIDINE 20 MG: 10 INJECTION, SOLUTION INTRAVENOUS at 09:39

## 2020-06-15 RX ADMIN — HYDROCODONE BITARTRATE AND ACETAMINOPHEN 1 TABLET: 5; 325 TABLET ORAL at 13:20

## 2020-06-15 RX ADMIN — SODIUM CHLORIDE: 9 INJECTION, SOLUTION INTRAVENOUS at 11:40

## 2020-06-15 RX ADMIN — PROPOFOL 50 MG: 10 INJECTION, EMULSION INTRAVENOUS at 11:36

## 2020-06-15 ASSESSMENT — PULMONARY FUNCTION TESTS
PIF_VALUE: 22
PIF_VALUE: 0
PIF_VALUE: 26
PIF_VALUE: 23
PIF_VALUE: 22
PIF_VALUE: 21
PIF_VALUE: 24
PIF_VALUE: 34
PIF_VALUE: 26
PIF_VALUE: 21
PIF_VALUE: 26
PIF_VALUE: 20
PIF_VALUE: 2
PIF_VALUE: 24
PIF_VALUE: 25
PIF_VALUE: 2
PIF_VALUE: 21
PIF_VALUE: 23
PIF_VALUE: 1
PIF_VALUE: 34
PIF_VALUE: 4
PIF_VALUE: 10
PIF_VALUE: 30
PIF_VALUE: 0
PIF_VALUE: 25
PIF_VALUE: 25
PIF_VALUE: 1
PIF_VALUE: 35
PIF_VALUE: 23
PIF_VALUE: 28
PIF_VALUE: 0
PIF_VALUE: 22
PIF_VALUE: 26
PIF_VALUE: 0
PIF_VALUE: 24
PIF_VALUE: 21
PIF_VALUE: 22
PIF_VALUE: 1
PIF_VALUE: 11
PIF_VALUE: 1
PIF_VALUE: 23
PIF_VALUE: 23
PIF_VALUE: 22
PIF_VALUE: 8
PIF_VALUE: 21
PIF_VALUE: 22
PIF_VALUE: 36
PIF_VALUE: 26
PIF_VALUE: 11
PIF_VALUE: 26
PIF_VALUE: 21
PIF_VALUE: 0
PIF_VALUE: 24
PIF_VALUE: 26

## 2020-06-15 ASSESSMENT — PAIN SCALES - GENERAL
PAINLEVEL_OUTOF10: 0
PAINLEVEL_OUTOF10: 0
PAINLEVEL_OUTOF10: 2
PAINLEVEL_OUTOF10: 5

## 2020-06-15 ASSESSMENT — PAIN - FUNCTIONAL ASSESSMENT: PAIN_FUNCTIONAL_ASSESSMENT: 0-10

## 2020-06-15 ASSESSMENT — PAIN DESCRIPTION - DESCRIPTORS: DESCRIPTORS: ACHING;CRAMPING

## 2020-06-15 NOTE — ANESTHESIA POSTPROCEDURE EVALUATION
Department of Anesthesiology  Postprocedure Note    Patient: Kirt Flores  MRN: [id number]  YOB: 1980  Date of evaluation: 6/15/2020  Time:  1:12 PM     Procedure Summary     Date:  06/15/20 Room / Location:  [facility] OR  / [facility] OR    Anesthesia Start:  1057 Anesthesia Stop:  1200    Procedure:  DIAGNOSTIC LAPAROSCOPY, (N/A Abdomen) Diagnosis:  (PELVIC PAIN)    Surgeon:  Shanti Gardner MD Responsible Provider:  Aria Abreu MD    Anesthesia Type:  general ASA Status:  2          Anesthesia Type: general    Baldemar Phase I: Baldemar Score: 10    Baldemar Phase II: Baldemar Score: 10    Last vitals: Reviewed and per EMR flowsheets. Anesthesia Post Evaluation    Patient location during evaluation: PACU  Patient participation: complete - patient participated  Level of consciousness: awake and alert  Airway patency: patent  Nausea & Vomiting: no nausea and no vomiting  Complications: no  Cardiovascular status: hemodynamically stable  Respiratory status: acceptable  Hydration status: euvolemic      [facility]  POST-ANESTHESIA NOTE       Name:  Kirt Flores                                         Age:  41 y.o.   MRN:  [id number]      Last Vitals:  /60   Pulse 63   Temp 98.6 °F (37 °C) (Temporal)   Resp 16   Ht 5' 2\" (1.575 m)   Wt 223 lb (101.2 kg)   LMP 05/27/2020   SpO2 94%   BMI 40.79 kg/m²   Patient Vitals for the past 4 hrs:   BP Temp Temp src Pulse Resp SpO2   06/15/20 1242 127/60 98.6 °F (37 °C) Temporal 63 16 94 %   06/15/20 1225 129/76 -- -- 62 23 96 %   06/15/20 1220 125/76 -- -- 65 21 95 %   06/15/20 1215 119/73 -- -- 67 15 97 %   06/15/20 1210 129/79 -- -- 68 22 98 %   06/15/20 1205 127/76 -- -- 76 21 96 %   06/15/20 1200 (!) 156/82 -- -- 79 12 96 %   06/15/20 1156 (!) 157/83 97 °F (36.1 °C) Temporal 79 12 96 %       Level of Consciousness:  Awake    Respiratory:  Stable    Oxygen Saturation:  Stable    Cardiovascular:  Stable    Hydration: Adequate    PONV:  Stable    Post-op Pain:  Adequate analgesia    Post-op Assessment:  No apparent anesthetic complications    Additional Follow-Up / Treatment / Comment:  None    Daya Lizarraga MD  Marybeth 15, 2020   1:12 PM

## 2020-06-15 NOTE — ANESTHESIA PRE PROCEDURE
Counseling given: Not Answered      Vital Signs (Current):   Vitals:    06/08/20 1116 06/15/20 0900   BP:  135/80   Pulse:  74   Resp:  18   Temp:  96.7 °F (35.9 °C)   TempSrc:  Temporal   SpO2:  100%   Weight: 228 lb (103.4 kg) 223 lb (101.2 kg)   Height: 5' 4.37\" (1.635 m) 5' 2\" (1.575 m)                                              BP Readings from Last 3 Encounters:   06/15/20 135/80   05/29/20 136/80   04/07/20 139/81       NPO Status: Time of last liquid consumption: 2130                        Time of last solid consumption: 2130                        Date of last liquid consumption: 06/14/20                        Date of last solid food consumption: 06/14/20    BMI:   Wt Readings from Last 3 Encounters:   06/15/20 223 lb (101.2 kg)   05/29/20 228 lb (103.4 kg)   01/02/20 227 lb 12.8 oz (103.3 kg)     Body mass index is 40.79 kg/m². CBC:   Lab Results   Component Value Date    WBC 11.9 06/15/2020    RBC 4.65 06/15/2020    RBC 4.33 11/07/2016    HGB 12.7 06/15/2020    HCT 40.5 06/15/2020    MCV 87.1 06/15/2020    RDW 14.2 01/22/2018     06/15/2020       CMP:   Lab Results   Component Value Date     12/28/2019    K 3.7 12/28/2019     12/28/2019    CO2 23 12/28/2019    BUN 9 12/28/2019    CREATININE 0.4 12/28/2019    LABGLOM >90 12/28/2019    GLUCOSE 132 12/28/2019    GLUCOSE 105 01/22/2018    PROT 7.2 12/27/2019    CALCIUM 9.4 12/28/2019    BILITOT 0.3 12/27/2019    ALKPHOS 85 12/27/2019    AST 25 12/27/2019    ALT 40 12/27/2019       POC Tests: No results for input(s): POCGLU, POCNA, POCK, POCCL, POCBUN, POCHEMO, POCHCT in the last 72 hours.     Coags:   Lab Results   Component Value Date    INR 0.96 12/27/2019    APTT 32.1 12/27/2019       HCG (If Applicable):   Lab Results   Component Value Date    PREGTESTUR NEGATIVE 06/15/2020    PREGSERUM NEGATIVE 12/12/2018        ABGs: No results found for: PHART, PO2ART, FMG0AID, YHC0OSR, BEART, T3SGLSMT     Type & Screen (If Applicable):  Lab

## 2020-06-25 ENCOUNTER — HOSPITAL ENCOUNTER (OUTPATIENT)
Dept: WOMENS IMAGING | Age: 40
Discharge: HOME | End: 2020-06-25
Payer: COMMERCIAL

## 2020-06-25 PROCEDURE — 76642 ULTRASOUND BREAST LIMITED: CPT

## 2020-06-25 PROCEDURE — G0279 TOMOSYNTHESIS, MAMMO: HCPCS

## 2020-06-26 ENCOUNTER — TELEPHONE (OUTPATIENT)
Dept: FAMILY MEDICINE CLINIC | Age: 40
End: 2020-06-26

## 2020-07-02 ENCOUNTER — TELEPHONE (OUTPATIENT)
Dept: FAMILY MEDICINE CLINIC | Age: 40
End: 2020-07-02

## 2020-07-07 ENCOUNTER — VIRTUAL VISIT (OUTPATIENT)
Dept: FAMILY MEDICINE CLINIC | Age: 40
End: 2020-07-07
Payer: COMMERCIAL

## 2020-07-07 PROCEDURE — G8428 CUR MEDS NOT DOCUMENT: HCPCS | Performed by: NURSE PRACTITIONER

## 2020-07-07 PROCEDURE — G8417 CALC BMI ABV UP PARAM F/U: HCPCS | Performed by: NURSE PRACTITIONER

## 2020-07-07 PROCEDURE — 99213 OFFICE O/P EST LOW 20 MIN: CPT | Performed by: NURSE PRACTITIONER

## 2020-07-07 PROCEDURE — 1036F TOBACCO NON-USER: CPT | Performed by: NURSE PRACTITIONER

## 2020-07-07 RX ORDER — SERTRALINE HYDROCHLORIDE 100 MG/1
TABLET, FILM COATED ORAL
Qty: 90 TABLET | Refills: 3 | Status: SHIPPED | OUTPATIENT
Start: 2020-07-07 | End: 2021-08-10

## 2020-07-07 ASSESSMENT — ENCOUNTER SYMPTOMS
WHEEZING: 0
NAUSEA: 0
EYE REDNESS: 0
SORE THROAT: 0
VOMITING: 0
COLOR CHANGE: 0
SHORTNESS OF BREATH: 0
RHINORRHEA: 0
TROUBLE SWALLOWING: 0
COUGH: 0
EYE PAIN: 0
DIARRHEA: 0
ABDOMINAL PAIN: 0

## 2020-07-07 NOTE — PROGRESS NOTES
2020    TELEHEALTH EVALUATION -- Audio/Visual (During BZGNG-58 public health emergency)    HPI:    Kathia Rogers (:  1980) has requested an audio/video evaluation for the following concern(s): Anxiety  Is still taking the Zoloft 100 mg. She does feel like the anxiety is doing well, no panic attacks. Anxiety is manageable. Prediabetes  She is not checking sugars. Her last A1C was 5.9. she just started the Metformin to see if this will help with some weight loss. Denies any side effects. Wt Readings from Last 3 Encounters:   06/15/20 223 lb (101.2 kg)   20 228 lb (103.4 kg)   20 227 lb 12.8 oz (103.3 kg)     Thrombocytosis  Platelet count still elevated, she has seen Dr Ilene Perez who felt like her platelet count was chronic and likely reactive in nature, f/u prn. Review of Systems   Constitutional: Negative for chills, diaphoresis and fatigue. HENT: Negative for congestion, rhinorrhea, sore throat and trouble swallowing. Eyes: Negative for pain, redness and visual disturbance. Respiratory: Negative for cough, shortness of breath and wheezing. Cardiovascular: Negative for chest pain, palpitations and leg swelling. Gastrointestinal: Negative for abdominal pain, diarrhea, nausea and vomiting. Endocrine: Negative for polydipsia, polyphagia and polyuria. Genitourinary: Negative for decreased urine volume, dysuria, frequency and urgency. Musculoskeletal: Negative for arthralgias and myalgias. Skin: Negative for color change and rash. Allergic/Immunologic: Negative for environmental allergies, food allergies and immunocompromised state. Neurological: Negative for dizziness, tremors, syncope and headaches. Hematological: Negative. Negative for adenopathy. Psychiatric/Behavioral: Negative for behavioral problems, confusion, self-injury and suicidal ideas. All other systems reviewed and are negative. Prior to Visit Medications    Medication Sig Taking? Authorizing Provider   sertraline (ZOLOFT) 100 MG tablet take 1 tablet by mouth once daily Yes MARÍA Hazel CNP   pantoprazole (PROTONIX) 20 MG tablet Take 20 mg by mouth daily  Historical Provider, MD   metFORMIN (GLUCOPHAGE-XR) 500 MG extended release tablet Take 1 tablet by mouth daily (with breakfast)  MARÍA Hazel CNP   amLODIPine (NORVASC) 10 MG tablet take 1 tablet by mouth once daily  Patient taking differently: nightly take 1 tablet by mouth once daily  MARÍA Hazel CNP   metoprolol succinate (TOPROL XL) 100 MG extended release tablet take 1 tablet by mouth once daily  Patient taking differently: nightly Do not crush or chew.   MARÍA Hazel CNP   acetaminophen (TYLENOL) 500 MG tablet Take 1,000 mg by mouth every 6 hours as needed for Pain  Historical Provider, MD   cyclobenzaprine (FLEXERIL) 10 MG tablet Take 1 tablet by mouth 3 times daily as needed for Muscle spasms  Bharathi Harrington PA-C   ibuprofen (ADVIL;MOTRIN) 800 MG tablet Take 1 tablet by mouth every 8 hours as needed for Pain  Bharathi Harrington PA-C   norethindrone (MICRONOR) 0.35 MG tablet Take 1 tablet by mouth daily  Historical Provider, MD   FLUoxetine (PROZAC) 20 MG capsule Take 1 capsule by mouth daily for 2 weeks, then take 2 capsules by mouth daily  MARÍA Hazel CNP   metoprolol tartrate (LOPRESSOR) 25 MG tablet Take 1 tablet by mouth 2 times daily  Patient taking differently: Take 25 mg by mouth daily   MARÍA Hazel CNP       Social History     Tobacco Use    Smoking status: Never Smoker    Smokeless tobacco: Never Used   Substance Use Topics    Alcohol use: Yes     Comment: social    Drug use: No        Allergies   Allergen Reactions    Bactrim Swelling     tongue    Sulfamethoxazole-Trimethoprim Swelling     tongue   ,   Past Medical History:   Diagnosis Date    Bell palsy 2020    right side droop    Essential hypertension 11/7/2016    GERD (gastroesophageal reflux [x]  Normal  [] Abnormal -         Discharge [x]  None visible  [] Abnormal -    HENT:   [x] Normocephalic, atraumatic. [] Abnormal   [x] Mouth/Throat: Mucous membranes are moist.     External Ears [x] Normal  [] Abnormal-     Neck: [x] No visualized mass     Pulmonary/Chest: [x] Respiratory effort normal.  [x] No visualized signs of difficulty breathing or respiratory distress        [] Abnormal-      Musculoskeletal:   [x] Normal gait with no signs of ataxia         [x] Normal range of motion of neck        [] Abnormal-       Neurological:        [x] No Facial Asymmetry (Cranial nerve 7 motor function) (limited exam to video visit)          [x] No gaze palsy        [] Abnormal-         Skin:        [x] No significant exanthematous lesions or discoloration noted on facial skin         [] Abnormal-            Psychiatric:       [x] Normal Affect [x] No Hallucinations        [] Abnormal-     Other pertinent observable physical exam findings-     ASSESSMENT & PLAN  Diagnoses and all orders for this visit:    Panic disorder without agoraphobia with moderate panic attacks  -     sertraline (ZOLOFT) 100 MG tablet; take 1 tablet by mouth once daily    Obesity (BMI 30-39. 9)    Reactive thrombocytosis    Prediabetes      - con't metformin  - work on diet/exercise for weight loss  - con't Zoloft  - f/u prn with hematology  - see back 5 months, will recheck A1C then, or sooner if not losing weight    Return in about 5 months (around 12/7/2020) for prediabetes and obesity, needs OV. Fe Her is a 41 y.o. female being evaluated by a Virtual Visit (video visit) encounter to address concerns as mentioned above. A caregiver was present when appropriate. Due to this being a TeleHealth encounter (During XKFEJ-97 public health emergency), evaluation of the following organ systems was limited: Vitals/Constitutional/EENT/Resp/CV/GI//MS/Neuro/Skin/Heme-Lymph-Imm.   Pursuant to the emergency declaration under the [facility] [address]118 waiver authority and the [employer] Act, this Virtual Visit was conducted with patient's (and/or legal guardian's) consent, to reduce the patient's risk of exposure to COVID-19 and provide necessary medical care. The patient (and/or legal guardian) has also been advised to contact this office for worsening conditions or problems, and seek emergency medical treatment and/or call 911 if deemed necessary. Services were provided through a video synchronous discussion virtually to substitute for in-person clinic visit. Patient and provider were located at their individual homes. --MARÍA Rodriguez CNP on 7/7/2020 at 2:59 PM    An electronic signature was used to authenticate this note.

## 2020-07-16 ENCOUNTER — TELEPHONE (OUTPATIENT)
Dept: FAMILY MEDICINE CLINIC | Age: 40
End: 2020-07-16

## 2020-07-16 NOTE — TELEPHONE ENCOUNTER
Pt was notified our [facility] has completed/faxed/scanned her FMLA paperwork. Originals were placed at the  for .

## 2020-08-17 ENCOUNTER — APPOINTMENT (OUTPATIENT)
Dept: GENERAL RADIOLOGY | Age: 40
End: 2020-08-17
Payer: COMMERCIAL

## 2020-08-17 ENCOUNTER — HOSPITAL ENCOUNTER (EMERGENCY)
Age: 40
Discharge: HOME | End: 2020-08-17
Attending: EMERGENCY MEDICINE
Payer: COMMERCIAL

## 2020-08-17 VITALS
BODY MASS INDEX: 37.56 KG/M2 | WEIGHT: 220 LBS | TEMPERATURE: 98.2 F | OXYGEN SATURATION: 100 % | RESPIRATION RATE: 16 BRPM | HEART RATE: 66 BPM | SYSTOLIC BLOOD PRESSURE: 136 MMHG | DIASTOLIC BLOOD PRESSURE: 83 MMHG | HEIGHT: 64 IN

## 2020-08-17 LAB
ANION GAP SERPL CALCULATED.3IONS-SCNC: 10 MEQ/L (ref 8–16)
BASOPHILS # BLD: 0.3 %
BASOPHILS ABSOLUTE: 0 THOU/MM3 (ref 0–0.1)
BUN BLDV-MCNC: 11 MG/DL (ref 7–22)
CALCIUM SERPL-MCNC: 9 MG/DL (ref 8.5–10.5)
CHLORIDE BLD-SCNC: 105 MEQ/L (ref 98–111)
CO2: 24 MEQ/L (ref 23–33)
CREAT SERPL-MCNC: 0.5 MG/DL (ref 0.4–1.2)
EKG ATRIAL RATE: 64 BPM
EKG P AXIS: 30 DEGREES
EKG P-R INTERVAL: 148 MS
EKG Q-T INTERVAL: 416 MS
EKG QRS DURATION: 74 MS
EKG QTC CALCULATION (BAZETT): 429 MS
EKG R AXIS: 31 DEGREES
EKG T AXIS: 19 DEGREES
EKG VENTRICULAR RATE: 64 BPM
EOSINOPHIL # BLD: 1.3 %
EOSINOPHILS ABSOLUTE: 0.1 THOU/MM3 (ref 0–0.4)
ERYTHROCYTE [DISTWIDTH] IN BLOOD BY AUTOMATED COUNT: 14.1 % (ref 11.5–14.5)
ERYTHROCYTE [DISTWIDTH] IN BLOOD BY AUTOMATED COUNT: 44 FL (ref 35–45)
GFR SERPL CREATININE-BSD FRML MDRD: > 90 ML/MIN/1.73M2
GLUCOSE BLD-MCNC: 127 MG/DL (ref 70–108)
HCT VFR BLD CALC: 37.8 % (ref 37–47)
HEMOGLOBIN: 12 GM/DL (ref 12–16)
IMMATURE GRANS (ABS): 0.03 THOU/MM3 (ref 0–0.07)
IMMATURE GRANULOCYTES: 0.3 %
LYMPHOCYTES # BLD: 24.7 %
LYMPHOCYTES ABSOLUTE: 2.7 THOU/MM3 (ref 1–4.8)
MCH RBC QN AUTO: 27.3 PG (ref 26–33)
MCHC RBC AUTO-ENTMCNC: 31.7 GM/DL (ref 32.2–35.5)
MCV RBC AUTO: 86.1 FL (ref 81–99)
MONOCYTES # BLD: 6.9 %
MONOCYTES ABSOLUTE: 0.7 THOU/MM3 (ref 0.4–1.3)
NUCLEATED RED BLOOD CELLS: 0 /100 WBC
OSMOLALITY CALCULATION: 278.5 MOSMOL/KG (ref 275–300)
PLATELET # BLD: 469 THOU/MM3 (ref 130–400)
PMV BLD AUTO: 9.6 FL (ref 9.4–12.4)
POTASSIUM SERPL-SCNC: 3.9 MEQ/L (ref 3.5–5.2)
RBC # BLD: 4.39 MILL/MM3 (ref 4.2–5.4)
SEG NEUTROPHILS: 66.5 %
SEGMENTED NEUTROPHILS ABSOLUTE COUNT: 7.2 THOU/MM3 (ref 1.8–7.7)
SODIUM BLD-SCNC: 139 MEQ/L (ref 135–145)
TROPONIN T: < 0.01 NG/ML
WBC # BLD: 10.8 THOU/MM3 (ref 4.8–10.8)

## 2020-08-17 PROCEDURE — 6370000000 HC RX 637 (ALT 250 FOR IP): Performed by: STUDENT IN AN ORGANIZED HEALTH CARE EDUCATION/TRAINING PROGRAM

## 2020-08-17 PROCEDURE — 84484 ASSAY OF TROPONIN QUANT: CPT

## 2020-08-17 PROCEDURE — 93005 ELECTROCARDIOGRAM TRACING: CPT | Performed by: STUDENT IN AN ORGANIZED HEALTH CARE EDUCATION/TRAINING PROGRAM

## 2020-08-17 PROCEDURE — 96374 THER/PROPH/DIAG INJ IV PUSH: CPT

## 2020-08-17 PROCEDURE — 71045 X-RAY EXAM CHEST 1 VIEW: CPT

## 2020-08-17 PROCEDURE — 6360000002 HC RX W HCPCS: Performed by: STUDENT IN AN ORGANIZED HEALTH CARE EDUCATION/TRAINING PROGRAM

## 2020-08-17 PROCEDURE — 99284 EMERGENCY DEPT VISIT MOD MDM: CPT

## 2020-08-17 PROCEDURE — 36415 COLL VENOUS BLD VENIPUNCTURE: CPT

## 2020-08-17 PROCEDURE — 80048 BASIC METABOLIC PNL TOTAL CA: CPT

## 2020-08-17 PROCEDURE — 85025 COMPLETE CBC W/AUTO DIFF WBC: CPT

## 2020-08-17 RX ORDER — ACETAMINOPHEN 500 MG
1000 TABLET ORAL ONCE
Status: COMPLETED | OUTPATIENT
Start: 2020-08-17 | End: 2020-08-17

## 2020-08-17 RX ORDER — METOCLOPRAMIDE HYDROCHLORIDE 5 MG/ML
10 INJECTION INTRAMUSCULAR; INTRAVENOUS ONCE
Status: COMPLETED | OUTPATIENT
Start: 2020-08-17 | End: 2020-08-17

## 2020-08-17 RX ORDER — ASPIRIN 81 MG/1
324 TABLET, CHEWABLE ORAL ONCE
Status: COMPLETED | OUTPATIENT
Start: 2020-08-17 | End: 2020-08-17

## 2020-08-17 RX ADMIN — ACETAMINOPHEN 1000 MG: 500 TABLET ORAL at 09:06

## 2020-08-17 RX ADMIN — ASPIRIN 324 MG: 81 TABLET, CHEWABLE ORAL at 09:06

## 2020-08-17 RX ADMIN — METOCLOPRAMIDE 10 MG: 5 INJECTION, SOLUTION INTRAMUSCULAR; INTRAVENOUS at 09:06

## 2020-08-17 ASSESSMENT — PAIN SCALES - GENERAL
PAINLEVEL_OUTOF10: 6
PAINLEVEL_OUTOF10: 3
PAINLEVEL_OUTOF10: 7

## 2020-08-17 ASSESSMENT — PAIN DESCRIPTION - FREQUENCY
FREQUENCY: CONTINUOUS
FREQUENCY: CONTINUOUS

## 2020-08-17 ASSESSMENT — ENCOUNTER SYMPTOMS
ABDOMINAL PAIN: 0
COUGH: 0
SORE THROAT: 0
BACK PAIN: 0
SINUS PAIN: 0
VOMITING: 0
DIARRHEA: 0
NAUSEA: 0
SHORTNESS OF BREATH: 0
EYE REDNESS: 0
RHINORRHEA: 0

## 2020-08-17 ASSESSMENT — PAIN DESCRIPTION - LOCATION
LOCATION: ARM;HEAD;NECK
LOCATION: ARM;NECK;HEAD

## 2020-08-17 ASSESSMENT — PAIN DESCRIPTION - PAIN TYPE
TYPE: ACUTE PAIN
TYPE: ACUTE PAIN

## 2020-08-17 ASSESSMENT — PAIN DESCRIPTION - ORIENTATION
ORIENTATION: LEFT
ORIENTATION: LEFT

## 2020-08-17 NOTE — ED PROVIDER NOTES
[address] [address]          Pt Name: Jorge Martin  MRN: [id number]  [place] 1980  Date of evaluation: 8/17/2020  Treating Resident Physician: Lisbet Wharton MD  Supervising Physician: Dr. Igor Worrell       Chief Complaint   Patient presents with    Headache    Neck Pain    Arm Pain     left     History obtained from the patient. HISTORY OF PRESENT ILLNESS    HPI  Jorge Martin is a 41 y.o. female who presents to the emergency department for evaluation of left-sided arm aching that has been going on for the past 7 days. Patient also complains of that left arm pain rating to her chest 3 days ago. She currently does not have any chest pain in the ER. She also mentions having a headache that was gradual onset that was similar to prior episodes of her headache in the past beginning approximately 2 days ago that is intermittent in nature. Patient also mentions having some neck pain over the past 3 days as well. She denies having any fevers, chills, shortness of breath, nausea, vomiting, diarrhea or numbness or tingling. The patient has no other acute complaints at this time. REVIEW OF SYSTEMS   Review of Systems   Constitutional: Negative for chills and fever. HENT: Negative for rhinorrhea, sinus pain and sore throat. Eyes: Negative for redness. Respiratory: Negative for cough and shortness of breath. Cardiovascular: Negative for chest pain. Gastrointestinal: Negative for abdominal pain, diarrhea, nausea and vomiting. Genitourinary: Negative for dysuria. Musculoskeletal: Positive for neck pain. Negative for back pain and neck stiffness. Left-sided arm aching. Skin: Negative for rash. Neurological: Negative for dizziness, light-headedness and headaches. Psychiatric/Behavioral: Negative for agitation.          PAST MEDICAL AND SURGICAL HISTORY     Past Medical History: Diagnosis Date    Bell palsy     right side droop    Essential hypertension 2016    GERD (gastroesophageal reflux disease)     Hashimoto's thyroiditis 2016    HIGH CHOLESTEROL     Hyperlipidemia     Hypokalemia     Prediabetes 2020    Subclinical hyperthyroidism 2016     Past Surgical History:   Procedure Laterality Date     SECTION  ,     DILATION AND CURETTAGE OF UTERUS      ENDOSCOPY, COLON, DIAGNOSTIC      LAPAROSCOPY N/A 6/15/2020    DIAGNOSTIC LAPAROSCOPY, performed by Jody Cueva MD at [address]   No current facility-administered medications for this encounter.      Current Outpatient Medications:     sertraline (ZOLOFT) 100 MG tablet, take 1 tablet by mouth once daily, Disp: 90 tablet, Rfl: 3    pantoprazole (PROTONIX) 20 MG tablet, Take 20 mg by mouth daily, Disp: , Rfl:     metFORMIN (GLUCOPHAGE-XR) 500 MG extended release tablet, Take 1 tablet by mouth daily (with breakfast), Disp: 90 tablet, Rfl: 1    amLODIPine (NORVASC) 10 MG tablet, take 1 tablet by mouth once daily (Patient taking differently: nightly take 1 tablet by mouth once daily), Disp: 90 tablet, Rfl: 3    metoprolol succinate (TOPROL XL) 100 MG extended release tablet, take 1 tablet by mouth once daily (Patient taking differently: nightly Do not crush or chew.), Disp: 90 tablet, Rfl: 3    acetaminophen (TYLENOL) 500 MG tablet, Take 1,000 mg by mouth every 6 hours as needed for Pain, Disp: , Rfl:     cyclobenzaprine (FLEXERIL) 10 MG tablet, Take 1 tablet by mouth 3 times daily as needed for Muscle spasms, Disp: 12 tablet, Rfl: 0    ibuprofen (ADVIL;MOTRIN) 800 MG tablet, Take 1 tablet by mouth every 8 hours as needed for Pain, Disp: 12 tablet, Rfl: 0    norethindrone (MICRONOR) 0.35 MG tablet, Take 1 tablet by mouth daily, Disp: , Rfl:       SOCIAL HISTORY     Social History     Social History Narrative    Not on file     Social History     Tobacco Use    Smoking status: Never Smoker    Smokeless tobacco: Never Used   Substance Use Topics    Alcohol use: Yes     Comment: social    Drug use: No         ALLERGIES     Allergies   Allergen Reactions    Bactrim Swelling     tongue    Sulfamethoxazole-Trimethoprim Swelling     tongue         FAMILY HISTORY     Family History   Problem Relation Age of Onset    High Blood Pressure Mother     Diabetes Mother     Heart Disease Mother     High Blood Pressure Father     Diabetes Father          PREVIOUS RECORDS   Previous records reviewed: Patient was seen here on 11/24/2019 for left arm pain. PHYSICAL EXAM     ED Triage Vitals [08/17/20 0757]   BP Temp Temp src Pulse Resp SpO2 Height Weight   (!) 149/92 -- -- 73 16 100 % 5' 4\" (1.626 m) 220 lb (99.8 kg)     Initial vital signs and nursing assessment reviewed and normal. Pulsoximetry is normal per my interpretation. Additional Vital Signs:  Vitals:    08/17/20 0757   BP: (!) 149/92   Pulse: 73   Resp: 16   SpO2: 100%       Physical Exam  Vitals signs and nursing note reviewed. Constitutional:       Appearance: Normal appearance. She is obese. She is not toxic-appearing or diaphoretic. HENT:      Head: Normocephalic and atraumatic. Right Ear: External ear normal.      Left Ear: External ear normal.      Nose: Nose normal. No congestion or rhinorrhea. Mouth/Throat:      Mouth: Mucous membranes are moist.      Pharynx: Oropharynx is clear. Eyes:      General: No scleral icterus. Extraocular Movements: Extraocular movements intact. Conjunctiva/sclera: Conjunctivae normal.      Pupils: Pupils are equal, round, and reactive to light. Neck:      Musculoskeletal: Normal range of motion and neck supple. No neck rigidity or muscular tenderness. Cardiovascular:      Rate and Rhythm: Normal rate and regular rhythm. Pulses: Normal pulses. Heart sounds: Normal heart sounds. No murmur.       Comments: Radial pulses 2+ and equal bilaterally. No JVD noted. Pulmonary:      Effort: Pulmonary effort is normal. No respiratory distress. Breath sounds: Normal breath sounds. No stridor. No wheezing. Chest:      Chest wall: No tenderness. Abdominal:      General: Abdomen is flat. Bowel sounds are normal.      Palpations: Abdomen is soft. Tenderness: There is no abdominal tenderness. There is no guarding or rebound. Musculoskeletal: Normal range of motion. Right lower leg: No edema. Left lower leg: No edema. Lymphadenopathy:      Cervical: No cervical adenopathy. Skin:     General: Skin is warm and dry. Capillary Refill: Capillary refill takes less than 2 seconds. Coloration: Skin is not jaundiced. Neurological:      General: No focal deficit present. Mental Status: She is alert and oriented to person, place, and time. Mental status is at baseline. Cranial Nerves: No cranial nerve deficit. Sensory: No sensory deficit. Motor: No weakness. Coordination: Coordination normal.      Deep Tendon Reflexes: Reflexes normal.   Psychiatric:         Mood and Affect: Mood normal.         Behavior: Behavior normal.             MEDICAL DECISION MAKING   Initial Assessment: This is a 26-year-old female with a history of hypertension and hyperlipidemia complaining of left arm aching rating down her chest approximately 3 days ago. Concern for possible acute coronary syndrome EKG and troponin will be obtained. Aspirin will be given. chest x-ray will also be obtained. She will be given Reglan and Tylenol for headache. Patient was reevaluated at 0945 and was sitting comfortably in bed patient is no longer in pain and would like to be discharged home. Will up with her primary care physician in the next week.         ED RESULTS   Laboratory results:  Labs Reviewed   CBC WITH AUTO DIFFERENTIAL - Abnormal; Notable for the following components:       Result Value    MCHC 31.7 (*)     Platelets 279 (*) All other components within normal limits   BASIC METABOLIC PANEL - Abnormal; Notable for the following components:    Glucose 127 (*)     All other components within normal limits   TROPONIN   ANION GAP   OSMOLALITY   GLOMERULAR FILTRATION RATE, ESTIMATED       Radiologic studies results:  XR CHEST PORTABLE   Final Result   Stable radiographic appearance of the chest. No evidence of an acute process. **This report has been created using voice recognition software. It may contain minor errors which are inherent in voice recognition technology. **      Final report electronically signed by Dr. Shanti Urrutia on 8/17/2020 8:37 AM          ED Medications administered this visit:   Medications   aspirin chewable tablet 324 mg (324 mg Oral Given 8/17/20 0906)   acetaminophen (TYLENOL) tablet 1,000 mg (1,000 mg Oral Given 8/17/20 0906)   metoclopramide (REGLAN) injection 10 mg (10 mg Intravenous Given 8/17/20 3849)         ED COURSE      Strict return precautions and follow up instructions were discussed with the patient prior to discharge, with which the patient agrees. MEDICATION CHANGES     New Prescriptions    No medications on file         FINAL DISPOSITION     Final diagnoses:   Chronic nonintractable headache, unspecified headache type   Chest pain, unspecified type     Condition: condition: good  Dispo: Discharge to home      This transcription was electronically signed. Parts of this transcriptions may have been dictated by use of voice recognition software and electronically transcribed, and parts may have been transcribed with the assistance of an ED scribe. The transcription may contain errors not detected in proofreading. Please refer to my supervising physician's documentation if my documentation differs.     Electronically Signed: Shawn Qureshi, 08/17/20, 9:55 AM        Shawn Qureshi MD  Resident  08/17/20 5232

## 2020-09-02 RX ORDER — OMEPRAZOLE 40 MG/1
40 CAPSULE, DELAYED RELEASE ORAL NIGHTLY
COMMUNITY
End: 2021-11-23 | Stop reason: SDUPTHER

## 2020-09-02 NOTE — PROGRESS NOTES
Following instructions given to patient, who states understanding:     NPO after midnight  [employer] and 's license  Wear comfortable clean clothing  Do not bring jewelry   Shower night before and morning of surgery with a liquid antibacterial soap  Bring medications in original bottles  Follow all instructions given by your physician   needed at discharge  Call [phone] for any questions  Report to [facility] on 2nd floor  If you would become ill prior to surgery, please call the surgeon  May have only 1 visitor accompany you for surgery  Please bring and wear mask  You will be receiving a phone call one or two days before surgery to review covid screening exposure     Covid screening questionnaire complete and negative for symptoms or exposure see chart for documentation.    patient plans to get covid test 9/3 or 9/4 at [facility]    Please limit your exposure to the public after you have your covid test  Please call your doctor immediately if you develop any symptoms of covid prior to your surgery

## 2020-09-05 ENCOUNTER — HOSPITAL ENCOUNTER (OUTPATIENT)
Age: 40
Discharge: HOME | End: 2020-09-05
Payer: COMMERCIAL

## 2020-09-05 PROCEDURE — U0003 INFECTIOUS AGENT DETECTION BY NUCLEIC ACID (DNA OR RNA); SEVERE ACUTE RESPIRATORY SYNDROME CORONAVIRUS 2 (SARS-COV-2) (CORONAVIRUS DISEASE [COVID-19]), AMPLIFIED PROBE TECHNIQUE, MAKING USE OF HIGH THROUGHPUT TECHNOLOGIES AS DESCRIBED BY CMS-2020-01-R: HCPCS

## 2020-09-08 LAB — SARS-COV-2: NOT DETECTED

## 2020-09-09 NOTE — H&P
Social History     Tobacco Use   Smoking Status Never Smoker   Smokeless Tobacco Never Used        Family History: Noncontributory; Denies h/o cancer. ROS:  Negative except as stated in HPI    PE:  Vitals:    09/10/20 0547   BP: (!) 152/80   Pulse: 77   Resp: 12   Temp: 96.5 °F (35.8 °C)   SpO2: 100%       General: well nourished, well developed, in no acute distress  Resp: breathing unlabored  Abdomen: Nontender, no rebound, no guarding  Pelvic: deferred to the OR    Labs:   Lab Results   Component Value Date    WBC 14.1 (H) 09/10/2020    HGB 11.9 (L) 09/10/2020    HCT 37.5 09/10/2020    MCV 85.2 09/10/2020     (H) 09/10/2020     9/10/20 urine preg: negative  9/10/20 type and screen pending. Imagin/5/19 u/s: UTERINE POSITION: anteverted   UTERINE TEXTURE: heterogeneous   UTERUS: L: 9.6 centimeters W: 6.7 centimeters AP: 5.3 centimeters   ESTIMATED UTERINE VOLUME: 180 ML   UTERINE LININ.6 millimeters   UTERINE CAVITY: smooth   CUL DE SAC: no fluid present   PRESENCE OF FIBROIDS: The myometrium is heterogeneous but there are no defined borders of a fibroid. RIGHT OVARY: 2.1 centimeters by 1.2 centimeters by 1.3 centimeters ; normal consistency ; bloodflow is demonstrated   LEFT OVARY: 2.7 centimeters by 2.7 centimeters by 2.9 centimeters ; normal consistency ; bloodflow is demonstrated   CERVIX: normal consistency     Comments: There is a fluid filled tubular structure in the left adnexa that is likely the hydrosalpynx seen on prior scans. It measures 2.1cm. Assessment: 41 y.o. [id number] female with pelvic pain and failed conservative management, known scar tissue    Plan:   Proceed with JAMAL and possible BSO. I discussed the risks of the procedure including but not limited to bleeding, infection, injury to bowel/bladder. I discussed alternatives as well. All questions answered.     Frutoso Arabella Cisneros  7:30 AM  9/10/2020

## 2020-09-09 NOTE — PROGRESS NOTES
Patient contacted regarding COVID-19 screen. Following questions asked: In the last month, have you been in contact with someone who was confirmed or suspected to have Coronavirus/COVID-19:  Patient stated Yes at work    Pt was informed only 1 visitor allowed. Please bring masks. Do you or family members have any of the following symptoms:  Cough-no   Muscle pain-no   Shortness of breath-no   Fever-no   Weakness-no  Severe headache-no   Sore throat-no   Respiratory symptoms-no    Have you traveled internationally in the last month?  No     Have you been to the emergency room recently-No

## 2020-09-10 ENCOUNTER — ANESTHESIA EVENT (OUTPATIENT)
Dept: OPERATING ROOM | Age: 40
DRG: 743 | End: 2020-09-10
Payer: COMMERCIAL

## 2020-09-10 ENCOUNTER — ANESTHESIA (OUTPATIENT)
Dept: OPERATING ROOM | Age: 40
DRG: 743 | End: 2020-09-10
Payer: COMMERCIAL

## 2020-09-10 ENCOUNTER — HOSPITAL ENCOUNTER (INPATIENT)
Age: 40
LOS: 2 days | Discharge: HOME | DRG: 743 | End: 2020-09-12
Attending: OBSTETRICS & GYNECOLOGY | Admitting: OBSTETRICS & GYNECOLOGY
Payer: COMMERCIAL

## 2020-09-10 VITALS — SYSTOLIC BLOOD PRESSURE: 162 MMHG | TEMPERATURE: 98.2 F | OXYGEN SATURATION: 100 % | DIASTOLIC BLOOD PRESSURE: 87 MMHG

## 2020-09-10 PROBLEM — Z90.710 S/P HYSTERECTOMY: Status: ACTIVE | Noted: 2020-09-10

## 2020-09-10 PROBLEM — R10.2 PELVIC PAIN: Status: ACTIVE | Noted: 2020-09-10

## 2020-09-10 LAB
ABO: NORMAL
ANTIBODY SCREEN: NORMAL
ERYTHROCYTE [DISTWIDTH] IN BLOOD BY AUTOMATED COUNT: 14.4 % (ref 11.5–14.5)
ERYTHROCYTE [DISTWIDTH] IN BLOOD BY AUTOMATED COUNT: 44.6 FL (ref 35–45)
HCT VFR BLD CALC: 37.5 % (ref 37–47)
HEMOGLOBIN: 11.9 GM/DL (ref 12–16)
MCH RBC QN AUTO: 27 PG (ref 26–33)
MCHC RBC AUTO-ENTMCNC: 31.7 GM/DL (ref 32.2–35.5)
MCV RBC AUTO: 85.2 FL (ref 81–99)
PLATELET # BLD: 453 THOU/MM3 (ref 130–400)
PMV BLD AUTO: 9.8 FL (ref 9.4–12.4)
PREGNANCY, URINE: NEGATIVE
RBC # BLD: 4.4 MILL/MM3 (ref 4.2–5.4)
RH FACTOR: NORMAL
WBC # BLD: 14.1 THOU/MM3 (ref 4.8–10.8)

## 2020-09-10 PROCEDURE — 0UT70ZZ RESECTION OF BILATERAL FALLOPIAN TUBES, OPEN APPROACH: ICD-10-PCS | Performed by: OBSTETRICS & GYNECOLOGY

## 2020-09-10 PROCEDURE — 6370000000 HC RX 637 (ALT 250 FOR IP): Performed by: OBSTETRICS & GYNECOLOGY

## 2020-09-10 PROCEDURE — 6360000002 HC RX W HCPCS: Performed by: OBSTETRICS & GYNECOLOGY

## 2020-09-10 PROCEDURE — 7100000001 HC PACU RECOVERY - ADDTL 15 MIN: Performed by: OBSTETRICS & GYNECOLOGY

## 2020-09-10 PROCEDURE — 81025 URINE PREGNANCY TEST: CPT

## 2020-09-10 PROCEDURE — 86901 BLOOD TYPING SEROLOGIC RH(D): CPT

## 2020-09-10 PROCEDURE — 7100000000 HC PACU RECOVERY - FIRST 15 MIN: Performed by: OBSTETRICS & GYNECOLOGY

## 2020-09-10 PROCEDURE — 2709999900 HC NON-CHARGEABLE SUPPLY: Performed by: OBSTETRICS & GYNECOLOGY

## 2020-09-10 PROCEDURE — 85027 COMPLETE CBC AUTOMATED: CPT

## 2020-09-10 PROCEDURE — 36415 COLL VENOUS BLD VENIPUNCTURE: CPT

## 2020-09-10 PROCEDURE — 6360000002 HC RX W HCPCS: Performed by: ANESTHESIOLOGY

## 2020-09-10 PROCEDURE — 2500000003 HC RX 250 WO HCPCS

## 2020-09-10 PROCEDURE — 86850 RBC ANTIBODY SCREEN: CPT

## 2020-09-10 PROCEDURE — 3700000000 HC ANESTHESIA ATTENDED CARE: Performed by: OBSTETRICS & GYNECOLOGY

## 2020-09-10 PROCEDURE — 86900 BLOOD TYPING SEROLOGIC ABO: CPT

## 2020-09-10 PROCEDURE — 0UT90ZZ RESECTION OF UTERUS, OPEN APPROACH: ICD-10-PCS | Performed by: OBSTETRICS & GYNECOLOGY

## 2020-09-10 PROCEDURE — 88307 TISSUE EXAM BY PATHOLOGIST: CPT

## 2020-09-10 PROCEDURE — C1765 ADHESION BARRIER: HCPCS | Performed by: OBSTETRICS & GYNECOLOGY

## 2020-09-10 PROCEDURE — 1200000000 HC SEMI PRIVATE

## 2020-09-10 PROCEDURE — 6360000002 HC RX W HCPCS

## 2020-09-10 PROCEDURE — 3600000013 HC SURGERY LEVEL 3 ADDTL 15MIN: Performed by: OBSTETRICS & GYNECOLOGY

## 2020-09-10 PROCEDURE — 3700000001 HC ADD 15 MINUTES (ANESTHESIA): Performed by: OBSTETRICS & GYNECOLOGY

## 2020-09-10 PROCEDURE — 2580000003 HC RX 258: Performed by: OBSTETRICS & GYNECOLOGY

## 2020-09-10 PROCEDURE — 2720000010 HC SURG SUPPLY STERILE: Performed by: OBSTETRICS & GYNECOLOGY

## 2020-09-10 PROCEDURE — 3600000003 HC SURGERY LEVEL 3 BASE: Performed by: OBSTETRICS & GYNECOLOGY

## 2020-09-10 PROCEDURE — 2580000003 HC RX 258

## 2020-09-10 DEVICE — BARRIER ADH W3XL4IN UTER PELV ABSRB GYNECARE INTCEED: Type: IMPLANTABLE DEVICE | Status: FUNCTIONAL

## 2020-09-10 RX ORDER — SUCCINYLCHOLINE CHLORIDE 20 MG/ML
INJECTION INTRAMUSCULAR; INTRAVENOUS PRN
Status: DISCONTINUED | OUTPATIENT
Start: 2020-09-10 | End: 2020-09-10 | Stop reason: SDUPTHER

## 2020-09-10 RX ORDER — SODIUM CHLORIDE, SODIUM LACTATE, POTASSIUM CHLORIDE, CALCIUM CHLORIDE 600; 310; 30; 20 MG/100ML; MG/100ML; MG/100ML; MG/100ML
INJECTION, SOLUTION INTRAVENOUS CONTINUOUS
Status: DISCONTINUED | OUTPATIENT
Start: 2020-09-10 | End: 2020-09-10

## 2020-09-10 RX ORDER — KETOROLAC TROMETHAMINE 30 MG/ML
30 INJECTION, SOLUTION INTRAMUSCULAR; INTRAVENOUS EVERY 6 HOURS
Status: COMPLETED | OUTPATIENT
Start: 2020-09-10 | End: 2020-09-12

## 2020-09-10 RX ORDER — ACETAMINOPHEN 325 MG/1
650 TABLET ORAL EVERY 4 HOURS PRN
Status: DISCONTINUED | OUTPATIENT
Start: 2020-09-10 | End: 2020-09-12 | Stop reason: HOSPADM

## 2020-09-10 RX ORDER — KETOROLAC TROMETHAMINE 30 MG/ML
30 INJECTION, SOLUTION INTRAMUSCULAR; INTRAVENOUS ONCE
Status: COMPLETED | OUTPATIENT
Start: 2020-09-10 | End: 2020-09-10

## 2020-09-10 RX ORDER — CEFAZOLIN SODIUM 1 G/3ML
INJECTION, POWDER, FOR SOLUTION INTRAMUSCULAR; INTRAVENOUS PRN
Status: DISCONTINUED | OUTPATIENT
Start: 2020-09-10 | End: 2020-09-10 | Stop reason: SDUPTHER

## 2020-09-10 RX ORDER — SODIUM CHLORIDE, SODIUM LACTATE, POTASSIUM CHLORIDE, CALCIUM CHLORIDE 600; 310; 30; 20 MG/100ML; MG/100ML; MG/100ML; MG/100ML
INJECTION, SOLUTION INTRAVENOUS CONTINUOUS
Status: DISCONTINUED | OUTPATIENT
Start: 2020-09-10 | End: 2020-09-12 | Stop reason: HOSPADM

## 2020-09-10 RX ORDER — MORPHINE SULFATE 2 MG/ML
2 INJECTION, SOLUTION INTRAMUSCULAR; INTRAVENOUS
Status: DISCONTINUED | OUTPATIENT
Start: 2020-09-10 | End: 2020-09-12 | Stop reason: HOSPADM

## 2020-09-10 RX ORDER — HYDRALAZINE HYDROCHLORIDE 20 MG/ML
5 INJECTION INTRAMUSCULAR; INTRAVENOUS EVERY 10 MIN PRN
Status: DISCONTINUED | OUTPATIENT
Start: 2020-09-10 | End: 2020-09-10 | Stop reason: HOSPADM

## 2020-09-10 RX ORDER — GLYCOPYRROLATE 1 MG/5 ML
SYRINGE (ML) INTRAVENOUS PRN
Status: DISCONTINUED | OUTPATIENT
Start: 2020-09-10 | End: 2020-09-10 | Stop reason: SDUPTHER

## 2020-09-10 RX ORDER — MORPHINE SULFATE 4 MG/ML
4 INJECTION, SOLUTION INTRAMUSCULAR; INTRAVENOUS
Status: DISCONTINUED | OUTPATIENT
Start: 2020-09-10 | End: 2020-09-12 | Stop reason: HOSPADM

## 2020-09-10 RX ORDER — LABETALOL 20 MG/4 ML (5 MG/ML) INTRAVENOUS SYRINGE
5 EVERY 5 MIN PRN
Status: DISCONTINUED | OUTPATIENT
Start: 2020-09-10 | End: 2020-09-10 | Stop reason: HOSPADM

## 2020-09-10 RX ORDER — HYDROCODONE BITARTRATE AND ACETAMINOPHEN 5; 325 MG/1; MG/1
2 TABLET ORAL EVERY 4 HOURS PRN
Status: DISCONTINUED | OUTPATIENT
Start: 2020-09-10 | End: 2020-09-12 | Stop reason: HOSPADM

## 2020-09-10 RX ORDER — ONDANSETRON 2 MG/ML
8 INJECTION INTRAMUSCULAR; INTRAVENOUS EVERY 8 HOURS PRN
Status: DISCONTINUED | OUTPATIENT
Start: 2020-09-10 | End: 2020-09-10 | Stop reason: HOSPADM

## 2020-09-10 RX ORDER — FENTANYL CITRATE 50 UG/ML
INJECTION, SOLUTION INTRAMUSCULAR; INTRAVENOUS PRN
Status: DISCONTINUED | OUTPATIENT
Start: 2020-09-10 | End: 2020-09-10 | Stop reason: SDUPTHER

## 2020-09-10 RX ORDER — SODIUM CHLORIDE 0.9 % (FLUSH) 0.9 %
10 SYRINGE (ML) INJECTION EVERY 12 HOURS SCHEDULED
Status: DISCONTINUED | OUTPATIENT
Start: 2020-09-10 | End: 2020-09-12 | Stop reason: HOSPADM

## 2020-09-10 RX ORDER — ONDANSETRON 2 MG/ML
4 INJECTION INTRAMUSCULAR; INTRAVENOUS
Status: COMPLETED | OUTPATIENT
Start: 2020-09-10 | End: 2020-09-10

## 2020-09-10 RX ORDER — AMLODIPINE BESYLATE 10 MG/1
10 TABLET ORAL NIGHTLY
Status: DISCONTINUED | OUTPATIENT
Start: 2020-09-10 | End: 2020-09-12 | Stop reason: HOSPADM

## 2020-09-10 RX ORDER — LABETALOL HYDROCHLORIDE 5 MG/ML
INJECTION, SOLUTION INTRAVENOUS PRN
Status: DISCONTINUED | OUTPATIENT
Start: 2020-09-10 | End: 2020-09-10 | Stop reason: SDUPTHER

## 2020-09-10 RX ORDER — DIPHENHYDRAMINE HYDROCHLORIDE 50 MG/ML
12.5 INJECTION INTRAMUSCULAR; INTRAVENOUS
Status: DISCONTINUED | OUTPATIENT
Start: 2020-09-10 | End: 2020-09-10 | Stop reason: HOSPADM

## 2020-09-10 RX ORDER — SODIUM CHLORIDE 0.9 % (FLUSH) 0.9 %
10 SYRINGE (ML) INJECTION PRN
Status: DISCONTINUED | OUTPATIENT
Start: 2020-09-10 | End: 2020-09-12 | Stop reason: HOSPADM

## 2020-09-10 RX ORDER — MEPERIDINE HYDROCHLORIDE 25 MG/ML
12.5 INJECTION INTRAMUSCULAR; INTRAVENOUS; SUBCUTANEOUS EVERY 5 MIN PRN
Status: DISCONTINUED | OUTPATIENT
Start: 2020-09-10 | End: 2020-09-10 | Stop reason: HOSPADM

## 2020-09-10 RX ORDER — ROCURONIUM BROMIDE 10 MG/ML
INJECTION, SOLUTION INTRAVENOUS PRN
Status: DISCONTINUED | OUTPATIENT
Start: 2020-09-10 | End: 2020-09-10 | Stop reason: SDUPTHER

## 2020-09-10 RX ORDER — MORPHINE SULFATE 2 MG/ML
2 INJECTION, SOLUTION INTRAMUSCULAR; INTRAVENOUS EVERY 5 MIN PRN
Status: DISCONTINUED | OUTPATIENT
Start: 2020-09-10 | End: 2020-09-10 | Stop reason: HOSPADM

## 2020-09-10 RX ORDER — DEXAMETHASONE SODIUM PHOSPHATE 4 MG/ML
INJECTION, SOLUTION INTRA-ARTICULAR; INTRALESIONAL; INTRAMUSCULAR; INTRAVENOUS; SOFT TISSUE PRN
Status: DISCONTINUED | OUTPATIENT
Start: 2020-09-10 | End: 2020-09-10 | Stop reason: SDUPTHER

## 2020-09-10 RX ORDER — ONDANSETRON 2 MG/ML
4 INJECTION INTRAMUSCULAR; INTRAVENOUS EVERY 6 HOURS PRN
Status: DISCONTINUED | OUTPATIENT
Start: 2020-09-10 | End: 2020-09-12 | Stop reason: HOSPADM

## 2020-09-10 RX ORDER — FENTANYL CITRATE 50 UG/ML
50 INJECTION, SOLUTION INTRAMUSCULAR; INTRAVENOUS EVERY 5 MIN PRN
Status: COMPLETED | OUTPATIENT
Start: 2020-09-10 | End: 2020-09-10

## 2020-09-10 RX ORDER — PROMETHAZINE HYDROCHLORIDE 25 MG/1
12.5 TABLET ORAL EVERY 6 HOURS PRN
Status: DISCONTINUED | OUTPATIENT
Start: 2020-09-10 | End: 2020-09-12 | Stop reason: HOSPADM

## 2020-09-10 RX ORDER — METOPROLOL SUCCINATE 100 MG/1
100 TABLET, EXTENDED RELEASE ORAL NIGHTLY
Status: DISCONTINUED | OUTPATIENT
Start: 2020-09-10 | End: 2020-09-12 | Stop reason: HOSPADM

## 2020-09-10 RX ORDER — SERTRALINE HYDROCHLORIDE 100 MG/1
100 TABLET, FILM COATED ORAL DAILY
Status: DISCONTINUED | OUTPATIENT
Start: 2020-09-10 | End: 2020-09-12 | Stop reason: HOSPADM

## 2020-09-10 RX ORDER — FENTANYL CITRATE 50 UG/ML
INJECTION, SOLUTION INTRAMUSCULAR; INTRAVENOUS PRN
Status: DISCONTINUED | OUTPATIENT
Start: 2020-09-10 | End: 2020-09-10

## 2020-09-10 RX ORDER — LIDOCAINE HYDROCHLORIDE 10 MG/ML
INJECTION, SOLUTION EPIDURAL; INFILTRATION; INTRACAUDAL; PERINEURAL PRN
Status: DISCONTINUED | OUTPATIENT
Start: 2020-09-10 | End: 2020-09-10 | Stop reason: SDUPTHER

## 2020-09-10 RX ORDER — SODIUM CHLORIDE 9 MG/ML
INJECTION, SOLUTION INTRAVENOUS CONTINUOUS PRN
Status: DISCONTINUED | OUTPATIENT
Start: 2020-09-10 | End: 2020-09-10 | Stop reason: SDUPTHER

## 2020-09-10 RX ORDER — HYDROCODONE BITARTRATE AND ACETAMINOPHEN 5; 325 MG/1; MG/1
1 TABLET ORAL EVERY 4 HOURS PRN
Status: DISCONTINUED | OUTPATIENT
Start: 2020-09-10 | End: 2020-09-12 | Stop reason: HOSPADM

## 2020-09-10 RX ADMIN — DEXAMETHASONE SODIUM PHOSPHATE 4 MG: 4 INJECTION, SOLUTION INTRAMUSCULAR; INTRAVENOUS at 08:51

## 2020-09-10 RX ADMIN — KETOROLAC TROMETHAMINE 30 MG: 30 INJECTION, SOLUTION INTRAMUSCULAR at 13:26

## 2020-09-10 RX ADMIN — HYDROMORPHONE HYDROCHLORIDE 0.25 MG: 1 INJECTION, SOLUTION INTRAMUSCULAR; INTRAVENOUS; SUBCUTANEOUS at 12:17

## 2020-09-10 RX ADMIN — PROPOFOL 50 MG: 10 INJECTION, EMULSION INTRAVENOUS at 11:13

## 2020-09-10 RX ADMIN — Medication 0.4 MG: at 07:35

## 2020-09-10 RX ADMIN — HYDROMORPHONE HYDROCHLORIDE 0.25 MG: 1 INJECTION, SOLUTION INTRAMUSCULAR; INTRAVENOUS; SUBCUTANEOUS at 11:50

## 2020-09-10 RX ADMIN — MORPHINE SULFATE 2 MG: 2 INJECTION, SOLUTION INTRAMUSCULAR; INTRAVENOUS at 11:59

## 2020-09-10 RX ADMIN — KETOROLAC TROMETHAMINE 30 MG: 30 INJECTION, SOLUTION INTRAMUSCULAR at 20:01

## 2020-09-10 RX ADMIN — ROCURONIUM BROMIDE 50 MG: 10 INJECTION INTRAVENOUS at 09:32

## 2020-09-10 RX ADMIN — SERTRALINE HYDROCHLORIDE 100 MG: 100 TABLET, FILM COATED ORAL at 20:01

## 2020-09-10 RX ADMIN — FENTANYL CITRATE 50 MCG: 50 INJECTION, SOLUTION INTRAMUSCULAR; INTRAVENOUS at 08:21

## 2020-09-10 RX ADMIN — ONDANSETRON HYDROCHLORIDE 4 MG: 4 INJECTION, SOLUTION INTRAMUSCULAR; INTRAVENOUS at 08:51

## 2020-09-10 RX ADMIN — FENTANYL CITRATE 50 MCG: 50 INJECTION, SOLUTION INTRAMUSCULAR; INTRAVENOUS at 08:05

## 2020-09-10 RX ADMIN — LIDOCAINE HYDROCHLORIDE 50 MG: 10 INJECTION, SOLUTION EPIDURAL; INFILTRATION; INTRACAUDAL; PERINEURAL at 07:40

## 2020-09-10 RX ADMIN — ROCURONIUM BROMIDE 50 MG: 10 INJECTION INTRAVENOUS at 07:55

## 2020-09-10 RX ADMIN — FENTANYL CITRATE 50 MCG: 50 INJECTION, SOLUTION INTRAMUSCULAR; INTRAVENOUS at 11:12

## 2020-09-10 RX ADMIN — PROPOFOL 200 MG: 10 INJECTION, EMULSION INTRAVENOUS at 07:40

## 2020-09-10 RX ADMIN — FENTANYL CITRATE 50 MCG: 50 INJECTION, SOLUTION INTRAMUSCULAR; INTRAVENOUS at 08:14

## 2020-09-10 RX ADMIN — AMLODIPINE BESYLATE 10 MG: 10 TABLET ORAL at 20:01

## 2020-09-10 RX ADMIN — SODIUM CHLORIDE, POTASSIUM CHLORIDE, SODIUM LACTATE AND CALCIUM CHLORIDE: 600; 310; 30; 20 INJECTION, SOLUTION INTRAVENOUS at 21:52

## 2020-09-10 RX ADMIN — MORPHINE SULFATE 4 MG: 4 INJECTION, SOLUTION INTRAMUSCULAR; INTRAVENOUS at 22:13

## 2020-09-10 RX ADMIN — CEFAZOLIN 2000 MG: 1 INJECTION, POWDER, FOR SOLUTION INTRAMUSCULAR; INTRAVENOUS; PARENTERAL at 10:20

## 2020-09-10 RX ADMIN — MORPHINE SULFATE 4 MG: 4 INJECTION, SOLUTION INTRAMUSCULAR; INTRAVENOUS at 16:51

## 2020-09-10 RX ADMIN — FENTANYL CITRATE 50 MCG: 50 INJECTION, SOLUTION INTRAMUSCULAR; INTRAVENOUS at 11:33

## 2020-09-10 RX ADMIN — HYDROMORPHONE HYDROCHLORIDE 0.25 MG: 1 INJECTION, SOLUTION INTRAMUSCULAR; INTRAVENOUS; SUBCUTANEOUS at 12:12

## 2020-09-10 RX ADMIN — FENTANYL CITRATE 50 MCG: 50 INJECTION, SOLUTION INTRAMUSCULAR; INTRAVENOUS at 08:26

## 2020-09-10 RX ADMIN — HYDROCODONE BITARTRATE AND ACETAMINOPHEN 2 TABLET: 5; 325 TABLET ORAL at 21:08

## 2020-09-10 RX ADMIN — FENTANYL CITRATE 50 MCG: 50 INJECTION, SOLUTION INTRAMUSCULAR; INTRAVENOUS at 08:16

## 2020-09-10 RX ADMIN — SODIUM CHLORIDE, POTASSIUM CHLORIDE, SODIUM LACTATE AND CALCIUM CHLORIDE: 600; 310; 30; 20 INJECTION, SOLUTION INTRAVENOUS at 06:23

## 2020-09-10 RX ADMIN — FENTANYL CITRATE 100 MCG: 50 INJECTION INTRAMUSCULAR; INTRAVENOUS at 07:52

## 2020-09-10 RX ADMIN — HYDROCODONE BITARTRATE AND ACETAMINOPHEN 2 TABLET: 5; 325 TABLET ORAL at 15:28

## 2020-09-10 RX ADMIN — CEFAZOLIN 2 G: 10 INJECTION, POWDER, FOR SOLUTION INTRAVENOUS at 07:47

## 2020-09-10 RX ADMIN — HYDROMORPHONE HYDROCHLORIDE 0.25 MG: 1 INJECTION, SOLUTION INTRAMUSCULAR; INTRAVENOUS; SUBCUTANEOUS at 12:07

## 2020-09-10 RX ADMIN — LABETALOL HYDROCHLORIDE 10 MG: 5 INJECTION INTRAVENOUS at 11:16

## 2020-09-10 RX ADMIN — SODIUM CHLORIDE: 9 INJECTION, SOLUTION INTRAVENOUS at 09:32

## 2020-09-10 RX ADMIN — SUCCINYLCHOLINE CHLORIDE 160 MG: 20 INJECTION, SOLUTION INTRAMUSCULAR; INTRAVENOUS at 07:43

## 2020-09-10 RX ADMIN — ONDANSETRON 8 MG: 2 INJECTION INTRAMUSCULAR; INTRAVENOUS at 06:31

## 2020-09-10 RX ADMIN — MORPHINE SULFATE 4 MG: 4 INJECTION, SOLUTION INTRAMUSCULAR; INTRAVENOUS at 18:55

## 2020-09-10 RX ADMIN — METOPROLOL SUCCINATE 100 MG: 100 TABLET, FILM COATED, EXTENDED RELEASE ORAL at 20:01

## 2020-09-10 RX ADMIN — KETOROLAC TROMETHAMINE 30 MG: 30 INJECTION, SOLUTION INTRAMUSCULAR at 06:29

## 2020-09-10 RX ADMIN — MORPHINE SULFATE 4 MG: 4 INJECTION, SOLUTION INTRAMUSCULAR; INTRAVENOUS at 14:23

## 2020-09-10 RX ADMIN — ROCURONIUM BROMIDE 50 MG: 10 INJECTION INTRAVENOUS at 08:29

## 2020-09-10 RX ADMIN — SODIUM CHLORIDE, POTASSIUM CHLORIDE, SODIUM LACTATE AND CALCIUM CHLORIDE: 600; 310; 30; 20 INJECTION, SOLUTION INTRAVENOUS at 13:25

## 2020-09-10 RX ADMIN — FENTANYL CITRATE 50 MCG: 50 INJECTION, SOLUTION INTRAMUSCULAR; INTRAVENOUS at 08:35

## 2020-09-10 ASSESSMENT — PAIN SCALES - GENERAL
PAINLEVEL_OUTOF10: 9
PAINLEVEL_OUTOF10: 8
PAINLEVEL_OUTOF10: 10
PAINLEVEL_OUTOF10: 8
PAINLEVEL_OUTOF10: 10
PAINLEVEL_OUTOF10: 10
PAINLEVEL_OUTOF10: 3
PAINLEVEL_OUTOF10: 10
PAINLEVEL_OUTOF10: 3
PAINLEVEL_OUTOF10: 10
PAINLEVEL_OUTOF10: 9
PAINLEVEL_OUTOF10: 8
PAINLEVEL_OUTOF10: 7
PAINLEVEL_OUTOF10: 8
PAINLEVEL_OUTOF10: 9

## 2020-09-10 ASSESSMENT — PULMONARY FUNCTION TESTS
PIF_VALUE: 40
PIF_VALUE: 30
PIF_VALUE: 1
PIF_VALUE: 39
PIF_VALUE: 40
PIF_VALUE: 36
PIF_VALUE: 0
PIF_VALUE: 0
PIF_VALUE: 40
PIF_VALUE: 42
PIF_VALUE: 33
PIF_VALUE: 32
PIF_VALUE: 39
PIF_VALUE: 31
PIF_VALUE: 42
PIF_VALUE: 37
PIF_VALUE: 30
PIF_VALUE: 30
PIF_VALUE: 41
PIF_VALUE: 39
PIF_VALUE: 29
PIF_VALUE: 38
PIF_VALUE: 41
PIF_VALUE: 40
PIF_VALUE: 39
PIF_VALUE: 35
PIF_VALUE: 39
PIF_VALUE: 35
PIF_VALUE: 36
PIF_VALUE: 40
PIF_VALUE: 41
PIF_VALUE: 40
PIF_VALUE: 39
PIF_VALUE: 29
PIF_VALUE: 0
PIF_VALUE: 36
PIF_VALUE: 30
PIF_VALUE: 43
PIF_VALUE: 39
PIF_VALUE: 39
PIF_VALUE: 38
PIF_VALUE: 30
PIF_VALUE: 38
PIF_VALUE: 39
PIF_VALUE: 32
PIF_VALUE: 31
PIF_VALUE: 0
PIF_VALUE: 41
PIF_VALUE: 39
PIF_VALUE: 39
PIF_VALUE: 40
PIF_VALUE: 31
PIF_VALUE: 31
PIF_VALUE: 36
PIF_VALUE: 40
PIF_VALUE: 40
PIF_VALUE: 38
PIF_VALUE: 31
PIF_VALUE: 1
PIF_VALUE: 39
PIF_VALUE: 38
PIF_VALUE: 40
PIF_VALUE: 41
PIF_VALUE: 40
PIF_VALUE: 31
PIF_VALUE: 30
PIF_VALUE: 39
PIF_VALUE: 40
PIF_VALUE: 38
PIF_VALUE: 39
PIF_VALUE: 40
PIF_VALUE: 36
PIF_VALUE: 31
PIF_VALUE: 40
PIF_VALUE: 40
PIF_VALUE: 38
PIF_VALUE: 31
PIF_VALUE: 9
PIF_VALUE: 35
PIF_VALUE: 40
PIF_VALUE: 41
PIF_VALUE: 35
PIF_VALUE: 39
PIF_VALUE: 40
PIF_VALUE: 40
PIF_VALUE: 35
PIF_VALUE: 14
PIF_VALUE: 3
PIF_VALUE: 30
PIF_VALUE: 39
PIF_VALUE: 39
PIF_VALUE: 40
PIF_VALUE: 40
PIF_VALUE: 38
PIF_VALUE: 32
PIF_VALUE: 31
PIF_VALUE: 39
PIF_VALUE: 31
PIF_VALUE: 39
PIF_VALUE: 28
PIF_VALUE: 32
PIF_VALUE: 35
PIF_VALUE: 41
PIF_VALUE: 39
PIF_VALUE: 41
PIF_VALUE: 39
PIF_VALUE: 38
PIF_VALUE: 40
PIF_VALUE: 33
PIF_VALUE: 38
PIF_VALUE: 39
PIF_VALUE: 30
PIF_VALUE: 39
PIF_VALUE: 41
PIF_VALUE: 40
PIF_VALUE: 39
PIF_VALUE: 39
PIF_VALUE: 30
PIF_VALUE: 38
PIF_VALUE: 31
PIF_VALUE: 39
PIF_VALUE: 40
PIF_VALUE: 31
PIF_VALUE: 39
PIF_VALUE: 44
PIF_VALUE: 41
PIF_VALUE: 34
PIF_VALUE: 39
PIF_VALUE: 39
PIF_VALUE: 2
PIF_VALUE: 40
PIF_VALUE: 35
PIF_VALUE: 36
PIF_VALUE: 24
PIF_VALUE: 40
PIF_VALUE: 35
PIF_VALUE: 39
PIF_VALUE: 40
PIF_VALUE: 40
PIF_VALUE: 41
PIF_VALUE: 14
PIF_VALUE: 38
PIF_VALUE: 39
PIF_VALUE: 31
PIF_VALUE: 40
PIF_VALUE: 38
PIF_VALUE: 40
PIF_VALUE: 36
PIF_VALUE: 38
PIF_VALUE: 39
PIF_VALUE: 38
PIF_VALUE: 42
PIF_VALUE: 30
PIF_VALUE: 39
PIF_VALUE: 38
PIF_VALUE: 32
PIF_VALUE: 41
PIF_VALUE: 36
PIF_VALUE: 29
PIF_VALUE: 3
PIF_VALUE: 41
PIF_VALUE: 40
PIF_VALUE: 1
PIF_VALUE: 39
PIF_VALUE: 40
PIF_VALUE: 40
PIF_VALUE: 36
PIF_VALUE: 41
PIF_VALUE: 39
PIF_VALUE: 31
PIF_VALUE: 35
PIF_VALUE: 39
PIF_VALUE: 40
PIF_VALUE: 34
PIF_VALUE: 38
PIF_VALUE: 39
PIF_VALUE: 39
PIF_VALUE: 0
PIF_VALUE: 39
PIF_VALUE: 40
PIF_VALUE: 30
PIF_VALUE: 39
PIF_VALUE: 42
PIF_VALUE: 38
PIF_VALUE: 40
PIF_VALUE: 39
PIF_VALUE: 32
PIF_VALUE: 39
PIF_VALUE: 36
PIF_VALUE: 42
PIF_VALUE: 39
PIF_VALUE: 3
PIF_VALUE: 0
PIF_VALUE: 38
PIF_VALUE: 0
PIF_VALUE: 37
PIF_VALUE: 40
PIF_VALUE: 39
PIF_VALUE: 43
PIF_VALUE: 31
PIF_VALUE: 37
PIF_VALUE: 40
PIF_VALUE: 35
PIF_VALUE: 3
PIF_VALUE: 40
PIF_VALUE: 38
PIF_VALUE: 40
PIF_VALUE: 0
PIF_VALUE: 42
PIF_VALUE: 41
PIF_VALUE: 31
PIF_VALUE: 39
PIF_VALUE: 43
PIF_VALUE: 35
PIF_VALUE: 41
PIF_VALUE: 0
PIF_VALUE: 36
PIF_VALUE: 39
PIF_VALUE: 39
PIF_VALUE: 38
PIF_VALUE: 36
PIF_VALUE: 35
PIF_VALUE: 39
PIF_VALUE: 39
PIF_VALUE: 34
PIF_VALUE: 40

## 2020-09-10 ASSESSMENT — PAIN DESCRIPTION - ONSET
ONSET: ON-GOING

## 2020-09-10 ASSESSMENT — PAIN DESCRIPTION - DESCRIPTORS
DESCRIPTORS: ACHING

## 2020-09-10 ASSESSMENT — PAIN DESCRIPTION - ORIENTATION
ORIENTATION: LOWER
ORIENTATION: LOWER;MID
ORIENTATION: LOWER
ORIENTATION: LOWER;MID
ORIENTATION: LOWER

## 2020-09-10 ASSESSMENT — PAIN - FUNCTIONAL ASSESSMENT

## 2020-09-10 ASSESSMENT — PAIN DESCRIPTION - FREQUENCY
FREQUENCY: CONTINUOUS

## 2020-09-10 ASSESSMENT — PAIN DESCRIPTION - LOCATION
LOCATION: ABDOMEN

## 2020-09-10 ASSESSMENT — PAIN DESCRIPTION - PAIN TYPE
TYPE: SURGICAL PAIN

## 2020-09-10 ASSESSMENT — PAIN DESCRIPTION - PROGRESSION
CLINICAL_PROGRESSION: NOT CHANGED

## 2020-09-10 ASSESSMENT — PAIN DESCRIPTION - DIRECTION
RADIATING_TOWARDS: SURGICAL SITE

## 2020-09-10 NOTE — PROGRESS NOTES
Pt admitted to  Counts include 234 beds at the [facility] via from home from surgery. Complaints: s/p total hysterectomy. IV normal saline infusing into the hand left, condition patent and no redness at a rate of 100 mls/ hour with about 200 mls in the bag still. IV site free of s/s of infection or infiltration. Vital signs obtained. Assessment and data collection initiated. Two nurse skin assessment performed by Pilar Sánchez and Tita Dee RN. Oriented to room. Policies and procedures for  explained. All questions answered with no further questions at this time. Fall prevention and safety brochure discussed with patient. Bed alarm on. Call light in reach. Oriented to room. Chuck Rodriguez RN 9/10/2020 1:08 PM     Explained patients right to have family, representative or physician notified of their admission. Patient has Declined for physician to be notified. Patient has Declined for family/representative to be notified. Abdominal binder placed on patient. Incentive spirometer given to patient. Instructions given on use. Patient demonstrated use and verbalized understanding. CHG soap given to patient. Instructions given on daily use to prevent infection. Patient voiced understanding.

## 2020-09-10 NOTE — BRIEF OP NOTE
Gyn Service    Operative Report      Pt Name: Tea Rdz  MRN: [id number] [place] #: [de-identified]  YOB: 1980  Procedure Performed By: Olvin Goldsmith MD    Pre-operative Diagnosis:  Pelvic pain, pelvic adhesions, left hydrosalphinx, failed conservative management, fibroid uterus     Post-operative Diagnosis:   Pelvic pain, pelvic adhesions, left hydrosalphinx, failed conservative management, fibroid uterus     Procedure: JAMAL, b/l salpingectomy     Past Medical history:   Past Medical History:   Diagnosis Date    Bell palsy     right side droop    Essential hypertension 2016    GERD (gastroesophageal reflux disease)     Hashimoto's thyroiditis 2016    HIGH CHOLESTEROL     Hyperlipidemia     Hypokalemia     Prediabetes 2020    Subclinical hyperthyroidism 2016          Surgeon:  Olvin Goldsmith MD    Assistant: Davina Gasca MD    Anesthesia: GENERAL      Estimated blood loss: 450 cc    Fluids: 1500 cc LR    Urine: 200 cc clear at the end of the procedure     Findings:  Normal external genitalia. Normal uterus. Uterus densely adhered over  scar to the anterior abdominal wall and bladder. Normal right tube and ovary. Normal left tube. Left hydrosalpinx. Normal abdominal anatomy.       Complications: none     Disposition: stable to PACU     Pathology: uterus, cervix, bilateral tubes     Indications: The patient is a 41 y.o. [id number] female  who presents today for JAMAL possible BSO for pelvic pain and known scar tissue. She presented to the office for pelvic pain. She failed conservative management and underwent diagnostic laparoscopy. She was found to have multiple adhesions and the decision was made to proceed with definitive management. She desires to keep her ovaries if able. All risks, benefits and alternatives to the procedure were discussed in detail including but not limited to bleeding, infection, and injury to bowel or bladder.  She is agreeable to a blood transfusion if so needed.   All questions were answered and the consent was signed.   Aldair Cisneros  9/10/2020  11:20 AM

## 2020-09-10 NOTE — PROGRESS NOTES
ADMITTED TO [facility] AND ORIENTED TO UNIT. SCDS ON. FALL AND ALLERGY BANDS ON. PT VERBALIZED APPROVAL FOR FIRST NAME, LAST INITIAL AND PHYSICIAN NAME ON UNIT WHITEBOARD. Sister, Bennie Petties with the patient.

## 2020-09-10 NOTE — ANESTHESIA POSTPROCEDURE EVALUATION
Department of Anesthesiology  Postprocedure Note    Patient: Rosita Goldmann  MRN: [id number]  YOB: 1980  Date of evaluation: 9/10/2020  Time:  1:50 PM     Procedure Summary     Date:  09/10/20 [facility]    Anesthesia Start:  0732 Anesthesia Stop:  9302    Procedure:  HYSTERECTOMY ABDOMINAL TOTAL, BS, POSS DANIELLE (N/A Abdomen) Diagnosis:  (PELVIC PAIN)    Surgeon:  Manpreet Harris MD Responsible Provider:  Angus Cox MD    Anesthesia Type:  general ASA Status:  2          Anesthesia Type: No value filed. Baldemar Phase I: Baldemar Score: 10    Baldemar Phase II:      Last vitals: Reviewed and per EMR flowsheets.        Anesthesia Post Evaluation

## 2020-09-10 NOTE — ANESTHESIA PRE PROCEDURE
Department of Anesthesiology  Preprocedure Note       Name:  Ervin Wooten   Age:  41 y.o.  :  1980                                          MRN:  [id number]         Date:  9/10/2020      Surgeon: Madison Serrano):  MD Hernan Holland MD    Procedure: Procedure(s): HYSTERECTOMY ABDOMINAL TOTAL, BS, POSS DANIELLE    Medications prior to admission:   Prior to Admission medications    Medication Sig Start Date End Date Taking? Authorizing Provider   metroNIDAZOLE (FLAGYL PO) Take by mouth Last dose yesterday   Yes Historical Provider, MD   omeprazole (PRILOSEC) 40 MG delayed release capsule Take 40 mg by mouth nightly   Yes Historical Provider, MD   sertraline (ZOLOFT) 100 MG tablet take 1 tablet by mouth once daily 20  Yes MARÍA Montes De Oca CNP   metFORMIN (GLUCOPHAGE-XR) 500 MG extended release tablet Take 1 tablet by mouth daily (with breakfast) 20  Yes MARÍA Montes De Oca CNP   amLODIPine (NORVASC) 10 MG tablet take 1 tablet by mouth once daily  Patient taking differently: nightly take 1 tablet by mouth once daily 20  Yes MARÍA Montes De Oca CNP   metoprolol succinate (TOPROL XL) 100 MG extended release tablet take 1 tablet by mouth once daily  Patient taking differently: nightly Do not crush or chew.  20  Yes MARÍA Montes De Oca CNP   acetaminophen (TYLENOL) 500 MG tablet Take 1,000 mg by mouth every 6 hours as needed for Pain   Yes Historical Provider, MD   cyclobenzaprine (FLEXERIL) 10 MG tablet Take 1 tablet by mouth 3 times daily as needed for Muscle spasms 19  Yes Melissa Christopher PA-C   ibuprofen (ADVIL;MOTRIN) 800 MG tablet Take 1 tablet by mouth every 8 hours as needed for Pain 19  Yes Melissa Christopher PA-C   norethindrone (MICRONOR) 0.35 MG tablet Take 1 tablet by mouth daily   Yes Historical Provider, MD   FLUoxetine (PROZAC) 20 MG capsule Take 1 capsule by mouth daily for 2 weeks, then take 2 capsules by mouth daily 10/16/17 17  MARÍA Dailey CNP   metoprolol tartrate (LOPRESSOR) 25 MG tablet Take 1 tablet by mouth 2 times daily  Patient taking differently: Take 25 mg by mouth daily  17  MARÍA Dailey CNP       Current medications:    Current Facility-Administered Medications   Medication Dose Route Frequency Provider Last Rate Last Dose    lactated ringers infusion   Intravenous Continuous Darshan Sanchez  mL/hr at 09/10/20 0623      ondansetron (ZOFRAN) injection 8 mg  8 mg Intravenous Q8H PRN Darshan Sanchez MD   8 mg at 09/10/20 0631    ceFAZolin (ANCEF) 2 g in dextrose 5 % 50 mL IVPB  2 g Intravenous On Call to [facility], MD           Allergies: Allergies   Allergen Reactions    Bactrim Swelling     tongue    Sulfamethoxazole-Trimethoprim Swelling     tongue       Problem List:    Patient Active Problem List   Diagnosis Code    Abdominal pain complicating pregnancy X64.335, R10.9    Essential hypertension I10    Panic disorder without agoraphobia with moderate panic attacks F41.0    Hashimoto's thyroiditis E06.3    Family history of early CAD Z80.55    Intermittent palpitations R00.2    Facial droop R29.810    Accelerated hypertension I10    Diarrhea R19.7    Hyperlipidemia E78.5    Reactive thrombocytosis R79.89    Obesity (BMI 30-39. 9) E66.9    Right Ramirez's palsy G51.0    Prediabetes R73.03    Pelvic pain R10.2       Past Medical History:        Diagnosis Date    Bell palsy 2020    right side droop    Essential hypertension 2016    GERD (gastroesophageal reflux disease)     Hashimoto's thyroiditis 2016    HIGH CHOLESTEROL     Hyperlipidemia     Hypokalemia     Prediabetes 2020    Subclinical hyperthyroidism 2016       Past Surgical History:        Procedure Laterality Date     SECTION  ,     DILATION AND CURETTAGE OF UTERUS      ENDOSCOPY, COLON, DIAGNOSTIC      LAPAROSCOPY N/A Lab Results   Component Value Date    INR 0.96 12/27/2019    APTT 32.1 12/27/2019       HCG (If Applicable):   Lab Results   Component Value Date    PREGTESTUR NEGATIVE 09/10/2020    PREGSERUM NEGATIVE 12/12/2018        ABGs: No results found for: PHART, PO2ART, NYD9YGA, VQQ5XGI, BEART, H7UYRQIM     Type & Screen (If Applicable):  Lab Results   Component Value Date    LABRH POS 06/15/2020       Drug/Infectious Status (If Applicable):  Lab Results   Component Value Date    HEPCAB Negative 01/08/2019       COVID-19 Screening (If Applicable):   Lab Results   Component Value Date    COVID19 Not Detected 09/05/2020         Anesthesia Evaluation    Airway: Mallampati: II       Mouth opening: > = 3 FB Dental:          Pulmonary:                              Cardiovascular:    (+) hypertension:,                   Neuro/Psych:   (+) neuromuscular disease:, psychiatric history:            GI/Hepatic/Renal:   (+) GERD:,           Endo/Other:    (+) hyperthyroidism::., .                 Abdominal:   (+) obese,         Vascular:                                        Anesthesia Plan      general     ASA 2       Induction: intravenous. Anesthetic plan and risks discussed with patient. Plan discussed with CRNA.                   Mariajose Spicer MD   9/10/2020

## 2020-09-11 LAB
ERYTHROCYTE [DISTWIDTH] IN BLOOD BY AUTOMATED COUNT: 14.5 % (ref 11.5–14.5)
ERYTHROCYTE [DISTWIDTH] IN BLOOD BY AUTOMATED COUNT: 45.9 FL (ref 35–45)
HCT VFR BLD CALC: 30.5 % (ref 37–47)
HEMOGLOBIN: 9.6 GM/DL (ref 12–16)
MCH RBC QN AUTO: 27.4 PG (ref 26–33)
MCHC RBC AUTO-ENTMCNC: 31.5 GM/DL (ref 32.2–35.5)
MCV RBC AUTO: 86.9 FL (ref 81–99)
PLATELET # BLD: 445 THOU/MM3 (ref 130–400)
PMV BLD AUTO: 9.6 FL (ref 9.4–12.4)
RBC # BLD: 3.51 MILL/MM3 (ref 4.2–5.4)
WBC # BLD: 15.4 THOU/MM3 (ref 4.8–10.8)

## 2020-09-11 PROCEDURE — 85027 COMPLETE CBC AUTOMATED: CPT

## 2020-09-11 PROCEDURE — 36415 COLL VENOUS BLD VENIPUNCTURE: CPT

## 2020-09-11 PROCEDURE — 6370000000 HC RX 637 (ALT 250 FOR IP): Performed by: OBSTETRICS & GYNECOLOGY

## 2020-09-11 PROCEDURE — 2580000003 HC RX 258: Performed by: OBSTETRICS & GYNECOLOGY

## 2020-09-11 PROCEDURE — 94760 N-INVAS EAR/PLS OXIMETRY 1: CPT

## 2020-09-11 PROCEDURE — 1200000000 HC SEMI PRIVATE

## 2020-09-11 PROCEDURE — 6360000002 HC RX W HCPCS: Performed by: OBSTETRICS & GYNECOLOGY

## 2020-09-11 RX ORDER — DIPHENHYDRAMINE HYDROCHLORIDE 50 MG/ML
50 INJECTION INTRAMUSCULAR; INTRAVENOUS EVERY 6 HOURS PRN
Status: DISCONTINUED | OUTPATIENT
Start: 2020-09-11 | End: 2020-09-12 | Stop reason: HOSPADM

## 2020-09-11 RX ORDER — OXYCODONE HYDROCHLORIDE AND ACETAMINOPHEN 5; 325 MG/1; MG/1
1 TABLET ORAL EVERY 6 HOURS PRN
Qty: 28 TABLET | Refills: 0 | Status: SHIPPED | OUTPATIENT
Start: 2020-09-11 | End: 2020-09-18

## 2020-09-11 RX ORDER — IBUPROFEN 600 MG/1
600 TABLET ORAL EVERY 6 HOURS PRN
Qty: 30 TABLET | Refills: 1 | COMMUNITY
Start: 2020-09-11 | End: 2021-01-26

## 2020-09-11 RX ORDER — DIPHENHYDRAMINE HYDROCHLORIDE 50 MG/ML
25 INJECTION INTRAMUSCULAR; INTRAVENOUS EVERY 6 HOURS PRN
Status: DISCONTINUED | OUTPATIENT
Start: 2020-09-11 | End: 2020-09-12 | Stop reason: HOSPADM

## 2020-09-11 RX ADMIN — MORPHINE SULFATE 4 MG: 4 INJECTION, SOLUTION INTRAMUSCULAR; INTRAVENOUS at 03:38

## 2020-09-11 RX ADMIN — HYDROCODONE BITARTRATE AND ACETAMINOPHEN 2 TABLET: 5; 325 TABLET ORAL at 12:20

## 2020-09-11 RX ADMIN — DIPHENHYDRAMINE HYDROCHLORIDE 25 MG: 50 INJECTION, SOLUTION INTRAMUSCULAR; INTRAVENOUS at 10:22

## 2020-09-11 RX ADMIN — KETOROLAC TROMETHAMINE 30 MG: 30 INJECTION, SOLUTION INTRAMUSCULAR at 16:41

## 2020-09-11 RX ADMIN — METOPROLOL SUCCINATE 100 MG: 100 TABLET, FILM COATED, EXTENDED RELEASE ORAL at 20:10

## 2020-09-11 RX ADMIN — MORPHINE SULFATE 4 MG: 4 INJECTION, SOLUTION INTRAMUSCULAR; INTRAVENOUS at 22:04

## 2020-09-11 RX ADMIN — SERTRALINE HYDROCHLORIDE 100 MG: 100 TABLET, FILM COATED ORAL at 20:10

## 2020-09-11 RX ADMIN — SODIUM CHLORIDE, POTASSIUM CHLORIDE, SODIUM LACTATE AND CALCIUM CHLORIDE: 600; 310; 30; 20 INJECTION, SOLUTION INTRAVENOUS at 13:26

## 2020-09-11 RX ADMIN — Medication 10 ML: at 20:10

## 2020-09-11 RX ADMIN — KETOROLAC TROMETHAMINE 30 MG: 30 INJECTION, SOLUTION INTRAMUSCULAR at 10:22

## 2020-09-11 RX ADMIN — MORPHINE SULFATE 4 MG: 4 INJECTION, SOLUTION INTRAMUSCULAR; INTRAVENOUS at 08:43

## 2020-09-11 RX ADMIN — AMLODIPINE BESYLATE 10 MG: 10 TABLET ORAL at 20:10

## 2020-09-11 RX ADMIN — KETOROLAC TROMETHAMINE 30 MG: 30 INJECTION, SOLUTION INTRAMUSCULAR at 20:11

## 2020-09-11 RX ADMIN — MORPHINE SULFATE 2 MG: 2 INJECTION, SOLUTION INTRAMUSCULAR; INTRAVENOUS at 13:26

## 2020-09-11 RX ADMIN — KETOROLAC TROMETHAMINE 30 MG: 30 INJECTION, SOLUTION INTRAMUSCULAR at 02:21

## 2020-09-11 RX ADMIN — HYDROCODONE BITARTRATE AND ACETAMINOPHEN 2 TABLET: 5; 325 TABLET ORAL at 18:52

## 2020-09-11 RX ADMIN — DIPHENHYDRAMINE HYDROCHLORIDE 25 MG: 50 INJECTION, SOLUTION INTRAMUSCULAR; INTRAVENOUS at 18:52

## 2020-09-11 ASSESSMENT — PAIN SCALES - GENERAL
PAINLEVEL_OUTOF10: 5
PAINLEVEL_OUTOF10: 8
PAINLEVEL_OUTOF10: 7
PAINLEVEL_OUTOF10: 6
PAINLEVEL_OUTOF10: 5
PAINLEVEL_OUTOF10: 6
PAINLEVEL_OUTOF10: 7
PAINLEVEL_OUTOF10: 6
PAINLEVEL_OUTOF10: 7
PAINLEVEL_OUTOF10: 7
PAINLEVEL_OUTOF10: 4
PAINLEVEL_OUTOF10: 7
PAINLEVEL_OUTOF10: 6

## 2020-09-11 ASSESSMENT — PAIN DESCRIPTION - ORIENTATION
ORIENTATION: MID;LOWER
ORIENTATION: LOWER;MID
ORIENTATION: MID;LOWER
ORIENTATION: LOWER;MID
ORIENTATION: MID;LOWER
ORIENTATION: LOWER;MID

## 2020-09-11 ASSESSMENT — PAIN DESCRIPTION - PAIN TYPE
TYPE: SURGICAL PAIN

## 2020-09-11 ASSESSMENT — PAIN DESCRIPTION - FREQUENCY
FREQUENCY: CONTINUOUS
FREQUENCY: INTERMITTENT
FREQUENCY: CONTINUOUS

## 2020-09-11 ASSESSMENT — PAIN DESCRIPTION - ONSET
ONSET: ON-GOING

## 2020-09-11 ASSESSMENT — PAIN - FUNCTIONAL ASSESSMENT
PAIN_FUNCTIONAL_ASSESSMENT: PREVENTS OR INTERFERES SOME ACTIVE ACTIVITIES AND ADLS
PAIN_FUNCTIONAL_ASSESSMENT: ACTIVITIES ARE NOT PREVENTED
PAIN_FUNCTIONAL_ASSESSMENT: PREVENTS OR INTERFERES SOME ACTIVE ACTIVITIES AND ADLS

## 2020-09-11 ASSESSMENT — PAIN DESCRIPTION - DESCRIPTORS
DESCRIPTORS: ACHING
DESCRIPTORS: CRAMPING
DESCRIPTORS: ACHING;CONSTANT
DESCRIPTORS: ACHING
DESCRIPTORS: ACHING;CONSTANT
DESCRIPTORS: ACHING
DESCRIPTORS: ACHING
DESCRIPTORS: ACHING;THROBBING

## 2020-09-11 ASSESSMENT — PAIN DESCRIPTION - LOCATION
LOCATION: ABDOMEN

## 2020-09-11 ASSESSMENT — PAIN DESCRIPTION - PROGRESSION
CLINICAL_PROGRESSION: NOT CHANGED
CLINICAL_PROGRESSION: GRADUALLY IMPROVING
CLINICAL_PROGRESSION: NOT CHANGED
CLINICAL_PROGRESSION: NOT CHANGED
CLINICAL_PROGRESSION: GRADUALLY IMPROVING
CLINICAL_PROGRESSION: NOT CHANGED

## 2020-09-11 NOTE — CARE COORDINATION
9/11/20, 7:25 AM EDT  DISCHARGE PLANNING EVALUATION:    [address] [facility] [address]       Admitted from: PACU 9/10/2020/ [address] day: 1   Location: [facility]79/639-K Reason for admit: Pelvic pain [R10.2]  S/P hysterectomy [Z90.710] Status: Inpt. Admit order signed?: no  PMH:  has a past medical history of Bell palsy, Essential hypertension, GERD (gastroesophageal reflux disease), Hashimoto's thyroiditis, HIGH CHOLESTEROL, Hyperlipidemia, Hypokalemia, Prediabetes, and Subclinical hyperthyroidism. Procedure: 9/10/2020 Total abdominal hysterectomy b/l salpingectomy. Pertinent abnormal Imaging:No scans to report. Medications:  Scheduled Meds:   amLODIPine  10 mg Oral Nightly    metoprolol succinate  100 mg Oral Nightly    sertraline  100 mg Oral Daily    sodium chloride flush  10 mL Intravenous 2 times per day    ketorolac  30 mg Intravenous Q6H     Continuous Infusions:   lactated ringers 125 mL/hr at 09/10/20 2152      Pertinent Info/Orders/Treatment Plan:  IV fluids, Toradol scheduled, prn Tylenol, Norco, Morphine, Phenergan, and Zofran, full liquid diet, ambulate, abdominal binder, incentive spirometry, SCD's. Diet: DIET CLEAR LIQUID; advanced to full liquids. Smoking status:  reports that she has never smoked. She has never used smokeless tobacco.   PCP: MARÍA Valle CNP  Readmission 30 days or less: No  Readmission Risk Score: 6%    Discharge Planning Evaluation  Current Residence:  Private Residence  Living Arrangements:  Children   Support Systems:  Parent, Family Members, Children  Current Services PTA:     Potential Assistance Needed:  N/A  Potential Assistance Purchasing Medications:  No  Does patient want to participate in local refill/ meds to beds program?  Yes  Type of Home Care Services:  None  Patient expects to be discharged to:  home  Expected Discharge date:  09/12/20  Follow Up Appointment: Best Day/ Time: Monday AM    Patient Goals/Plan/Treatment Preferences: Met with David She is from home with her children. Deloris verifies her PCP and insurance-[facility] [place] for her primary and [facility] as a secondary. She will have transportation to home at discharge. Effie Ji is independent with her ADL's and actively employed. She denies a need for services or DME at discharge. She states she has many family members available to assist as needed at discharge. Transportation/Food Security/Housekeeping Addressed:  No issues identified.     Evaluation: no

## 2020-09-12 VITALS
TEMPERATURE: 98.1 F | HEART RATE: 74 BPM | SYSTOLIC BLOOD PRESSURE: 138 MMHG | WEIGHT: 223.8 LBS | HEIGHT: 64 IN | BODY MASS INDEX: 38.21 KG/M2 | OXYGEN SATURATION: 95 % | DIASTOLIC BLOOD PRESSURE: 80 MMHG | RESPIRATION RATE: 16 BRPM

## 2020-09-12 PROCEDURE — 2580000003 HC RX 258: Performed by: OBSTETRICS & GYNECOLOGY

## 2020-09-12 PROCEDURE — 6370000000 HC RX 637 (ALT 250 FOR IP): Performed by: OBSTETRICS & GYNECOLOGY

## 2020-09-12 PROCEDURE — 6360000002 HC RX W HCPCS: Performed by: OBSTETRICS & GYNECOLOGY

## 2020-09-12 RX ADMIN — KETOROLAC TROMETHAMINE 30 MG: 30 INJECTION, SOLUTION INTRAMUSCULAR at 08:36

## 2020-09-12 RX ADMIN — KETOROLAC TROMETHAMINE 30 MG: 30 INJECTION, SOLUTION INTRAMUSCULAR at 01:51

## 2020-09-12 RX ADMIN — HYDROCODONE BITARTRATE AND ACETAMINOPHEN 2 TABLET: 5; 325 TABLET ORAL at 05:53

## 2020-09-12 RX ADMIN — SODIUM CHLORIDE, POTASSIUM CHLORIDE, SODIUM LACTATE AND CALCIUM CHLORIDE: 600; 310; 30; 20 INJECTION, SOLUTION INTRAVENOUS at 05:54

## 2020-09-12 RX ADMIN — HYDROCODONE BITARTRATE AND ACETAMINOPHEN 2 TABLET: 5; 325 TABLET ORAL at 00:23

## 2020-09-12 RX ADMIN — DIPHENHYDRAMINE HYDROCHLORIDE 50 MG: 50 INJECTION, SOLUTION INTRAMUSCULAR; INTRAVENOUS at 01:04

## 2020-09-12 RX ADMIN — DIPHENHYDRAMINE HYDROCHLORIDE 50 MG: 50 INJECTION, SOLUTION INTRAMUSCULAR; INTRAVENOUS at 07:23

## 2020-09-12 RX ADMIN — HYDROCODONE BITARTRATE AND ACETAMINOPHEN 2 TABLET: 5; 325 TABLET ORAL at 10:16

## 2020-09-12 ASSESSMENT — PAIN - FUNCTIONAL ASSESSMENT
PAIN_FUNCTIONAL_ASSESSMENT: ACTIVITIES ARE NOT PREVENTED
PAIN_FUNCTIONAL_ASSESSMENT: ACTIVITIES ARE NOT PREVENTED

## 2020-09-12 ASSESSMENT — PAIN DESCRIPTION - PROGRESSION
CLINICAL_PROGRESSION: GRADUALLY IMPROVING
CLINICAL_PROGRESSION: GRADUALLY IMPROVING

## 2020-09-12 ASSESSMENT — PAIN DESCRIPTION - PAIN TYPE
TYPE: SURGICAL PAIN
TYPE: SURGICAL PAIN

## 2020-09-12 ASSESSMENT — PAIN SCALES - GENERAL
PAINLEVEL_OUTOF10: 6
PAINLEVEL_OUTOF10: 6
PAINLEVEL_OUTOF10: 0
PAINLEVEL_OUTOF10: 4
PAINLEVEL_OUTOF10: 7
PAINLEVEL_OUTOF10: 4
PAINLEVEL_OUTOF10: 6

## 2020-09-12 ASSESSMENT — PAIN DESCRIPTION - LOCATION
LOCATION: ABDOMEN
LOCATION: ABDOMEN

## 2020-09-12 ASSESSMENT — PAIN DESCRIPTION - FREQUENCY
FREQUENCY: INTERMITTENT
FREQUENCY: INTERMITTENT

## 2020-09-12 ASSESSMENT — PAIN DESCRIPTION - ONSET: ONSET: ON-GOING

## 2020-09-12 ASSESSMENT — PAIN DESCRIPTION - ORIENTATION
ORIENTATION: LOWER;MID
ORIENTATION: LOWER;MID

## 2020-09-12 ASSESSMENT — PAIN DESCRIPTION - DESCRIPTORS
DESCRIPTORS: CRAMPING
DESCRIPTORS: CRAMPING

## 2020-09-12 NOTE — PROGRESS NOTES
All discharge instructions given to patient and family with no further questions at this time. Patient discharged off unit via wheelchair. Chart contents placed in yellow bin.    Electronically signed by Terre Severance, RN on 9/12/2020 at 1:39 PM

## 2020-09-12 NOTE — DISCHARGE SUMMARY
Reasons for Admission on 9/10/2020  5:29 AM  Pelvic pain [R10.2]  S/P hysterectomy [Z90.710]  No comment available      PROCEDURE:  TAHBS, LYSIS OF ADHESIONS       Operative Complications         Discharge Diagnosis   PELVIC PAIN  PELVIC ADHESIONS  FIBROID UTERUS   OBESITY    Discharge Information  Current Discharge Medication List      START taking these medications    Details   oxyCODONE-acetaminophen (PERCOCET) 5-325 MG per tablet Take 1 tablet by mouth every 6 hours as needed for Pain for up to 7 days. Intended supply: 7 days.  Take lowest dose possible to manage pain  Qty: 28 tablet, Refills: 0    Comments: Reduce doses taken as pain becomes manageable  Associated Diagnoses: S/P hysterectomy         CONTINUE these medications which have CHANGED    Details   ibuprofen (ADVIL;MOTRIN) 600 MG tablet Take 1 tablet by mouth every 6 hours as needed for Pain  Qty: 30 tablet, Refills: 1         CONTINUE these medications which have NOT CHANGED    Details   omeprazole (PRILOSEC) 40 MG delayed release capsule Take 40 mg by mouth nightly      sertraline (ZOLOFT) 100 MG tablet take 1 tablet by mouth once daily  Qty: 90 tablet, Refills: 3    Associated Diagnoses: Panic disorder without agoraphobia with moderate panic attacks      metFORMIN (GLUCOPHAGE-XR) 500 MG extended release tablet Take 1 tablet by mouth daily (with breakfast)  Qty: 90 tablet, Refills: 1    Associated Diagnoses: Prediabetes      amLODIPine (NORVASC) 10 MG tablet take 1 tablet by mouth once daily  Qty: 90 tablet, Refills: 3    Associated Diagnoses: Essential hypertension      metoprolol succinate (TOPROL XL) 100 MG extended release tablet take 1 tablet by mouth once daily  Qty: 90 tablet, Refills: 3    Associated Diagnoses: Essential hypertension      cyclobenzaprine (FLEXERIL) 10 MG tablet Take 1 tablet by mouth 3 times daily as needed for Muscle spasms  Qty: 12 tablet, Refills: 0         STOP taking these medications       metroNIDAZOLE (FLAGYL PO) Comments:   Reason for Stopping:         acetaminophen (TYLENOL) 500 MG tablet Comments:   Reason for Stopping:         norethindrone (MICRONOR) 0.35 MG tablet Comments:   Reason for Stopping:               Discharge Procedure Orders   Initiate PAT Protocol   Standing Status: Future Standing Exp. Date: 11/08/20   Order Comments: DIAGNOSTIC TESTS, LABS, AND BETA BLOCKERS PER PRESURGICAL GUIDELINES     .   Condition at Discharge- Good  Discharge to:  home  Follow up in 1-2 wks    Electronically signed by Janett Horne MD on 9/12/2020 at 10:43 AM

## 2020-09-12 NOTE — PLAN OF CARE
Problem: Pain:  Goal: Pain level will decrease  Description: Pain level will decrease  9/12/2020 0133 by Sravani Payne RN  Outcome: Ongoing  Note: Pain Assessment: 0-10  Pain Level: 4   Patient's Stated Pain Goal: 4   Is pain goal met at this time? Yes     Non-Pharmaceutical Pain Intervention(s): Ambulation/Increased Activity, Repositioned, Splinting  Scheduled Toradol. PRN morphine and norco      Problem: Falls - Risk of:  Goal: Will remain free from falls  Description: Will remain free from falls  9/12/2020 0133 by Sravani Payne RN  Outcome: Ongoing  Note: Patient remains free from falls this shift. Fall risk assessment complete. Fall sign posted. Non skid socks on. Bed in lowest position, 2/4 siderails up. Bed alarm on. Call light and all possessions within reach. Ambulates independently. Rounding hourly       Problem: Discharge Planning:  Goal: Discharged to appropriate level of care  Description: Discharged to appropriate level of care  9/12/2020 0133 by Sravani Payne RN  Outcome: Ongoing  Note: Plan to discharge home with family. Problem: Physical Regulation:  Goal: Will remain free from infection  Description: Will remain free from infection  9/12/2020 0133 by Sravani Payne RN  Outcome: Ongoing  Note: JANET surgical site-dressing in place. Dressing remains clean, dry and intact. Free from s/s of infection. Daily CHG complete 9/11. Care plan reviewed with patient. Patient verbalize understanding of the plan of care and contribute to goal setting.

## 2020-09-12 NOTE — PLAN OF CARE
Problem: Pain:  Goal: Pain level will decrease  Description: Pain level will decrease  Outcome: Ongoing  Note: Patient reporting pain 7/10 with goal of 4/10. Patient satisfied with current interventions including rest and repositioning. Pain assessments ongoing. Problem: Falls - Risk of:  Goal: Will remain free from falls  Description: Will remain free from falls  Outcome: Ongoing  Note: Patient free from falls this shift. Patient using call light appropriately. Call light in reach, fall sign posted, nonskid footwear on, hourly rounding, bed alarm on, bed rails up x2. Patient at risk for falls due to generalized weakness. Problem: Discharge Planning:  Goal: Discharged to appropriate level of care  Description: Discharged to appropriate level of care  Outcome: Ongoing  Note: Discharge plans to return home discussed with patient. Problem: Physical Regulation:  Goal: Will remain free from infection  Description: Will remain free from infection  Outcome: Ongoing  Note: Patient afebrile and denies chills. Incision dressing clean, dry, and intact. Patient had CHG bath this shift and educated on daily use to prevent SSI. Care plan reviewed with patient. Patient verbalizes understanding of the plan of care and contributes to goal setting.

## 2020-09-14 ENCOUNTER — TELEPHONE (OUTPATIENT)
Dept: FAMILY MEDICINE CLINIC | Age: 40
End: 2020-09-14

## 2020-09-14 NOTE — PROGRESS NOTES
Department of Obstetrics and Gynecology  Progress Note      S: Doing well. No complaints. Pain controlled. Lochia appropriate. noflatus. mariam reg diet. No Voiding. Mauricio in place. no ambulation. Denies cp, sob, ct.     O:   Vitals:    09/12/20 0831   BP: 138/80   Pulse: 74   Resp: 16   Temp: 98.1 °F (36.7 °C)   SpO2: 95%       Gen: no acute distress   Resp: breathing unlabored   Abd: soft, nondistended, incision covered, dressing clean     Lab Results   Component Value Date    WBC 15.4 (H) 09/11/2020    HGB 9.6 (L) 09/11/2020    HCT 30.5 (L) 09/11/2020    MCV 86.9 09/11/2020     (H) 09/11/2020       A: 41 y.o. POD#1 s/p JAMAL, b/l salpingectomy, lysis of adhesions for pelvic pain, doing well. P:   1. Encourage ambulation and IS use   3. Con't postop care   4. Anticipate d/c home POD#2 or #3.      Ulysses Cisneros  1:14 PM  9/14/2020

## 2020-09-14 NOTE — OP NOTE
Gyn Service    Operative Report      Pt Name: Yeni Desai  MRN: [id number] Ami #: [[id number]]  YOB: 1980  Procedure Performed By: Lolis Gómez MD    Pre-operative Diagnosis:  Pelvic pain, pelvic adhesions, left hydrosalphinx, failed conservative management, fibroid uterus     Post-operative Diagnosis:   Pelvic pain, pelvic adhesions, left hydrosalphinx, failed conservative management, fibroid uterus     Procedure: JAMAL, b/l salpingectomy     Past Medical history:   Past Medical History:   Diagnosis Date    Bell palsy     right side droop    Essential hypertension 2016    GERD (gastroesophageal reflux disease)     Hashimoto's thyroiditis 2016    HIGH CHOLESTEROL     Hyperlipidemia     Hypokalemia     Prediabetes 2020    Subclinical hyperthyroidism 2016          Surgeon:  Lolis Gómez MD    Assistant: Randall Cardenas MD    Anesthesia: GENERAL      Estimated blood loss: 450 cc    Fluids: 1500 cc LR    Urine: 200 cc clear at the end of the procedure     Findings:  Normal external genitalia. Normal uterus. Uterus densely adhered over  scar to the anterior abdominal wall and bladder. Normal right tube and ovary. Normal left tube. Left hydrosalpinx. Normal abdominal anatomy.       Complications: none     Disposition: stable to PACU     Pathology: uterus, cervix, bilateral tubes     Indications: The patient is a 41 y.o. [id number] female  who presents today for JAMAL possible BSO for pelvic pain and known scar tissue. She presented to the office for pelvic pain. She failed conservative management and underwent diagnostic laparoscopy. She was found to have multiple adhesions and the decision was made to proceed with definitive management. She desires to keep her ovaries if able. All risks, benefits and alternatives to the procedure were discussed in detail including but not limited to bleeding, infection, and injury to bowel or bladder.  She is electrocautery. The fascia was closed with loop 0 PDS in a running fashion. The subcutaneous layer was irrigated. Hemostasis was achieved with the Bovie. The subcutaneous layer was reapproximated with 2-0 plain. The skin was closed with 4-0 vicryl in a subcuticular fashion. The Mauricio was draining clear yellow urine at the end of the procedure.  Minimal vaginal bleeding was present.  The patient was awakened, extubated, the taken to recovery in good condition.        Fran Cisneros  9/14/2020  12:57 PM

## 2020-09-14 NOTE — TELEPHONE ENCOUNTER
[facility] Transitions Initial Follow Up Call    Outreach made within 2 business days of discharge: Yes    Patient: Km Brought Patient : 1980   MRN: [id number]  Reason for Admission: There are no discharge diagnoses documented for the most recent discharge. Discharge Date: 20       Spoke with: Miya Durham    Discharge department/facility: [facility]    TCM Interactive Patient Contact:  Was patient able to fill all prescriptions: Yes  Was patient instructed to bring all medications to the follow-up visit: Yes  Is patient taking all medications as directed in the discharge summary? Yes  Does patient understand their discharge instructions: Yes  Does patient have questions or concerns that need addressed prior to 7-14 day follow up office visit: no    Scheduled appointment with PCP within 7-14 days    Follow Up  No future appointments.     Law Salcedo CMA ([facility])

## 2020-09-15 ENCOUNTER — CARE COORDINATION (OUTPATIENT)
Dept: OTHER | Facility: CLINIC | Age: 40
End: 2020-09-15

## 2020-09-15 RX ORDER — DOCUSATE SODIUM 100 MG/1
100 CAPSULE, LIQUID FILLED ORAL 2 TIMES DAILY PRN
COMMUNITY
End: 2021-02-01

## 2020-09-15 NOTE — CARE COORDINATION
[facility] Transitions Initial Follow Up Call    Call within 2 business days of discharge: Yes    Patient: Mikala Portillo Patient : 1980   MRN: [id number]  Reason for Admission: s/p total abdominal hysterectomy, bilateral salpingectomy, lysis of adhesions  Discharge Date: 20 RARS: Readmission Risk Score: 6      Last Discharge [facility]       Complaint Diagnosis Description Type Department Provider    9/10/20  S/P hysterectomy Admission (Discharged) Sebastian Olson MD           Spoke with: Mikala Portillo, patient    Facility: [address] [place] [address]    Non-face-to-face services provided:  Obtained and reviewed discharge summary and/or continuity of care documents  Assessment and support for treatment adherence and medication management-medications reviewed with patient    Care Transitions 24 Hour Call    Schedule Follow Up Appointment with PCP:  Declined  Do you have any ongoing symptoms?:  Yes  Patient-reported symptoms:  Pain (Comment: Pt c/o post-op pain; states pain controlled with Percocet and Ibuprofen)  Do you have a copy of your discharge instructions?:  Yes  Do you have all of your prescriptions and are they filled?:  Yes  Have you scheduled your follow up appointment?:  No  Do you feel like you have everything you need to keep you well at home?:  Yes  Care Transitions Interventions       Associate Care Manager (ACM) called patient for initial CC outreach after recent inpatient discharge after surgery 9/10/20. Verified patient's full name and date of birth as identifiers. Explanation of ACM role and introduction to self provided to patient. Patient states she is still feeling \"sore and tired\". Reports post-op pain controlled with Percocet and Ibuprofen as ordered. States she had BM yesterday and today. States she has been taking OTC stool softener with relief. ACM performed full med review with patient.  Patient states she plans to start Metformin today as it is a new medication for her. ACM reviewed red flags to call Dr. Brian Cochran for. Also discussed f/u appt with patient who states she will call today to schedule. States she has family or friend that can drive her to follow up appt. Patient states she took shower for first time post-op today and it went well. Denies concerns. ACM reviewed activity restrictions with patient and discussed need for high fiber diet and increase fluid intake for constipation management. Patient verbalized understanding. Patient agrees to f/u calls from [facility]. Next outreach planned for next week. ACM notified patient to reach out if needs arise sooner. Goals      Conditions and Symptoms      I will schedule office visits, as directed by my provider. I will keep my appointment or reschedule if I have to cancel. I will notify my provider of any barriers to my plan of care. I will notify my provider of any symptoms that indicate a worsening of my condition. Barriers: none  Plan for overcoming my barriers: N/A  Confidence: 8/10  Anticipated Goal Completion Date: 10/1/20 & ongoing    9/15/20: Patient to all Dr. Stephania Mtz office to schedule follow up appt. ACM reviewed red flags to contact provider's office for. Pt verbalized understanding. Athena Cheadle, RN BSN  Associate Care Manager  Phone: [phone]  Email: [email or web address]

## 2020-09-21 ENCOUNTER — CARE COORDINATION (OUTPATIENT)
Dept: OTHER | Facility: CLINIC | Age: 40
End: 2020-09-21

## 2020-09-21 NOTE — CARE COORDINATION
[facility] Transitions Follow Up Call    2020    Patient: Kaur Nogueira  Patient : 1980   MRN: [id number]  Reason for Admission:  s/p total abdominal hysterectomy, bilateral salpingectomy, lysis of adhesions  Discharge Date: 20 RARS: Readmission Risk Score: 6         Spoke with: Kaur Nogueira, patient    ACM received return call from patient this date. Patient states she had initial follow-up appointment with Dr. Patrica Parada 20, next appt 10/23/20. States post-op dressing was removed at appt last week and site is now open to air. Patient denies drainage or redness to site. Denies fever or chills. Patient states current post-op pain is now \"5\"/10 and is controlled with current medications. States she was told to hold off on driving for a few more days, per Dr. Patrica Parada at follow up last week. States she was instructed not to vacuum or do strenuous activity but do not restrict to bed rest either. Patient states she has been up and around and has also been resting. Patient reports appetite is \"good\" and states she is getting adequate fluid intake. Reports she has not needed to take Colace since last week and that she has been having 1 BM/day. Denies current needs and is agreeable for [facility] to continue to outreach patient. Care Transitions Subsequent and Final Call    Subsequent and Final Calls  Do you have any ongoing symptoms?:  Yes  Onset of Patient-reported symptoms: In the past 7 days  Patient-reported symptoms:  Pain  Have your medications changed?:  No  Do you have any questions related to your medications?:  No  Do you currently have any active services?:  No  Do you have any needs or concerns that I can assist you with?:  No  Identified Barriers:  None  Care Transitions Interventions  Other Interventions:          Goals      Conditions and Symptoms      I will schedule office visits, as directed by my provider.   I will keep my appointment or reschedule if I have to cancel. I will notify my provider of any barriers to my plan of care. I will notify my provider of any symptoms that indicate a worsening of my condition. Barriers: none  Plan for overcoming my barriers: N/A  Confidence: 8/10  Anticipated Goal Completion Date: 10/1/20 & ongoing    9/15/20: Patient to all Dr. Abbie Catherine office to schedule follow up appt. ACM reviewed red flags to contact provider's office for. Pt verbalized understanding.     9/21/20: Patient had appt with Dr. Endy Bliss 9/18/20; Next appt 10/23/20. ACM reviewed red flags to call Dr. Abbie Catherine office for. Gurmeet Charles RN BSN  Associate Care Manager  Phone: [phone]  Email: [email or web address]

## 2020-10-01 ENCOUNTER — CARE COORDINATION (OUTPATIENT)
Dept: OTHER | Facility: CLINIC | Age: 40
End: 2020-10-01

## 2020-10-01 NOTE — CARE COORDINATION
Ambulatory Care Coordination Note  10/1/2020  CM Risk Score: 8  Charlson 10 Year Mortality Risk Score: 2%     ACC: Serenity So RN    Summary Note: Associate Care Manager (ACM) called patient for CC outreach. Patient states she saw Dr. Shruthi Huddleston this week due to experiencing different pains. States she had discontinued use of abdominal binder and was instructed per Dr. Shruthi Huddleston to continue use of binder. States she was told per Dr. Shruthi Huddleston that the pain is related to the muscles healing post-op. Patient states the incision still looks good and shows no signs of infection. States she is having regular BMs and appetite is good. Patient reports currently using Percocet and Ibuprofen PRN with pain controlled. Patient denies concerns or needs at this time. ACM questioned patient about health goals she wants to work on with support of ACM. Patient to think about goal(s) and notify ACM with next outreach. Ambulatory Care Coordination Assessment    Care Coordination Protocol  Program Enrollment:  Complex Care  Referral from Primary Care Provider:  No  Week 1 - Initial Assessment     Do you have all of your prescriptions and are they filled?:  Yes                          Suggested Interventions and Community Resources         Set up/Review an Education Plan, Set up/Review Goals          Goals      Conditions and Symptoms      I will schedule office visits, as directed by my provider. I will keep my appointment or reschedule if I have to cancel. I will notify my provider of any barriers to my plan of care. I will notify my provider of any symptoms that indicate a worsening of my condition. Barriers: none  Plan for overcoming my barriers: N/A  Confidence: 8/10  Anticipated Goal Completion Date: 10/1/20 & ongoing    9/15/20: Patient to all Dr. Lorena Johansen office to schedule follow up appt. ACM reviewed red flags to contact provider's office for.  Pt verbalized understanding.     9/21/20: Patient had appt with Dr. Wesley Craven 9/18/20; Next appt 10/23/20. ACM reviewed red flags to call Dr. Ray Blend office for. 10/1/20: Patient had appt with Dr. Wesley Craven 9/30/20; Next appt 10/23/20. Plan:  -Patient to let ACM know of any health goal(s) she wants to work on together at next outreach.    -Next appointment with Dr. Wesley Craven 10/23/2020. Sara Rodriguez RN BSN  Associate Care Manager  Phone: [phone]  Email: [email or web address]

## 2020-10-12 ENCOUNTER — CARE COORDINATION (OUTPATIENT)
Dept: OTHER | Facility: CLINIC | Age: 40
End: 2020-10-12

## 2020-10-12 NOTE — CARE COORDINATION
Ambulatory Care Coordination Note  10/12/2020  CM Risk Score: 8  Charlson 10 Year Mortality Risk Score: 2%     ACC: Dirk Kaufman RN    Summary Note: Associate Care Manager (ACM) called patient for CC outreach. Patient reports doing okay and denies needs or concerns. States having regular bowel movements and incision healing. Denies concerns with appetite. ACM questioned patient if any health goals she has that she would like to work with ACM with. Patient denies at this time. ACM notified patient to reach out if needs or concerns in the future. Patient verbalized understanding. Care Coordination Interventions    Program Enrollment:  Complex Care  Referral from Primary Care Provider:  No  Suggested Interventions and Community Resources         Goals Addressed                 This Visit's Progress     COMPLETED: Conditions and Symptoms        I will schedule office visits, as directed by my provider. I will keep my appointment or reschedule if I have to cancel. I will notify my provider of any barriers to my plan of care. I will notify my provider of any symptoms that indicate a worsening of my condition. Barriers: none  Plan for overcoming my barriers: N/A  Confidence: 8/10  Anticipated Goal Completion Date: 10/1/20 & ongoing    9/15/20: Patient to all Dr. Elsa Cedillo office to schedule follow up appt. ACM reviewed red flags to contact provider's office for. Pt verbalized understanding.     9/21/20: Patient had appt with Dr. Scott Numbers 9/18/20; Next appt 10/23/20. ACM reviewed red flags to call Dr. Elsa Cedillo office for. 10/1/20: Patient had appt with Dr. Sonia Branham 9/30/20; Next appt 10/23/20.    10/12/20: Patient has attended appointments as scheduled. Next follow up with Dr. Scott Numbers 10/23/20. ACM signing off due to no further needs. Dirk Kaufman RN BSN  Associate Care Manager  Phone: [phone]  Email: [email or web address]

## 2020-12-14 ENCOUNTER — TELEPHONE (OUTPATIENT)
Dept: FAMILY MEDICINE CLINIC | Age: 40
End: 2020-12-14

## 2020-12-14 NOTE — TELEPHONE ENCOUNTER
Pt informed and verbalized understanding.   Pt transferred to central scheduling to get the US set-up

## 2020-12-14 NOTE — TELEPHONE ENCOUNTER
Let Hilario Lundberg know she is due to repeat the [address] on her left breast to follow up on the breast lump. I have placed the order.

## 2020-12-16 ENCOUNTER — HOSPITAL ENCOUNTER (OUTPATIENT)
Dept: WOMENS IMAGING | Age: 40
Discharge: HOME | End: 2020-12-16
Payer: COMMERCIAL

## 2020-12-16 PROCEDURE — 76642 ULTRASOUND BREAST LIMITED: CPT

## 2020-12-17 ENCOUNTER — NURSE TRIAGE (OUTPATIENT)
Dept: OTHER | Facility: CLINIC | Age: 40
End: 2020-12-17

## 2020-12-17 ENCOUNTER — TELEPHONE (OUTPATIENT)
Dept: FAMILY MEDICINE CLINIC | Age: 40
End: 2020-12-17

## 2020-12-17 NOTE — TELEPHONE ENCOUNTER
----- Message from MARÍA Otero CNP sent at 12/16/2020  4:47 PM EST -----  Let Crys Lockhart know her US looks great, looks like a benign lipoma which is a benign fatty mass. The radiologist is recommending doing a f/u in 1 year, [facility] closer to the time to schedule this.

## 2020-12-18 NOTE — TELEPHONE ENCOUNTER
Reason for Disposition   MILD-MODERATE diarrhea (e.g., 1-6 times / day more than normal)    Answer Assessment - Initial Assessment Questions  1. DIARRHEA SEVERITY: \"How bad is the diarrhea? \" \"How many extra stools have you had in the past 24 hours than normal?\"     - NO DIARRHEA (SCALE 0)    - MILD (SCALE 1-3): Few loose or mushy BMs; increase of 1-3 stools over normal daily number of stools; mild increase in ostomy output. -  MODERATE (SCALE 4-7): Increase of 4-6 stools daily over normal; moderate increase in ostomy output. * SEVERE (SCALE 8-10; OR 'WORST POSSIBLE'): Increase of 7 or more stools daily over normal; moderate increase in ostomy output; incontinence. moderate    2. ONSET: \"When did the diarrhea begin? \"   Today    3. BM CONSISTENCY: \"How loose or watery is the diarrhea? \"   Watery    4. VOMITING: \"Are you also vomiting? \" If so, ask: \"How many times in the past 24 hours? \"    no    5. ABDOMINAL PAIN: Bryanna Pooleis you having any abdominal pain? \" If yes: \"What does it feel like? \" (e.g., crampy, dull, intermittent, constant)      Cramping    6. ABDOMINAL PAIN SEVERITY: If present, ask: \"How bad is the pain? \"  (e.g., Scale 1-10; mild, moderate, or severe)    - MILD (1-3): doesn't interfere with normal activities, abdomen soft and not tender to touch     - MODERATE (4-7): interferes with normal activities or awakens from sleep, tender to touch     - SEVERE (8-10): excruciating pain, doubled over, unable to do any normal activities    6/10    7. ORAL INTAKE: If vomiting, \"Have you been able to drink liquids? \" \"How much fluids have you had in the past 24 hours? \"      No vomiting    8. HYDRATION: \"Any signs of dehydration? \" (e.g., dry mouth [not just dry lips], too weak to stand, dizziness, new weight loss) \"When did you last urinate? \"     No    9. EXPOSURE: \"Have you traveled to a foreign country recently? \" \"Have you been exposed to anyone with diarrhea? \" \"Could you have eaten any food that was spoiled? \"  No    10. ANTIBIOTIC USE: \"Are you taking antibiotics now or have you taken antibiotics in the past 2 months? \"   no    11. OTHER SYMPTOMS: \"Do you have any other symptoms? \" (e.g., fever, blood in stool)    Chills, fatigue, HA    12. PREGNANCY: \"Is there any chance you are pregnant? \" \"When was your last menstrual period? \"       No, hyst    Protocols used: DIARRHEA-ADULT-    Caller provided care advice and instructed to call back with worsening symptoms.

## 2021-01-04 ENCOUNTER — NURSE TRIAGE (OUTPATIENT)
Dept: OTHER | Facility: CLINIC | Age: 41
End: 2021-01-04

## 2021-01-04 NOTE — TELEPHONE ENCOUNTER
Reason for Disposition   Sinus congestion (pressure, fullness) present > 10 days    Answer Assessment - Initial Assessment Questions  1. LOCATION: \"Where does it hurt? \"       Sometimes she does have pain under her eyes and nose but does not have any right now. 2. ONSET: \"When did the sinus pain start? \"  (e.g., hours, days)       About 2 weeks     3. SEVERITY: \"How bad is the pain? \"   (Scale 1-10; mild, moderate or severe)    - MILD (1-3): doesn't interfere with normal activities     - MODERATE (4-7): interferes with normal activities (e.g., work or school) or awakens from sleep    - SEVERE (8-10): excruciating pain and patient unable to do any normal activities         No pain currently     4. RECURRENT SYMPTOM: \"Have you ever had sinus problems before? \" If so, ask: \"When was the last time? \" and \"What happened that time? \"       Yes, it has been a long time and she was put on an antibiotic     5. NASAL CONGESTION: \"Is the nose blocked? \" If so, ask, \"Can you open it or must you breathe through the mouth? \"     Nose is runny and green discharge and is worse at night. 6. NASAL DISCHARGE: \"Do you have discharge from your nose? \" If so ask, \"What color? \"      Green     7. FEVER: \"Do you have a fever? \" If so, ask: \"What is it, how was it measured, and when did it start? \"       No fever 98.4 today     8. OTHER SYMPTOMS: \"Do you have any other symptoms? \" (e.g., sore throat, cough, earache, difficulty breathing)      Ears feel clogged, but no pain. Throat is irritated from the drainage. 9. PREGNANCY: \"Is there any chance you are pregnant? \" \"When was your last menstrual period? \"      No    Protocols used: SINUS PAIN OR CONGESTION-ADULT-[place]    POD 1 [place]     Brief description of triage: sinus congestion and runny nose with green discharge x 2 weeks. Triage indicates for patient to be seen today or tomorrow.      Care advice provided, patient verbalizes understanding; denies any other questions or concerns; instructed to call back for any new or worsening symptoms. Writer provided warm transfer to [facility] at [facility] for appointment scheduling. Attention Provider: Thank you for allowing me to participate in the care of your patient. The patient was connected to triage in response to information provided to the E[facility]. Please do not respond through this encounter as the response is not directed to a shared pool.

## 2021-01-05 ENCOUNTER — VIRTUAL VISIT (OUTPATIENT)
Dept: FAMILY MEDICINE CLINIC | Age: 41
End: 2021-01-05
Payer: COMMERCIAL

## 2021-01-05 DIAGNOSIS — J01.90 ACUTE RHINOSINUSITIS: Primary | ICD-10-CM

## 2021-01-05 PROCEDURE — 99213 OFFICE O/P EST LOW 20 MIN: CPT | Performed by: NURSE PRACTITIONER

## 2021-01-05 PROCEDURE — G8428 CUR MEDS NOT DOCUMENT: HCPCS | Performed by: NURSE PRACTITIONER

## 2021-01-05 RX ORDER — FLUTICASONE PROPIONATE 50 MCG
2 SPRAY, SUSPENSION (ML) NASAL DAILY
Qty: 1 BOTTLE | Refills: 1 | Status: SHIPPED | OUTPATIENT
Start: 2021-01-05 | End: 2021-03-02

## 2021-01-05 RX ORDER — AZITHROMYCIN 250 MG/1
250 TABLET, FILM COATED ORAL SEE ADMIN INSTRUCTIONS
Qty: 6 TABLET | Refills: 0 | Status: SHIPPED | OUTPATIENT
Start: 2021-01-05 | End: 2021-01-10

## 2021-01-05 ASSESSMENT — ENCOUNTER SYMPTOMS
EYE REDNESS: 0
WHEEZING: 0
COLOR CHANGE: 0
COUGH: 1
VOMITING: 0
TROUBLE SWALLOWING: 0
ABDOMINAL PAIN: 0
EYE PAIN: 0
DIARRHEA: 0
NAUSEA: 0
SHORTNESS OF BREATH: 0
RHINORRHEA: 1
SORE THROAT: 1

## 2021-01-05 ASSESSMENT — PATIENT HEALTH QUESTIONNAIRE - PHQ9
SUM OF ALL RESPONSES TO PHQ QUESTIONS 1-9: 0
SUM OF ALL RESPONSES TO PHQ QUESTIONS 1-9: 0
SUM OF ALL RESPONSES TO PHQ9 QUESTIONS 1 & 2: 0
2. FEELING DOWN, DEPRESSED OR HOPELESS: 0

## 2021-01-05 NOTE — PROGRESS NOTES
2021    TELEHEALTH EVALUATION -- Audio/Visual (During HBJWQ-67 public health emergency)    HPI:    Fawad Sands (:  1980) has requested an audio/video evaluation for the following concern(s):    URI Symptoms    HPI:      Symptoms have been present for 3 week(s). Symptoms are unchanged since they initially started. She was tested for COVID through [address] and she was negative    Fever? No  Runny nose or congestion? Yes   Cough? Yes  Sore throat? Yes  Headache, fatigue, joint pains, muscle aches? Yes - occasional HA  Shortness of breath/Wheezing? No  Nausea/Vomiting/Diarrhea? No  Double Sickening? No  Sick contacts? Yes    Patient has tried nyquil, motrin without improvement. Review of Systems   Constitutional: Negative for chills, diaphoresis, fatigue and fever. HENT: Positive for congestion, postnasal drip, rhinorrhea and sore throat. Negative for trouble swallowing. Eyes: Negative for pain, redness and visual disturbance. Respiratory: Positive for cough. Negative for shortness of breath and wheezing. Cardiovascular: Negative for chest pain, palpitations and leg swelling. Gastrointestinal: Negative for abdominal pain, diarrhea, nausea and vomiting. Endocrine: Negative for polydipsia, polyphagia and polyuria. Genitourinary: Negative for decreased urine volume, dysuria, frequency and urgency. Musculoskeletal: Negative for arthralgias and myalgias. Skin: Negative for color change and rash. Allergic/Immunologic: Negative for environmental allergies, food allergies and immunocompromised state. Neurological: Positive for headaches. Negative for dizziness, tremors and syncope. Hematological: Negative for adenopathy. Psychiatric/Behavioral: Negative for behavioral problems, confusion, self-injury and suicidal ideas. All other systems reviewed and are negative. Prior to Visit Medications    Medication Sig Taking?  Authorizing Provider azithromycin (ZITHROMAX) 250 MG tablet Take 1 tablet by mouth See Admin Instructions for 5 days 500mg on day 1 followed by 250mg on days 2 - 5 Yes MARÍA Lawrence CNP   fluticasone (FLONASE) 50 MCG/ACT nasal spray 2 sprays by Each Nostril route daily Yes MARÍA Lawrence CNP   docusate sodium (STOOL SOFTENER) 100 MG capsule Take 100 mg by mouth 2 times daily as needed for Constipation  Historical Provider, MD   ibuprofen (ADVIL;MOTRIN) 600 MG tablet Take 1 tablet by mouth every 6 hours as needed for Pain  Deisy Erickson MD   omeprazole (PRILOSEC) 40 MG delayed release capsule Take 40 mg by mouth nightly  Historical Provider, MD   sertraline (ZOLOFT) 100 MG tablet take 1 tablet by mouth once daily  MARÍA Lawrence CNP   metFORMIN (GLUCOPHAGE-XR) 500 MG extended release tablet Take 1 tablet by mouth daily (with breakfast)  MARÍA Lawrence CNP   amLODIPine (NORVASC) 10 MG tablet take 1 tablet by mouth once daily  Patient taking differently: nightly take 1 tablet by mouth once daily  MARÍA Lawrence CNP   metoprolol succinate (TOPROL XL) 100 MG extended release tablet take 1 tablet by mouth once daily  Patient taking differently: nightly Do not crush or chew.   MARÍA Lawrence CNP   cyclobenzaprine (FLEXERIL) 10 MG tablet Take 1 tablet by mouth 3 times daily as needed for Muscle spasms  Patient not taking: Reported on 9/15/2020  Mary Sanders PA-C   FLUoxetine (PROZAC) 20 MG capsule Take 1 capsule by mouth daily for 2 weeks, then take 2 capsules by mouth daily  MARÍA Lawrence CNP   metoprolol tartrate (LOPRESSOR) 25 MG tablet Take 1 tablet by mouth 2 times daily  Patient taking differently: Take 25 mg by mouth daily   MARÍA Lawrence CNP       Social History     Tobacco Use    Smoking status: Never Smoker    Smokeless tobacco: Never Used   Substance Use Topics    Alcohol use: Yes     Comment: social    Drug use: No        Allergies Allergen Reactions    Bactrim Swelling     tongue    Sulfamethoxazole-Trimethoprim Swelling     tongue   ,   Past Medical History:   Diagnosis Date    Bell palsy     right side droop    Essential hypertension 2016    GERD (gastroesophageal reflux disease)     Hashimoto's thyroiditis 2016    HIGH CHOLESTEROL     Hyperlipidemia     Hypokalemia     Prediabetes 2020    Subclinical hyperthyroidism 2016   ,   Past Surgical History:   Procedure Laterality Date     SECTION  ,     DILATION AND CURETTAGE OF UTERUS      ENDOSCOPY, COLON, DIAGNOSTIC      HYSTERECTOMY, TOTAL ABDOMINAL N/A 9/10/2020    HYSTERECTOMY ABDOMINAL TOTAL, BS, POSS DANIELLE performed by Shade Santos MD at [place] [id number] 6/15/2020    DIAGNOSTIC LAPAROSCOPY, performed by Shade Santos MD at [address]   ,   Family History   Problem Relation Age of Onset    High Blood Pressure Mother     Diabetes Mother     Heart Disease Mother     High Blood Pressure Father     Diabetes Father    ,   Immunization History   Administered Date(s) Administered    Influenza Vaccine, unspecified formulation 10/01/2016    Influenza Virus Vaccine 2017, 2018, 2019    Tdap (Boostrix, Adacel) 2016   ,   Health Maintenance   Topic Date Due    HIV screen  1993    Flu vaccine (1) 2020    A1C test (Diabetic or Prediabetic)  2021    Lipid screen  2024    DTaP/Tdap/Td vaccine (2 - Td) 2026    Hepatitis C screen  Completed    Hepatitis A vaccine  Aged Out    Hepatitis B vaccine  Aged Out    Hib vaccine  Aged Out    Meningococcal (ACWY) vaccine  Aged Out    Pneumococcal 0-64 years Vaccine  Aged Out       PHYSICAL EXAMINATION:  [ INSTRUCTIONS:  \"[x]\" Indicates a positive item  \"[]\" Indicates a negative item  -- DELETE ALL ITEMS NOT EXAMINED]  Vital Signs: (As obtained by patient/caregiver or practitioner observation) Blood pressure-  Heart rate-    Respiratory rate-    Temperature-  Pulse oximetry-     Constitutional: [x] Appears well-developed and well-nourished [x] No apparent distress      [] Abnormal-   Mental status  [x] Alert and awake  [x] Oriented to person/place/time [x]Able to follow commands      Eyes:  EOM    [x]  Normal  [] Abnormal-  Sclera  [x]  Normal  [] Abnormal -         Discharge [x]  None visible  [] Abnormal -    HENT:   [x] Normocephalic, atraumatic. [] Abnormal   [x] Mouth/Throat: Mucous membranes are moist.     External Ears [x] Normal  [] Abnormal-     Neck: [x] No visualized mass     Pulmonary/Chest: [x] Respiratory effort normal.  [x] No visualized signs of difficulty breathing or respiratory distress        [] Abnormal-      Musculoskeletal:   [x] Normal gait with no signs of ataxia         [x] Normal range of motion of neck        [] Abnormal-       Neurological:        [x] No Facial Asymmetry (Cranial nerve 7 motor function) (limited exam to video visit)          [x] No gaze palsy        [] Abnormal-         Skin:        [x] No significant exanthematous lesions or discoloration noted on facial skin         [] Abnormal-            Psychiatric:       [x] Normal Affect [x] No Hallucinations        [] Abnormal-     Other pertinent observable physical exam findings-     ASSESSMENT/PLAN:  1. Acute rhinosinusitis  - COVID negative  - likely secondary bacterial infection at this point  - azithromycin (ZITHROMAX) 250 MG tablet; Take 1 tablet by mouth See Admin Instructions for 5 days 500mg on day 1 followed by 250mg on days 2 - 5  Dispense: 6 tablet; Refill: 0  - fluticasone (FLONASE) 50 MCG/ACT nasal spray; 2 sprays by Each Nostril route daily  Dispense: 1 Bottle; Refill: 1      Return if symptoms worsen or fail to improve. Marco Antonio Henderson is a 41 y.o. female being evaluated by a Virtual Visit (video visit) encounter to address concerns as mentioned above. A caregiver was present when appropriate. Due to this being a TeleHealth encounter (During [facility] public health emergency), evaluation of the following organ systems was limited: Vitals/Constitutional/EENT/Resp/CV/GI//MS/Neuro/Skin/Heme-Lymph-Imm. Pursuant to the emergency declaration under the [address] [place] [address] and the [employer] Act, this Virtual Visit was conducted with patient's (and/or legal guardian's) consent, to reduce the patient's risk of exposure to COVID-19 and provide necessary medical care. The patient (and/or legal guardian) has also been advised to contact this office for worsening conditions or problems, and seek emergency medical treatment and/or call 911 if deemed necessary. Services were provided through a video synchronous discussion virtually to substitute for in-person clinic visit. Patient and provider were located at their individual homes. --MARÍA Maradiaga - CNP on 1/5/2021 at 2:46 PM    An electronic signature was used to authenticate this note.

## 2021-01-26 ENCOUNTER — APPOINTMENT (OUTPATIENT)
Dept: CT IMAGING | Age: 41
End: 2021-01-26
Payer: COMMERCIAL

## 2021-01-26 ENCOUNTER — HOSPITAL ENCOUNTER (EMERGENCY)
Age: 41
Discharge: HOME | End: 2021-01-26
Attending: EMERGENCY MEDICINE
Payer: COMMERCIAL

## 2021-01-26 ENCOUNTER — APPOINTMENT (OUTPATIENT)
Dept: ULTRASOUND IMAGING | Age: 41
End: 2021-01-26
Payer: COMMERCIAL

## 2021-01-26 VITALS
SYSTOLIC BLOOD PRESSURE: 128 MMHG | RESPIRATION RATE: 18 BRPM | OXYGEN SATURATION: 98 % | TEMPERATURE: 98.2 F | DIASTOLIC BLOOD PRESSURE: 87 MMHG | HEART RATE: 71 BPM | BODY MASS INDEX: 36.7 KG/M2 | HEIGHT: 64 IN | WEIGHT: 215 LBS

## 2021-01-26 DIAGNOSIS — N83.202 CYST OF LEFT OVARY: Primary | ICD-10-CM

## 2021-01-26 DIAGNOSIS — K59.00 CONSTIPATION, UNSPECIFIED CONSTIPATION TYPE: ICD-10-CM

## 2021-01-26 LAB
ALBUMIN SERPL-MCNC: 4 G/DL (ref 3.5–5.1)
ALP BLD-CCNC: 112 U/L (ref 38–126)
ALT SERPL-CCNC: 35 U/L (ref 11–66)
AMPHETAMINE+METHAMPHETAMINE URINE SCREEN: NEGATIVE
ANION GAP SERPL CALCULATED.3IONS-SCNC: 7 MEQ/L (ref 8–16)
AST SERPL-CCNC: 29 U/L (ref 5–40)
BACTERIA: ABNORMAL /HPF
BARBITURATE QUANTITATIVE URINE: NEGATIVE
BASOPHILS # BLD: 0.3 %
BASOPHILS ABSOLUTE: 0 THOU/MM3 (ref 0–0.1)
BENZODIAZEPINE QUANTITATIVE URINE: NEGATIVE
BILIRUB SERPL-MCNC: 0.3 MG/DL (ref 0.3–1.2)
BILIRUBIN DIRECT: < 0.2 MG/DL (ref 0–0.3)
BILIRUBIN URINE: NEGATIVE
BLOOD, URINE: ABNORMAL
BUN BLDV-MCNC: 11 MG/DL (ref 7–22)
CALCIUM SERPL-MCNC: 9.4 MG/DL (ref 8.5–10.5)
CANNABINOID QUANTITATIVE URINE: NEGATIVE
CASTS 2: ABNORMAL /LPF
CASTS UA: ABNORMAL /LPF
CHARACTER, URINE: CLEAR
CHLORIDE BLD-SCNC: 105 MEQ/L (ref 98–111)
CO2: 26 MEQ/L (ref 23–33)
COCAINE METABOLITE QUANTITATIVE URINE: NEGATIVE
COLOR: YELLOW
CREAT SERPL-MCNC: 0.5 MG/DL (ref 0.4–1.2)
CRYSTALS, UA: ABNORMAL
EKG ATRIAL RATE: 67 BPM
EKG P AXIS: 39 DEGREES
EKG P-R INTERVAL: 150 MS
EKG Q-T INTERVAL: 412 MS
EKG QRS DURATION: 76 MS
EKG QTC CALCULATION (BAZETT): 435 MS
EKG R AXIS: 28 DEGREES
EKG T AXIS: 33 DEGREES
EKG VENTRICULAR RATE: 67 BPM
EOSINOPHIL # BLD: 0.7 %
EOSINOPHILS ABSOLUTE: 0.1 THOU/MM3 (ref 0–0.4)
EPITHELIAL CELLS, UA: ABNORMAL /HPF
ERYTHROCYTE [DISTWIDTH] IN BLOOD BY AUTOMATED COUNT: 17.2 % (ref 11.5–14.5)
ERYTHROCYTE [DISTWIDTH] IN BLOOD BY AUTOMATED COUNT: 50.8 FL (ref 35–45)
GFR SERPL CREATININE-BSD FRML MDRD: > 90 ML/MIN/1.73M2
GLUCOSE BLD-MCNC: 114 MG/DL (ref 70–108)
GLUCOSE URINE: NEGATIVE MG/DL
HCT VFR BLD CALC: 37.6 % (ref 37–47)
HEMOGLOBIN: 11.9 GM/DL (ref 12–16)
IMMATURE GRANS (ABS): 0.03 THOU/MM3 (ref 0–0.07)
IMMATURE GRANULOCYTES: 0.3 %
KETONES, URINE: NEGATIVE
LEUKOCYTE ESTERASE, URINE: NEGATIVE
LIPASE: 43.8 U/L (ref 5.6–51.3)
LYMPHOCYTES # BLD: 26.4 %
LYMPHOCYTES ABSOLUTE: 3.1 THOU/MM3 (ref 1–4.8)
MAGNESIUM: 1.9 MG/DL (ref 1.6–2.4)
MCH RBC QN AUTO: 25.6 PG (ref 26–33)
MCHC RBC AUTO-ENTMCNC: 31.6 GM/DL (ref 32.2–35.5)
MCV RBC AUTO: 81 FL (ref 81–99)
MISCELLANEOUS 2: ABNORMAL
MONOCYTES # BLD: 6.1 %
MONOCYTES ABSOLUTE: 0.7 THOU/MM3 (ref 0.4–1.3)
NITRITE, URINE: NEGATIVE
NUCLEATED RED BLOOD CELLS: 0 /100 WBC
OPIATES, URINE: NEGATIVE
OSMOLALITY CALCULATION: 275.9 MOSMOL/KG (ref 275–300)
OXYCODONE: NEGATIVE
PH UA: 5 (ref 5–9)
PHENCYCLIDINE QUANTITATIVE URINE: NEGATIVE
PLATELET # BLD: 504 THOU/MM3 (ref 130–400)
PMV BLD AUTO: 10 FL (ref 9.4–12.4)
POTASSIUM SERPL-SCNC: 4.2 MEQ/L (ref 3.5–5.2)
PROTEIN UA: ABNORMAL
RBC # BLD: 4.64 MILL/MM3 (ref 4.2–5.4)
RBC URINE: ABNORMAL /HPF
RENAL EPITHELIAL, UA: ABNORMAL
SEG NEUTROPHILS: 66.2 %
SEGMENTED NEUTROPHILS ABSOLUTE COUNT: 7.7 THOU/MM3 (ref 1.8–7.7)
SODIUM BLD-SCNC: 138 MEQ/L (ref 135–145)
SPECIFIC GRAVITY, URINE: 1.02 (ref 1–1.03)
TOTAL PROTEIN: 7.9 G/DL (ref 6.1–8)
TROPONIN T: < 0.01 NG/ML
UROBILINOGEN, URINE: 0.2 EU/DL (ref 0–1)
WBC # BLD: 11.7 THOU/MM3 (ref 4.8–10.8)
WBC UA: ABNORMAL /HPF
YEAST: ABNORMAL

## 2021-01-26 PROCEDURE — 96375 TX/PRO/DX INJ NEW DRUG ADDON: CPT

## 2021-01-26 PROCEDURE — 2580000003 HC RX 258: Performed by: EMERGENCY MEDICINE

## 2021-01-26 PROCEDURE — 76830 TRANSVAGINAL US NON-OB: CPT

## 2021-01-26 PROCEDURE — 93005 ELECTROCARDIOGRAM TRACING: CPT | Performed by: EMERGENCY MEDICINE

## 2021-01-26 PROCEDURE — 36415 COLL VENOUS BLD VENIPUNCTURE: CPT

## 2021-01-26 PROCEDURE — 80053 COMPREHEN METABOLIC PANEL: CPT

## 2021-01-26 PROCEDURE — C9113 INJ PANTOPRAZOLE SODIUM, VIA: HCPCS | Performed by: EMERGENCY MEDICINE

## 2021-01-26 PROCEDURE — 99285 EMERGENCY DEPT VISIT HI MDM: CPT

## 2021-01-26 PROCEDURE — 83690 ASSAY OF LIPASE: CPT

## 2021-01-26 PROCEDURE — 96374 THER/PROPH/DIAG INJ IV PUSH: CPT

## 2021-01-26 PROCEDURE — 6360000002 HC RX W HCPCS: Performed by: EMERGENCY MEDICINE

## 2021-01-26 PROCEDURE — 81001 URINALYSIS AUTO W/SCOPE: CPT

## 2021-01-26 PROCEDURE — 82248 BILIRUBIN DIRECT: CPT

## 2021-01-26 PROCEDURE — 84484 ASSAY OF TROPONIN QUANT: CPT

## 2021-01-26 PROCEDURE — 74177 CT ABD & PELVIS W/CONTRAST: CPT

## 2021-01-26 PROCEDURE — 83735 ASSAY OF MAGNESIUM: CPT

## 2021-01-26 PROCEDURE — 85025 COMPLETE CBC W/AUTO DIFF WBC: CPT

## 2021-01-26 PROCEDURE — 80307 DRUG TEST PRSMV CHEM ANLYZR: CPT

## 2021-01-26 PROCEDURE — 93975 VASCULAR STUDY: CPT

## 2021-01-26 PROCEDURE — 6360000004 HC RX CONTRAST MEDICATION: Performed by: EMERGENCY MEDICINE

## 2021-01-26 RX ORDER — POLYETHYLENE GLYCOL 3350 17 G/17G
17 POWDER, FOR SOLUTION ORAL DAILY PRN
Qty: 30 EACH | Refills: 0 | Status: SHIPPED | OUTPATIENT
Start: 2021-01-26 | End: 2021-02-25

## 2021-01-26 RX ORDER — KETOROLAC TROMETHAMINE 30 MG/ML
30 INJECTION, SOLUTION INTRAMUSCULAR; INTRAVENOUS ONCE
Status: COMPLETED | OUTPATIENT
Start: 2021-01-26 | End: 2021-01-26

## 2021-01-26 RX ORDER — IBUPROFEN 400 MG/1
400 TABLET ORAL EVERY 6 HOURS PRN
Qty: 40 TABLET | Refills: 0 | Status: SHIPPED | OUTPATIENT
Start: 2021-01-26 | End: 2021-04-22

## 2021-01-26 RX ORDER — ONDANSETRON 2 MG/ML
4 INJECTION INTRAMUSCULAR; INTRAVENOUS ONCE
Status: COMPLETED | OUTPATIENT
Start: 2021-01-26 | End: 2021-01-26

## 2021-01-26 RX ORDER — PANTOPRAZOLE SODIUM 40 MG/10ML
40 INJECTION, POWDER, LYOPHILIZED, FOR SOLUTION INTRAVENOUS ONCE
Status: COMPLETED | OUTPATIENT
Start: 2021-01-26 | End: 2021-01-26

## 2021-01-26 RX ORDER — SODIUM CHLORIDE 9 MG/ML
10 INJECTION INTRAVENOUS ONCE
Status: DISCONTINUED | OUTPATIENT
Start: 2021-01-26 | End: 2021-01-26 | Stop reason: HOSPADM

## 2021-01-26 RX ORDER — 0.9 % SODIUM CHLORIDE 0.9 %
500 INTRAVENOUS SOLUTION INTRAVENOUS ONCE
Status: COMPLETED | OUTPATIENT
Start: 2021-01-26 | End: 2021-01-26

## 2021-01-26 RX ADMIN — KETOROLAC TROMETHAMINE 30 MG: 30 INJECTION, SOLUTION INTRAMUSCULAR at 05:13

## 2021-01-26 RX ADMIN — SODIUM CHLORIDE 500 ML: 9 INJECTION, SOLUTION INTRAVENOUS at 03:55

## 2021-01-26 RX ADMIN — PANTOPRAZOLE SODIUM 40 MG: 40 INJECTION, POWDER, FOR SOLUTION INTRAVENOUS at 04:28

## 2021-01-26 RX ADMIN — ONDANSETRON 4 MG: 2 INJECTION INTRAMUSCULAR; INTRAVENOUS at 03:55

## 2021-01-26 RX ADMIN — IOPAMIDOL 80 ML: 755 INJECTION, SOLUTION INTRAVENOUS at 04:13

## 2021-01-26 ASSESSMENT — ENCOUNTER SYMPTOMS
ABDOMINAL DISTENTION: 0
CHOKING: 0
EYE REDNESS: 0
ABDOMINAL PAIN: 1
VOMITING: 0
BLOOD IN STOOL: 0
PHOTOPHOBIA: 0
EYE ITCHING: 0
SINUS PRESSURE: 0
COUGH: 0
EYE DISCHARGE: 0
VOICE CHANGE: 0
WHEEZING: 0
BACK PAIN: 0
RHINORRHEA: 0
NAUSEA: 0
SHORTNESS OF BREATH: 0
SORE THROAT: 0
CONSTIPATION: 0
TROUBLE SWALLOWING: 0
DIARRHEA: 0
CHEST TIGHTNESS: 0
EYE PAIN: 0

## 2021-01-26 ASSESSMENT — PAIN SCALES - GENERAL: PAINLEVEL_OUTOF10: 8

## 2021-01-26 ASSESSMENT — PAIN DESCRIPTION - PAIN TYPE: TYPE: ACUTE PAIN

## 2021-01-26 ASSESSMENT — PAIN DESCRIPTION - LOCATION: LOCATION: ABDOMEN

## 2021-01-26 NOTE — ED NOTES
Pt. Resting in bed with even and unlabored respirations. Pt. Resting with lights off and eyes closed. Pt. Updated about plan of care and treatment. Family at bedside. Pt. Has no further concerns, questions or needs at this time. Call light within reach.         Chun Smallwood RN  01/26/21 8944

## 2021-01-26 NOTE — ED PROVIDER NOTES
Musculoskeletal: Negative for arthralgias, back pain, gait problem, myalgias, neck pain and neck stiffness. Skin: Negative for pallor and rash. Allergic/Immunologic: Negative for immunocompromised state. Neurological: Negative for dizziness, tremors, seizures, syncope, facial asymmetry, weakness, light-headedness, numbness and headaches. Hematological: Negative for adenopathy. Does not bruise/bleed easily. Psychiatric/Behavioral: Negative for agitation, hallucinations and suicidal ideas. The patient is not nervous/anxious. PAST MEDICAL HISTORY    has a past medical history of Bell palsy, Essential hypertension, GERD (gastroesophageal reflux disease), Hashimoto's thyroiditis, HIGH CHOLESTEROL, Hyperlipidemia, Hypokalemia, Prediabetes, and Subclinical hyperthyroidism. SURGICAL HISTORY      has a past surgical history that includes  section (, ); Endoscopy, colon, diagnostic; Dilation and curettage of uterus (); laparoscopy (N/A, 6/15/2020); and Hysterectomy, total abdominal (N/A, 9/10/2020). CURRENT MEDICATIONS       Previous Medications    AMLODIPINE (NORVASC) 10 MG TABLET    take 1 tablet by mouth once daily    CYCLOBENZAPRINE (FLEXERIL) 10 MG TABLET    Take 1 tablet by mouth 3 times daily as needed for Muscle spasms    DOCUSATE SODIUM (STOOL SOFTENER) 100 MG CAPSULE    Take 100 mg by mouth 2 times daily as needed for Constipation    FLUTICASONE (FLONASE) 50 MCG/ACT NASAL SPRAY    2 sprays by Each Nostril route daily    METOPROLOL SUCCINATE (TOPROL XL) 100 MG EXTENDED RELEASE TABLET    take 1 tablet by mouth once daily    OMEPRAZOLE (PRILOSEC) 40 MG DELAYED RELEASE CAPSULE    Take 40 mg by mouth nightly    SERTRALINE (ZOLOFT) 100 MG TABLET    take 1 tablet by mouth once daily       ALLERGIES     is allergic to bactrim and sulfamethoxazole-trimethoprim. FAMILY HISTORY     She indicated that her mother is . She indicated that her father is .    family history includes Diabetes in her father and mother; Heart Disease in her mother; High Blood Pressure in her father and mother. SOCIAL HISTORY      reports that she has never smoked. She has never used smokeless tobacco. She reports current alcohol use. She reports that she does not use drugs. PHYSICAL EXAM     INITIAL VITALS:  height is 5' 4\" (1.626 m) and weight is 215 lb (97.5 kg). Her oral temperature is 98.2 °F (36.8 °C). Her blood pressure is 128/87 and her pulse is 71. Her respiration is 18 and oxygen saturation is 98%. Physical Exam  Vitals signs and nursing note reviewed. Constitutional:       General: She is not in acute distress. Appearance: She is well-developed. She is not diaphoretic. HENT:      Head: Normocephalic and atraumatic. Right Ear: External ear normal.      Left Ear: External ear normal.      Nose: Nose normal.      Mouth/Throat:      Pharynx: No oropharyngeal exudate. Eyes:      General: No scleral icterus. Right eye: No discharge. Left eye: No discharge. Conjunctiva/sclera: Conjunctivae normal.      Pupils: Pupils are equal, round, and reactive to light. Neck:      Musculoskeletal: Normal range of motion and neck supple. Thyroid: No thyromegaly. Vascular: No JVD. Trachea: No tracheal deviation. Cardiovascular:      Rate and Rhythm: Normal rate and regular rhythm. Heart sounds: Normal heart sounds, S1 normal and S2 normal. No murmur. No friction rub. No gallop. Pulmonary:      Effort: Pulmonary effort is normal.      Breath sounds: Normal breath sounds. No stridor. No wheezing, rhonchi or rales. Chest:      Chest wall: No tenderness. Abdominal:      General: Bowel sounds are normal. There is no distension. Palpations: Abdomen is soft. There is no mass. Tenderness: There is abdominal tenderness in the right lower quadrant and suprapubic area. There is no guarding or rebound.  Negative signs include Dorado's sign, Rovsing's sign, McBurney's sign, psoas sign and obturator sign. Hernia: No hernia is present. Musculoskeletal: Normal range of motion. General: No tenderness. Lymphadenopathy:      Cervical: No cervical adenopathy. Skin:     General: Skin is warm and dry. Capillary Refill: Capillary refill takes less than 2 seconds. Findings: No bruising, ecchymosis, lesion or rash. Neurological:      General: No focal deficit present. Mental Status: She is alert and oriented to person, place, and time. Mental status is at baseline. Cranial Nerves: No cranial nerve deficit. Motor: No weakness. Coordination: Coordination normal.      Gait: Gait normal.      Deep Tendon Reflexes: Reflexes are normal and symmetric. Reflexes normal.   Psychiatric:         Mood and Affect: Mood normal.         Speech: Speech normal.         Behavior: Behavior normal.         Thought Content: Thought content normal.         Judgment: Judgment normal.           DIFFERENTIAL DIAGNOSIS:   Constipation diverticulosis diverticulitis urinary tract infection kidney stone less likely bowel perforation    DIAGNOSTIC RESULTS     EKG: All EKG's are interpreted by the Emergency Department Physician who either signs or Co-signs this chart in the absence of a cardiologist.  EKG reveals normal sinus rhythm, normal axis, ventricular rate 67 CT interval 150 QRS duration of 76 QT interval 412 QTC of 435. RADIOLOGY: non-plain film images(s) such as CT, Ultrasound and MRI are read by the radiologist.  US NON OB TRANSVAGINAL   Final Result   1. No evidence of ovarian torsion. The left ovary is large and    demonstrates at least 3 follicles which are likely physiologic. 2. Hysterectomy. This document has been electronically signed by: Zhen Farah MD on 01/26/2021 06:26 AM         CT ABDOMEN PELVIS W IV CONTRAST Additional Contrast? None   Final Result   Possible cystitis.    Reactive para-aortic lymph nodes. Nonemergent/incidental findings in the report. This document has been electronically signed by: Anoop Arteaga MD on    01/26/2021 04:51 AM      All CT scans at this facility use dose modulation, iterative    reconstruction, and/or weight-based   dosing when appropriate to reduce radiation dose to as low as reasonably    achievable. US DUP ABD PEL RETRO SCROT COMPLETE    (Results Pending)         LABS:   Labs Reviewed   CBC WITH AUTO DIFFERENTIAL - Abnormal; Notable for the following components:       Result Value    WBC 11.7 (*)     Hemoglobin 11.9 (*)     MCH 25.6 (*)     MCHC 31.6 (*)     RDW-CV 17.2 (*)     RDW-SD 50.8 (*)     Platelets 750 (*)     All other components within normal limits   BASIC METABOLIC PANEL - Abnormal; Notable for the following components:    Glucose 114 (*)     All other components within normal limits   ANION GAP - Abnormal; Notable for the following components:    Anion Gap 7.0 (*)     All other components within normal limits   URINE WITH REFLEXED MICRO - Abnormal; Notable for the following components:    Blood, Urine TRACE (*)     Protein, UA TRACE (*)     All other components within normal limits   HEPATIC FUNCTION PANEL   LIPASE   TROPONIN   MAGNESIUM   URINE DRUG SCREEN   GLOMERULAR FILTRATION RATE, ESTIMATED   OSMOLALITY       EMERGENCY DEPARTMENT COURSE:   Vitals:    Vitals:    01/26/21 0259 01/26/21 0400 01/26/21 0516 01/26/21 0624   BP: 135/87 (!) 143/93 (!) 137/92 128/87   Pulse:  65 65 71   Resp:  18 16 18   Temp: 98.2 °F (36.8 °C)      TempSrc: Oral      SpO2:  99% 100% 98%   Weight:       Height:         Patient was assessed at bedside appropriate labs and imaging were ordered. Patient has a relatively benign abdominal exam here. I anticipate that the patient has some mild constipation especially status post colonoscopy. CT examination showed an enlarged left ovary.   Her pain is on the right however I did order ultrasound to rule out any kind of dictations but occasionally words are mis-transcribed.)    Octavio Montana, [facility],   01/26/21 3520

## 2021-01-26 NOTE — ED NOTES
Pt. Resting in bed with even and unlabored respirations. Pt. States pain is a 8/10 at this time. Pt. Updated about plan for CT scan. Family at bedside. Pt. Has no further concerns, questions or needs at this time. Call light within reach.         Li Prasad RN  01/26/21 0936

## 2021-01-26 NOTE — ED NOTES
Pt arrives to the ED for lower/middle abdominal pain that began about an hour prior to arrival. Pt rates her pain a 8/10 and states she has been nauseated as well. Pt states she had a colonoscopy last friday. Pt states she has not had abmornal bowel movements and states she had a bowel movement yesterday. Pt denies  any urinary changes. Pt remains to have her gallbladder and appendix. Pt respirations are even and unlabored. Pt vitals are stable.         Josselyn Smith RN  01/26/21 9049

## 2021-01-26 NOTE — ED NOTES
Pt. Resting in bed with even and unlabored respirations. Pt. States pain is a 7/10 at this time. Pt. Updated about plan for ultrasound. Pt. Has no further concerns, questions or needs at this time. Call light within reach.         Bill Andres RN  01/26/21 8615

## 2021-01-28 ENCOUNTER — CARE COORDINATION (OUTPATIENT)
Dept: OTHER | Facility: CLINIC | Age: 41
End: 2021-01-28

## 2021-01-28 NOTE — CARE COORDINATION
[facility] ED Follow Up Call    2021    Patient: Hernan Jorge Patient : 1980   MRN: [id number]  Reason for Admission: Left ovarian cyst, Constipation  Discharge Date: 2021    ACM attempted to reach patient for Care Transitions call. HIPAA compliant message left requesting a return phone call at patients convenience. Will continue to follow. Anai Goldstein RN BSN  Associate Care Manager  Phone: [phone]  Email: [email or web address]

## 2021-02-01 ENCOUNTER — CARE COORDINATION (OUTPATIENT)
Dept: OTHER | Facility: CLINIC | Age: 41
End: 2021-02-01

## 2021-02-01 NOTE — CARE COORDINATION
[address] [facility] ED Follow Up Call    2021    Patient: Santhosh Friedman Patient : 1980   MRN: [id number]  Reason for Admission: Left ovarian cyst, Constipation  Discharge Date: 2021       Care Transitions ED Follow Up    Care Transitions Interventions  Schedule Follow Up Appointment with Physician: Completed  Do you have any ongoing symptoms?: No   Do you have a copy of your discharge instructions?: Yes   Do you understand what to report and when to return?: Yes   Are you following your discharge instructions?: Yes   Do you have all of your prescriptions and are they filled?: Yes   Have you scheduled your follow up appointment?: No   Were you discharged with any Home Care or Post Acute Services or do you currently have any active services?: No              Needs to be reviewed by the provider   Additional needs identified to be addressed with provider No  none    Discussed COVID-19 related testing which was not done at this time. Test results were not done. Patient informed of results, if available? N/A         Ambulatory Care Manager [facility]) contacted the patient by telephone to perform post ED visit assessment. Verified name and  with patient as identifiers.     -Left ovarian cyst, Constipation: Patient reports no longer having severe pain that took her to ED. States taking Miralax and now having regular BMs. States doing okay. ACM scheduled PCP follow up for tomorrow. Patient denies needs or concerns at this time. Patient agreeable to follow up outreach from [facility]. Interventions:    Counseling and education provided at today's visit on:   Red Flag symptoms to report  Symptom management  Reinforced Discharge instructions  Advanced Care Planning education    Medication reconciliation was performed with patient, who verbalizes understanding of administration of home medications. Advised obtaining a 90-day supply of all daily and as-needed medications.      Reinforced resources available to patient including: PCP. Discussed follow-up appointments. If no appointment was previously scheduled, appointment scheduling offered:  Is follow up appointment scheduled within 7 days of discharge? Yes  [facility] follow up appointment(s):   Future Appointments   Date Time Provider Ravi Mccann   2/2/2021  2:40 PM Caleb Hanna, APRN - [address]:  Continue weekly outreaches to provide telephonic support, education and resources as needed. Discuss / follow up on: Red Flag symptoms to report  Symptom management      Patient verbalized understanding and is agreeable. Yehuda Johnston RN BSN  Associate Care Manager  Phone: [phone]  Email: [email or web address]

## 2021-02-08 ENCOUNTER — CARE COORDINATION (OUTPATIENT)
Dept: OTHER | Facility: CLINIC | Age: 41
End: 2021-02-08

## 2021-02-08 NOTE — LETTER
Dear Scott Moreno,    I hope this letter finds you doing well! I am sending you a few patient education hand-outs that I felt would be useful to you. I have highlighted or made note of a few things that I would like to emphasize or stress the importance of. I hope you find these helpful. Please look them over and let me know if you have any questions. You can contact me at my number or email listed below. Have a blessed day,        Adilson Myrick RN BSN  Associate Care Manager  [phone]  [email or web address]

## 2021-02-08 NOTE — CARE COORDINATION
[address] [facility] ED Follow Up Call    2021    Patient: Georges Green Patient : 1980   MRN: [id number]  Reason for Admission: Left ovarian cyst, Constipation  Discharge Date: 2021       Care Transitions ED Follow Up    Care Transitions Interventions  Do you have any ongoing symptoms?: Yes   Onset of Patient-reported symptoms: Other   Patient-reported symptoms: Constipation, Pain   Do you have a copy of your discharge instructions?: Yes   Do you understand what to report and when to return?: Yes   Are you following your discharge instructions?: Yes   Do you have all of your prescriptions and are they filled?: Yes              Associate Care Manager [facility]) called patient for CT ED follow up. Patient reports did not make PCP follow up appointment due to forgot about it. States she will call PCP to reschedule. Patient reports she also needs to schedule follow up with ob/gyn Dr. Irvin Enriquez, due to ongoing abdominal pain related to ovarian cyst. States abdominal pain improved since ED visit but not gone. Patient also reports with ongoing constipation and states \"I need to change my diet\". [facility] offered to mail patient information about high fiber diet and constipation management; patient accepted offer. [facility] mailed education this date. [facility] to follow up with patient next week. [facility] mailed the following letter and education to patient:    Dear Shonda Guevara,    I hope this letter finds you doing well! I am sending you a few patient education hand-outs that I felt would be useful to you. I have highlighted or made note of a few things that I would like to emphasize or stress the importance of. I hope you find these helpful. Please look them over and let me know if you have any questions. You can contact me at my number or email listed below. Have a blessed day,        Khalida Camacho RN BSN  Associate Care Manager  [phone]  [email or web address]          High-Fiber Diet: Care Instructions  Your Care Instructions     A high-fiber diet may help you relieve constipation and feel less bloated. Your doctor and dietitian will help you make a high-fiber eating plan based on your personal needs. The plan will include the things you like to eat. It will also make sure that you get 30 grams of fiber a day. Before you make changes to the way you eat, be sure to talk with your doctor or dietitian. Follow-up care is a key part of your treatment and safety. Be sure to make and go to all appointments, and call your doctor if you are having problems. It's also a good idea to know your test results and keep a list of the medicines you take. How can you care for yourself at home? · You can increase how much fiber you get if you eat more of certain foods. These foods include:  ? Whole-grain breads and cereals. ? Fruits, such as pears, apples, and peaches. Eat the skins, peels, and seeds, if you can.  ? Vegetables, such as broccoli, cabbage, spinach, carrots, asparagus, and squash. ? Starchy vegetables. These include potatoes with skins, kidney beans, and lima beans. · Take a fiber supplement every day if your doctor recommends it. Examples are Benefiber, Citrucel, FiberCon, and Metamucil. Ask your doctor how much to take. · Drink plenty of fluids, enough so that your urine is light yellow or clear like water. If you have kidney, heart, or liver disease and have to limit fluids, talk with your doctor before you increase the amount of fluids you drink. · Get some exercise every day. Exercise helps stool move through the colon. It also helps prevent constipation. · Keep a food diary. Try to notice and write down what foods cause gas, pain, or other symptoms. Then you can avoid these foods. Where can you learn more? Go to [email or web address] org and sign in to your [employer] account.  Enter P623 in the [employer] box to learn more about \"High-Fiber Diet: Care Instructions. \"     If you do not have an account, please click on the \"Sign Up Now\" link. Current as of: August 22, 2019               Content Version: 12.6  © 7296-4066 [employer]. Care instructions adapted under license by [facility] ([facility]). If you have questions about a medical condition or this instruction, always ask your healthcare professional. [address] any warranty or liability for your use of this information. Constipation: Care Instructions  Your Care Instructions     Constipation means that you have a hard time passing stools (bowel movements). People pass stools from 3 times a day to once every 3 days. What is normal for you may be different. Constipation may occur with pain in the rectum and cramping. The pain may get worse when you try to pass stools. Sometimes there are small amounts of bright red blood on toilet paper or the surface of stools. This is because of enlarged veins near the rectum (hemorrhoids). A few changes in your diet and lifestyle may help you avoid ongoing constipation. Your doctor may also prescribe medicine to help loosen your stool. Some medicines can cause constipation. These include pain medicines and antidepressants. Tell your doctor about all the medicines you take. Your doctor may want to make a medicine change to ease your symptoms. Follow-up care is a key part of your treatment and safety. Be sure to make and go to all appointments, and call your doctor if you are having problems. It's also a good idea to know your test results and keep a list of the medicines you take. How can you care for yourself at home? · Drink plenty of fluids, enough so that your urine is light yellow or clear like water. If you have kidney, heart, or liver disease and have to limit fluids, talk with your doctor before you increase the amount of fluids you drink. · Include high-fiber foods in your diet each day.  These include fruits, vegetables, beans, and whole grains. · Get at least 30 minutes of exercise on most days of the week. Walking is a good choice. You also may want to do other activities, such as running, swimming, cycling, or playing tennis or team sports. · Take a fiber supplement, such as Citrucel or Metamucil, every day. Read and follow all instructions on the label. · Schedule time each day for a bowel movement. A daily routine may help. Take your time having your bowel movement. · Support your feet with a small step stool when you sit on the toilet. This helps flex your hips and places your pelvis in a squatting position. · Your doctor may recommend an over-the-counter laxative to relieve your constipation. Examples are Milk of Magnesia and MiraLax. Read and follow all instructions on the label. Do not use laxatives on a long-term basis. When should you call for help? Call your doctor now or seek immediate medical care if:    · You have new or worse belly pain.     · You have new or worse nausea or vomiting.     · You have blood in your stools. Watch closely for changes in your health, and be sure to contact your doctor if:    · Your constipation is getting worse.     · You do not get better as expected. Where can you learn more? Go to https://[email or web address].obopay. org and sign in to your [facility] account. Enter 21 [phone] in the [facility] box to learn more about \"Constipation: Care Instructions. \"     If you do not have an account, please click on the \"Sign Up Now\" link. Current as of: June 26, 2019               Content Version: 12.6  © 1354-2833 [employer]. Care instructions adapted under license by [facility] ([facility]).  If you have questions about a medical condition or this instruction, always ask your healthcare professional. [place] [address] any warranty or liability for your use of this information.     [facility] mailed the following handouts with this letter: Nurse [employer] and Right [employer] Right Place Right Time. Goals      Conditions and Symptoms      I will schedule office visits, as directed by my provider. I will keep my appointment or reschedule if I have to cancel. I will notify my provider of any barriers to my plan of care. I will notify my provider of any symptoms that indicate a worsening of my condition. Barriers: lack of motivation  Plan for overcoming my barriers: I will work with my ACM to overcome barriers  Confidence: 7/10  Anticipated Goal Completion Date: 3/10/2021    2/8/2021: Patient missed PCP follow up appointment due to forgot about it. Patient to schedule follow up with PCP and ob/gyn Dr. Korin Marcial. Plan:  -Patient to call PCP and ob/gyn (Dr. Korin Marcial) to schedule ED follow up appts. -ACM to mail high fiber diet foods to patient (done-ACM mailed 2/8/2021)    -ACM to follow up with patient again next week. Barb Alaniz RN BSN  Associate Care Manager  Phone: [phone]  Email: [email or web address]

## 2021-02-16 ENCOUNTER — CARE COORDINATION (OUTPATIENT)
Dept: OTHER | Facility: CLINIC | Age: 41
End: 2021-02-16

## 2021-02-16 NOTE — CARE COORDINATION
[facility] [address] [facility] ED Follow Up Call    2021    Patient: Carlos A Kaur Patient : 1980   MRN: [id number]  Reason for Admission: Left ovarian cyst, Constipation  Discharge Date: 2021    ACM attempted to reach patient for follow up call. HIPAA compliant message left requesting a return phone call at patients convenience. Will continue to follow. Rosey Flor RN BSN  Associate Care Manager  Phone: [phone]  Email: [email or web address]

## 2021-02-22 ENCOUNTER — HOSPITAL ENCOUNTER (OUTPATIENT)
Age: 41
Discharge: HOME | End: 2021-02-22
Payer: COMMERCIAL

## 2021-02-22 ENCOUNTER — HOSPITAL ENCOUNTER (OUTPATIENT)
Dept: GENERAL RADIOLOGY | Age: 41
Discharge: HOME | End: 2021-02-22
Payer: COMMERCIAL

## 2021-02-22 DIAGNOSIS — N20.0 KIDNEY STONE: ICD-10-CM

## 2021-02-22 PROCEDURE — 74018 RADEX ABDOMEN 1 VIEW: CPT

## 2021-02-25 ENCOUNTER — CARE COORDINATION (OUTPATIENT)
Dept: OTHER | Facility: CLINIC | Age: 41
End: 2021-02-25

## 2021-02-25 NOTE — CARE COORDINATION
[address] [facility] ED Follow Up Call    2021    Patient: Carlos A Kaur Patient : 1980   MRN: [id number]  Reason for Admission: Left ovarian cyst, Constipation  Discharge Date: 2021    ACM attempted to reach patient for follow up call. HIPAA compliant message left requesting a return phone call at patients convenience. Will continue to follow. [facility] also sent lost to follow up letter to patient via [address] Rosey Flor RN BSN  Associate Care Manager  Phone: [phone]  Email: [email or web address]

## 2021-03-02 ENCOUNTER — OFFICE VISIT (OUTPATIENT)
Dept: UROLOGY | Age: 41
End: 2021-03-02
Payer: COMMERCIAL

## 2021-03-02 VITALS — WEIGHT: 222 LBS | TEMPERATURE: 97.1 F | BODY MASS INDEX: 37.9 KG/M2 | HEIGHT: 64 IN

## 2021-03-02 DIAGNOSIS — I10 ESSENTIAL HYPERTENSION: ICD-10-CM

## 2021-03-02 DIAGNOSIS — N32.81 OAB (OVERACTIVE BLADDER): ICD-10-CM

## 2021-03-02 DIAGNOSIS — N20.0 KIDNEY STONE: Primary | ICD-10-CM

## 2021-03-02 LAB
BACTERIA: ABNORMAL
BILIRUBIN URINE: NEGATIVE
BILIRUBIN URINE: NEGATIVE
BLOOD URINE, POC: ABNORMAL
BLOOD, URINE: ABNORMAL
CASTS: ABNORMAL /LPF
CASTS: ABNORMAL /LPF
CHARACTER, URINE: ABNORMAL
CHARACTER, URINE: CLEAR
COLOR, URINE: YELLOW
COLOR: YELLOW
CRYSTALS: ABNORMAL
EPITHELIAL CELLS, UA: ABNORMAL /HPF
GLUCOSE URINE: NEGATIVE MG/DL
GLUCOSE, URINE: NEGATIVE MG/DL
KETONES, URINE: NEGATIVE
KETONES, URINE: NEGATIVE
LEUKOCYTE CLUMPS, URINE: NEGATIVE
LEUKOCYTE ESTERASE, URINE: NEGATIVE
MISCELLANEOUS LAB TEST RESULT: ABNORMAL
NITRITE, URINE: NEGATIVE
NITRITE, URINE: NEGATIVE
PH UA: 5 (ref 5–9)
PH, URINE: 5 (ref 5–9)
POST VOID RESIDUAL (PVR): 23 ML
PROTEIN UA: 30 MG/DL
PROTEIN, URINE: 30 MG/DL
RBC URINE: ABNORMAL /HPF
RENAL EPITHELIAL, UA: ABNORMAL
SPECIFIC GRAVITY UA: 1.03 (ref 1–1.03)
SPECIFIC GRAVITY, URINE: >= 1.03 (ref 1–1.03)
UROBILINOGEN, URINE: 0.2 EU/DL (ref 0–1)
UROBILINOGEN, URINE: 0.2 EU/DL (ref 0–1)
WBC UA: ABNORMAL /HPF
YEAST: ABNORMAL

## 2021-03-02 PROCEDURE — 1036F TOBACCO NON-USER: CPT | Performed by: NURSE PRACTITIONER

## 2021-03-02 PROCEDURE — 51798 US URINE CAPACITY MEASURE: CPT | Performed by: NURSE PRACTITIONER

## 2021-03-02 PROCEDURE — 81003 URINALYSIS AUTO W/O SCOPE: CPT | Performed by: NURSE PRACTITIONER

## 2021-03-02 PROCEDURE — 99214 OFFICE O/P EST MOD 30 MIN: CPT | Performed by: NURSE PRACTITIONER

## 2021-03-02 PROCEDURE — G8417 CALC BMI ABV UP PARAM F/U: HCPCS | Performed by: NURSE PRACTITIONER

## 2021-03-02 PROCEDURE — G8484 FLU IMMUNIZE NO ADMIN: HCPCS | Performed by: NURSE PRACTITIONER

## 2021-03-02 PROCEDURE — G8427 DOCREV CUR MEDS BY ELIG CLIN: HCPCS | Performed by: NURSE PRACTITIONER

## 2021-03-02 RX ORDER — METOPROLOL SUCCINATE 100 MG/1
TABLET, EXTENDED RELEASE ORAL
Qty: 90 TABLET | Refills: 3 | Status: SHIPPED | OUTPATIENT
Start: 2021-03-02 | End: 2022-04-12

## 2021-03-02 RX ORDER — OXYBUTYNIN CHLORIDE 10 MG/1
10 TABLET, EXTENDED RELEASE ORAL DAILY
Qty: 30 TABLET | Refills: 2 | Status: SHIPPED | OUTPATIENT
Start: 2021-03-02 | End: 2021-05-04 | Stop reason: SDUPTHER

## 2021-03-02 ASSESSMENT — ENCOUNTER SYMPTOMS
VOMITING: 0
ABDOMINAL PAIN: 1
NAUSEA: 0
BACK PAIN: 0

## 2021-03-02 NOTE — PROGRESS NOTES
[address] [place] [address] [place] [id number]  Dept: [phone]  Loc: [phone]    Visit Date: 3/2/2021        HPI:     Alphonse Varner is a 41 y.o. female who presents today for:  Chief Complaint   Patient presents with    Follow-up     oab and kidney stones kub prior pvr today       HPI   Pt seen in follow up for OAB, hx of ureteral calculus. Pt has a hx of kidney stones and also notes urinary frequency and urgency spanning years. She is not currently on any medication. She reports a hx of pelvic discomfort if she holds her urine for too long relieved by urination. Notes discomfort sometimes following intercourse. Reports she urinates multiple times per day and at least 2 x per night to urinate. Has hx of nocturnal enuresis as a child until the age of 13. Denies incontinence currently. PVR 23 mls. Urine with small blood. Accompanied to appt by clarita Cohn.     Current Outpatient Medications   Medication Sig Dispense Refill    metoprolol succinate (TOPROL XL) 100 MG extended release tablet take 1 tablet by mouth once daily 90 tablet 3    ibuprofen (IBU) 400 MG tablet Take 1 tablet by mouth every 6 hours as needed for Pain 40 tablet 0    omeprazole (PRILOSEC) 40 MG delayed release capsule Take 40 mg by mouth nightly      sertraline (ZOLOFT) 100 MG tablet take 1 tablet by mouth once daily 90 tablet 3    amLODIPine (NORVASC) 10 MG tablet take 1 tablet by mouth once daily (Patient taking differently: nightly take 1 tablet by mouth once daily) 90 tablet 3     No current facility-administered medications for this visit. Past Medical History  Jay Mckenzie  has a past medical history of Bell palsy, Essential hypertension, GERD (gastroesophageal reflux disease), Hashimoto's thyroiditis, HIGH CHOLESTEROL, Hyperlipidemia, Hypokalemia, Prediabetes, and Subclinical hyperthyroidism.     Past Surgical History  The patient  has a past surgical history that includes  section (, ); Endoscopy, colon, diagnostic; Dilation and curettage of uterus (); laparoscopy (N/A, 6/15/2020); Hysterectomy, total abdominal (N/A, 9/10/2020); and Colonoscopy (2021). Family History  This patient's family history includes Diabetes in her father and mother; Heart Disease in her mother; High Blood Pressure in her father and mother. Social History  Deloris  reports that she has never smoked. She has never used smokeless tobacco. She reports current alcohol use. She reports that she does not use drugs. Subjective:      Review of Systems   Constitutional: Negative for activity change, appetite change, chills, diaphoresis, fatigue, fever and unexpected weight change. Gastrointestinal: Positive for abdominal pain. Negative for nausea and vomiting. Genitourinary: Negative for decreased urine volume, difficulty urinating, dysuria, flank pain, frequency, hematuria and urgency. Musculoskeletal: Negative for back pain. Objective:   Temp 97.1 °F (36.2 °C)   Ht 5' 4\" (1.626 m)   Wt 222 lb (100.7 kg)   LMP 2020   BMI 38.11 kg/m²     Physical Exam  Vitals signs reviewed. Constitutional:       General: She is not in acute distress. Appearance: Normal appearance. She is well-developed. She is not ill-appearing or diaphoretic. HENT:      Head: Normocephalic and atraumatic. Right Ear: External ear normal.      Left Ear: External ear normal.      Nose: Nose normal.      Mouth/Throat:      Mouth: Mucous membranes are moist.   Eyes:      General: No scleral icterus. Right eye: No discharge. Left eye: No discharge. Neck:      Vascular: No JVD. Trachea: No tracheal deviation. Pulmonary:      Effort: Pulmonary effort is normal. No respiratory distress. Abdominal:      General: There is no distension. Tenderness: There is no abdominal tenderness.  There is no right CVA micro and culture. F/u in 6-8 weeks with PVR. [facility] prior to appt.

## 2021-03-02 NOTE — PATIENT INSTRUCTIONS
You may receive a survey regarding the care you received during your visit. Your input is valuable to us. We encourage you to complete and return your survey. We hope you will choose us in the future for your healthcare needs. Kidney Stone Prevention    Kidney/Ureteral stones are formed by the normal chemicals which are present in the urine. If the urine gets super concentrated by any of the common ingredients e.g. Calcium, uric acid, oxalate, or phosphate, the stone is formed. The three most important changes, which you should make to your diet to prevent recurrence are as follows:      1. Drink at least 3 quarts of water a day, and even more in hot weather. You need to drink enough water to make at least two quarts of urine per day. 2. Limit your salt intake to no more than 3 grams per day. Remember that most of the processed foods are very high in sodium (canned, boxed, frozen, restaurant foods). When you eat large amounts of salty foods/snacks the kidney will eliminate that excess salt along with calcium, which is the most common composition of kidney stones. You do not have to limit your calcium intake (up to 1000 mg). Natural foods (low fat dairy products) as the source of calcium are preferred over pills and may in fact prevent stones. 3. Limit your animal protein (meat, fish, poultry). Try to limit meat consumption to no more than 8 ounces a day. Kidney stones are more common in people consuming large amounts of animal proteins, especially red meat. Increase vegetables/fruit consumption. Other strategies for stone prevention:  · Citric acid in urine prevents stone crystallization. Increase citrate consumption by drinking orange juice, lemonade, or diluted lemon juice. One good and inexpensive way to achieve this goal is by mixing 4 ounces of lemon juice in 2 liters of water to be used in a 24-hour period. · Limit oxalate consumption.   Oxalate is one of the most common contents of kidney stones. Foods rich in oxalates are all types of nuts, tea, spinach, rhubarb, cranberry, chocolate, and black pepper. Risk factors for stone formation:  · If your medical history includes gastric bypass surgery or resection of a large segment of bowel due to inflammatory bowel disease---you may have excessive oxalate absorption from the gut increasing your stone risk. Depending on the stone composition and or 24 hour urine tests, specific recommendations can be made to help prevent kidney stone formation in the future. · Obesity--Kidney stones are more common with obesity. Any weight reduction can be helpful. Do your best!!    Further testing:  · A 24 hour urine collection test called a Vicente Mahmood may be ordered by your physician at your follow-up appointment in the office. This test analyzes the elements present in your urine that may increase your risk for kidney stone formation. It allows your medical provider to pinpoint what specific dietary changes or medicine changes can be made to prevent future stones. · Stone analysis can be done on stone fragments retrieved during your procedure or from fragments that you pass in your urine to aid in prevention of future stones.       Foods and drinks that can be irritating to the bladder and contribute to pain:

## 2021-03-02 NOTE — TELEPHONE ENCOUNTER
Recent Visits  Date Type Provider Dept   05/29/20 Office Visit Red Morning, APRN - CNP [facility]   01/02/20 Office Visit Red Morning, APRN - CNP [facility]   Showing recent visits within past 540 days with a meds authorizing provider and meeting all other requirements     Future Appointments  No visits were found meeting these conditions.    Showing future appointments within next 150 days with a meds authorizing provider and meeting all other requirements     Future Appointments   Date Time Provider Ravi Mccann   3/2/2021  2:00 PM Tamie Funez, [facility]

## 2021-03-04 ENCOUNTER — CARE COORDINATION (OUTPATIENT)
Dept: OTHER | Facility: CLINIC | Age: 41
End: 2021-03-04

## 2021-03-04 NOTE — CARE COORDINATION
[address] [facility] ED Follow Up Call    3/4/2021    Patient: Primus Morena Patient : 1980   MRN: [id number]  Reason for Admission: Left ovarian cyst, Constipation  Discharge Date: 2021    ACM attempted to reach patient for follow up call. HIPAA compliant message left requesting a return phone call at patients convenience. ACM sent final lost to follow up letter to patient via [address] ACM signing off unless patient returns the call. Yehuda Johnston RN BSN  Associate Care Manager  Phone: [phone]  Email: [email or web address]

## 2021-03-05 LAB
ORGANISM: ABNORMAL
URINE CULTURE, ROUTINE: ABNORMAL
URINE CULTURE, ROUTINE: ABNORMAL

## 2021-03-06 ENCOUNTER — TELEPHONE (OUTPATIENT)
Dept: UROLOGY | Age: 41
End: 2021-03-06

## 2021-03-06 NOTE — TELEPHONE ENCOUNTER
Pt's urine culture with a small amount of bacteria only sensitive to IV antibiotic therapy since she is allergic to Bactrim. If she is not having any symptoms I recommend holding off on treatment. If she is having pain, dysuria, increased frequency, urgency, or fever we should refer to ID for IV antibiotic treatment.

## 2021-03-08 ENCOUNTER — TELEPHONE (OUTPATIENT)
Dept: FAMILY MEDICINE CLINIC | Age: 41
End: 2021-03-08

## 2021-03-08 ENCOUNTER — OFFICE VISIT (OUTPATIENT)
Dept: FAMILY MEDICINE CLINIC | Age: 41
End: 2021-03-08
Payer: COMMERCIAL

## 2021-03-08 VITALS
WEIGHT: 218.8 LBS | DIASTOLIC BLOOD PRESSURE: 78 MMHG | OXYGEN SATURATION: 98 % | BODY MASS INDEX: 37.36 KG/M2 | RESPIRATION RATE: 14 BRPM | HEIGHT: 64 IN | TEMPERATURE: 98.7 F | SYSTOLIC BLOOD PRESSURE: 130 MMHG | HEART RATE: 87 BPM

## 2021-03-08 DIAGNOSIS — R07.9 LEFT-SIDED CHEST PAIN: ICD-10-CM

## 2021-03-08 DIAGNOSIS — F41.0 PANIC DISORDER WITHOUT AGORAPHOBIA WITH MODERATE PANIC ATTACKS: ICD-10-CM

## 2021-03-08 DIAGNOSIS — B95.61 STAPHYLOCOCCUS AUREUS BACTEREMIA: Primary | ICD-10-CM

## 2021-03-08 DIAGNOSIS — R73.03 PREDIABETES: Primary | ICD-10-CM

## 2021-03-08 DIAGNOSIS — I10 ESSENTIAL HYPERTENSION: ICD-10-CM

## 2021-03-08 DIAGNOSIS — R78.81 STAPHYLOCOCCUS AUREUS BACTEREMIA: Primary | ICD-10-CM

## 2021-03-08 PROCEDURE — 99214 OFFICE O/P EST MOD 30 MIN: CPT | Performed by: NURSE PRACTITIONER

## 2021-03-08 PROCEDURE — G8427 DOCREV CUR MEDS BY ELIG CLIN: HCPCS | Performed by: NURSE PRACTITIONER

## 2021-03-08 PROCEDURE — G8484 FLU IMMUNIZE NO ADMIN: HCPCS | Performed by: NURSE PRACTITIONER

## 2021-03-08 PROCEDURE — 1036F TOBACCO NON-USER: CPT | Performed by: NURSE PRACTITIONER

## 2021-03-08 PROCEDURE — G8417 CALC BMI ABV UP PARAM F/U: HCPCS | Performed by: NURSE PRACTITIONER

## 2021-03-08 RX ORDER — AMLODIPINE BESYLATE 10 MG/1
10 TABLET ORAL DAILY
Qty: 90 TABLET | Refills: 3 | Status: SHIPPED | OUTPATIENT
Start: 2021-03-08 | End: 2022-03-15

## 2021-03-08 NOTE — PROGRESS NOTES
Chief Complaint   Patient presents with   Soy Willingham     no concerns        History obtained from chart review and the patient. SUBJECTIVE:  Marco Antonio Henderson is a 41 y.o. female that presents today for follow up HTN    HTN    Does patient check BP regularly at home? - No  Current Medication regimen - Toprol, Norvasc  Tolerating medications well? - yes    Shortness of breath or chest pain? She does get some chest pains, can happen at work and at home. She had one episode where she also had some numbness go down her arm. The chest pain usually goes away on its own. Denies any anxiety. Denies any SOB. The chest pain is usually on the left side. She denies any heart palpitations. Headache or visual complaints? Some headaches  Neurologic changes like confusion? No  Extremity edema? No    BP Readings from Last 3 Encounters:   03/08/21 130/78   01/26/21 128/87   09/12/20 138/80     She denies any heart burn. She is taking the Prilosec as prescribed. Anxiety  Anxiety is doing very well. She has even drove recently on the highway recently which is huge for her. No major panic attacks. Denies any depression. Taking Zoloft 100 mg and no side effects.     Age/Gender Health Maintenance    Lipid   Lab Results   Component Value Date    CHOL 195 12/28/2019    CHOL 194 05/15/2019    CHOL 205 (H) 02/12/2019     Lab Results   Component Value Date    TRIG 129 12/28/2019    TRIG 199 05/15/2019    TRIG 87 02/12/2019     Lab Results   Component Value Date    HDL 51 12/28/2019    HDL 49 05/15/2019    HDL 45 02/12/2019     Lab Results   Component Value Date    LDLCALC 118 12/28/2019    LDLCALC 105 05/15/2019    LDLCALC 143 02/12/2019     Lab Results   Component Value Date    LABVLDL 20 09/23/2015     Lab Results   Component Value Date    CHOLHDLRATIO 4.2 09/23/2015     DM Screen -   Lab Results   Component Value Date    LABA1C 5.9 05/29/2020     Colon Cancer Screening - 50  Lung Cancer Screening (Age 54 to [de-identified] with 30 pack year hx, current smoker or quit within past 15 years) - n/a    Tetanus - needs  Influenza Vaccine - needs  Pneumonia Vaccine - 65  Zostavax - 50    Breast Cancer Screening - 50  Cervical Cancer Screening - per OB, Dr. Toscano  Osteoporosis Screening - 70    AAA Screening - n/a    Falls screening - n/a    Current Outpatient Medications   Medication Sig Dispense Refill    amLODIPine (NORVASC) 10 MG tablet Take 1 tablet by mouth daily 90 tablet 3    metoprolol succinate (TOPROL XL) 100 MG extended release tablet take 1 tablet by mouth once daily 90 tablet 3    oxybutynin (DITROPAN XL) 10 MG extended release tablet Take 1 tablet by mouth daily 30 tablet 2    ibuprofen (IBU) 400 MG tablet Take 1 tablet by mouth every 6 hours as needed for Pain 40 tablet 0    omeprazole (PRILOSEC) 40 MG delayed release capsule Take 40 mg by mouth nightly      sertraline (ZOLOFT) 100 MG tablet take 1 tablet by mouth once daily 90 tablet 3     No current facility-administered medications for this visit. Orders Placed This Encounter   Medications    amLODIPine (NORVASC) 10 MG tablet     Sig: Take 1 tablet by mouth daily     Dispense:  90 tablet     Refill:  3         All medications reviewed and reconciled, including OTC and herbal medications. Updated list given to patient. Patient Active Problem List   Diagnosis    Abdominal pain complicating pregnancy    Essential hypertension    Panic disorder without agoraphobia with moderate panic attacks    Hashimoto's thyroiditis    Family history of early CAD    Intermittent palpitations    Facial droop    Accelerated hypertension    Diarrhea    Hyperlipidemia    Reactive thrombocytosis    Obesity (BMI 30-39. 9)    Right Ramirez's palsy    Prediabetes    Pelvic pain    S/P hysterectomy       Past Medical History:   Diagnosis Date    Bell palsy 2020    right side droop    Essential hypertension 11/7/2016    GERD (gastroesophageal reflux disease)     Hashimoto's thyroiditis 2016    HIGH CHOLESTEROL     Hyperlipidemia     Hypokalemia     Prediabetes 2020    Subclinical hyperthyroidism 2016       Past Surgical History:   Procedure Laterality Date     SECTION  ,     COLONOSCOPY  2021    1 polyp removed     DILATION AND CURETTAGE OF UTERUS      ENDOSCOPY, COLON, DIAGNOSTIC      HYSTERECTOMY, TOTAL ABDOMINAL N/A 9/10/2020    HYSTERECTOMY ABDOMINAL TOTAL, BS, POSS DANIELLE performed by Ryland Llamas MD at [facility] 13 6/15/2020    DIAGNOSTIC LAPAROSCOPY, performed by Ryland Llamas MD at [address] [place] [address] 200   Allergen Reactions    Bactrim Swelling     tongue    Sulfamethoxazole-Trimethoprim Swelling     tongue       Social History     Socioeconomic History    Marital status: Single     Spouse name: Not on file    Number of children: 2    Years of education: Not on file    Highest education level: Not on file   Occupational History    Not on file   Social Needs    Financial resource strain: Not on file    Food insecurity     Worry: Not on file     Inability: Not on file    Transportation needs     Medical: Not on file     Non-medical: Not on file   Tobacco Use    Smoking status: Never Smoker    Smokeless tobacco: Never Used   Substance and Sexual Activity    Alcohol use: Yes     Comment: social    Drug use: No    Sexual activity: Not on file   Lifestyle    Physical activity     Days per week: Not on file     Minutes per session: Not on file    Stress: Not on file   Relationships    Social connections     Talks on phone: Not on file     Gets together: Not on file     Attends Rastafarian service: Not on file     Active member of club or organization: Not on file     Attends meetings of clubs or organizations: Not on file     Relationship status: Not on file    Intimate partner violence     Fear of current or ex partner: Not on file     Emotionally abused: Not on file     Physically abused: Not on file     Forced sexual activity: Not on file   Other Topics Concern    Not on file   Social History Narrative    Not on file       Family History   Problem Relation Age of Onset    High Blood Pressure Mother     Diabetes Mother     Heart Disease Mother     High Blood Pressure Father     Diabetes Father          I have reviewed the patient's past medical history, past surgical history, allergies, medications, social and family history and I have made updates where appropriate.       Review of Systems  Positive responses are highlighted in bold    Constitutional:  Fever, Chills, Night Sweats, Fatigue, Unexpected changes in weight  Eyes:  Eye discharge, Eye pain, Eye redness, Visual disturbances   HENT:  Ear pain, Tinnitus, Nosebleeds, Trouble swallowing, Hearing loss, Sore throat  Cardiovascular:  Chest Pain, Palpitations, Orthopnea, Paroxysmal Nocturnal Dyspnea  Respiratory:  Cough, Wheezing, Shortness of breath, Chest tightness, Apnea  Gastrointestinal:  Nausea, Vomiting, Diarrhea, Constipation, Heartburn, Blood in stool  Genitourinary:  Difficulty or painful urination, Flank pain, Change in frequency, Urgency  Skin:  Color change, Rash, Itching, Wound  Psychiatric:  Hallucinations, Anxiety, Depression, Suicidal ideation  Hematological:  Enlarged glands, Easy bleeding, Easily bruising  Musculoskeletal:  Joint pain, Back pain, Gait problems, Joint swelling, Myalgias  Neurological:  Dizziness, Headaches, Presyncope, Numbness, Seizures, Tremors  Allergy:  Environmental allergies, Food allergies  Endocrine:  Heat Intolerance, Cold Intolerance, Polydipsia, Polyphagia, Polyuria    Lab Results   Component Value Date    TSH 0.670 05/29/2020     Lab Results   Component Value Date     01/26/2021    K 4.2 01/26/2021     01/26/2021    CO2 26 01/26/2021    BUN 11 01/26/2021    CREATININE 0.5 01/26/2021    GLUCOSE 114 (H) 01/26/2021    CALCIUM 9.4 01/26/2021    PROT 7.9 01/26/2021    LABALBU 4.0 01/26/2021    BILITOT 0.3 01/26/2021    ALKPHOS 112 01/26/2021    AST 29 01/26/2021    ALT 35 01/26/2021    LABGLOM >90 01/26/2021       Lab Results   Component Value Date    WBC 11.7 (H) 01/26/2021    HGB 11.9 (L) 01/26/2021    HCT 37.6 01/26/2021    MCV 81.0 01/26/2021     (H) 01/26/2021       PHYSICAL EXAM:  Vitals:    03/08/21 1409   BP: 130/78   Pulse: 87   Resp: 14   Temp: 98.7 °F (37.1 °C)   TempSrc: Oral   SpO2: 98%   Weight: 218 lb 12.8 oz (99.2 kg)   Height: 5' 4\" (1.626 m)     Body mass index is 37.56 kg/m². VS Reviewed  General Appearance: A&O x 3, No acute distress,well developed and well- nourished  Head: normocephalic and atraumatic  Eyes: pupils equal, round, and reactive to light, extraocular eye movements intact, conjunctivae and eye lids without erythema  Neck: supple and non-tender without mass, mild thyromegaly but no thyroid nodules, no cervical lymphadenopathy  Pulmonary/Chest: clear to auscultation bilaterally- no wheezes, rales or rhonchi, normal air movement, no respiratory distress or retractions  Cardiovascular: S1 and S2 auscultated w/ RRR. No murmurs, rubs, clicks, or gallops, distal pulses intact. Abdomen: soft, nontender non-distended, bowl sounds physiologic,  no rebound or guarding, no masses or hernias noted. Liver and spleen without enlargement. Extremities: no cyanosis, clubbing or edema of the lower extremities. Musculoskeletal: No joint swelling or gross deformity   Neuro:  Alert, 5/5 strength globally and symmetrically  Psych: Affect and mood are normal. Thought process is normal. Good insight and appropriate interaction. Cognition and memory appear to be intact. Skin: warm and dry, no rash or erythema  Lymph:  No cervical, auricular or supraclavicular lymph nodes palpated      ASSESSMENT & PLAN  Alaina Weldon was seen today for check-up.     Diagnoses and all orders for this visit:    Prediabetes  -     Hemoglobin A1C; Future    Essential hypertension  -

## 2021-03-08 NOTE — TELEPHONE ENCOUNTER
Patient has pain in her abdomen and low back that is continuous rated 7 on scale of 0-10. She has urgency, frequency, and urinates a lot. She denies fever or chills. Please order the referral. Thank you.

## 2021-03-08 NOTE — TELEPHONE ENCOUNTER
Pt. Is schedule for a stress test on 3/23/21 at 2 in the afternoon. She is to arrive at 1:30p.m. at [facility] on the 2nd floor heart center. 1. No coffee (regular and decaf), tea, chocolate, or cola drinks 24 hours prior to test.    2.Do not eat or drink anything 6 hours before the test (You may have a few sips of water    to take medication). 3. Hold VIAGRA medication 24 hours prior to test.    4.PLEASE BRING ALL MEDICATIONS WITH YOU TO THE APPOINTMENT,      INCLUDING INHALERS (IF YOU TAKE THEM). 5. Please wear comfortable clothing and shoes. Do not wear anything containing      Metal over the chest area (chains, necklaces, medals, safety pins, or a metal       underwire bra. All other types of bras are perfectly acceptable. Pt informed, states she has no questions or concerns.

## 2021-03-17 ENCOUNTER — TELEPHONE (OUTPATIENT)
Dept: WOUND CARE | Age: 41
End: 2021-03-17

## 2021-03-24 ENCOUNTER — NURSE ONLY (OUTPATIENT)
Dept: LAB | Age: 41
End: 2021-03-24

## 2021-03-24 DIAGNOSIS — R73.03 PREDIABETES: ICD-10-CM

## 2021-03-24 DIAGNOSIS — I10 ESSENTIAL HYPERTENSION: ICD-10-CM

## 2021-03-24 LAB
AVERAGE GLUCOSE: 117 MG/DL (ref 70–126)
CHOLESTEROL, FASTING: 230 MG/DL (ref 100–199)
HBA1C MFR BLD: 5.9 % (ref 4.4–6.4)
HDLC SERPL-MCNC: 53 MG/DL
LDL CHOLESTEROL CALCULATED: 157 MG/DL
TRIGLYCERIDE, FASTING: 101 MG/DL (ref 0–199)

## 2021-03-25 ENCOUNTER — TELEPHONE (OUTPATIENT)
Dept: FAMILY MEDICINE CLINIC | Age: 41
End: 2021-03-25

## 2021-03-25 NOTE — TELEPHONE ENCOUNTER
----- Message from [place], MARÍA Menjivar CNP sent at 3/25/2021  7:36 AM EDT -----  Let Devonte Sierra know her A1C  (DM test) came back at 5.9 which is stable. Still within prediabetic range, but stable. Cholesterol is up though, total cholesterol 230 LDL (bad) 157. Considering she has high blood pressure and prediabetes she is at a higher cardiovascular risk(stroke, heart attack), and I would consider starting her on cholesterol medication to help lower this risk and protect her heart. [id number] Evelyne Rouse with her?

## 2021-04-05 ENCOUNTER — OFFICE VISIT (OUTPATIENT)
Dept: INFECTIOUS DISEASES | Age: 41
End: 2021-04-05
Payer: COMMERCIAL

## 2021-04-05 VITALS
SYSTOLIC BLOOD PRESSURE: 142 MMHG | BODY MASS INDEX: 37.56 KG/M2 | DIASTOLIC BLOOD PRESSURE: 90 MMHG | HEIGHT: 64 IN | WEIGHT: 220 LBS | HEART RATE: 70 BPM

## 2021-04-05 DIAGNOSIS — R35.0 URINARY FREQUENCY: Primary | ICD-10-CM

## 2021-04-05 DIAGNOSIS — R39.15 URINARY URGENCY: ICD-10-CM

## 2021-04-05 PROCEDURE — G8417 CALC BMI ABV UP PARAM F/U: HCPCS | Performed by: NURSE PRACTITIONER

## 2021-04-05 PROCEDURE — 99212 OFFICE O/P EST SF 10 MIN: CPT | Performed by: NURSE PRACTITIONER

## 2021-04-05 PROCEDURE — G8427 DOCREV CUR MEDS BY ELIG CLIN: HCPCS | Performed by: NURSE PRACTITIONER

## 2021-04-05 PROCEDURE — 1036F TOBACCO NON-USER: CPT | Performed by: NURSE PRACTITIONER

## 2021-04-05 NOTE — PROGRESS NOTES
Use    Smoking status: Never Smoker    Smokeless tobacco: Never Used   Substance Use Topics    Alcohol use: Yes     Comment: social    Drug use: No       ALLERGIES    Allergies   Allergen Reactions    Bactrim Swelling     tongue    Sulfamethoxazole-Trimethoprim Swelling     tongue       MEDICATIONS    Current Outpatient Medications on File Prior to Visit   Medication Sig Dispense Refill    amLODIPine (NORVASC) 10 MG tablet Take 1 tablet by mouth daily 90 tablet 3    metoprolol succinate (TOPROL XL) 100 MG extended release tablet take 1 tablet by mouth once daily 90 tablet 3    oxybutynin (DITROPAN XL) 10 MG extended release tablet Take 1 tablet by mouth daily 30 tablet 2    omeprazole (PRILOSEC) 40 MG delayed release capsule Take 40 mg by mouth nightly      sertraline (ZOLOFT) 100 MG tablet take 1 tablet by mouth once daily 90 tablet 3    ibuprofen (IBU) 400 MG tablet Take 1 tablet by mouth every 6 hours as needed for Pain 40 tablet 0    [DISCONTINUED] FLUoxetine (PROZAC) 20 MG capsule Take 1 capsule by mouth daily for 2 weeks, then take 2 capsules by mouth daily 60 capsule 3    [DISCONTINUED] metoprolol tartrate (LOPRESSOR) 25 MG tablet Take 1 tablet by mouth 2 times daily (Patient taking differently: Take 25 mg by mouth daily ) 60 tablet 3     No current facility-administered medications on file prior to visit.         REVIEW OF SYSTEMS    Constitutional: Denies fever, chills, night sweats, fatigue, weight loss/gain, loss of appetite   Head: Denies headache,  dizziness, loss of consciousness  Respiratory: Denies shortness of breath, cough, wheezing, dyspnea with exertion  Cardiovascular:Denies chest pain, palpitations, edema  Gastrointestinal: Denies nausea, vomiting, constipation, diarrhea, abdominal pain   JOY: +urgency, frequency Denies dysuria, hematuria  Musculoskeletal: Denies joint pain, swelling , stiffness,  Endocrine: Denies polyuria, polydipsia, cold or heat intolerance  Hematology: Denies easy brusing or bleeding, hx of clotting disorder    Objective:      Ht 5' 4\" (1.626 m)   Wt 220 lb (99.8 kg)   LMP 07/23/2020   BMI 37.76 kg/m²     Wt Readings from Last 3 Encounters:   04/05/21 220 lb (99.8 kg)   03/08/21 218 lb 12.8 oz (99.2 kg)   03/02/21 222 lb (100.7 kg)       Immunization History   Administered Date(s) Administered    Influenza Vaccine, unspecified formulation 10/01/2016    Influenza Virus Vaccine 11/01/2017, 11/05/2018, 11/27/2019    Tdap (Boostrix, Adacel) 01/01/2016       PHYSICAL EXAM    Wt Readings from Last 3 Encounters:   04/05/21 220 lb (99.8 kg)   03/08/21 218 lb 12.8 oz (99.2 kg)   03/02/21 222 lb (100.7 kg)       General:  Awake, alert, no apparent distress. Appears stated age  [de-identified]: conjuctivae are clear without exudate or hemorrhage, anicteric sclera, moist oral mucosa. Chest:  Respirations regular, non-labored. Chest rise and fall equal bilaterally. Lungs clear to auscultation throughout all fields  Cardiovascular:  RRR,S1S2, no murmur or gallop. Abdomen:  Soft, non tender to palpation. No CVA tenderness. Neurological: Awake, alert, oriented x4    Psychiatric:  Appropriate mood and affect  Extremities: non-traumatic in appearance.    Skin:  Warm and dry       Wound 09/10/20 Abdomen Lower (Active)   Number of days: 206         Wound 09/10/20 Abdomen Lower (Active)   Number of days: 206       LABS       CBC:   Lab Results   Component Value Date    WBC 11.7 01/26/2021    HGB 11.9 01/26/2021    HCT 37.6 01/26/2021    MCV 81.0 01/26/2021     01/26/2021     BMP:   Lab Results   Component Value Date     01/26/2021    K 4.2 01/26/2021    K 3.7 12/28/2019     01/26/2021    CO2 26 01/26/2021    BUN 11 01/26/2021    CREATININE 0.5 01/26/2021     PT/INR:   Lab Results   Component Value Date    INR 0.96 12/27/2019     Prealbumin: No results found for: PREALBUMIN  Albumin:  Lab Results   Component Value Date    [facility] 01/26/2021     Sed Rate:No results found for: SEDRATE  CRP:   Lab Results   Component Value Date    CRP 0.65 11/18/2019     Micro: No results found for: BC   Hemoglobin A1C:   Lab Results   Component Value Date    LABA1C 5.9 03/24/2021     Component Collected Lab   Urine Culture, Routine Abnormal  03/02/2021  2:39  [facility]   No growth-preliminary     Organism Abnormal  03/02/2021  2:39  [facility]   Staphylococcus aureus    Urine Culture, Routine 03/02/2021  2:39  [facility]   Adams count: <10,000 CFU/mL This is a MRSA (Methicillin Resistant Staphylococcusaureus)isolate. Isolates of MRSA (ORSA) Methicillin (Oxacillin) ResistantStaphylococcus aureus (coagulase positive) require patientbe placed in CONTACT isolation. Methicillin(Oxacillin)resistant strains of staphylococci(MRSA)or(MRSE)should be considered resistant to all classesof cephalosporins, penems and beta-lactams. In the treatment of gram positive infections, GENTAMICINshould be CONSIDERED a SYNERGYSTIC agent ONLY. Ciprofloxacin and Levofloxacin, regardless of in vitrosensitivity, should not be used for staphylococcal infectionsother than uncomplicated lower UTIs. Testing Performed By    [facility] Name Director Address Valid Date Range   [facility] - [id number] Bryce Shaffer MD [address] [place] [id number] 08/30/17 0855-Present   Narrative  Performed by: [facility]  Source: urine, clean catch       Site:           Current Antibiotics: not stated   Susceptibility    Staphylococcus aureus (1)    Antibiotic Interpretation [place] Status    gentamicin Sensitive <=0.5 mcg/mL Final    oxacillin Resistant >=4 mcg/mL Final    tetracycline Resistant >=16 mcg/mL Final    trimethoprim-sulfamethoxazole Sensitive <=10 mcg/mL Final        Assessment:     -Urinary urgency  -Urinary frequency    Patient Active Problem List   Diagnosis Code    Abdominal pain complicating have more UTIs than women who aren't sexually active. Having a new sexual partner also increases your risk. If sexually active, void soon after sex.   -Constipation can lead to signs and symptoms of urinary tract infection. Ensure adequate water and fiber intake in diet to help regulate bowels. Follow up with primary care physician for any ongoing constipation.  -Call clinic or go to emergency department with any worsening symptoms or new symptoms. Follow up: As needed. Call clinic with any new onset or worsening symptoms. Please see attached Discharge Instructions    Written patient dismissal instructions given to patient and signed by patient or POA.              Electronically signed by MARÍA Obregon CNP on 4/5/2021 at 10:14 AM

## 2021-04-05 NOTE — PATIENT INSTRUCTIONS
-Drink plenty of water, recommend 2-3 liters per day unless otherwise directed by your doctor. This helps to irrigate bladder and prevent urine from becoming stagnant.    -Limit foods and drinks that are known to be irritating to the bladder. In general, you will want to avoid coffee, alcohol, citrus fruits, tomato-based products, artificial sweeteners and spicy foods. -Wipe from front to back to avoid contamination of urethra with stool.  -Avoid feminine products that may increase irritation to urethra. Using deodorant sprays or other feminine products, such as douches and powders, in the genital area can irritate the urethra.  -Sexually active women tend to have more UTIs than women who aren't sexually active. Having a new sexual partner also increases your risk. If sexually active, void soon after sex.   -Constipation can lead to signs and symptoms of urinary tract infection. Ensure adequate water and fiber intake in diet to help regulate bowels. Follow up with primary care physician for any ongoing constipation.  -Call clinic or go to emergency department with any worsening symptoms or new symptoms.      -When urine sample is ordered proper technique is essential to correctly identify cause of infection. Use proper technique as below:  Wash your hands with soap and water. Women need to wash the area between the labia prior to collection.  Sit on the toilet with your legs spread apart. Use two fingers to spread open your labia.  Use wipe to clean the inner folds of the labia making sure to wipe from the front to the back.  Use second wipe to clean over the opening where urine comes out (urethra), just above the opening of the vagina.:   Keeping labia spread apart, urinate a small amount into the toilet bowl, then stop the flow of urine.  Hold the urine cup a few inches from the urethra and urinate until the cup is about half full then finish urinating into toilet.      After collecting sample   Screw the lid tightly on the cup. Do not touch the inside of the cup or the lid.  Return the sample to the lab.  If you are at home, place the cup in a plastic bag and put the bag in the refrigerator until you take it to the lab or your provider's office.

## 2021-04-09 ENCOUNTER — HOSPITAL ENCOUNTER (OUTPATIENT)
Dept: NON INVASIVE DIAGNOSTICS | Age: 41
Discharge: HOME | End: 2021-04-09
Payer: COMMERCIAL

## 2021-04-09 VITALS — HEIGHT: 64 IN | BODY MASS INDEX: 37.56 KG/M2 | WEIGHT: 220 LBS

## 2021-04-09 DIAGNOSIS — R07.9 LEFT-SIDED CHEST PAIN: ICD-10-CM

## 2021-04-09 PROCEDURE — 93017 CV STRESS TEST TRACING ONLY: CPT | Performed by: NUCLEAR MEDICINE

## 2021-04-12 ENCOUNTER — TELEPHONE (OUTPATIENT)
Dept: FAMILY MEDICINE CLINIC | Age: 41
End: 2021-04-12

## 2021-04-12 DIAGNOSIS — R07.9 LEFT-SIDED CHEST PAIN: Primary | ICD-10-CM

## 2021-04-12 NOTE — TELEPHONE ENCOUNTER
Pt notified and chest pain has gotten no worse or better, its all the same, no new symptoms. Chest pain comes and goes.

## 2021-04-12 NOTE — TELEPHONE ENCOUNTER
----- Message from MARÍA Holguin CNP sent at 4/12/2021  9:47 AM EDT -----  Let Gabriele Rodriguez know she passed her stress test, was normal. Is she still having issues with CP?

## 2021-04-19 ENCOUNTER — OFFICE VISIT (OUTPATIENT)
Dept: FAMILY MEDICINE CLINIC | Age: 41
End: 2021-04-19
Payer: COMMERCIAL

## 2021-04-19 VITALS
WEIGHT: 223.2 LBS | OXYGEN SATURATION: 97 % | TEMPERATURE: 97.4 F | RESPIRATION RATE: 14 BRPM | BODY MASS INDEX: 38.1 KG/M2 | DIASTOLIC BLOOD PRESSURE: 82 MMHG | HEIGHT: 64 IN | SYSTOLIC BLOOD PRESSURE: 140 MMHG | HEART RATE: 78 BPM

## 2021-04-19 DIAGNOSIS — M48.061 SPINAL STENOSIS OF LUMBAR REGION WITHOUT NEUROGENIC CLAUDICATION: ICD-10-CM

## 2021-04-19 DIAGNOSIS — M51.26 HERNIATED LUMBAR INTERVERTEBRAL DISC: ICD-10-CM

## 2021-04-19 DIAGNOSIS — F41.0 PANIC DISORDER WITHOUT AGORAPHOBIA WITH MODERATE PANIC ATTACKS: ICD-10-CM

## 2021-04-19 DIAGNOSIS — I10 ESSENTIAL HYPERTENSION: Primary | ICD-10-CM

## 2021-04-19 DIAGNOSIS — R07.9 CHEST PAIN, UNSPECIFIED TYPE: ICD-10-CM

## 2021-04-19 PROBLEM — R29.810 FACIAL DROOP: Status: RESOLVED | Noted: 2019-12-27 | Resolved: 2021-04-19

## 2021-04-19 PROCEDURE — G8417 CALC BMI ABV UP PARAM F/U: HCPCS | Performed by: NURSE PRACTITIONER

## 2021-04-19 PROCEDURE — G8428 CUR MEDS NOT DOCUMENT: HCPCS | Performed by: NURSE PRACTITIONER

## 2021-04-19 PROCEDURE — 1036F TOBACCO NON-USER: CPT | Performed by: NURSE PRACTITIONER

## 2021-04-19 PROCEDURE — 99214 OFFICE O/P EST MOD 30 MIN: CPT | Performed by: NURSE PRACTITIONER

## 2021-04-19 RX ORDER — HYDROCHLOROTHIAZIDE 12.5 MG/1
12.5 CAPSULE, GELATIN COATED ORAL DAILY
Qty: 90 CAPSULE | Refills: 0 | Status: SHIPPED | OUTPATIENT
Start: 2021-04-19 | End: 2021-09-27

## 2021-04-19 NOTE — PROGRESS NOTES
Chief Complaint   Patient presents with    Follow-up     chest pain gotten slightly better, but still has it, comes and goes    Lower Back Pain     has bulging disc, achy/ shooting constant pain       History obtained from chart review and the patient. SUBJECTIVE:  David Ochoa is a 35 y.o. female that presents today for follow up HTN    HTN    Does patient check BP regularly at home? - No  Current Medication regimen - Toprol, Norvasc  Tolerating medications well? - yes    Shortness of breath or chest pain? She does get some chest pains, can happen at work and at home. She is getting episodes 3-4 times/week. The pain will radiate through to her back. Headache or visual complaints? Some headaches  Neurologic changes like confusion? No  Extremity edema? No    BP Readings from Last 3 Encounters:   04/19/21 (!) 140/82   04/05/21 (!) 142/90   03/08/21 130/78       Anxiety  Anxiety is doing very well, she feels like it is very manageable. She has even drove recently on the highway recently which is huge for her. No major panic attacks. Denies any depression. Taking Zoloft 100 mg and no side effects. C/o back pain. She has seen [facility] regarding bulging disc. She did have injections in her back and she reports that they actually \"hit a nerve\" and it actually made the pain worse.  She does have f/u appt with them    Age/Gender Health Maintenance    Lipid   Lab Results   Component Value Date    CHOL 195 12/28/2019    CHOL 194 05/15/2019    CHOL 205 (H) 02/12/2019     Lab Results   Component Value Date    TRIG 129 12/28/2019    TRIG 199 05/15/2019    TRIG 87 02/12/2019     Lab Results   Component Value Date    HDL 53 03/24/2021    HDL 51 12/28/2019    HDL 49 05/15/2019     Lab Results   Component Value Date    LDLCALC 157 03/24/2021    LDLCALC 118 12/28/2019    LDLCALC 105 05/15/2019     Lab Results   Component Value Date    LABVLDL 20 09/23/2015     Lab Results   Component Value Date    CHOLHDLRATIO 4.2 09/23/2015     DM Screen -   Lab Results   Component Value Date    LABA1C 5.9 03/24/2021     Colon Cancer Screening - 46  Lung Cancer Screening (Age 54 to [de-identified] with 27 pack year hx, current smoker or quit within past 15 years) - n/a    Tetanus - needs  Influenza Vaccine - needs  Pneumonia Vaccine - 65  Zostavax - 50    Breast Cancer Screening - 50  Cervical Cancer Screening - per OB, Dr. Marin Montez  Osteoporosis Screening - 70    AAA Screening - n/a    Falls screening - n/a    Current Outpatient Medications   Medication Sig Dispense Refill    hydroCHLOROthiazide (MICROZIDE) 12.5 MG capsule Take 1 capsule by mouth daily 90 capsule 0    amLODIPine (NORVASC) 10 MG tablet Take 1 tablet by mouth daily 90 tablet 3    metoprolol succinate (TOPROL XL) 100 MG extended release tablet take 1 tablet by mouth once daily 90 tablet 3    oxybutynin (DITROPAN XL) 10 MG extended release tablet Take 1 tablet by mouth daily 30 tablet 2    omeprazole (PRILOSEC) 40 MG delayed release capsule Take 40 mg by mouth nightly      sertraline (ZOLOFT) 100 MG tablet take 1 tablet by mouth once daily 90 tablet 3    ibuprofen (IBU) 400 MG tablet Take 1 tablet by mouth every 6 hours as needed for Pain 40 tablet 0     No current facility-administered medications for this visit. Orders Placed This Encounter   Medications    hydroCHLOROthiazide (MICROZIDE) 12.5 MG capsule     Sig: Take 1 capsule by mouth daily     Dispense:  90 capsule     Refill:  0         All medications reviewed and reconciled, including OTC and herbal medications. Updated list given to patient. Patient Active Problem List   Diagnosis    Essential hypertension    Panic disorder without agoraphobia with moderate panic attacks    Hashimoto's thyroiditis    Family history of early CAD    Intermittent palpitations    Diarrhea    Hyperlipidemia    Reactive thrombocytosis    Obesity (BMI 30-39. 9)    Right Ramirez's palsy    Prediabetes    Pelvic pain    S/P hysterectomy    Spinal stenosis of lumbar region without neurogenic claudication    Herniated lumbar intervertebral disc       Past Medical History:   Diagnosis Date    Bell palsy     right side droop    Essential hypertension 2016    GERD (gastroesophageal reflux disease)     Hashimoto's thyroiditis 2016    HIGH CHOLESTEROL     Hyperlipidemia     Hypokalemia     Prediabetes 2020    Subclinical hyperthyroidism 2016       Past Surgical History:   Procedure Laterality Date     SECTION  ,     COLONOSCOPY  2021    1 polyp removed     DILATION AND CURETTAGE OF UTERUS      ENDOSCOPY, COLON, DIAGNOSTIC      HYSTERECTOMY, TOTAL ABDOMINAL N/A 9/10/2020    HYSTERECTOMY ABDOMINAL TOTAL, BS, POSS DANIELLE performed by Mg Loya MD at [place] [id number] 6/15/2020    DIAGNOSTIC LAPAROSCOPY, performed by Mg Loya MD at [facility]       Allergies   Allergen Reactions    Bactrim Swelling     tongue    Sulfamethoxazole-Trimethoprim Swelling     tongue       Social History     Socioeconomic History    Marital status: Single     Spouse name: Not on file    Number of children: 2    Years of education: Not on file    Highest education level: Not on file   Occupational History    Not on file   Social Needs    Financial resource strain: Not on file    Food insecurity     Worry: Not on file     Inability: Not on file    Transportation needs     Medical: Not on file     Non-medical: Not on file   Tobacco Use    Smoking status: Never Smoker    Smokeless tobacco: Never Used   Substance and Sexual Activity    Alcohol use: Yes     Comment: social    Drug use: No    Sexual activity: Not on file   Lifestyle    Physical activity     Days per week: Not on file     Minutes per session: Not on file    Stress: Not on file   Relationships    Social connections     Talks on phone: Not on file     Gets together: Not on file     Attends Sabianist service: Not on file     Active member of club or organization: Not on file     Attends meetings of clubs or organizations: Not on file     Relationship status: Not on file    Intimate partner violence     Fear of current or ex partner: Not on file     Emotionally abused: Not on file     Physically abused: Not on file     Forced sexual activity: Not on file   Other Topics Concern    Not on file   Social History Narrative    Not on file       Family History   Problem Relation Age of Onset    High Blood Pressure Mother     Diabetes Mother     Heart Disease Mother     High Blood Pressure Father     Diabetes Father          I have reviewed the patient's past medical history, past surgical history, allergies, medications, social and family history and I have made updates where appropriate.       Review of Systems  Positive responses are highlighted in bold    Constitutional:  Fever, Chills, Night Sweats, Fatigue, Unexpected changes in weight  Eyes:  Eye discharge, Eye pain, Eye redness, Visual disturbances   HENT:  Ear pain, Tinnitus, Nosebleeds, Trouble swallowing, Hearing loss, Sore throat  Cardiovascular:  Chest Pain, Palpitations, Orthopnea, Paroxysmal Nocturnal Dyspnea  Respiratory:  Cough, Wheezing, Shortness of breath, Chest tightness, Apnea  Gastrointestinal:  Nausea, Vomiting, Diarrhea, Constipation, Heartburn, Blood in stool  Genitourinary:  Difficulty or painful urination, Flank pain, Change in frequency, Urgency  Skin:  Color change, Rash, Itching, Wound  Psychiatric:  Hallucinations, Anxiety, Depression, Suicidal ideation  Hematological:  Enlarged glands, Easy bleeding, Easily bruising  Musculoskeletal:  Joint pain, Back pain, Gait problems, Joint swelling, Myalgias  Neurological:  Dizziness, Headaches, Presyncope, Numbness, Seizures, Tremors  Allergy:  Environmental allergies, Food allergies  Endocrine:  Heat Intolerance, Cold Intolerance, Polydipsia, Polyphagia, Polyuria    Lab Results   Component Value Date    TSH 0.670 05/29/2020     Lab Results   Component Value Date     01/26/2021    K 4.2 01/26/2021     01/26/2021    CO2 26 01/26/2021    BUN 11 01/26/2021    CREATININE 0.5 01/26/2021    GLUCOSE 114 (H) 01/26/2021    CALCIUM 9.4 01/26/2021    PROT 7.9 01/26/2021    LABALBU 4.0 01/26/2021    BILITOT 0.3 01/26/2021    ALKPHOS 112 01/26/2021    AST 29 01/26/2021    ALT 35 01/26/2021    LABGLOM >90 01/26/2021       Lab Results   Component Value Date    WBC 11.7 (H) 01/26/2021    HGB 11.9 (L) 01/26/2021    HCT 37.6 01/26/2021    MCV 81.0 01/26/2021     (H) 01/26/2021       PHYSICAL EXAM:  Vitals:    04/19/21 1454 04/19/21 1515   BP: (!) 140/94 (!) 140/82   Pulse: 78    Resp: 14    Temp: 97.4 °F (36.3 °C)    TempSrc: Temporal    SpO2: 97%    Weight: 223 lb 3.2 oz (101.2 kg)    Height: 5' 4\" (1.626 m)      Body mass index is 38.31 kg/m². VS Reviewed  General Appearance: A&O x 3, No acute distress,well developed and well- nourished  Head: normocephalic and atraumatic  Eyes: pupils equal, round, and reactive to light, extraocular eye movements intact, conjunctivae and eye lids without erythema  Neck: supple and non-tender without mass, mild thyromegaly but no thyroid nodules, no cervical lymphadenopathy  Pulmonary/Chest: clear to auscultation bilaterally- no wheezes, rales or rhonchi, normal air movement, no respiratory distress or retractions  Cardiovascular: S1 and S2 auscultated w/ RRR. No murmurs, rubs, clicks, or gallops, distal pulses intact. Abdomen: soft, nontender non-distended, bowl sounds physiologic,  no rebound or guarding, no masses or hernias noted. Liver and spleen without enlargement. Extremities: no cyanosis, clubbing or edema of the lower extremities. Musculoskeletal: No joint swelling or gross deformity   Neuro:  Alert, 5/5 strength globally and symmetrically  Psych: Affect and mood are normal. Thought process is normal. Good insight and appropriate interaction. medications. Barriers to medication compliance addressed. All patient questions answered. Pt voiced understanding.        Electronically signed by MARÍA Wright CNP on 4/19/2021 at 3:32 PM

## 2021-04-22 ENCOUNTER — OFFICE VISIT (OUTPATIENT)
Dept: CARDIOLOGY CLINIC | Age: 41
End: 2021-04-22
Payer: COMMERCIAL

## 2021-04-22 VITALS
WEIGHT: 222 LBS | HEART RATE: 64 BPM | HEIGHT: 64 IN | SYSTOLIC BLOOD PRESSURE: 124 MMHG | DIASTOLIC BLOOD PRESSURE: 66 MMHG | BODY MASS INDEX: 37.9 KG/M2

## 2021-04-22 DIAGNOSIS — R07.2 PRECORDIAL PAIN: ICD-10-CM

## 2021-04-22 DIAGNOSIS — I10 ESSENTIAL HYPERTENSION: Primary | ICD-10-CM

## 2021-04-22 DIAGNOSIS — R06.02 SHORTNESS OF BREATH: ICD-10-CM

## 2021-04-22 PROCEDURE — G8427 DOCREV CUR MEDS BY ELIG CLIN: HCPCS | Performed by: NUCLEAR MEDICINE

## 2021-04-22 PROCEDURE — G8417 CALC BMI ABV UP PARAM F/U: HCPCS | Performed by: NUCLEAR MEDICINE

## 2021-04-22 PROCEDURE — 99204 OFFICE O/P NEW MOD 45 MIN: CPT | Performed by: NUCLEAR MEDICINE

## 2021-04-22 PROCEDURE — 1036F TOBACCO NON-USER: CPT | Performed by: NUCLEAR MEDICINE

## 2021-04-22 ASSESSMENT — ENCOUNTER SYMPTOMS
DIARRHEA: 0
ABDOMINAL PAIN: 0
RECTAL PAIN: 0
BLOOD IN STOOL: 0
ABDOMINAL DISTENTION: 0
NAUSEA: 0
COLOR CHANGE: 0
BACK PAIN: 0
ANAL BLEEDING: 0
SHORTNESS OF BREATH: 1
CHEST TIGHTNESS: 1
VOMITING: 0
CONSTIPATION: 0

## 2021-04-22 NOTE — PROGRESS NOTES
Never Used   Substance Use Topics    Alcohol use: Yes     Comment: social      Current Outpatient Medications   Medication Sig Dispense Refill    hydroCHLOROthiazide (MICROZIDE) 12.5 MG capsule Take 1 capsule by mouth daily 90 capsule 0    amLODIPine (NORVASC) 10 MG tablet Take 1 tablet by mouth daily 90 tablet 3    metoprolol succinate (TOPROL XL) 100 MG extended release tablet take 1 tablet by mouth once daily 90 tablet 3    oxybutynin (DITROPAN XL) 10 MG extended release tablet Take 1 tablet by mouth daily 30 tablet 2    omeprazole (PRILOSEC) 40 MG delayed release capsule Take 40 mg by mouth nightly      sertraline (ZOLOFT) 100 MG tablet take 1 tablet by mouth once daily 90 tablet 3     No current facility-administered medications for this visit. Allergies   Allergen Reactions    Bactrim Swelling     tongue    Sulfamethoxazole-Trimethoprim Swelling     tongue     Health Maintenance   Topic Date Due    HIV screen  Never done    COVID-19 Vaccine (1) Never done    Flu vaccine (Season Ended) 09/01/2021    Potassium monitoring  01/26/2022    Creatinine monitoring  01/26/2022    A1C test (Diabetic or Prediabetic)  03/24/2022    DTaP/Tdap/Td vaccine (2 - Td) 01/01/2026    Lipid screen  03/24/2026    Hepatitis C screen  Completed    Hepatitis A vaccine  Aged Out    Hepatitis B vaccine  Aged Out    Hib vaccine  Aged Out    Meningococcal (ACWY) vaccine  Aged Out    Pneumococcal 0-64 years Vaccine  Aged Out       Subjective:  Review of Systems   Constitutional: Positive for fatigue. HENT: Negative for ear pain and nosebleeds. Respiratory: Positive for chest tightness and shortness of breath. Cardiovascular: Positive for chest pain. Negative for palpitations. Gastrointestinal: Negative for abdominal distention, abdominal pain, anal bleeding, blood in stool, constipation, diarrhea, nausea, rectal pain and vomiting. Endocrine: Negative for polyphagia.    Genitourinary: Negative for dysuria and urgency. Musculoskeletal: Negative for arthralgias, back pain, gait problem, joint swelling, myalgias, neck pain and neck stiffness. Skin: Negative for color change, pallor, rash and wound. Allergic/Immunologic: Negative for food allergies. Neurological: Negative for syncope, light-headedness and numbness. Psychiatric/Behavioral: Negative for confusion, decreased concentration, dysphoric mood, hallucinations and self-injury. The patient is not nervous/anxious and is not hyperactive. Objective:  Physical Exam  HENT:      Head: Normocephalic. Right Ear: Tympanic membrane normal.      Nose: Nose normal.      Mouth/Throat:      Mouth: Mucous membranes are moist.   Eyes:      Pupils: Pupils are equal, round, and reactive to light. Neck:      Musculoskeletal: Normal range of motion. Cardiovascular:      Rate and Rhythm: Normal rate and regular rhythm. Heart sounds: Murmur present. Pulmonary:      Effort: No respiratory distress. Breath sounds: No stridor. No wheezing, rhonchi or rales. Chest:      Chest wall: No tenderness. Abdominal:      General: There is no distension. Palpations: There is no mass. Tenderness: There is no abdominal tenderness. There is no right CVA tenderness, left CVA tenderness, guarding or rebound. Hernia: No hernia is present. Musculoskeletal:         General: No swelling, tenderness, deformity or signs of injury. Right lower leg: No edema. Left lower leg: No edema. Skin:     Coloration: Skin is not jaundiced or pale. Findings: No bruising, erythema, lesion or rash. Neurological:      Mental Status: She is alert and oriented to person, place, and time. Cranial Nerves: No cranial nerve deficit. Sensory: No sensory deficit. Motor: No weakness.       Coordination: Coordination normal.      Gait: Gait normal.      Deep Tendon Reflexes: Reflexes normal.   Psychiatric:         Mood and Affect: Mood normal.         Thought Content: Thought content normal.       /66   Pulse 64   Ht 5' 4\" (1.626 m)   Wt 222 lb (100.7 kg)   LMP 07/23/2020   BMI 38.11 kg/m²     Assessment:      Diagnosis Orders   1. Essential hypertension     2. Precordial pain     3. Shortness of breath     as above  Chronic chest pain   No clear causes  recent regular stress test   Nuclear stress test 2018 was okay   Does have some risk for CAD  No obvious GI causes   Still with the symptoms       Plan:  No follow-ups on file. Discussed  ?/ nuclear stress test   Vs CTA   Consider a cath ? ? Continue risk factor modification and medical management  Thank you for allowing me to participate in the care of your patient. Please don't hesitate to contact me regarding any further issues related to the patient care    Orders Placed:  No orders of the defined types were placed in this encounter. Medications Prescribed:  No orders of the defined types were placed in this encounter. Discussed use, benefit, and side effects of prescribed medications. All patient questions answered. Pt voicedunderstanding. Instructed to continue current medications, diet and exercise. Continue risk factor modification and medical management. Patient agreed with treatment plan. Follow up as directed.     Electronically signedby Evelio Cisneros MD on 4/22/2021 at 2:49 PM

## 2021-04-23 ENCOUNTER — HOSPITAL ENCOUNTER (EMERGENCY)
Age: 41
Discharge: HOME | End: 2021-04-24
Attending: EMERGENCY MEDICINE
Payer: COMMERCIAL

## 2021-04-23 DIAGNOSIS — S40.011A CONTUSION OF MULTIPLE SITES OF RIGHT SHOULDER AND UPPER ARM, INITIAL ENCOUNTER: ICD-10-CM
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
## 2021-04-23 DIAGNOSIS — S40.021A CONTUSION OF MULTIPLE SITES OF RIGHT SHOULDER AND UPPER ARM, INITIAL ENCOUNTER: ICD-10-CM

## 2021-04-23 PROCEDURE — 99284 EMERGENCY DEPT VISIT MOD MDM: CPT

## 2021-04-24 ENCOUNTER — APPOINTMENT (OUTPATIENT)
Dept: GENERAL RADIOLOGY | Age: 41
End: 2021-04-24
Payer: COMMERCIAL

## 2021-04-24 ENCOUNTER — NURSE ONLY (OUTPATIENT)
Dept: LAB | Age: 41
End: 2021-04-24

## 2021-04-24 VITALS
SYSTOLIC BLOOD PRESSURE: 157 MMHG | DIASTOLIC BLOOD PRESSURE: 93 MMHG | TEMPERATURE: 98.3 F | HEART RATE: 69 BPM | OXYGEN SATURATION: 100 % | RESPIRATION RATE: 17 BRPM

## 2021-04-24 PROCEDURE — 73564 X-RAY EXAM KNEE 4 OR MORE: CPT

## 2021-04-24 PROCEDURE — 73030 X-RAY EXAM OF SHOULDER: CPT

## 2021-04-24 PROCEDURE — 73080 X-RAY EXAM OF ELBOW: CPT

## 2021-04-24 PROCEDURE — 6370000000 HC RX 637 (ALT 250 FOR IP): Performed by: EMERGENCY MEDICINE

## 2021-04-24 RX ORDER — ACETAMINOPHEN 325 MG/1
650 TABLET ORAL ONCE
Status: COMPLETED | OUTPATIENT
Start: 2021-04-24 | End: 2021-04-24

## 2021-04-24 RX ORDER — CYCLOBENZAPRINE HCL 10 MG
10 TABLET ORAL 3 TIMES DAILY PRN
Qty: 30 TABLET | Refills: 0 | Status: SHIPPED | OUTPATIENT
Start: 2021-04-24 | End: 2021-05-04

## 2021-04-24 RX ORDER — NAPROXEN 500 MG/1
500 TABLET ORAL 2 TIMES DAILY
Qty: 60 TABLET | Refills: 0 | Status: SHIPPED | OUTPATIENT
Start: 2021-04-24 | End: 2021-08-08

## 2021-04-24 RX ADMIN — ACETAMINOPHEN 650 MG: 325 TABLET ORAL at 00:39

## 2021-04-24 ASSESSMENT — ENCOUNTER SYMPTOMS
NAUSEA: 0
VOMITING: 0
EYE PAIN: 0
BACK PAIN: 0
PHOTOPHOBIA: 0
EYE ITCHING: 0
EYE DISCHARGE: 0
CONSTIPATION: 0
RHINORRHEA: 0
ABDOMINAL DISTENTION: 0
SHORTNESS OF BREATH: 0
SORE THROAT: 0
WHEEZING: 0
EYE REDNESS: 0
ABDOMINAL PAIN: 0
COUGH: 0
CHEST TIGHTNESS: 0
STRIDOR: 0
DIARRHEA: 0

## 2021-04-24 ASSESSMENT — PAIN SCALES - GENERAL: PAINLEVEL_OUTOF10: 8

## 2021-04-24 NOTE — ED NOTES
Upon first contact with patient this RN receives bedside shift report from [employer]. Pt resting on cot in stable condition. Pt state that she is still having pain 8/10 at this time. Vitals stable.  Call light in reach     Adry Mustafa RN  04/24/21 [id number]
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
## 2021-04-28 ENCOUNTER — OFFICE VISIT (OUTPATIENT)
Dept: FAMILY MEDICINE CLINIC | Age: 41
End: 2021-04-28
Payer: COMMERCIAL

## 2021-04-28 VITALS
HEIGHT: 64 IN | RESPIRATION RATE: 12 BRPM | HEART RATE: 75 BPM | BODY MASS INDEX: 37.73 KG/M2 | TEMPERATURE: 98.8 F | DIASTOLIC BLOOD PRESSURE: 76 MMHG | WEIGHT: 221 LBS | OXYGEN SATURATION: 98 % | SYSTOLIC BLOOD PRESSURE: 130 MMHG

## 2021-04-28 DIAGNOSIS — S13.4XXA WHIPLASH INJURY TO NECK, INITIAL ENCOUNTER: ICD-10-CM

## 2021-04-28 DIAGNOSIS — S46.911A STRAIN OF RIGHT SHOULDER, INITIAL ENCOUNTER: ICD-10-CM

## 2021-04-28 DIAGNOSIS — S09.90XA CLOSED HEAD INJURY, INITIAL ENCOUNTER: ICD-10-CM
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
## 2021-04-28 PROCEDURE — 1036F TOBACCO NON-USER: CPT | Performed by: NURSE PRACTITIONER

## 2021-04-28 PROCEDURE — 99213 OFFICE O/P EST LOW 20 MIN: CPT | Performed by: NURSE PRACTITIONER

## 2021-04-28 PROCEDURE — G8427 DOCREV CUR MEDS BY ELIG CLIN: HCPCS | Performed by: NURSE PRACTITIONER

## 2021-04-28 PROCEDURE — G8417 CALC BMI ABV UP PARAM F/U: HCPCS | Performed by: NURSE PRACTITIONER

## 2021-04-28 NOTE — PATIENT INSTRUCTIONS
Patient Education        Neck Strain or Sprain: Rehab Exercises  Introduction  Here are some examples of exercises for you to try. The exercises may be suggested for a condition or for rehabilitation. Start each exercise slowly. Ease off the exercises if you start to have pain. You will be told when to start these exercises and which ones will work best for you. How to do the exercises  Neck rotation   1. Sit in a firm chair, or stand up straight. 2. Keeping your chin level, turn your head to the right, and hold for 15 to 30 seconds. 3. Turn your head to the left and hold for 15 to 30 seconds. 4. Repeat 2 to 4 times to each side. Neck stretches   1. Look straight ahead, and tip your right ear to your right shoulder. Do not let your left shoulder rise up as you tip your head to the right. 2. Hold for 15 to 30 seconds. 3. Tilt your head to the left. Do not let your right shoulder rise up as you tip your head to the left. 4. Hold for 15 to 30 seconds. 5. Repeat 2 to 4 times to each side. Forward neck flexion   1. Sit in a firm chair, or stand up straight. 2. Bend your head forward. 3. Hold for 15 to 30 seconds. 4. Repeat 2 to 4 times. Lateral (side) bend strengthening   1. With your right hand, place your first two fingers on your right temple. 2. Start to bend your head to the side while using gentle pressure from your fingers to keep your head from bending. 3. Hold for about 6 seconds. 4. Repeat 8 to 12 times. 5. Switch hands and repeat the same exercise on your left side. Forward bend strengthening   1. Place your first two fingers of either hand on your forehead. 2. Start to bend your head forward while using gentle pressure from your fingers to keep your head from bending. 3. Hold for about 6 seconds. 4. Repeat 8 to 12 times. Neutral position strengthening   1. Using one hand, place your fingertips on the back of your head at the top of your neck.   2. Start to bend your head backward while using gentle pressure from your fingers to keep your head from bending. 3. Hold for about 6 seconds. 4. Repeat 8 to 12 times. Chin tuck   1. Lie on the floor with a rolled-up towel under your neck. Your head should be touching the floor. 2. Slowly bring your chin toward your chest.  3. Hold for a count of 6, and then relax for up to 10 seconds. 4. Repeat 8 to 12 times. Follow-up care is a key part of your treatment and safety. Be sure to make and go to all appointments, and call your doctor if you are having problems. It's also a good idea to know your test results and keep a list of the medicines you take. Where can you learn more? Go to https://[employer]. org and sign in to your [employer] account. Enter M679 in the [employer] box to learn more about \"Neck Strain or Sprain: Rehab Exercises. \"     If you do not have an account, please click on the \"Sign Up Now\" link. Current as of: November 16, 2020               Content Version: 12.8  © 0182-0500 Healthwise, Incorporated. Care instructions adapted under license by [facility] ([facility]). If you have questions about a medical condition or this instruction, always ask your healthcare professional. [address] any warranty or liability for your use of this information.
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
## 2021-05-03 ENCOUNTER — NURSE ONLY (OUTPATIENT)
Dept: LAB | Age: 41
End: 2021-05-03

## 2021-05-03 ENCOUNTER — TELEPHONE (OUTPATIENT)
Dept: FAMILY MEDICINE CLINIC | Age: 41
End: 2021-05-03

## 2021-05-03 DIAGNOSIS — R73.01 ELEVATED FASTING BLOOD SUGAR: Primary | ICD-10-CM

## 2021-05-03 DIAGNOSIS — I10 ESSENTIAL HYPERTENSION: ICD-10-CM

## 2021-05-03 LAB
ANION GAP SERPL CALCULATED.3IONS-SCNC: 11 MEQ/L (ref 8–16)
BUN BLDV-MCNC: 14 MG/DL (ref 7–22)
CALCIUM SERPL-MCNC: 9.6 MG/DL (ref 8.5–10.5)
CHLORIDE BLD-SCNC: 104 MEQ/L (ref 98–111)
CO2: 24 MEQ/L (ref 23–33)
CREAT SERPL-MCNC: 0.6 MG/DL (ref 0.4–1.2)
GFR SERPL CREATININE-BSD FRML MDRD: > 90 ML/MIN/1.73M2
GLUCOSE BLD-MCNC: 142 MG/DL (ref 70–108)
POTASSIUM SERPL-SCNC: 3.5 MEQ/L (ref 3.5–5.2)
SODIUM BLD-SCNC: 139 MEQ/L (ref 135–145)

## 2021-05-03 NOTE — TELEPHONE ENCOUNTER
Pt notified and lab said they cannot add A1C onto labs because there was not a right tube drawn, they can only add a1c if cbc was drawn. Pt was notified of this and will go back to the lab if a1c is ordered. Thanks!

## 2021-05-03 NOTE — TELEPHONE ENCOUNTER
----- Message from MARÍA Hernandez CNP sent at 5/3/2021  4:36 PM EDT -----  It is noted pt blood sugar was elevated on this result, was pt fasting? please call lab and see if they can run an A1C on this, also her kidney function is normal, electrolytes are normal.  Ask if any questions, continue all meds as ordered.

## 2021-05-04 ENCOUNTER — OFFICE VISIT (OUTPATIENT)
Dept: UROLOGY | Age: 41
End: 2021-05-04
Payer: COMMERCIAL

## 2021-05-04 VITALS
HEIGHT: 64 IN | SYSTOLIC BLOOD PRESSURE: 120 MMHG | BODY MASS INDEX: 37.73 KG/M2 | DIASTOLIC BLOOD PRESSURE: 86 MMHG | WEIGHT: 221 LBS

## 2021-05-04 DIAGNOSIS — R10.2 PELVIC PAIN: Primary | ICD-10-CM

## 2021-05-04 DIAGNOSIS — N32.81 OAB (OVERACTIVE BLADDER): ICD-10-CM

## 2021-05-04 DIAGNOSIS — N20.0 KIDNEY STONES: Primary | ICD-10-CM

## 2021-05-04 LAB
BILIRUBIN URINE: NEGATIVE
BLOOD URINE, POC: NEGATIVE
CHARACTER, URINE: CLEAR
COLOR, URINE: YELLOW
GLUCOSE URINE: NEGATIVE MG/DL
KETONES, URINE: NEGATIVE
LEUKOCYTE CLUMPS, URINE: NEGATIVE
NITRITE, URINE: NEGATIVE
PH, URINE: 5 (ref 5–9)
POST VOID RESIDUAL (PVR): 0 ML
PROTEIN, URINE: ABNORMAL MG/DL
SPECIFIC GRAVITY, URINE: 1.02 (ref 1–1.03)
UROBILINOGEN, URINE: 0.2 EU/DL (ref 0–1)

## 2021-05-04 PROCEDURE — 81003 URINALYSIS AUTO W/O SCOPE: CPT | Performed by: NURSE PRACTITIONER

## 2021-05-04 PROCEDURE — G8417 CALC BMI ABV UP PARAM F/U: HCPCS | Performed by: NURSE PRACTITIONER

## 2021-05-04 PROCEDURE — 51798 US URINE CAPACITY MEASURE: CPT | Performed by: NURSE PRACTITIONER

## 2021-05-04 PROCEDURE — G8427 DOCREV CUR MEDS BY ELIG CLIN: HCPCS | Performed by: NURSE PRACTITIONER

## 2021-05-04 PROCEDURE — 99214 OFFICE O/P EST MOD 30 MIN: CPT | Performed by: NURSE PRACTITIONER

## 2021-05-04 PROCEDURE — 1036F TOBACCO NON-USER: CPT | Performed by: NURSE PRACTITIONER

## 2021-05-04 RX ORDER — OXYBUTYNIN CHLORIDE 10 MG/1
10 TABLET, EXTENDED RELEASE ORAL 2 TIMES DAILY
Qty: 60 TABLET | Refills: 2 | Status: SHIPPED | OUTPATIENT
Start: 2021-05-04 | End: 2021-07-14 | Stop reason: ALTCHOICE

## 2021-05-04 ASSESSMENT — ENCOUNTER SYMPTOMS
ABDOMINAL PAIN: 0
VOMITING: 0
BACK PAIN: 0
NAUSEA: 0

## 2021-05-04 NOTE — PROGRESS NOTES
[place] [address] [address]  Dept: [phone]  Loc: [phone]    Visit Date: 5/4/2021        HPI:     David Ochoa is a 35 y.o. female who presents today for:  Chief Complaint   Patient presents with    Follow-up     6-8 week with PVR, litholink prior. HPI   Pt seen in follow up for kidney stones and OAB. Pt has a hx of kidney stones with 4 mm nonobstructive R renal stone on last KUB. She completed Litholink prior to appt. Chaparro Godinez has a hx of urinary frequency and urgency. Reports daytime urinary frequency and waking at least 2 x per night to urinate. Has hx of nocturnal enuresis as a child until the age of 13. Denies incontinence now. Reports intermittent pelvic pain. Denies constipation. Has minimum of 5 stools per day. No improvement with oxybutynin 10 mg XL daily. Current Outpatient Medications   Medication Sig Dispense Refill    naproxen (NAPROSYN) 500 MG tablet Take 1 tablet by mouth 2 times daily 60 tablet 0    cyclobenzaprine (FLEXERIL) 10 MG tablet Take 1 tablet by mouth 3 times daily as needed for Muscle spasms 30 tablet 0    hydroCHLOROthiazide (MICROZIDE) 12.5 MG capsule Take 1 capsule by mouth daily 90 capsule 0    amLODIPine (NORVASC) 10 MG tablet Take 1 tablet by mouth daily 90 tablet 3    metoprolol succinate (TOPROL XL) 100 MG extended release tablet take 1 tablet by mouth once daily 90 tablet 3    oxybutynin (DITROPAN XL) 10 MG extended release tablet Take 1 tablet by mouth daily 30 tablet 2    omeprazole (PRILOSEC) 40 MG delayed release capsule Take 40 mg by mouth nightly      sertraline (ZOLOFT) 100 MG tablet take 1 tablet by mouth once daily 90 tablet 3     No current facility-administered medications for this visit.         Past Medical History  Newtonsweetie Robin  has a past medical history of Bell palsy, Essential hypertension, GERD (gastroesophageal reflux disease), Hashimoto's thyroiditis, HIGH CHOLESTEROL, Hyperlipidemia, Hypokalemia, Prediabetes, and Subclinical hyperthyroidism. Past Surgical History  The patient  has a past surgical history that includes  section (, ); Endoscopy, colon, diagnostic; Dilation and curettage of uterus (); laparoscopy (N/A, 6/15/2020); Hysterectomy, total abdominal (N/A, 9/10/2020); and Colonoscopy (2021). Family History  This patient's family history includes Diabetes in her father and mother; Heart Disease in her mother; High Blood Pressure in her father and mother. Social History  Deloris  reports that she has never smoked. She has never used smokeless tobacco. She reports current alcohol use. She reports that she does not use drugs. Subjective:      Review of Systems   Constitutional: Negative for activity change, appetite change, chills, diaphoresis, fatigue, fever and unexpected weight change. Gastrointestinal: Negative for abdominal pain, nausea and vomiting. Genitourinary: Positive for frequency, pelvic pain and urgency. Negative for decreased urine volume, difficulty urinating, dysuria, flank pain and hematuria. Musculoskeletal: Negative for back pain. Objective:   /86   Ht 5' 4\" (1.626 m)   Wt 221 lb (100.2 kg)   LMP 2020   BMI 37.93 kg/m²     Physical Exam  Vitals signs reviewed. Constitutional:       General: She is not in acute distress. Appearance: Normal appearance. She is well-developed. She is not ill-appearing or diaphoretic. HENT:      Head: Normocephalic and atraumatic. Right Ear: External ear normal.      Left Ear: External ear normal.      Nose: Nose normal.      Mouth/Throat:      Mouth: Mucous membranes are moist.   Eyes:      General: No scleral icterus. Right eye: No discharge. Left eye: No discharge. Neck:      Vascular: No JVD. Trachea: No tracheal deviation.    Pulmonary:      Effort: Pulmonary effort is normal. No respiratory distress. Abdominal:      General: There is no distension. Tenderness: There is no abdominal tenderness. There is no right CVA tenderness or left CVA tenderness. Musculoskeletal: Normal range of motion. General: No tenderness. Neurological:      Mental Status: She is alert and oriented to person, place, and time. Mental status is at baseline. Psychiatric:         Mood and Affect: Mood normal.         Behavior: Behavior normal.         Thought Content: Thought content normal.         POC  Results for POC orders placed in visit on 05/04/21   POCT Urinalysis No Micro (Auto)   Result Value Ref Range    Glucose, Ur Negative NEGATIVE mg/dl    Bilirubin Urine Negative     Ketones, Urine Negative NEGATIVE    Specific Gravity, Urine 1.025 1.002 - 1.030    Blood, UA POC Negative NEGATIVE    pH, Urine 5.00 5.0 - 9.0    Protein, Urine Trace (A) NEGATIVE mg/dl    Urobilinogen, Urine 0.20 0.0 - 1.0 eu/dl    Nitrite, Urine Negative NEGATIVE    Leukocyte Clumps, Urine Negative NEGATIVE    Color, Urine Yellow YELLOW-STRAW    Character, Urine Clear CLR-SL.CLOUD   poct post void residual   Result Value Ref Range    post void residual 0 ml         Patients recent PSA values are as follows  No results found for: PSA, PSADIA     Recent BUN/Creatinine:  Lab Results   Component Value Date    BUN 14 05/03/2021    CREATININE 0.6 05/03/2021   Radiology  The patient has had a KUB which I have reviewed along with its accompanying report. The study demonstrates a 4 mm calculus in R kidney.       CT 1/2021-3 tiny nonobstructive calculi and 4-5 mm nonobstructive right renal calculus, bladder wall thickening    Assessment:   OAB  Pelvic pain  Nonobstructive R nephrolithiasis    Plan:     Reviewed results of Litholink with Donna. Recently started on HCTZ by PCP. Discussed decreasing salt intake, increasing water intake, reducing animal protein, and adding citrate containing foods and drinks to her diet. Repeat KUB in 6 months. For urinary frequency trial increasing Oxybutynin to 10mg XL BID and facilitate referral to pelvic floor therapy for pelvic pain. F/u in 2-3 months with PVR. Fikie to call if symptoms fail to improve prior to appt on the oxybutynin.

## 2021-05-06 ENCOUNTER — HOSPITAL ENCOUNTER (OUTPATIENT)
Dept: PHYSICAL THERAPY | Age: 41
Setting detail: THERAPIES SERIES
Discharge: HOME | End: 2021-05-06
Payer: COMMERCIAL

## 2021-05-06 PROCEDURE — 97163 PT EVAL HIGH COMPLEX 45 MIN: CPT | Performed by: PHYSICAL THERAPIST

## 2021-05-06 PROCEDURE — 97140 MANUAL THERAPY 1/> REGIONS: CPT | Performed by: PHYSICAL THERAPIST

## 2021-05-06 PROCEDURE — 97110 THERAPEUTIC EXERCISES: CPT | Performed by: PHYSICAL THERAPIST

## 2021-05-06 NOTE — PROGRESS NOTES
** PLEASE SIGN, DATE AND TIME CERTIFICATION BELOW AND RETURN TO [facility] OUTPATIENT [facility]ILITATION (FAX #: [phone]). ATTEST/CO-SIGN IF ACCESSING VIA [employer]. THANK YOU.**    I certify that I have examined the patient below and determined that Physical Medicine and Rehabilitation service is necessary and that I approve the established plan of care for up to 90 days or as specifically noted. Attestation, signature or co-signature of physician indicates approval of certification requirements.    ________________________ ____________ __________  Physician Signature   Date   Time  [address]  PHYSICAL THERAPY  OUTPATIENT REHABILITATION - SPECIALIZED THERAPY SERVICES  [x] PELVIC HEALTH EVALUATION  [] DAILY NOTE  [] PROGRESS NOTE [] DISCHARGE NOTE    Date: 2021  Patient Name:  Jorge Valles  : 1980  MRN: [id number]  CSN: [id number]    Referring Practitioner Blanca Lee*   Diagnosis Pelvic Pain    Treatment Diagnosis M62.58 - Muscle Wasting and Atrophy, nec, other site  M62.89 - Other Specified Disorders of Muscle  M54.5 - Low Back Pain  M62.830 - Muscle Spasms of Back, R39.15 - Urgency of Urination  R35.0 - Frequency of Micturition, R15.0 - Incomplete Defecation, R10.2 - Pelvic and Perineal Pain  R10.30 - Lower Abdominal Pain, Unspecified and M53.2X8 - Spinal Instabilities, Sacral and Sacrococcygeal Region  M62.838 PFM spasm N94.12   Date of Evaluation 21    Additional Pertinent History Past Medical History  Bell palsy, Essential hypertension, GERD, Hashimoto's thyroiditis, HIGH CHOLESTEROL, Hyperlipidemia, Hypokalemia, Prediabetes, and Subclinical hyperthyroidism. The patient  has a past surgical history that includes  section (, ); Endoscopy, colon, diagnostic; Dilation and curettage of uterus (); laparoscopy (N/A, 6/15/2020); Hysterectomy, total abdominal (N/A, 9/10/2020); and Colonoscopy (2021).       Functional Outcome Measure Used Back Index   Functional Outcome Score 23/50 (5/6/21)       Insurance: Primary: Payor: Brandy Camarena /  /  / ,   Secondary: PARAMOUNT ADVANTAGE   Authorization Information: No pre-cert req'd   Visit # 1, 1/10 for progress note   Visits Allowed: Unlimited based on med nec   Recertification Date: 3/5/0593   Physician Follow-Up:    Physician Orders: eval and treat   History of Present Illness: Chronic pelvic pain of the inf abdominal and LB regions with gradual worsening over time. Hyster 9/2020 was not helpful, colonoscopy essentially negative, working with urology for OAB, has stopped drinking OJ. Dyspareunia which can last a week. Pt sees Ally Berg     SUBJECTIVE: Pt is 34 yo female with c/c inf abdominal pain and pelvic pain. Pt has struggled with pain for many years without known reason and gradual worsening over time. Pt does have known disc problem in her LB, but the abdominal pain is the more distressing issue. Pt had hyster in Sept 2020 with no change in pain. GI testing (colonoscopy negative other than polyp removal and hemorrhoids). Pt has been working with urology for OAB, but meanwhile her pain is persisting. Pt reports worse pain kristen after intercourse which can last for a week. Pt was a bed wetter until age 13. Social/Functional History and Current Status:  Medications and Allergies have been reviewed and are listed on Medical History Questionnaire. Jovita Sheridan lives single lives with SO in a multiple floor home with stairs and no handrail to enter.     Task Previous Current   ADLs  Independent Independent   IADL's Independent standing tolerance is 30 minutes   Ambulation Independent walking tolerance is 4 blocks max   Transfers Independent antalgic when more flared   Recreation Independent pt would like to walk and dance, but pain stops her; intercourse causes pain flare which lasts a week   Community Integration Independent likes to be active with family act's; wants to be able exercise for fitness, at least walk   Driving Active  Active    Work [employer]. Occupation: phlebotomist  Full-Time.        PAIN    Location Inf abdominal   Description Tender, bloat, cramp   Increased by After intercourse, end of day, after PAP or exam   Decreased by time   Maximum Intensity 9-10/10   Best Intensity 4/10   Todays Rating 7/10   Other/Function      PAIN    Location LBP B, can radiate into B post-lat thighs   Description Ache, tight   Increased by [facility], prolonged standing or positions, twisting; worse abdominal pain   Decreased by occas stretching fwd; rest/sit down   Maximum Intensity 10/10   Best Intensity 4/10   Todays Rating 7/10   Other/Function        SEXUAL /MENSTRUAL HISTORY [] Deferred secondary to:    Age of First Menstrual Period 10   Are Cycles Regular yes - prior to hyster   Pain During Menses yes - first 3 days   Birth Control yes - had hyster   Number of Pregnancies 5, 3 miscarries   Number of Vaginal Deliveries 0   Number of Caesarean Deliveries 2       BLADDER ASSESSMENT [] Deferred secondary to:   Daily Fluid Ingestion: 6-8c water,2-3 sodas (Mt Dew or Pepsi), 2-3c Jeronimo Aid, rare tea, occas mixed drink   Urination Frequency Times/Day: every 1-2 hrs  Times/Night: 2   Volume Medium   Urge Sensation gradually gets strong with filling bladder   SYMPTOM QUESTIONNAIRE   Loses Urine Upon: none   Incontinence Volume: none   Frequency of Leakage: none   Wets the Bed: no   Burning/Pain with Urination: yes: abdominal   Difficulty Starting a Stream of Urine: no   Incomplete Emptying feels empty but can wait and void more (med amnt)   Strain to Empty Bladder: yes: occas   Falling Out Feeling: yes: pressure feeling but no palpable bulge   Urinate more than 7 times/day: yes   Use a form of Leakage Protection: no   Restrict Fluid Intake: no   Stream Strength Strong       BOWEL ASSESSMENT [] Deferred secondary to:   Frequency: 5 or more per day   Most Common Stool Modalities Parameters/  Location  Notes                     Manual Therapy Time/Technique  Notes   Perineal MFR 15 min x                Exercise/Intervention    Notes   Basic pelvic anatomy, nature of condition, PT POC 8 min  x    Diaphragmatic breathing       MET, L quad with R hams 5 sec 5 x bid          LTR       piri stretch       Adductor stretcj-supine                                     Specific Interventions Next Treatment: Review prior instructions, resume perineal MFR and instruct pt options for home such as use of thera-wand; instruct stretching; cont to reassess SIJ position  Also need to educate normal bladder fxn, urge control strategies, diet effects on bladder, optimal toilet posture and habits    Activity/Treatment Tolerance:  [x]  Patient tolerated treatment well  []  Patient limited by fatigue  []  Patient limited by pain   []  Patient limited by medical complications  []  Other:     Patient Education:   [x]  HEP/Education Completed: Instructed basic pelvic and PFM anatomy, nature of condition, PT POC. Instructed MET to address SIJ position with HO given. Initiated perineal MFR using light to moderate pressure to avoid pain flare  []  No new Education completed  []  Reviewed Prior HEP      [x]  Patient verbalized and/or demonstrated understanding of education provided. []  Patient unable to verbalize and/or demonstrate understanding of education provided. Will continue education. [x]  Barriers to learning: none    Assessment: Pt is in need of skilled PT due to problems with chronic pelvic pain and LBP. Pt has worse pain after intercourse or vaginal exam which can often last 1 wk. Pt additionally struggles with urinary urgency/frequency (but bladder palp does not reproduce pain symptoms) in addition to freq BM (which is likely due to incomplete defecation caused by tight/spastic PFM).   Pt exhibits tight and painful PFM in deep layers R>L sides along with psoas tightness and severe piri/glut tightness. Pt is particularly fxn'lly limited with standing and walking tolerance. Body Structures/Functions/Activity Limitations: PFM spasm, Decreased awareness of normal bladder habits, control, and diet effects on functioning, Abdominal and core weakness, SIJ obliquity, Impaired activity tolerance, Impaired endurance, Impaired muscle tone and Pain  Prognosis: Good    GOALS:  Patient Goal: abolish the pain    Short Term Goals:  Time Frame: 6 weeks  *  Patient to be independent with basic HEP. *  Patient to identify normal bladder function and voiding patterns, diet effects on bladder, urge control strategies, and optimal stool motility management. * Patient to delay voiding for 10 minutes with out leaking. *  Initiate use of home therawand vs SO assist for PFM spasm reduction training as appropriate. * Decrease max intensity of inf abdominal pain to 7/10 and by 20% in frequency due to reduction of CTR and mm tension. Long Term Goals:  Time Frame: 12 weeks  *  Patient to be independent with progressed HEP. * Patient will Increase abdominal strength to 2+ to 3-/5 or greater to allow for increased pelvic organ support and more efficient voiding and defecation. * Patient to exhibit normalized PFM tone to allow for pain reduction and efficient PFM control. * Patient's Back Index Score will improve to 10/50 or less to demonstrate functional improvement in condition. * Decrease max intensity of inf abdominal pain to 4/10 and present less than 50% of the day  * Decrease max intensity of LBP to 6/10 and present less than 50% of the day. * Pt to report standing tolerance of 45 min or longer due to pain and mm tension reduction. * Pt to report walking tolerance of at least 10 blocks at a moderate pace without pain flare. * Decrease max dyspareunia to 4/10 with pain duration of 2 hrs or less due to improved PFM tone.   * Improve pt's spine ROM to all [facility] with ease/minimal pain due to reduced mm tension and resultant improvement in ease of mobility. PLAN:  Treatment Recommendations: Strengthening, Functional Mobility Training, Neuromuscular Re-education, Manual Therapy - Soft Tissue Mobilization, Pain Management, Home Exercise Program, Patient Education and Self-Care Education and Training    [x]  Plan of care initiated. Plan to see patient 1 times per week for 12 weeks to address the treatment planned outlined above.   []  Continue with current plan of care  []  Modify plan of care as follows:    []  Hold pending physician visit  []  Discharge    Time In 10:00   Time Out 11:00   Timed Code Minutes: 30 min   Total Treatment Time: 60 min       Electronically Signed by: ISAURA COX

## 2021-05-10 ENCOUNTER — HOSPITAL ENCOUNTER (OUTPATIENT)
Dept: NON INVASIVE DIAGNOSTICS | Age: 41
Discharge: HOME | End: 2021-05-10
Payer: COMMERCIAL

## 2021-05-10 VITALS — WEIGHT: 220 LBS | HEIGHT: 64 IN | BODY MASS INDEX: 37.56 KG/M2

## 2021-05-10 DIAGNOSIS — R06.02 SHORTNESS OF BREATH: ICD-10-CM

## 2021-05-10 DIAGNOSIS — R07.2 PRECORDIAL PAIN: ICD-10-CM

## 2021-05-10 DIAGNOSIS — I10 ESSENTIAL HYPERTENSION: ICD-10-CM

## 2021-05-10 LAB
LV EF: 51 %
LV EF: 55 %
LVEF MODALITY: NORMAL
LVEF MODALITY: NORMAL

## 2021-05-10 PROCEDURE — 78452 HT MUSCLE IMAGE SPECT MULT: CPT

## 2021-05-10 PROCEDURE — 93017 CV STRESS TEST TRACING ONLY: CPT | Performed by: NUCLEAR MEDICINE

## 2021-05-10 PROCEDURE — 93306 TTE W/DOPPLER COMPLETE: CPT

## 2021-05-10 PROCEDURE — 3430000000 HC RX DIAGNOSTIC RADIOPHARMACEUTICAL: Performed by: NUCLEAR MEDICINE

## 2021-05-10 PROCEDURE — A9500 TC99M SESTAMIBI: HCPCS | Performed by: NUCLEAR MEDICINE

## 2021-05-10 RX ADMIN — Medication 9.7 MILLICURIE: at 14:15

## 2021-05-10 RX ADMIN — Medication 33 MILLICURIE: at 15:10

## 2021-05-19 ENCOUNTER — APPOINTMENT (OUTPATIENT)
Dept: PHYSICAL THERAPY | Age: 41
End: 2021-05-19
Payer: COMMERCIAL

## 2021-05-20 ENCOUNTER — HOSPITAL ENCOUNTER (OUTPATIENT)
Dept: GENERAL RADIOLOGY | Age: 41
Discharge: HOME | End: 2021-05-20
Payer: COMMERCIAL

## 2021-05-20 ENCOUNTER — HOSPITAL ENCOUNTER (OUTPATIENT)
Age: 41
Discharge: HOME | End: 2021-05-20
Payer: COMMERCIAL

## 2021-05-20 ENCOUNTER — TELEPHONE (OUTPATIENT)
Dept: FAMILY MEDICINE CLINIC | Age: 41
End: 2021-05-20

## 2021-05-20 DIAGNOSIS — S33.5XXA SPRAIN OF LOW BACK, INITIAL ENCOUNTER: ICD-10-CM

## 2021-05-20 DIAGNOSIS — S13.4XXA WHIPLASH INJURY TO NECK, INITIAL ENCOUNTER: ICD-10-CM

## 2021-05-20 DIAGNOSIS — R73.01 ELEVATED FASTING BLOOD SUGAR: ICD-10-CM

## 2021-05-20 LAB
AVERAGE GLUCOSE: 114 MG/DL (ref 70–126)
HBA1C MFR BLD: 5.8 % (ref 4.4–6.4)

## 2021-05-20 PROCEDURE — 36415 COLL VENOUS BLD VENIPUNCTURE: CPT

## 2021-05-20 PROCEDURE — 72040 X-RAY EXAM NECK SPINE 2-3 VW: CPT

## 2021-05-20 PROCEDURE — 83036 HEMOGLOBIN GLYCOSYLATED A1C: CPT

## 2021-05-20 PROCEDURE — 72100 X-RAY EXAM L-S SPINE 2/3 VWS: CPT

## 2021-05-20 NOTE — TELEPHONE ENCOUNTER
----- Message from MARÍA Murillo CNP sent at 5/20/2021  4:31 PM EDT -----  Let Tom Feeler know her A1C is 5.8 which is stable, still within the prediabetic range, but stable.  Con't working on diet/exercise and weight loss

## 2021-05-24 ENCOUNTER — HOSPITAL ENCOUNTER (OUTPATIENT)
Dept: PHYSICAL THERAPY | Age: 41
Setting detail: THERAPIES SERIES
Discharge: HOME | End: 2021-05-24
Payer: COMMERCIAL

## 2021-05-24 PROCEDURE — 97140 MANUAL THERAPY 1/> REGIONS: CPT | Performed by: PHYSICAL THERAPIST

## 2021-05-24 PROCEDURE — 97530 THERAPEUTIC ACTIVITIES: CPT | Performed by: PHYSICAL THERAPIST

## 2021-05-24 NOTE — PROGRESS NOTES
[facility]  PHYSICAL THERAPY  OUTPATIENT REHABILITATION - SPECIALIZED THERAPY SERVICES  [] PELVIC HEALTH EVALUATION  [x] DAILY NOTE  [] PROGRESS NOTE [] DISCHARGE NOTE    Date: 2021  Patient Name:  Gigi Reyes  : 1980  MRN: [id number]  CSN: [id number]    Referring Practitioner Blanca Lee*   Diagnosis Pelvic Pain    Treatment Diagnosis M62.58 - Muscle Wasting and Atrophy, nec, other site  M62.89 - Other Specified Disorders of Muscle  M54.5 - Low Back Pain  M62.830 - Muscle Spasms of Back, R39.15 - Urgency of Urination  R35.0 - Frequency of Micturition, R15.0 - Incomplete Defecation, R10.2 - Pelvic and Perineal Pain  R10.30 - Lower Abdominal Pain, Unspecified and M53.2X8 - Spinal Instabilities, Sacral and Sacrococcygeal Region  M62.838 PFM spasm N94.12   Date of Evaluation 21    Additional Pertinent History Past Medical History  Bell palsy, Essential hypertension, GERD, Hashimoto's thyroiditis, HIGH CHOLESTEROL, Hyperlipidemia, Hypokalemia, Prediabetes, and Subclinical hyperthyroidism. The patient  has a past surgical history that includes  section (, ); Endoscopy, colon, diagnostic; Dilation and curettage of uterus (); laparoscopy (N/A, 6/15/2020); Hysterectomy, total abdominal (N/A, 9/10/2020); and Colonoscopy (2021). Functional Outcome Measure Used Back Index   Functional Outcome Score 23/50 (21)       Insurance: Primary: Payor: Conrad Kaufman /  /  / ,   Secondary: [place] [employer]   Authorization Information: No pre-cert req'd   Visit # 2, 2/10 for progress note   Visits Allowed: Unlimited based on med nec   Recertification Date:    Physician Follow-Up:    Physician Orders: eval and treat   History of Present Illness: Chronic pelvic pain of the inf abdominal and LB regions with gradual worsening over time.  Hyster 2020 was not helpful, colonoscopy essentially negative, working with urology for OAB, has stopped drinking OJ. Dyspareunia which can last a week. Pt sees Suzette Richter [facility]     SUBJECTIVE: Pt arrived 30 min late to today's appt due to being at Dr Vanessa Espinal for decompression. Pt denies new soreness after PT eval.  Pt working with MET. Pt reports no new changes in condition. Still having pain of the low abdominal and LB regions. Social/Functional History and Current Status:  Medications and Allergies have been reviewed and are listed on Medical History Questionnaire. Pt was a bed wetter until age 13. Nola Zazueta lives single lives with SO in a multiple floor home with stairs and no handrail to enter. Task Previous Current   ADLs  Independent Independent   IADL's Independent standing tolerance is 30 minutes   Ambulation Independent walking tolerance is 4 blocks max   Transfers Independent antalgic when more flared   Recreation Independent pt would like to walk and dance, but pain stops her; intercourse causes pain flare which lasts a week   Community Integration Independent likes to be active with family act's; wants to be able exercise for fitness, at least walk   Driving Active  Active    Work [employer]. Occupation: phlebotomist  Full-Time.        PAIN    Location Inf abdominal   Description Tender, bloat, cramp   Increased by After intercourse, end of day, after PAP or exam   Decreased by time   Maximum Intensity 9-10/10   Best Intensity 4/10   Todays Rating 7/10   Other/Function      PAIN    Location LBP B, can radiate into B post-lat thighs   Description Ache, tight   Increased by [facility], prolonged standing or positions, twisting; worse abdominal pain   Decreased by occas stretching fwd; rest/sit down   Maximum Intensity 10/10   Best Intensity 4/10   Todays Rating 7/10   Other/Function        SEXUAL /MENSTRUAL HISTORY [] Deferred secondary to:    Age of First Menstrual Period 10   Are Cycles Regular yes - prior to hyster   Pain During Menses yes - first 3 days   Birth Control yes - had hyster   Number of Pregnancies 5, 3 miscarries   Number of Vaginal Deliveries 0   Number of Caesarean Deliveries 2       BLADDER ASSESSMENT [] Deferred secondary to:   Daily Fluid Ingestion: 6-8c water,2-3 sodas (Mt Dew or Pepsi), 2-3c Jeronimo Aid, rare tea, occas mixed drink   Urination Frequency Times/Day: every 1-2 hrs  Times/Night: 2   Volume Medium   Urge Sensation gradually gets strong with filling bladder   SYMPTOM QUESTIONNAIRE   Loses Urine Upon: none   Incontinence Volume: none   Frequency of Leakage: none   Wets the Bed: no   Burning/Pain with Urination: yes: abdominal   Difficulty Starting a Stream of Urine: no   Incomplete Emptying feels empty but can wait and void more (med amnt)   Strain to Empty Bladder: yes: occas   Falling Out Feeling: yes: pressure feeling but no palpable bulge   Urinate more than 7 times/day: yes   Use a form of Leakage Protection: no   Restrict Fluid Intake: no   Stream Strength Strong       BOWEL ASSESSMENT [] Deferred secondary to:   Frequency: 5 or more per day   Most Common Stool Consistency: Varies soft to firm   SYMPTOM QUESTIONNAIRE   Strain to have a BM: yes: occas   Include fiber in your diet: no   Take laxatives/enemas regularly: no   Pain with BM: no   Strong urge to have BM: yes: if more loose    Leak/Stain Feces: no   Diarrhea often: no         SEXUAL ASSESSMENT [] Deferred secondary to:   Sexually Active yes   Pain with Keats (Dyspareunia) 7/10 during   Painful Penetration Pain with deep penetration   Lubrication Needed no   Pain After Keats yes: lasts 1 week at 9-10/10         VITALS [] Deferred secondary to:   Height 5'4\"   Weight 220#     GENERAL ASSESSMENT   [] Deferred secondary to:   Palpation Very tight and tender B glut's and piri; pain and tight B psoas and on pubis   Observation    Posture Forward Head Posture, Rounded Shoulders and R shldr dep, R pelvis dep, pelvic position is good today   Range of Motion Lumbar flexion 25% decreased with radiating B post LE pain; ext [facility] with increased LBP; increased abdom pain with SB R and with rot L; B LE AROM WFL and without c/o   Strength B LE strength WFL at 4/5 without c/o; abdominal strength 2-/5 and without c/o   Gait WFL   Sensation WNL   Edema WNL   Balance/Fall History Denies balance or fall problems   Special Tests Neg SLR, good hams flexi; pain with L FRANK; tight R hip ER and very tight B piri; Neg FADDIR; passive hip IR WFL and without c/o           OBSERVATION  [] Deferred secondary to:   Patient Safety Patient offered a chaperone and declined.    Skin Condition intact   Urogenital Triangle No tenderness or tightness reported       PELVIC FLOOR INTERNAL EXAM [] Deferred secondary to:   Exam Vaginal   Sensation Intact   Muscle Localization Good - after instruction   Palpation/Tone Hypertonic   Pelvic Floor Strength PERF:Power: 3-,Endurance: 3,Reps: 10,Flicks: 10   Relax after Contraction Normal   Prolapse None           TREATMENT   Precautions:     X in shaded column indicates activity completed today   Manual Therapy Time/Technique  Notes   Perineal MFR 15 min x    Inf abdominal MFR, kristen pubis and psoas 15 min x    Glut/piri/LB CTM      Exercise/Intervention    Notes   Basic pelvic anatomy, nature of condition, PT POC 2 min  x VR   Diaphragmatic breathing       MET, L quad with R hams 5 sec 5 x tiw          LTR 5 sec 5 x bid   piri stretch with towel 30 sec 3 x    Placido stretch 60 sec 1 x    Adductor stretch-supine              TrA       Pelvic tilt                Specific Interventions Next Treatment: Review prior instructions, cont perineal MFR and review options for home such as use of thera-wand; instruct stretching; cont to reassess SIJ position  Also need to educate normal bladder fxn, urge control strategies, diet effects on bladder, optimal toilet posture and habits    Activity/Treatment Tolerance:  [x]  Patient tolerated treatment well  []  Patient limited by fatigue  [] Patient limited by pain   []  Patient limited by medical complications  []  Other:     Patient Education:   [x]  HEP/Education Completed: Pt's pelvic position today is good, so decrease MET to tiw. Initiated stretching as listed, gently with good understanding, HO's given. Resumed perineal MFR and initiated inf abdominal MFR. Gave pt info on how to order a pelvic wand for home use for more ongoing ability to manage some her mm tightness and CTR. Pt very tight and tender with referred pain of R>L OI mm's and also of B psoas mm's and pubis region. []  No new Education completed  []  Reviewed Prior HEP      [x]  Patient verbalized and/or demonstrated understanding of education provided. []  Patient unable to verbalize and/or demonstrate understanding of education provided. Will continue education. [x]  Barriers to learning: none    Assessment: Pt with minimal change in her overall symptoms thus far, but her pelvic position has improved. Pt tolerated today's new additions to PT treatment without c/o. Very tight R>L OI mm, B psoas mm's, and still tight of glut's/piri regions. Body Structures/Functions/Activity Limitations: PFM spasm, Decreased awareness of normal bladder habits, control, and diet effects on functioning, Abdominal and core weakness, SIJ obliquity, Impaired activity tolerance, Impaired endurance, Impaired muscle tone and Pain  Prognosis: Good    GOALS:  Patient Goal: abolish the pain    Short Term Goals:  Time Frame: 6 weeks  *  Patient to be independent with basic HEP. *  Patient to identify normal bladder function and voiding patterns, diet effects on bladder, urge control strategies, and optimal stool motility management. * Patient to delay voiding for 10 minutes with out leaking. *  Initiate use of home therawand vs SO assist for PFM spasm reduction training as appropriate.   * Decrease max intensity of inf abdominal pain to 7/10 and by 20% in frequency due to reduction of CTR and mm tension. Long Term Goals:  Time Frame: 12 weeks  *  Patient to be independent with progressed HEP. * Patient will Increase abdominal strength to 2+ to 3-/5 or greater to allow for increased pelvic organ support and more efficient voiding and defecation. * Patient to exhibit normalized PFM tone to allow for pain reduction and efficient PFM control. * Patient's Back Index Score will improve to 10/50 or less to demonstrate functional improvement in condition. * Decrease max intensity of inf abdominal pain to 4/10 and present less than 50% of the day  * Decrease max intensity of LBP to 6/10 and present less than 50% of the day. * Pt to report standing tolerance of 45 min or longer due to pain and mm tension reduction. * Pt to report walking tolerance of at least 10 blocks at a moderate pace without pain flare. * Decrease max dyspareunia to 4/10 with pain duration of 2 hrs or less due to improved PFM tone. * Improve pt's spine ROM to all [facility] with ease/minimal pain due to reduced mm tension and resultant improvement in ease of mobility. PLAN:  Treatment Recommendations: Strengthening, Functional Mobility Training, Neuromuscular Re-education, Manual Therapy - Soft Tissue Mobilization, Pain Management, Home Exercise Program, Patient Education and Self-Care Education and Training    [x]  Plan of care initiated. Plan to see patient 1 times per week for 12 weeks to address the treatment planned outlined above.   []  Continue with current plan of care  []  Modify plan of care as follows:    []  Hold pending physician visit  []  Discharge    Time In 3:25   Time Out 4:10   Timed Code Minutes: 45 min   Total Treatment Time: 45 min       Electronically Signed by: ISAURA COX

## 2021-06-27 ENCOUNTER — APPOINTMENT (OUTPATIENT)
Dept: GENERAL RADIOLOGY | Age: 41
End: 2021-06-27
Payer: COMMERCIAL

## 2021-06-27 ENCOUNTER — HOSPITAL ENCOUNTER (EMERGENCY)
Age: 41
Discharge: HOME | End: 2021-06-27
Attending: EMERGENCY MEDICINE
Payer: COMMERCIAL

## 2021-06-27 VITALS
DIASTOLIC BLOOD PRESSURE: 87 MMHG | HEART RATE: 84 BPM | WEIGHT: 220 LBS | HEIGHT: 64 IN | OXYGEN SATURATION: 97 % | SYSTOLIC BLOOD PRESSURE: 149 MMHG | BODY MASS INDEX: 37.56 KG/M2 | TEMPERATURE: 98.3 F | RESPIRATION RATE: 16 BRPM

## 2021-06-27 DIAGNOSIS — R07.9 CHEST PAIN, UNSPECIFIED TYPE: Primary | ICD-10-CM

## 2021-06-27 LAB
ANION GAP SERPL CALCULATED.3IONS-SCNC: 11 MEQ/L (ref 8–16)
BASOPHILS # BLD: 0.3 %
BASOPHILS ABSOLUTE: 0 THOU/MM3 (ref 0–0.1)
BUN BLDV-MCNC: 16 MG/DL (ref 7–22)
CALCIUM SERPL-MCNC: 10.1 MG/DL (ref 8.5–10.5)
CHLORIDE BLD-SCNC: 104 MEQ/L (ref 98–111)
CO2: 25 MEQ/L (ref 23–33)
CREAT SERPL-MCNC: 0.6 MG/DL (ref 0.4–1.2)
EOSINOPHIL # BLD: 0.7 %
EOSINOPHILS ABSOLUTE: 0.1 THOU/MM3 (ref 0–0.4)
ERYTHROCYTE [DISTWIDTH] IN BLOOD BY AUTOMATED COUNT: 14.8 % (ref 11.5–14.5)
ERYTHROCYTE [DISTWIDTH] IN BLOOD BY AUTOMATED COUNT: 45 FL (ref 35–45)
GFR SERPL CREATININE-BSD FRML MDRD: > 90 ML/MIN/1.73M2
GLUCOSE BLD-MCNC: 170 MG/DL (ref 70–108)
HCT VFR BLD CALC: 38 % (ref 37–47)
HEMOGLOBIN: 12.1 GM/DL (ref 12–16)
IMMATURE GRANS (ABS): 0.03 THOU/MM3 (ref 0–0.07)
IMMATURE GRANULOCYTES: 0.3 %
LYMPHOCYTES # BLD: 26.9 %
LYMPHOCYTES ABSOLUTE: 2.9 THOU/MM3 (ref 1–4.8)
MCH RBC QN AUTO: 26.9 PG (ref 26–33)
MCHC RBC AUTO-ENTMCNC: 31.8 GM/DL (ref 32.2–35.5)
MCV RBC AUTO: 84.6 FL (ref 81–99)
MONOCYTES # BLD: 6.7 %
MONOCYTES ABSOLUTE: 0.7 THOU/MM3 (ref 0.4–1.3)
NUCLEATED RED BLOOD CELLS: 0 /100 WBC
OSMOLALITY CALCULATION: 284.6 MOSMOL/KG (ref 275–300)
PLATELET # BLD: 447 THOU/MM3 (ref 130–400)
PMV BLD AUTO: 9.6 FL (ref 9.4–12.4)
POTASSIUM REFLEX MAGNESIUM: 4.1 MEQ/L (ref 3.5–5.2)
RBC # BLD: 4.49 MILL/MM3 (ref 4.2–5.4)
SEG NEUTROPHILS: 65.1 %
SEGMENTED NEUTROPHILS ABSOLUTE COUNT: 7 THOU/MM3 (ref 1.8–7.7)
SODIUM BLD-SCNC: 140 MEQ/L (ref 135–145)
TROPONIN T: < 0.01 NG/ML
TROPONIN T: < 0.01 NG/ML
WBC # BLD: 10.8 THOU/MM3 (ref 4.8–10.8)

## 2021-06-27 PROCEDURE — 71045 X-RAY EXAM CHEST 1 VIEW: CPT

## 2021-06-27 PROCEDURE — 6370000000 HC RX 637 (ALT 250 FOR IP): Performed by: EMERGENCY MEDICINE

## 2021-06-27 PROCEDURE — 85025 COMPLETE CBC W/AUTO DIFF WBC: CPT

## 2021-06-27 PROCEDURE — 84484 ASSAY OF TROPONIN QUANT: CPT

## 2021-06-27 PROCEDURE — 93005 ELECTROCARDIOGRAM TRACING: CPT | Performed by: EMERGENCY MEDICINE

## 2021-06-27 PROCEDURE — 80048 BASIC METABOLIC PNL TOTAL CA: CPT

## 2021-06-27 PROCEDURE — 99284 EMERGENCY DEPT VISIT MOD MDM: CPT

## 2021-06-27 PROCEDURE — 36415 COLL VENOUS BLD VENIPUNCTURE: CPT

## 2021-06-27 RX ORDER — ASPIRIN 81 MG/1
324 TABLET, CHEWABLE ORAL ONCE
Status: COMPLETED | OUTPATIENT
Start: 2021-06-27 | End: 2021-06-27

## 2021-06-27 RX ADMIN — ASPIRIN 324 MG: 81 TABLET, CHEWABLE ORAL at 05:18

## 2021-06-27 ASSESSMENT — ENCOUNTER SYMPTOMS
ABDOMINAL PAIN: 0
CHEST TIGHTNESS: 1
CONSTIPATION: 0
SHORTNESS OF BREATH: 0
SINUS PRESSURE: 0
NAUSEA: 0
BACK PAIN: 0
SINUS PAIN: 0
DIARRHEA: 0
EYE PAIN: 0
COUGH: 0

## 2021-06-27 ASSESSMENT — PAIN SCALES - GENERAL: PAINLEVEL_OUTOF10: 6

## 2021-06-27 ASSESSMENT — PAIN DESCRIPTION - DESCRIPTORS: DESCRIPTORS: TIGHTNESS

## 2021-06-27 ASSESSMENT — PAIN DESCRIPTION - PAIN TYPE: TYPE: ACUTE PAIN

## 2021-06-27 ASSESSMENT — PAIN DESCRIPTION - LOCATION: LOCATION: CHEST
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
## 2021-06-27 NOTE — ED NOTES
Reassessment of the patients Chest Pain   is unchanged, the patients pain reassessment is a 5/10, Side rails up times 2, call light in reach, will continue to monitor.      Lalito Guy RN  06/27/21 3153

## 2021-06-27 NOTE — ED PROVIDER NOTES
[address] [facility] [address] [address] EMERGENCY DEPT  Emergency Department     Care of [address] was assumed from previous ED provider. The patient's initial evaluation and plan have been discussed with the ED prior provider who initially cared for the patient . All pertinent data has been reviewed. Time of signout is 0700. This patient is a 35 y.o. Female with chest pain, initial EKG and troponin negative, awaiting repeat troponin EKG    On my examination, patient is completely symptomatic, nontoxic. She is requesting discharge paperwork if she wants to be discharged home.   She states she is unable to wait any longer due to babysitting issues    [address] / MEDICAL DECISION MAKING:       MEDICATIONS GIVEN:  Orders Placed This Encounter   Medications    aspirin chewable tablet 324 mg     LABS / RADIOLOGY:     Results for orders placed or performed during the hospital encounter of 06/27/21   CBC Auto Differential   Result Value Ref Range    WBC 10.8 4.8 - 10.8 thou/mm3    RBC 4.49 4.20 - 5.40 mill/mm3    Hemoglobin 12.1 12.0 - 16.0 gm/dl    Hematocrit 38.0 37.0 - 47.0 %    MCV 84.6 81.0 - 99.0 fL    MCH 26.9 26.0 - 33.0 pg    MCHC 31.8 (L) 32.2 - 35.5 gm/dl    RDW-CV 14.8 (H) 11.5 - 14.5 %    RDW-SD 45.0 35.0 - 45.0 fL    Platelets 370 (H) 170 - 400 thou/mm3    MPV 9.6 9.4 - 12.4 fL    Seg Neutrophils 65.1 %    Lymphocytes 26.9 %    Monocytes 6.7 %    Eosinophils 0.7 %    Basophils 0.3 %    Immature Granulocytes 0.3 %    Segs Absolute 7.0 1 - 7 thou/mm3    Lymphocytes Absolute 2.9 1.0 - 4.8 thou/mm3    Monocytes Absolute 0.7 0.4 - 1.3 thou/mm3    Eosinophils Absolute 0.1 0.0 - 0.4 thou/mm3    Basophils Absolute 0.0 0.0 - 0.1 thou/mm3    Immature Grans (Abs) 0.03 0.00 - 0.07 thou/mm3    nRBC 0 /100 wbc   Troponin   Result Value Ref Range    Troponin T < 0.010 ng/ml   Basic Metabolic Panel w/ Reflex to MG   Result Value Ref Range    Sodium 140 135 - 145 meq/L    Potassium reflex Magnesium 4.1 3.5 - 5.2 meq/L    Chloride 104 98 - 111 meq/L    CO2 25 23 - 33 meq/L    Glucose 170 (H) 70 - 108 mg/dL    BUN 16 7 - 22 mg/dL    CREATININE 0.6 0.4 - 1.2 mg/dL    Calcium 10.1 8.5 - 10.5 mg/dL   Anion Gap   Result Value Ref Range    Anion Gap 11.0 8.0 - 16.0 meq/L   Glomerular Filtration Rate, Estimated   Result Value Ref Range    Est, Glom Filt Rate >90 ml/min/1.73m2   Osmolality   Result Value Ref Range    Osmolality Calc 284.6 275.0 - 300.0 mOsmol/kg   EKG 12 Lead   Result Value Ref Range    Ventricular Rate 97 BPM    Atrial Rate 97 BPM    P-R Interval 134 ms    QRS Duration 72 ms    Q-T Interval 360 ms    QTc Calculation (Bazett) 457 ms    P Axis 57 degrees    R Axis 41 degrees    T Axis 25 degrees     XR CHEST PORTABLE    Result Date: 6/27/2021  PROCEDURE: XR CHEST PORTABLE CLINICAL INFORMATION: Chest pain. Shortness of breath. COMPARISON: 8/17/2020. TECHNIQUE: AP upright view of the chest. FINDINGS: The lungs appear clear. The pulmonary vasculature and cardiomediastinal silhouette are within normal limits. Visualized osseous structures appear grossly intact. Stable radiographic appearance of the chest. No evidence of an acute process. **This report has been created using voice recognition software. It may contain minor errors which are inherent in voice recognition technology. ** Final report electronically signed by Dr. Emilie Moeller MD on 6/27/2021 7:04 AM    RECENT VITALS:     Temp: 98.3 °F (36.8 °C),  Pulse: 84, Resp: 16, BP: (!) 149/87, SpO2: 97 %    MDM/ED Course  ED Course as of Jun 27 0733   Sun Jun 27, 2021   0532 Heart score 2 if initial troponin is negative. Low risk. [DT]      ED Course User Index  [DT] Adam Milian MD       ED Course as of Jun 27 0733   Sun Jun 27, 2021   0532 Heart score 2 if initial troponin is negative. Low risk.     [DT]      ED Course User Index  [DT] Adam Milian MD     Agree with documentation above, agree patient is appropriate for outpatient management, however repeat troponin and EKG are pending at this time. The patient wants to be discharged home, given the incomplete evaluation so far, I discussed and [address] discharge currently, discussed appropriate precautions her, she understands she can return at anytime if she changes her mind if he develops any symptoms. She has been given aspirin, she has been set up for an outpatient cardiology appointment for tomorrow morning at 10 AM      CLINICAL IMPRESSION      1.  Chest pain, unspecified type          DISPOSITION/PLAN   DISPOSITION [place] 06/27/2021 07:33:39 AM      PATIENT REFERRED TO:  [email or web address]  [phone]  Go in 1 day  Tomorrow morning at 10 AM      DISCHARGE MEDICATIONS:  New Prescriptions    No medications on file              (Please note that portions of this note were completed with a voice recognition program.  Efforts were made to edit the dictations but occasionally words are mis-transcribed.)      Britton Armenta DO (electronically signed)  Attending Emergency Physician          Apolonia Lazaro DO  06/27/21 2285

## 2021-06-27 NOTE — ED NOTES
Patient to ED for chest pain/tightness that started last night. Patient reports having a stress test a couple months ago and has heard no results.  Patient feels nauseated, denies sob fever or chills     Toby Laureano RN  06/27/21 6512

## 2021-06-28 LAB
EKG ATRIAL RATE: 97 BPM
EKG P AXIS: 57 DEGREES
EKG P-R INTERVAL: 134 MS
EKG Q-T INTERVAL: 360 MS
EKG QRS DURATION: 72 MS
EKG QTC CALCULATION (BAZETT): 457 MS
EKG R AXIS: 41 DEGREES
EKG T AXIS: 25 DEGREES
EKG VENTRICULAR RATE: 97 BPM

## 2021-06-28 PROCEDURE — 93010 ELECTROCARDIOGRAM REPORT: CPT | Performed by: INTERNAL MEDICINE

## 2021-07-14 ENCOUNTER — OFFICE VISIT (OUTPATIENT)
Dept: FAMILY MEDICINE CLINIC | Age: 41
End: 2021-07-14
Payer: COMMERCIAL

## 2021-07-14 VITALS
DIASTOLIC BLOOD PRESSURE: 74 MMHG | BODY MASS INDEX: 38.76 KG/M2 | RESPIRATION RATE: 14 BRPM | TEMPERATURE: 98.6 F | HEIGHT: 64 IN | WEIGHT: 227 LBS | OXYGEN SATURATION: 100 % | SYSTOLIC BLOOD PRESSURE: 136 MMHG | HEART RATE: 70 BPM

## 2021-07-14 DIAGNOSIS — R51.9 NONINTRACTABLE EPISODIC HEADACHE, UNSPECIFIED HEADACHE TYPE: Primary | ICD-10-CM

## 2021-07-14 DIAGNOSIS — R47.01 EXPRESSIVE APHASIA: ICD-10-CM

## 2021-07-14 DIAGNOSIS — M50.90 CERVICAL BACK PAIN WITH EVIDENCE OF DISC DISEASE: ICD-10-CM

## 2021-07-14 DIAGNOSIS — R42 EPISODIC LIGHTHEADEDNESS: ICD-10-CM

## 2021-07-14 PROCEDURE — G8427 DOCREV CUR MEDS BY ELIG CLIN: HCPCS | Performed by: NURSE PRACTITIONER

## 2021-07-14 PROCEDURE — 1036F TOBACCO NON-USER: CPT | Performed by: NURSE PRACTITIONER

## 2021-07-14 PROCEDURE — 99214 OFFICE O/P EST MOD 30 MIN: CPT | Performed by: NURSE PRACTITIONER

## 2021-07-14 PROCEDURE — G8417 CALC BMI ABV UP PARAM F/U: HCPCS | Performed by: NURSE PRACTITIONER
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
## 2021-07-15 ENCOUNTER — TELEPHONE (OUTPATIENT)
Dept: FAMILY MEDICINE CLINIC | Age: 41
End: 2021-07-15

## 2021-07-15 NOTE — TELEPHONE ENCOUNTER
Left message for patient to call back. Patient is scheduled for MRI brain at 7/27/2021 at 3:30 needs to arrive at 3pm 1st floor main radiology, MRI cervical spine will follow after. Patient is to have no metal or jewelry on.

## 2021-07-16 NOTE — TELEPHONE ENCOUNTER
Left detailed msg on mach with appt details. Ok per signed HIPAA. Anus position normal and patency confirmed, rectal-cutaneous fistula absent, normal anal wink.

## 2021-07-21 ENCOUNTER — APPOINTMENT (OUTPATIENT)
Dept: CT IMAGING | Age: 41
End: 2021-07-21
Payer: COMMERCIAL

## 2021-07-21 ENCOUNTER — APPOINTMENT (OUTPATIENT)
Dept: GENERAL RADIOLOGY | Age: 41
End: 2021-07-21
Payer: COMMERCIAL

## 2021-07-21 ENCOUNTER — HOSPITAL ENCOUNTER (EMERGENCY)
Age: 41
Discharge: HOME | End: 2021-07-21
Attending: FAMILY MEDICINE
Payer: COMMERCIAL

## 2021-07-21 VITALS
BODY MASS INDEX: 38.41 KG/M2 | HEIGHT: 64 IN | OXYGEN SATURATION: 100 % | DIASTOLIC BLOOD PRESSURE: 97 MMHG | HEART RATE: 76 BPM | SYSTOLIC BLOOD PRESSURE: 167 MMHG | WEIGHT: 225 LBS | TEMPERATURE: 98.5 F | RESPIRATION RATE: 18 BRPM

## 2021-07-21 DIAGNOSIS — R51.9 ACUTE NONINTRACTABLE HEADACHE, UNSPECIFIED HEADACHE TYPE: Primary | ICD-10-CM

## 2021-07-21 LAB
ALBUMIN SERPL-MCNC: 4.4 G/DL (ref 3.5–5.1)
ALP BLD-CCNC: 117 U/L (ref 38–126)
ALT SERPL-CCNC: 50 U/L (ref 11–66)
ANION GAP SERPL CALCULATED.3IONS-SCNC: 14 MEQ/L (ref 8–16)
AST SERPL-CCNC: 34 U/L (ref 5–40)
BASOPHILS # BLD: 0.3 %
BASOPHILS ABSOLUTE: 0 THOU/MM3 (ref 0–0.1)
BILIRUB SERPL-MCNC: < 0.2 MG/DL (ref 0.3–1.2)
BUN BLDV-MCNC: 15 MG/DL (ref 7–22)
CALCIUM SERPL-MCNC: 10 MG/DL (ref 8.5–10.5)
CHLORIDE BLD-SCNC: 103 MEQ/L (ref 98–111)
CO2: 22 MEQ/L (ref 23–33)
CREAT SERPL-MCNC: 0.7 MG/DL (ref 0.4–1.2)
EOSINOPHIL # BLD: 0.9 %
EOSINOPHILS ABSOLUTE: 0.1 THOU/MM3 (ref 0–0.4)
ERYTHROCYTE [DISTWIDTH] IN BLOOD BY AUTOMATED COUNT: 14.3 % (ref 11.5–14.5)
ERYTHROCYTE [DISTWIDTH] IN BLOOD BY AUTOMATED COUNT: 44.1 FL (ref 35–45)
GFR SERPL CREATININE-BSD FRML MDRD: > 90 ML/MIN/1.73M2
GLUCOSE BLD-MCNC: 121 MG/DL (ref 70–108)
HCT VFR BLD CALC: 40.2 % (ref 37–47)
HEMOGLOBIN: 12.9 GM/DL (ref 12–16)
IMMATURE GRANS (ABS): 0.06 THOU/MM3 (ref 0–0.07)
IMMATURE GRANULOCYTES: 0.5 %
LYMPHOCYTES # BLD: 23 %
LYMPHOCYTES ABSOLUTE: 3.1 THOU/MM3 (ref 1–4.8)
MCH RBC QN AUTO: 27.1 PG (ref 26–33)
MCHC RBC AUTO-ENTMCNC: 32.1 GM/DL (ref 32.2–35.5)
MCV RBC AUTO: 84.5 FL (ref 81–99)
MONOCYTES # BLD: 7.3 %
MONOCYTES ABSOLUTE: 1 THOU/MM3 (ref 0.4–1.3)
NUCLEATED RED BLOOD CELLS: 0 /100 WBC
OSMOLALITY CALCULATION: 279.6 MOSMOL/KG (ref 275–300)
PLATELET # BLD: 465 THOU/MM3 (ref 130–400)
PMV BLD AUTO: 9.8 FL (ref 9.4–12.4)
POTASSIUM SERPL-SCNC: 4.1 MEQ/L (ref 3.5–5.2)
RBC # BLD: 4.76 MILL/MM3 (ref 4.2–5.4)
SEG NEUTROPHILS: 68 %
SEGMENTED NEUTROPHILS ABSOLUTE COUNT: 9 THOU/MM3 (ref 1.8–7.7)
SODIUM BLD-SCNC: 139 MEQ/L (ref 135–145)
TOTAL PROTEIN: 7.8 G/DL (ref 6.1–8)
WBC # BLD: 13.3 THOU/MM3 (ref 4.8–10.8)

## 2021-07-21 PROCEDURE — 71045 X-RAY EXAM CHEST 1 VIEW: CPT

## 2021-07-21 PROCEDURE — 85025 COMPLETE CBC W/AUTO DIFF WBC: CPT

## 2021-07-21 PROCEDURE — 96374 THER/PROPH/DIAG INJ IV PUSH: CPT

## 2021-07-21 PROCEDURE — 93005 ELECTROCARDIOGRAM TRACING: CPT | Performed by: FAMILY MEDICINE

## 2021-07-21 PROCEDURE — 99283 EMERGENCY DEPT VISIT LOW MDM: CPT

## 2021-07-21 PROCEDURE — 80053 COMPREHEN METABOLIC PANEL: CPT

## 2021-07-21 PROCEDURE — 36415 COLL VENOUS BLD VENIPUNCTURE: CPT

## 2021-07-21 PROCEDURE — 96375 TX/PRO/DX INJ NEW DRUG ADDON: CPT

## 2021-07-21 PROCEDURE — 70450 CT HEAD/BRAIN W/O DYE: CPT

## 2021-07-21 PROCEDURE — 6360000002 HC RX W HCPCS: Performed by: FAMILY MEDICINE

## 2021-07-21 RX ORDER — RIZATRIPTAN BENZOATE 5 MG/1
5-10 TABLET ORAL
Qty: 8 TABLET | Refills: 0 | Status: SHIPPED | OUTPATIENT
Start: 2021-07-21 | End: 2021-10-19 | Stop reason: ALTCHOICE

## 2021-07-21 RX ORDER — METOCLOPRAMIDE HYDROCHLORIDE 5 MG/ML
10 INJECTION INTRAMUSCULAR; INTRAVENOUS ONCE
Status: COMPLETED | OUTPATIENT
Start: 2021-07-21 | End: 2021-07-21

## 2021-07-21 RX ORDER — ONDANSETRON 4 MG/1
4 TABLET, ORALLY DISINTEGRATING ORAL EVERY 8 HOURS PRN
Qty: 20 TABLET | Refills: 0 | Status: SHIPPED | OUTPATIENT
Start: 2021-07-21 | End: 2021-07-28

## 2021-07-21 RX ORDER — BUTALBITAL, ACETAMINOPHEN AND CAFFEINE 300; 40; 50 MG/1; MG/1; MG/1
1 CAPSULE ORAL EVERY 6 HOURS PRN
Qty: 20 CAPSULE | Refills: 0 | Status: SHIPPED | OUTPATIENT
Start: 2021-07-21 | End: 2021-09-01

## 2021-07-21 RX ORDER — KETOROLAC TROMETHAMINE 30 MG/ML
30 INJECTION, SOLUTION INTRAMUSCULAR; INTRAVENOUS ONCE
Status: COMPLETED | OUTPATIENT
Start: 2021-07-21 | End: 2021-07-21

## 2021-07-21 RX ORDER — IBUPROFEN 600 MG/1
600 TABLET ORAL EVERY 8 HOURS PRN
Qty: 20 TABLET | Refills: 0 | Status: SHIPPED | OUTPATIENT
Start: 2021-07-21 | End: 2022-05-12 | Stop reason: ALTCHOICE

## 2021-07-21 RX ORDER — DIPHENHYDRAMINE HYDROCHLORIDE 50 MG/ML
50 INJECTION INTRAMUSCULAR; INTRAVENOUS EVERY 6 HOURS PRN
Status: DISCONTINUED | OUTPATIENT
Start: 2021-07-21 | End: 2021-07-21 | Stop reason: HOSPADM

## 2021-07-21 RX ADMIN — DIPHENHYDRAMINE HYDROCHLORIDE 50 MG: 50 INJECTION, SOLUTION INTRAMUSCULAR; INTRAVENOUS at 17:17

## 2021-07-21 RX ADMIN — METOCLOPRAMIDE 10 MG: 5 INJECTION, SOLUTION INTRAMUSCULAR; INTRAVENOUS at 17:16

## 2021-07-21 RX ADMIN — KETOROLAC TROMETHAMINE 30 MG: 30 INJECTION, SOLUTION INTRAMUSCULAR; INTRAVENOUS at 17:15

## 2021-07-21 ASSESSMENT — PAIN DESCRIPTION - FREQUENCY: FREQUENCY: CONTINUOUS

## 2021-07-21 ASSESSMENT — PAIN DESCRIPTION - DESCRIPTORS: DESCRIPTORS: ACHING

## 2021-07-21 ASSESSMENT — PAIN SCALES - GENERAL
PAINLEVEL_OUTOF10: 7
PAINLEVEL_OUTOF10: 7

## 2021-07-21 ASSESSMENT — PAIN DESCRIPTION - LOCATION: LOCATION: HEAD;NECK

## 2021-07-21 ASSESSMENT — PAIN DESCRIPTION - PAIN TYPE: TYPE: ACUTE PAIN

## 2021-07-21 NOTE — ED NOTES
Patient states she has increased chest pain today. Patient states she also has had increase confusion and slurred words at times. She has been forgetful at times.       Sharif Schaefer RN  07/21/21 6261

## 2021-07-21 NOTE — ED PROVIDER NOTES
[facility] [address] [address] EMERGENCY DEPT  EMERGENCY DEPARTMENT ENCOUNTER      Pt Name: Lito Henderson  MRN: [id number]  [place] 1980  Date of evaluation: 2021  Provider: Casey Alcantara MD    [address] [place] [address]       Chief Complaint   Patient presents with    Headache    Blurred Vision    Neck Pain     posterior         HISTORY OF PRESENT ILLNESS   (Location/Symptom, Timing/Onset, Context/Setting, Quality, Duration, Modifying Factors, Severity)  Note limiting factors. Lito Henderson is a 35 y.o. female who presents to the emergency department complaining of 2-2-1/2 weeks of bilateral temporal and top of the head headaches, as she describes them as lasting on and off that there are more than there off, but does describe them as kind of a pressure squeezing pain in the head, she denies photophobia she denies phonophobia, she denies changes of vision. The patient denies any fevers or chills she denies sinusitis type symptoms she denies teeth pain for head pain she denies pain pressure nasal discharge and ear pain sore throat \"cough or congestion     HPI    Nursing Notes were reviewed. REVIEW OF SYSTEMS    (2-9 systems for level 4, 10 or more for level 5)     Review of Systems    Except as noted above the remainder of the review of systems was reviewed and negative.        PAST MEDICAL HISTORY     Past Medical History:   Diagnosis Date    Bell palsy     right side droop    Essential hypertension 2016    GERD (gastroesophageal reflux disease)     Hashimoto's thyroiditis 2016    HIGH CHOLESTEROL     Hyperlipidemia     Hypokalemia     Prediabetes 2020    Subclinical hyperthyroidism 2016         SURGICAL HISTORY       Past Surgical History:   Procedure Laterality Date     SECTION  ,     COLONOSCOPY  2021    1 polyp removed     DILATION AND CURETTAGE OF UTERUS      ENDOSCOPY, COLON, DIAGNOSTIC      HYSTERECTOMY, TOTAL ABDOMINAL N/A 9/10/2020 HYSTERECTOMY ABDOMINAL TOTAL, BS, POSS DANIELLE performed by Gumaro Posada MD at [facility] 6/15/2020    DIAGNOSTIC LAPAROSCOPY, performed by Gumaro Posada MD at [address] [facility]       Previous Medications    AMLODIPINE (NORVASC) 10 MG TABLET    Take 1 tablet by mouth daily    HYDROCHLOROTHIAZIDE (MICROZIDE) 12.5 MG CAPSULE    Take 1 capsule by mouth daily    METOPROLOL SUCCINATE (TOPROL XL) 100 MG EXTENDED RELEASE TABLET    take 1 tablet by mouth once daily    NAPROXEN (NAPROSYN) 500 MG TABLET    Take 1 tablet by mouth 2 times daily    OMEPRAZOLE (PRILOSEC) 40 MG DELAYED RELEASE CAPSULE    Take 40 mg by mouth nightly    SERTRALINE (ZOLOFT) 100 MG TABLET    take 1 tablet by mouth once daily       ALLERGIES     Bactrim and Sulfamethoxazole-trimethoprim    FAMILY HISTORY       Family History   Problem Relation Age of Onset    High Blood Pressure Mother     Diabetes Mother     Heart Disease Mother     High Blood Pressure Father     Diabetes Father           SOCIAL HISTORY       Social History     Socioeconomic History    Marital status: Single     Spouse name: None    Number of children: 2    Years of education: None    Highest education level: None   Occupational History    None   Tobacco Use    Smoking status: Never Smoker    Smokeless tobacco: Never Used   Vaping Use    Vaping Use: Never used   Substance and Sexual Activity    Alcohol use: Yes     Comment: social    Drug use: No    Sexual activity: None   Other Topics Concern    None   Social History Narrative    None     Social Determinants of Health     Financial Resource Strain:     Difficulty of Paying Living Expenses:    Food Insecurity:     Worried About Running Out of Food in the Last Year:     Ran Out of Food in the Last Year:    Transportation Needs:     Lack of Transportation (Medical):      Lack of Transportation (Non-Medical):    Physical Activity:     Days of Exercise per Week:     Minutes of Exercise per Session:    Stress:     Feeling of Stress :    Social Connections:     Frequency of Communication with Friends and Family:     Frequency of Social Gatherings with Friends and Family:     Attends Islam Services:     Active Member of Clubs or Organizations:     Attends Club or Organization Meetings:     Marital Status:    Intimate Partner Violence:     Fear of Current or Ex-Partner:     Emotionally Abused:     Physically Abused:     Sexually Abused:        SCREENINGS   NIH Stroke Scale  Interval: Baseline  Level of Consciousness (1a. ): Alert  LOC Questions (1b. ): Answers both correctly  LOC Commands (1c. ): Performs both tasks correctly  Best Gaze (2. ): Normal  Visual (3. ): No visual loss  Facial Palsy (4. ): Normal symmetrical movement  Motor Arm, Left (5a. ): No drift  Motor Arm, Right (5b. ): No drift  Motor Leg, Left (6a. ): No drift  Motor Leg, Right (6b. ): No drift  Limb Ataxia (7. ): Absent  Sensory (8. ): (!) Mild to Moderate  Best Language (9. ): Mild to moderate aphasia  Dysarthria (10. ): Normal  Extinction and Inattention (11): No abnormality  Total: 2    Gerard Coma Scale  Eye Opening: Spontaneous  Best Verbal Response: Oriented  Best Motor Response: Obeys commands  [place] Coma Scale Score: 15               PHYSICAL EXAM    (up to 7 for level 4, 8 or more for level 5)     ED Triage Vitals [07/21/21 1636]   BP Temp Temp Source Pulse Resp SpO2 Height Weight   (!) 145/93 98.5 °F (36.9 °C) Oral 68 16 100 % 5' 4\" (1.626 m) 225 lb (102.1 kg)       Physical Exam  Vitals and nursing note reviewed. Constitutional:       Appearance: She is well-developed. HENT:      Head: Normocephalic and atraumatic. Eyes:      Conjunctiva/sclera: Conjunctivae normal.      Pupils: Pupils are equal, round, and reactive to light. Cardiovascular:      Rate and Rhythm: Normal rate and regular rhythm. Heart sounds: Normal heart sounds.    Pulmonary:      Effort: Pulmonary effort (*)     Platelets 178 (*)     Segs Absolute 9.0 (*)     All other components within normal limits   COMPREHENSIVE METABOLIC PANEL - Abnormal; Notable for the following components:    Glucose 121 (*)     CO2 22 (*)     Total Bilirubin <0.2 (*)     All other components within normal limits   ANION GAP   GLOMERULAR FILTRATION RATE, ESTIMATED   OSMOLALITY       All other labs were within normal range or not returned as of this dictation. EMERGENCY DEPARTMENT COURSE and DIFFERENTIAL DIAGNOSIS/MDM:   Vitals:    Vitals:    07/21/21 1636 07/21/21 1722   BP: (!) 145/93 (!) 167/97   Pulse: 68 76   Resp: 16 18   Temp: 98.5 °F (36.9 °C)    TempSrc: Oral    SpO2: 100% 100%   Weight: 225 lb (102.1 kg)    Height: 5' 4\" (1.626 m)        *    MDM      REASSESSMENT     patient improved after medications and agreeable to plan of discharge with medications to try at home      Rafi Johnson 124 time was ** minutes, excluding separately reportable procedures. There was a high probability of clinically significant/life threatening deterioration in the patient's condition which required my urgent intervention. **    CONSULTS:  None    PROCEDURES:  Unless otherwise noted below, none     Procedures        FINAL IMPRESSION      1.  Acute nonintractable headache, unspecified headache type          DISPOSITION/PLAN   DISPOSITION Decision To Discharge 07/21/2021 06:03:00 PM      PATIENT REFERRED TO:  MARÍA Zamarripa - CNP  [address] [place]  [phone]    Schedule an appointment as soon as possible for a visit   With primary care doctor to follow up from ER      DISCHARGE MEDICATIONS:  New Prescriptions    BUTALBITAL-APAP-CAFFEINE (FIORICET) -40 MG CAPS PER CAPSULE    Take 1 capsule by mouth every 6 hours as needed for Headaches or Migraine    IBUPROFEN (ADVIL;MOTRIN) 600 MG TABLET    Take 1 tablet by mouth every 8 hours as needed for Pain (Headache)    ONDANSETRON (ZOFRAN-ODT) 4 MG DISINTEGRATING TABLET    Place 1 tablet under the tongue every 8 hours as needed for Nausea or Vomiting    RIZATRIPTAN (MAXALT) 5 MG TABLET    Take 1-2 tablets by mouth once as needed for Migraine (May repeat once 2 hours after the first dose) May repeat in 2 hours if needed     Controlled Substances Monitoring:     RX Monitoring 3/26/2019   Attestation The Prescription Monitoring Report for this patient was reviewed today.    Periodic Controlled Substance Monitoring No signs of potential drug abuse or diversion identified: otherwise, see note documentation       (Please note that portions of this note were completed with a voice recognition program.  Efforts were made to edit the dictations but occasionally words are mis-transcribed.)    Karlos Naqvi MD (electronically signed)  Attending Emergency Physician          Robert Beth MD  07/21/21 1808       Robert Beth MD  08/24/21 1901

## 2021-07-22 ENCOUNTER — CARE COORDINATION (OUTPATIENT)
Dept: OTHER | Facility: CLINIC | Age: 41
End: 2021-07-22

## 2021-07-22 LAB
EKG ATRIAL RATE: 71 BPM
EKG P AXIS: 61 DEGREES
EKG P-R INTERVAL: 144 MS
EKG Q-T INTERVAL: 384 MS
EKG QRS DURATION: 76 MS
EKG QTC CALCULATION (BAZETT): 417 MS
EKG R AXIS: 42 DEGREES
EKG T AXIS: 27 DEGREES
EKG VENTRICULAR RATE: 71 BPM

## 2021-07-22 NOTE — CARE COORDINATION
[facility] ED Follow Up Call    2021    Patient: Adrienne Pradhan Patient : 1980   MRN: [id number]  Reason for Admission: Acute non intractable headache  Discharge Date: 2021    Associate Care Manager (ACM) called patient for  ED follow up. Patient answered the phone and states now is not a good time to talk but that she will call ACM back. Bridget Bales RN BSN  Associate Care Manager  Phone: [phone]  Email: [email or web address]

## 2021-07-26 ENCOUNTER — CARE COORDINATION (OUTPATIENT)
Dept: OTHER | Facility: CLINIC | Age: 41
End: 2021-07-26

## 2021-07-26 NOTE — CARE COORDINATION
[address] [facility] ED Follow Up Call    2021    Patient: Adrienne Pradhan Patient : 1980   MRN: [id number]  Reason for Admission: Acute non intractable headache  Discharge Date: 2021    ACM attempted 2nd outreach to reach patient for introduction to Associate Care Management. HIPAA compliant message left requesting a return phone call at patients convenience. Unable to Reach Letter sent to patient via [employer]. Will continue to outreach patient. Bridget Bales RN BSN  Associate Care Manager  Phone: [phone]  Email: [email or web address]

## 2021-07-29 ENCOUNTER — TELEPHONE (OUTPATIENT)
Dept: FAMILY MEDICINE CLINIC | Age: 41
End: 2021-07-29

## 2021-07-29 ENCOUNTER — HOSPITAL ENCOUNTER (OUTPATIENT)
Age: 41
Setting detail: SPECIMEN
Discharge: HOME | End: 2021-07-29
Payer: COMMERCIAL

## 2021-07-29 DIAGNOSIS — Z20.822 SUSPECTED COVID-19 VIRUS INFECTION: ICD-10-CM

## 2021-07-29 DIAGNOSIS — Z20.822 SUSPECTED COVID-19 VIRUS INFECTION: Primary | ICD-10-CM

## 2021-07-29 PROCEDURE — 87636 SARSCOV2 & INF A&B AMP PRB: CPT

## 2021-07-29 NOTE — TELEPHONE ENCOUNTER
Patient calling requesting a COVID-19 test  Patient states that she started having the following symptoms on 07.28.2021  Sweats  Chills  Fever  Headache  Taste off  Sinus issues  [facility]  Please advise

## 2021-07-29 NOTE — TELEPHONE ENCOUNTER
Pt informed and verbalized understanding.   Pt will go to [facility]/orders in epic

## 2021-07-30 ENCOUNTER — TELEPHONE (OUTPATIENT)
Dept: FAMILY MEDICINE CLINIC | Age: 41
End: 2021-07-30

## 2021-07-30 LAB
INFLUENZA A: NOT DETECTED
INFLUENZA B: NOT DETECTED
SARS-COV-2 RNA, RT PCR: DETECTED

## 2021-07-30 NOTE — TELEPHONE ENCOUNTER
----- Message from Gerardo Tan sent at 7/30/2021 11:27 AM EDT -----  Subject: Message to Provider    QUESTIONS  Information for Provider? Patient needs FMLA paper work for temp leave of   absence filled out by 8/9 and has been tested positive for COVID as of   7/30. Wanted to know how the office would like to handle that considering   the positive result.   ---------------------------------------------------------------------------  --------------  CALL BACK INFO  What is the best way for the office to contact you? OK to leave message on   voicemail  Preferred Call Back Phone Number? [phone]  ---------------------------------------------------------------------------  --------------  SCRIPT ANSWERS  Relationship to Patient?  Self

## 2021-07-30 NOTE — TELEPHONE ENCOUNTER
----- Message from MARÍA Price - CNP sent at 7/30/2021  7:33 AM EDT -----  Let Belle Ruth know her covid test did come back positive. Rec'd quarantine x 10 days from symptoms onset. If she is able to manage symptoms at home on her own, that's fine. If not, rec'd f/u VV.  Any questions let me know

## 2021-08-02 ENCOUNTER — TELEPHONE (OUTPATIENT)
Dept: FAMILY MEDICINE CLINIC | Age: 41
End: 2021-08-02

## 2021-08-02 NOTE — TELEPHONE ENCOUNTER
Left message on pt's phone asking that she call us back at earliest convenience to discuss how she will obtain record release form for her to sign since she has covid. Does she have access to a fax? Also need to discuss the information form we have to know if this is for a continuous period of time or intermittent, upcoming appts? Etc. Please see Nabila Hagan when pt calls back.

## 2021-08-02 NOTE — TELEPHONE ENCOUNTER
Pt called back stating she will come to office and park in the back of the building in orange designated parking spots to fill out the release of information paperwork for FMLA. I met pt in the back of our office building to have her forms.

## 2021-08-03 ENCOUNTER — CARE COORDINATION (OUTPATIENT)
Dept: OTHER | Facility: CLINIC | Age: 41
End: 2021-08-03

## 2021-08-03 NOTE — CARE COORDINATION
sense of taste. Denies shortness of breath or fever. Denies nausea or vomiting. States FMLA has been submitted and denies current needs for ACM. Agreeable to ACM follow up next week. States 10 y.o. daughter is with her at home and asymptomatic. States monitoring daughter for symptoms. States knows to call PCP if issues. Interventions:    Counseling and education provided at today's visit on:   Red Flag symptoms to report  Self monitoring compliance  Symptom management  Diet education/compliance  Medication compliance  Provider follow up appointment compliance  Reinforced Discharge instructions  Advanced Care Planning education    Medication reconciliation was performed with patient, who verbalizes understanding of administration of home medications. Advised obtaining a 90-day supply of all daily and as-needed medications. Reinforced resources available to patient including: PCP, Benefits related nurse triage line and [employer] Messaging. ACM encouraged outreach to Nurse Access Line and/or ACM for assessment and intervention guidance as needed. ACM encouraged patient to contact 911 for life threatening emergencies and PCP office 24/7 for non life-threatening symptoms. Reminded patient of alternatives to ED such as urgent care, walk in clinics and e-visits as available. Reviewed proper ED utilization using Right Care, Right Place, Right Time Flyer. Flyer mailed with PCP name and office number. Discussed follow-up appointments. If no appointment was previously scheduled, appointment scheduling offered: Yes Is follow up appointment scheduled within 7 days of discharge?  No  [facility] follow up appointment(s):   Future Appointments   Date Time Provider Ravi Mccann   8/16/2021  2:30 PM STR MRI RM1 STRZ MRI STR Radiolog   8/16/2021  3:00 PM STR MRI RM1 STRZ MRI STR Radiolog   10/19/2021  2:00 PM [facility] [place] [place]   11/10/2021  1:15 PM Monik Drew MD [facility] - [place]     [id number] [address] follow up appointment(s): None known    Plan:  Continue weekly outreaches to provide telephonic support, education and resources as needed. Discuss / follow up on:   Red Flag symptoms to report  Symptom management  Provider follow up appointment compliance  Goal Progress      Patient verbalized understanding and is agreeable. Goals      Conditions and Symptoms      I will schedule office visits, as directed by my provider. I will keep my appointment or reschedule if I have to cancel. I will notify my provider of any barriers to my plan of care. I will notify my provider of any symptoms that indicate a worsening of my condition. Barriers: stress  Plan for overcoming my barriers: I will work with my ACM to overcome barriers. Confidence: 6/10  Anticipated Goal Completion Date: 8/30/2021    8/3/2021: Patient states has not yet called PCP to schedule follow up. Declined ACM's offer to call PCP to schedule. ACM reviewed red flags with patient. Margareth Mcdaniels RN BSN  Associate Care Manager  Phone: [phone]  Email: [email or web address]

## 2021-08-04 ENCOUNTER — PATIENT MESSAGE (OUTPATIENT)
Dept: FAMILY MEDICINE CLINIC | Age: 41
End: 2021-08-04

## 2021-08-08 ENCOUNTER — HOSPITAL ENCOUNTER (EMERGENCY)
Age: 41
Discharge: HOME | End: 2021-08-08
Attending: NURSE PRACTITIONER
Payer: COMMERCIAL

## 2021-08-08 VITALS
RESPIRATION RATE: 16 BRPM | SYSTOLIC BLOOD PRESSURE: 140 MMHG | WEIGHT: 220 LBS | BODY MASS INDEX: 37.76 KG/M2 | HEART RATE: 70 BPM | DIASTOLIC BLOOD PRESSURE: 86 MMHG | OXYGEN SATURATION: 99 % | TEMPERATURE: 98 F

## 2021-08-08 DIAGNOSIS — U07.1 COVID-19: Primary | ICD-10-CM

## 2021-08-08 PROCEDURE — 99213 OFFICE O/P EST LOW 20 MIN: CPT | Performed by: NURSE PRACTITIONER

## 2021-08-08 PROCEDURE — 99213 OFFICE O/P EST LOW 20 MIN: CPT

## 2021-08-08 RX ORDER — GUAIFENESIN, PSEUDOEPHEDRINE HYDROCHLORIDE 600; 60 MG/1; MG/1
1 TABLET, EXTENDED RELEASE ORAL EVERY 12 HOURS
Qty: 28 TABLET | Refills: 0 | Status: SHIPPED | OUTPATIENT
Start: 2021-08-08 | End: 2021-08-19 | Stop reason: ALTCHOICE

## 2021-08-08 RX ORDER — PREDNISONE 20 MG/1
20 TABLET ORAL 2 TIMES DAILY
Qty: 10 TABLET | Refills: 0 | Status: SHIPPED | OUTPATIENT
Start: 2021-08-08 | End: 2021-08-13

## 2021-08-08 ASSESSMENT — ENCOUNTER SYMPTOMS
CHEST TIGHTNESS: 0
SORE THROAT: 0
DIARRHEA: 0
COUGH: 1
RHINORRHEA: 1
VOMITING: 0
SHORTNESS OF BREATH: 0
NAUSEA: 0

## 2021-08-08 ASSESSMENT — PAIN DESCRIPTION - PAIN TYPE: TYPE: ACUTE PAIN

## 2021-08-08 ASSESSMENT — PAIN DESCRIPTION - DESCRIPTORS: DESCRIPTORS: ACHING

## 2021-08-08 ASSESSMENT — PAIN SCALES - GENERAL: PAINLEVEL_OUTOF10: 8

## 2021-08-08 ASSESSMENT — PAIN DESCRIPTION - FREQUENCY: FREQUENCY: INTERMITTENT

## 2021-08-08 ASSESSMENT — ACTIVITIES OF DAILY LIVING (ADL): EFFECT OF PAIN ON DAILY ACTIVITIES: MISSED WORK

## 2021-08-08 ASSESSMENT — PAIN DESCRIPTION - LOCATION: LOCATION: HEAD

## 2021-08-08 ASSESSMENT — PAIN DESCRIPTION - ORIENTATION: ORIENTATION: RIGHT;LEFT

## 2021-08-08 NOTE — ED TRIAGE NOTES
Patient states 5- positive for covid. Sneezing, post nasal drainage, cough with chest discomfort, headache. ibprofen, vit. taken.

## 2021-08-08 NOTE — ED PROVIDER NOTES
[facility]  Urgent Care Encounter       CHIEF COMPLAINT       Chief Complaint   Patient presents with    Cough     post nasal drainage    Headache    Nasal Congestion     sneezing       Nurses Notes reviewed and I agree except as noted in the HPI. HISTORY OF PRESENT ILLNESS   Jorge Ceron is a 35 y.o. female who presents to the [facility] urgent care for evaluation of cough, headache, and nasal congestion. She was diagnosed with COVID-19 on 2021. She currently reports nasal congestion, rhinorrhea, postnasal drainage, cough with green sputum, and headache. She reports chills and sweats, but denies fever. The history is provided by the patient. No  was used. REVIEW OF SYSTEMS     Review of Systems   Constitutional: Positive for chills. Negative for activity change, appetite change, fatigue and fever. HENT: Positive for congestion, postnasal drip and rhinorrhea. Negative for ear discharge, ear pain and sore throat. Respiratory: Positive for cough. Negative for chest tightness and shortness of breath. Cardiovascular: Negative for chest pain. Gastrointestinal: Negative for diarrhea, nausea and vomiting. Genitourinary: Negative for dysuria. Skin: Negative for rash. Allergic/Immunologic: Negative for environmental allergies and food allergies. Neurological: Positive for headaches. Negative for dizziness. PAST MEDICAL HISTORY         Diagnosis Date    Bell palsy     right side droop    Essential hypertension 2016    GERD (gastroesophageal reflux disease)     Hashimoto's thyroiditis 2016    HIGH CHOLESTEROL     Hyperlipidemia     Hypokalemia     Prediabetes 2020    Subclinical hyperthyroidism 2016       SURGICALHISTORY     Patient  has a past surgical history that includes  section (, );  Endoscopy, colon, diagnostic; Dilation and curettage of uterus (); laparoscopy (N/A, 6/15/2020); Hysterectomy, total abdominal (N/A, 9/10/2020); and Colonoscopy (01/2021). CURRENT MEDICATIONS       Previous Medications    AMLODIPINE (NORVASC) 10 MG TABLET    Take 1 tablet by mouth daily    ASCORBIC ACID (VITAMIN C PO)    Take by mouth daily    BUTALBITAL-APAP-CAFFEINE (FIORICET) -40 MG CAPS PER CAPSULE    Take 1 capsule by mouth every 6 hours as needed for Headaches or Migraine    HYDROCHLOROTHIAZIDE (MICROZIDE) 12.5 MG CAPSULE    Take 1 capsule by mouth daily    IBUPROFEN (ADVIL;MOTRIN) 600 MG TABLET    Take 1 tablet by mouth every 8 hours as needed for Pain (Headache)    METOPROLOL SUCCINATE (TOPROL XL) 100 MG EXTENDED RELEASE TABLET    take 1 tablet by mouth once daily    OMEPRAZOLE (PRILOSEC) 40 MG DELAYED RELEASE CAPSULE    Take 40 mg by mouth nightly    RIZATRIPTAN (MAXALT) 5 MG TABLET    Take 1-2 tablets by mouth once as needed for Migraine (May repeat once 2 hours after the first dose) May repeat in 2 hours if needed    SERTRALINE (ZOLOFT) 100 MG TABLET    take 1 tablet by mouth once daily    VITAMIN E PO    Take by mouth daily    ZINC PO    Take by mouth daily       ALLERGIES     Patient is is allergic to bactrim and sulfamethoxazole-trimethoprim. Patients   Immunization History   Administered Date(s) Administered    Influenza Vaccine, unspecified formulation 10/01/2016    Influenza Virus Vaccine 11/01/2017, 11/05/2018, 11/27/2019    Tdap (Boostrix, Adacel) 01/01/2016       FAMILY HISTORY     Patient's family history includes Diabetes in her father and mother; Heart Disease in her mother; High Blood Pressure in her father and mother. SOCIAL HISTORY     Patient  reports that she has never smoked. She has never used smokeless tobacco. She reports current alcohol use. She reports that she does not use drugs.     PHYSICAL EXAM     ED TRIAGE VITALS  BP: (!) 140/86, Temp: 98 °F (36.7 °C), Pulse: 70, Resp: 16, SpO2: 99 %,Estimated body mass index is 37.76 kg/m² as calculated from the following:    Height as of 7/21/21: 5' 4\" (1.626 m). Weight as of this encounter: 220 lb (99.8 kg). ,Patient's last menstrual period was 07/23/2020. Physical Exam  Vitals and nursing note reviewed. Constitutional:       General: She is not in acute distress. Appearance: Normal appearance. She is not ill-appearing, toxic-appearing or diaphoretic. HENT:      Head: Normocephalic. Right Ear: Ear canal and external ear normal.      Left Ear: Ear canal and external ear normal.      Nose: Nose normal. No congestion or rhinorrhea. Mouth/Throat:      Mouth: Mucous membranes are moist.      Pharynx: Oropharynx is clear. No oropharyngeal exudate or posterior oropharyngeal erythema. Cardiovascular:      Rate and Rhythm: Normal rate. Pulses: Normal pulses. Pulmonary:      Effort: Pulmonary effort is normal. No respiratory distress. Breath sounds: No stridor. No wheezing or rhonchi. Abdominal:      General: Abdomen is flat. Bowel sounds are normal.      Palpations: Abdomen is soft. Musculoskeletal:         General: No swelling or tenderness. Normal range of motion. Cervical back: Normal range of motion. Neurological:      General: No focal deficit present. Mental Status: She is alert and oriented to person, place, and time. Psychiatric:         Mood and Affect: Mood normal.         Behavior: Behavior normal.         DIAGNOSTIC RESULTS     Labs:No results found for this visit on 08/08/21. IMAGING:    No orders to display         EKG: None      URGENT CARE COURSE:     Vitals:    08/08/21 1542   BP: (!) 140/86   Pulse: 70   Resp: 16   Temp: 98 °F (36.7 °C)   TempSrc: Temporal   SpO2: 99%   Weight: 220 lb (99.8 kg)       Medications - No data to display         PROCEDURES:  None    FINAL IMPRESSION      1. COVID-19          DISPOSITION/ PLAN     Patient seen and evaluated for cough and nasal congestion. Patient is provided prescriptions for Mucinex D and prednisone.   She is instructed to use over-the-counter Tylenol Motrin for pain or fever. She is instructed to follow-up with PCP in 3 to 5 days with continued symptoms. She is agreeable with the above plan and denies questions or concerns at this time.       PATIENT REFERRED TO:  MARÍA Evans CNP  [address] / [place] [address]      DISCHARGE MEDICATIONS:  New Prescriptions    PREDNISONE (DELTASONE) 20 MG TABLET    Take 1 tablet by mouth 2 times daily for 5 days    PSEUDOEPHEDRINE-GUAIFENESIN (MUCINEX D)  MG PER EXTENDED RELEASE TABLET    Take 1 tablet by mouth every 12 hours for 14 days       Discontinued Medications    NAPROXEN (NAPROSYN) 500 MG TABLET    Take 1 tablet by mouth 2 times daily       Current Discharge Medication List          Louvenia Mcburney, APRN - CNP    (Please note that portions of this note were completed with a voice recognition program. Efforts were made to edit the dictations but occasionally words are mis-transcribed.)           Louvenia Mcburney, APRN - CNP  08/08/21 0197

## 2021-08-09 ENCOUNTER — VIRTUAL VISIT (OUTPATIENT)
Dept: FAMILY MEDICINE CLINIC | Age: 41
End: 2021-08-09
Payer: COMMERCIAL

## 2021-08-09 DIAGNOSIS — J01.90 ACUTE RHINOSINUSITIS: Primary | ICD-10-CM

## 2021-08-09 DIAGNOSIS — U07.1 SARS-COV-2 POSITIVE: ICD-10-CM

## 2021-08-09 DIAGNOSIS — F41.0 PANIC DISORDER WITHOUT AGORAPHOBIA WITH MODERATE PANIC ATTACKS: ICD-10-CM

## 2021-08-09 PROCEDURE — G8428 CUR MEDS NOT DOCUMENT: HCPCS | Performed by: NURSE PRACTITIONER

## 2021-08-09 PROCEDURE — 99213 OFFICE O/P EST LOW 20 MIN: CPT | Performed by: NURSE PRACTITIONER

## 2021-08-09 RX ORDER — AZITHROMYCIN 250 MG/1
250 TABLET, FILM COATED ORAL SEE ADMIN INSTRUCTIONS
Qty: 6 TABLET | Refills: 0 | Status: SHIPPED | OUTPATIENT
Start: 2021-08-09 | End: 2021-08-14

## 2021-08-09 ASSESSMENT — ENCOUNTER SYMPTOMS
VOMITING: 0
SHORTNESS OF BREATH: 0
EYE PAIN: 0
ABDOMINAL PAIN: 0
SINUS PRESSURE: 1
TROUBLE SWALLOWING: 0
COUGH: 1
COLOR CHANGE: 0
SORE THROAT: 0
DIARRHEA: 0
WHEEZING: 0
SINUS PAIN: 1
NAUSEA: 0
EYE REDNESS: 0
RHINORRHEA: 1

## 2021-08-09 NOTE — LETTER
[address] [place] [id number]  Phone: [phone]  Fax: [phone]    MARÍA Berger CNP        August 9, 2021     Patient: Dennie Burows   YOB: 1980   Date of Visit: 8/9/2021       To Whom it May Concern:    Dennie Burows was seen in my clinic on 8/9/2021. She may return to work on 8/12/21. If you have any questions or concerns, please don't hesitate to call.     Sincerely,         MARÍA Berger CNP

## 2021-08-09 NOTE — PROGRESS NOTES
2021    TELEHEALTH EVALUATION -- Audio/Visual (During [facility] [place]-73 public health emergency)    HPI:    Lilibeth Sands (:  1980) has requested an audio/video evaluation for the following concern(s):    COVID  She got tested on  and was positive for COVID. She is not feeling better. Having a lot of congestion in her sinuses. She still has cough, but the cough is more due to PND. She does not have any chest tightness or SOB. She denies any fever. She was seen in the UC and given Rx's for mucinex and prednisone. Review of Systems   Constitutional: Negative for chills, diaphoresis, fatigue and fever. HENT: Positive for congestion, postnasal drip, rhinorrhea, sinus pressure and sinus pain. Negative for sore throat and trouble swallowing. Eyes: Negative for pain, redness and visual disturbance. Respiratory: Positive for cough. Negative for shortness of breath and wheezing. Cardiovascular: Negative for chest pain, palpitations and leg swelling. Gastrointestinal: Negative for abdominal pain, diarrhea, nausea and vomiting. Endocrine: Negative for polydipsia, polyphagia and polyuria. Genitourinary: Negative for decreased urine volume, dysuria, frequency and urgency. Musculoskeletal: Negative for arthralgias and myalgias. Skin: Negative for color change and rash. Allergic/Immunologic: Negative for environmental allergies, food allergies and immunocompromised state. Neurological: Positive for headaches. Negative for dizziness, tremors and syncope. Hematological: Negative. Negative for adenopathy. Psychiatric/Behavioral: Negative for behavioral problems, confusion, self-injury and suicidal ideas. All other systems reviewed and are negative. Prior to Visit Medications    Medication Sig Taking?  Authorizing Provider   azithromycin (ZITHROMAX) 250 MG tablet Take 1 tablet by mouth See Admin Instructions for 5 days 500mg on day 1 followed by 250mg on days 2 - 5 Yes Camacho Gaona hypertension 2016    GERD (gastroesophageal reflux disease)     Hashimoto's thyroiditis 2016    HIGH CHOLESTEROL     Hyperlipidemia     Hypokalemia     Prediabetes 2020    Subclinical hyperthyroidism 2016   ,   Past Surgical History:   Procedure Laterality Date     SECTION  ,     COLONOSCOPY  2021    1 polyp removed     DILATION AND CURETTAGE OF UTERUS      ENDOSCOPY, COLON, DIAGNOSTIC      HYSTERECTOMY, TOTAL ABDOMINAL N/A 9/10/2020    HYSTERECTOMY ABDOMINAL TOTAL, BS, POSS DANIELLE performed by Linus Mujica MD at [place] [address] 6/15/2020    DIAGNOSTIC LAPAROSCOPY, performed by Linus Mujica MD at [facility]   ,   Family History   Problem Relation Age of Onset    High Blood Pressure Mother     Diabetes Mother     Heart Disease Mother     High Blood Pressure Father     Diabetes Father    ,   Immunization History   Administered Date(s) Administered    Influenza Vaccine, unspecified formulation 10/01/2016    Influenza Virus Vaccine 2017, 2018, 2019    Tdap (Boostrix, Adacel) 2016   ,   Health Maintenance   Topic Date Due    COVID-19 Vaccine (1) Never done    HIV screen  Never done    Flu vaccine (1) 2021    A1C test (Diabetic or Prediabetic)  2022    Potassium monitoring  2022    Creatinine monitoring  2022    DTaP/Tdap/Td vaccine (2 - Td or Tdap) 2026    Lipid screen  2026    Hepatitis C screen  Completed    Hepatitis A vaccine  Aged Out    Hepatitis B vaccine  Aged Out    Hib vaccine  Aged Out    Meningococcal (ACWY) vaccine  Aged Out    Pneumococcal 0-64 years Vaccine  Aged Out       PHYSICAL EXAMINATION:  [ INSTRUCTIONS:  \"[x]\" Indicates a positive item  \"[]\" Indicates a negative item  -- DELETE ALL ITEMS NOT EXAMINED]  Vital Signs: (As obtained by patient/caregiver or practitioner observation)    Blood pressure-  Heart rate-    Respiratory rate- Temperature-  Pulse oximetry-     Constitutional: [x] Appears well-developed and well-nourished [x] No apparent distress      [] Abnormal-   Mental status  [x] Alert and awake  [x] Oriented to person/place/time [x]Able to follow commands      Eyes:  EOM    [x]  Normal  [] Abnormal-  Sclera  [x]  Normal  [] Abnormal -         Discharge [x]  None visible  [] Abnormal -    HENT:   [x] Normocephalic, atraumatic. [] Abnormal   [x] Mouth/Throat: Mucous membranes are moist.     External Ears [x] Normal  [] Abnormal-     Neck: [x] No visualized mass     Pulmonary/Chest: [x] Respiratory effort normal.  [x] No visualized signs of difficulty breathing or respiratory distress        [] Abnormal-      Musculoskeletal:   [x] Normal gait with no signs of ataxia         [x] Normal range of motion of neck        [] Abnormal-       Neurological:        [x] No Facial Asymmetry (Cranial nerve 7 motor function) (limited exam to video visit)          [x] No gaze palsy        [] Abnormal-         Skin:        [x] No significant exanthematous lesions or discoloration noted on facial skin         [] Abnormal-            Psychiatric:       [x] Normal Affect [x] No Hallucinations        [] Abnormal-     Other pertinent observable physical exam findings-     ASSESSMENT & PLAN  Tess Found was seen today for positive for covid-19. Diagnoses and all orders for this visit:    Acute rhinosinusitis  -     azithromycin (ZITHROMAX) 250 MG tablet; Take 1 tablet by mouth See Admin Instructions for 5 days 500mg on day 1 followed by 250mg on days 2 - 5    SARS-CoV-2 positive      - ok for Sheeba Gey, likely secondary bacterial sinus infection  - work extension through 8/12  - ok to con't Mucinex and prednisone    Return if symptoms worsen or fail to improve. Rigoberto Toure is a 35 y.o. female being evaluated by a Virtual Visit (video visit) encounter to address concerns as mentioned above. A caregiver was present when appropriate.  Due to this being a TeleHealth encounter (During [facility] public health emergency), evaluation of the following organ systems was limited: Vitals/Constitutional/EENT/Resp/CV/GI//MS/Neuro/Skin/Heme-Lymph-Imm. Pursuant to the emergency declaration under the [address] [place] [address], [address] [place] [address] and the [employer] and [employer] Act, this Virtual Visit was conducted with patient's (and/or legal guardian's) consent, to reduce the patient's risk of exposure to COVID-19 and provide necessary medical care. The patient (and/or legal guardian) has also been advised to contact this office for worsening conditions or problems, and seek emergency medical treatment and/or call 911 if deemed necessary. Services were provided through a video synchronous discussion virtually to substitute for in-person clinic visit. Patient and provider were located at their individual homes. --MARÍA Rick - CNP on 8/9/2021 at 2:18 PM    An electronic signature was used to authenticate this note.

## 2021-08-10 ENCOUNTER — CARE COORDINATION (OUTPATIENT)
Dept: OTHER | Facility: CLINIC | Age: 41
End: 2021-08-10

## 2021-08-10 RX ORDER — SERTRALINE HYDROCHLORIDE 100 MG/1
TABLET, FILM COATED ORAL
Qty: 90 TABLET | Refills: 3 | Status: SHIPPED | OUTPATIENT
Start: 2021-08-10 | End: 2022-09-26

## 2021-08-10 NOTE — CARE COORDINATION
[facility] ED Follow Up Call    8/10/2021    Patient: Naresh Baca Patient : 1980   MRN: [id number]  Reason for Admission: COVID 19  Discharge Date: 2021       Care Transitions ED Follow Up    Care Transitions Interventions  Do you have any ongoing symptoms?: Yes   Onset of Patient-reported symptoms: Other   Patient-reported symptoms: Pain   Do you have a copy of your discharge instructions?: Yes   Do you understand what to report and when to return?: Yes   Are you following your discharge instructions?: Yes   Do you have all of your prescriptions and are they filled?: Yes   Have you scheduled your follow up appointment?: Yes   Were you discharged with any Home Care or Post Acute Services or do you currently have any active services?: No              Needs to be reviewed by the provider   Additional needs identified to be addressed with provider No  none           Discussed COVID-19 related testing which was available at this time. Test results were positive. Patient informed of results, if available? Yes    Ambulatory Care Manager [facility]) contacted the patient by telephone to perform post ED visit assessment. Call within 2 business days of discharge: Yes. Verified name and  with patient as identifiers. Patient reports doing \"a little better\" since most recent UC visit. States had PCP follow up yesterday and was started on Zithromax. Patient states got Zithromax filled; along with Prednisone & Mucinex D (from recent ED visit). Patient states has not yet noticed any improvement in symptoms since starting meds. States appetite and fluid intake both normal. Reports PCP has her off work through 2021. Patient states has FMLA currently and will be calling [employer] tomorrow to give update on return to work date. Denies needs or concerns for ACM. Agreeable to follow up calls.      Interventions:    Counseling and education provided at today's visit on:   Red Flag symptoms to report  Symptom management  Medication compliance  Reinforced Discharge instructions    Medication reconciliation was performed with patient, who verbalizes understanding of administration of home medications. Advised obtaining a 90-day supply of all daily and as-needed medications. Reinforced resources available to patient including: PCP and Benefits related nurse triage line. ACM encouraged outreach to Nurse Access Line and/or ACM for assessment and intervention guidance as needed. ACM encouraged patient to contact 911 for life threatening emergencies and PCP office 24/7 for non life-threatening symptoms. Reminded patient of alternatives to ED such as urgent care, walk in clinics and e-visits as available. Reviewed proper ED utilization using Right Care, Right Place, Right Time Flyer. Flyer mailed with PCP name and office number. Discussed follow-up appointments. If no appointment was previously scheduled, appointment scheduling offered: No Is follow up appointment scheduled within 7 days of discharge? Yes  [facility] follow up appointment(s):   Future Appointments   Date Time Provider Ravi Mccann   8/16/2021  2:30 PM STR MRI RM1 STRZ MRI STR Radiolog   8/16/2021  3:00 PM STR MRI RM1 STRZ MRI STR Radiolog   8/27/2021 11:45 AM MARÍA Varela - M[facility]SA[facility] - [place]   10/19/2021  2:00 PM [facility] [place]   11/10/2021  1:15 PM Ryann Garg MD [facility] - [place]     Non-[facility] follow up appointment(s): None known    Plan:  Continue weekly outreaches to provide telephonic support, education and resources as needed. Discuss / follow up on:   Red Flag symptoms to report  Symptom management  Goal Progress      Patient verbalized understanding and is agreeable. Goals      Conditions and Symptoms      I will schedule office visits, as directed by my provider. I will keep my appointment or reschedule if I have to cancel.   I will notify my provider of any barriers to my plan of care. I will notify my provider of any symptoms that indicate a worsening of my condition. Barriers: stress  Plan for overcoming my barriers: I will work with my ACM to overcome barriers. Confidence: 6/10  Anticipated Goal Completion Date: 8/30/2021    8/3/2021: Patient states has not yet called PCP to schedule follow up. Declined ACM's offer to call PCP to schedule. ACM reviewed red flags with patient. 8/10/2021: Patient had follow up with PCP yesterday. Antonio sAhley RN BSN  Associate Care Manager  Phone: [phone]  Email: [email or web address]

## 2021-08-10 NOTE — TELEPHONE ENCOUNTER
Recent Visits  Date Type Provider Dept   07/14/21 Office Visit MARÍA Berger CNP [facility]   04/28/21 Office Visit MARÍA Berger CNP [facility]   04/19/21 Office Visit MARÍA Berger CNP [facility]   03/08/21 Office Visit MARÍA Berger CNP [facility]   05/29/20 Office Visit MARÍA Berger CNP [facility]   Showing recent visits within past 540 days with a meds authorizing provider and meeting all other requirements  Future Appointments  Date Type Provider Dept   10/19/21 Appointment MARÍA Berger CNP [facility]   Showing future appointments within next 150 days with a meds authorizing provider and meeting all other requirements

## 2021-08-11 ENCOUNTER — HOSPITAL ENCOUNTER (OUTPATIENT)
Age: 41
Discharge: HOME | End: 2021-08-11
Payer: COMMERCIAL

## 2021-08-11 ENCOUNTER — PATIENT MESSAGE (OUTPATIENT)
Dept: FAMILY MEDICINE CLINIC | Age: 41
End: 2021-08-11

## 2021-08-11 DIAGNOSIS — U07.1 SARS-COV-2 POSITIVE: Primary | ICD-10-CM

## 2021-08-11 PROCEDURE — 87636 SARSCOV2 & INF A&B AMP PRB: CPT

## 2021-08-11 NOTE — TELEPHONE ENCOUNTER
From: Becca Sands  To: Jennifer Bean, APRN - CNP  Sent: 8/11/2021 3:07 PM EDT  Subject: Non-Urgent Medical Question    Sheryle Collar, I was just told that I had to have a COVID test before benedict back to work so I need a referral sent to somewhere so I can take it today

## 2021-08-12 LAB
INFLUENZA A: NOT DETECTED
INFLUENZA B: NOT DETECTED
SARS-COV-2 RNA, RT PCR: DETECTED

## 2021-08-16 ENCOUNTER — HOSPITAL ENCOUNTER (OUTPATIENT)
Dept: MRI IMAGING | Age: 41
Discharge: HOME | End: 2021-08-16
Payer: COMMERCIAL

## 2021-08-16 ENCOUNTER — TELEPHONE (OUTPATIENT)
Dept: FAMILY MEDICINE CLINIC | Age: 41
End: 2021-08-16

## 2021-08-16 DIAGNOSIS — R47.01 EXPRESSIVE APHASIA: ICD-10-CM

## 2021-08-16 DIAGNOSIS — M50.90 CERVICAL BACK PAIN WITH EVIDENCE OF DISC DISEASE: ICD-10-CM

## 2021-08-16 DIAGNOSIS — R51.9 NONINTRACTABLE EPISODIC HEADACHE, UNSPECIFIED HEADACHE TYPE: ICD-10-CM

## 2021-08-16 DIAGNOSIS — R42 EPISODIC LIGHTHEADEDNESS: ICD-10-CM

## 2021-08-16 PROCEDURE — 72141 MRI NECK SPINE W/O DYE: CPT

## 2021-08-16 PROCEDURE — 70551 MRI BRAIN STEM W/O DYE: CPT

## 2021-08-16 NOTE — TELEPHONE ENCOUNTER
----- Message from Morena Pons sent at 8/16/2021  9:29 AM EDT -----  Subject: Message to Provider    QUESTIONS  Information for Provider? Patient is needing to have her FMLA filled out   for extension. She is wondering if she can just drop it off or of she   needs to be seen. ---------------------------------------------------------------------------  --------------  [facility] INFO  What is the best way for the office to contact you? Do not leave any   message, patient will call back for answer, OK to respond with electronic   message via [employer] portal (only for patients who have registered [employer]   account)  Preferred Call Back Phone Number? [phone]  ---------------------------------------------------------------------------  --------------  SCRIPT ANSWERS  Relationship to Patient?  Self

## 2021-08-17 ENCOUNTER — TELEPHONE (OUTPATIENT)
Dept: FAMILY MEDICINE CLINIC | Age: 41
End: 2021-08-17

## 2021-08-17 NOTE — TELEPHONE ENCOUNTER
Patient has been informed and voiced understanding and states that she will do some checking around and either call back or send a [employer] message with her decision of orthopedic or PM

## 2021-08-17 NOTE — TELEPHONE ENCOUNTER
----- Message from MARÍA Lama CNP sent at 2021  9:01 AM EDT -----  Let Gallina Bennington know the MRI of her brain is stable and within normal range from the prior MRI of her brain. No change noted. MRI neck shows multi level degenerative changes, worst at C4-C5 where she has a bulging disc. The bulging disc is causing spinal canal stenosis or narrowing. I would honestly consider sending her to pain management or orthopedic. I have no preference which she prefers, she can let me know.

## 2021-08-18 ENCOUNTER — TELEPHONE (OUTPATIENT)
Dept: FAMILY MEDICINE CLINIC | Age: 41
End: 2021-08-18

## 2021-08-18 NOTE — TELEPHONE ENCOUNTER
[facility] paperwork for intermittent leave has been faxed, scanned, and pt has been informed on 8/17/2021.

## 2021-08-19 ENCOUNTER — CARE COORDINATION (OUTPATIENT)
Dept: OTHER | Facility: CLINIC | Age: 41
End: 2021-08-19

## 2021-08-27 ENCOUNTER — TELEPHONE (OUTPATIENT)
Dept: UROLOGY | Age: 41
End: 2021-08-27

## 2021-09-01 ENCOUNTER — OFFICE VISIT (OUTPATIENT)
Dept: UROLOGY | Age: 41
End: 2021-09-01
Payer: COMMERCIAL

## 2021-09-01 VITALS — BODY MASS INDEX: 37.56 KG/M2 | HEIGHT: 64 IN | WEIGHT: 220 LBS

## 2021-09-01 DIAGNOSIS — N32.81 OAB (OVERACTIVE BLADDER): Primary | ICD-10-CM

## 2021-09-01 LAB
BILIRUBIN URINE: NEGATIVE
BLOOD URINE, POC: NEGATIVE
CHARACTER, URINE: CLEAR
COLOR, URINE: YELLOW
GLUCOSE URINE: NEGATIVE MG/DL
KETONES, URINE: NEGATIVE
LEUKOCYTE CLUMPS, URINE: NEGATIVE
NITRITE, URINE: NEGATIVE
PH, URINE: 5.5 (ref 5–9)
POST VOID RESIDUAL (PVR): 0 ML
PROTEIN, URINE: 30 MG/DL
SPECIFIC GRAVITY, URINE: >= 1.03 (ref 1–1.03)
UROBILINOGEN, URINE: 0.2 EU/DL (ref 0–1)

## 2021-09-01 PROCEDURE — 99214 OFFICE O/P EST MOD 30 MIN: CPT | Performed by: NURSE PRACTITIONER

## 2021-09-01 PROCEDURE — G8427 DOCREV CUR MEDS BY ELIG CLIN: HCPCS | Performed by: NURSE PRACTITIONER

## 2021-09-01 PROCEDURE — 51798 US URINE CAPACITY MEASURE: CPT | Performed by: NURSE PRACTITIONER

## 2021-09-01 PROCEDURE — G8417 CALC BMI ABV UP PARAM F/U: HCPCS | Performed by: NURSE PRACTITIONER

## 2021-09-01 PROCEDURE — 1036F TOBACCO NON-USER: CPT | Performed by: NURSE PRACTITIONER

## 2021-09-01 PROCEDURE — 81003 URINALYSIS AUTO W/O SCOPE: CPT | Performed by: NURSE PRACTITIONER

## 2021-09-01 ASSESSMENT — ENCOUNTER SYMPTOMS
ABDOMINAL PAIN: 0
NAUSEA: 0
BACK PAIN: 0
VOMITING: 0

## 2021-09-01 NOTE — PROGRESS NOTES
[place] [address] [address]  Dept: [phone]  Loc: [phone]    Visit Date: 9/1/2021        HPI:     Rigoberto Toure is a 35 y.o. female who presents today for:  Chief Complaint   Patient presents with    Follow-up     OAB, pelvic pain       HPI   Pt seen in follow up for kidney stones and OAB. Peeing every 1 hour. John Velasquez has a hx of urinary frequency and urgency. Reports urination of every 1 hour during the daytime and nocturia of 1-2 x per night. Notes no improvement with oxybutynin 10 mg XL BID. Reports pain at times in bladder if she waits to void. Has hx of nocturnal enuresis as a child until the age of 13. Denies incontinence now. Denies constipation. Has minimum of 5 stools per day. Pt also has a hx of kidney stones with 4 mm R renal calculus on last KUB. On HCTZ per pcp. Current Outpatient Medications   Medication Sig Dispense Refill    sertraline (ZOLOFT) 100 MG tablet take 1 tablet by mouth once daily 90 tablet 3    Ascorbic Acid (VITAMIN C PO) Take by mouth daily      VITAMIN E PO Take by mouth daily      ZINC PO Take by mouth daily      hydroCHLOROthiazide (MICROZIDE) 12.5 MG capsule Take 1 capsule by mouth daily 90 capsule 0    amLODIPine (NORVASC) 10 MG tablet Take 1 tablet by mouth daily 90 tablet 3    metoprolol succinate (TOPROL XL) 100 MG extended release tablet take 1 tablet by mouth once daily 90 tablet 3    omeprazole (PRILOSEC) 40 MG delayed release capsule Take 40 mg by mouth nightly      rizatriptan (MAXALT) 5 MG tablet Take 1-2 tablets by mouth once as needed for Migraine (May repeat once 2 hours after the first dose) May repeat in 2 hours if needed 8 tablet 0    ibuprofen (ADVIL;MOTRIN) 600 MG tablet Take 1 tablet by mouth every 8 hours as needed for Pain (Headache) 20 tablet 0     No current facility-administered medications for this visit. Past Medical History  Bravo Flaherty  has a past medical history of Bell palsy, Essential hypertension, GERD (gastroesophageal reflux disease), Hashimoto's thyroiditis, HIGH CHOLESTEROL, Hyperlipidemia, Hypokalemia, Prediabetes, and Subclinical hyperthyroidism. Past Surgical History  The patient  has a past surgical history that includes  section (, ); Endoscopy, colon, diagnostic; Dilation and curettage of uterus (); laparoscopy (N/A, 6/15/2020); Hysterectomy, total abdominal (N/A, 9/10/2020); and Colonoscopy (2021). Family History  This patient's family history includes Diabetes in her father and mother; Heart Disease in her mother; High Blood Pressure in her father and mother. Social History  Deloris  reports that she has never smoked. She has never used smokeless tobacco. She reports current alcohol use. She reports that she does not use drugs. Subjective:      Review of Systems   Constitutional: Negative for activity change, appetite change, chills, diaphoresis, fatigue, fever and unexpected weight change. Gastrointestinal: Negative for abdominal pain, nausea and vomiting. Genitourinary: Negative for decreased urine volume, difficulty urinating, dysuria, flank pain, frequency, hematuria and urgency. Musculoskeletal: Negative for back pain. Objective:   Ht 5' 4\" (1.626 m)   Wt 220 lb (99.8 kg)   LMP 2020   BMI 37.76 kg/m²     Physical Exam  Vitals reviewed. Constitutional:       General: She is not in acute distress. Appearance: Normal appearance. She is well-developed. She is not ill-appearing or diaphoretic. HENT:      Head: Normocephalic and atraumatic. Right Ear: External ear normal.      Left Ear: External ear normal.      Nose: Nose normal.      Mouth/Throat:      Mouth: Mucous membranes are moist.   Eyes:      General: No scleral icterus. Right eye: No discharge. Left eye: No discharge. Neck:      Vascular: No JVD. Trachea: No tracheal deviation. Pulmonary:      Effort: Pulmonary effort is normal. No respiratory distress. Abdominal:      General: There is no distension. Tenderness: There is no abdominal tenderness. There is no right CVA tenderness or left CVA tenderness. Musculoskeletal:         General: No tenderness. Normal range of motion. Neurological:      Mental Status: She is alert and oriented to person, place, and time. Mental status is at baseline. Psychiatric:         Mood and Affect: Mood normal.         Behavior: Behavior normal.         Thought Content: Thought content normal.         POC  Results for POC orders placed in visit on 09/01/21   POCT Urinalysis No Micro (Auto)   Result Value Ref Range    Glucose, Ur Negative NEGATIVE mg/dl    Bilirubin Urine Negative     Ketones, Urine Negative NEGATIVE    Specific Gravity, Urine >= 1.030 1.002 - 1.030    Blood, UA POC Negative NEGATIVE    pH, Urine 5.50 5.0 - 9.0    Protein, Urine 30 (A) NEGATIVE mg/dl    Urobilinogen, Urine 0.20 0.0 - 1.0 eu/dl    Nitrite, Urine Negative NEGATIVE    Leukocyte Clumps, Urine Negative NEGATIVE    Color, Urine Yellow [id number]-STRAW    Character, Urine Clear [id number].[id number]   poct post void residual   Result Value Ref Range    post void residual 0 ml       Patients recent PSA values are as follows  No results found for: PSA, PSADIA     Recent BUN/Creatinine:  Lab Results   Component Value Date    BUN 15 07/21/2021    CREATININE 0.7 07/21/2021       Assessment:   OAB  Pelvic pain  Nonobstructive R nephrolithiasis    Plan:     No improvement with oxybutynin. Trial Myrbetriq 50 mg daily. Provided with samples. If no improvement may need to consider possible advanced therapies for incontinence. F/u in 6-8 weeks with PVR.

## 2021-09-09 ENCOUNTER — CARE COORDINATION (OUTPATIENT)
Dept: OTHER | Facility: CLINIC | Age: 41
End: 2021-09-09

## 2021-09-09 NOTE — CARE COORDINATION
Associate Care Manager (ACM) called patient for CC outreach. No answer; ACM left HIPAA compliant voicemail message with request for return call. ACM will continue to follow. Eduardo Contreras RN BSN  Associate Care Manager  Phone: [phone]  Email: [email or web address]

## 2021-09-18 ENCOUNTER — CARE COORDINATION (OUTPATIENT)
Dept: OTHER | Facility: CLINIC | Age: 41
End: 2021-09-18

## 2021-09-18 NOTE — CARE COORDINATION
Associate Care Manager (ACM) called patient for CC outreach. No answer; ACM left HIPAA compliant voicemail message with request for return call. ACM also sent final lost to follow up letter to patient via [address] ACM signing off due to lost to follow up unless patient returns the call. Eduardo Contreras RN BSN  Associate Care Manager  Phone: [phone]  Email: [email or web address]

## 2021-09-21 ENCOUNTER — HOSPITAL ENCOUNTER (OUTPATIENT)
Age: 41
Discharge: HOME | End: 2021-09-21
Payer: COMMERCIAL

## 2021-09-21 PROCEDURE — 86304 IMMUNOASSAY TUMOR CA 125: CPT

## 2021-09-21 PROCEDURE — 36415 COLL VENOUS BLD VENIPUNCTURE: CPT

## 2021-09-22 LAB — CA 125: 6 U/ML

## 2021-10-19 ENCOUNTER — OFFICE VISIT (OUTPATIENT)
Dept: FAMILY MEDICINE CLINIC | Age: 41
End: 2021-10-19
Payer: COMMERCIAL

## 2021-10-19 VITALS
RESPIRATION RATE: 16 BRPM | TEMPERATURE: 98.4 F | WEIGHT: 222 LBS | DIASTOLIC BLOOD PRESSURE: 78 MMHG | HEART RATE: 69 BPM | BODY MASS INDEX: 37.9 KG/M2 | OXYGEN SATURATION: 99 % | HEIGHT: 64 IN | SYSTOLIC BLOOD PRESSURE: 126 MMHG

## 2021-10-19 DIAGNOSIS — R51.9 NONINTRACTABLE EPISODIC HEADACHE, UNSPECIFIED HEADACHE TYPE: Primary | ICD-10-CM

## 2021-10-19 DIAGNOSIS — M50.20 HERNIATED CERVICAL DISC: ICD-10-CM

## 2021-10-19 DIAGNOSIS — I10 ESSENTIAL HYPERTENSION: ICD-10-CM

## 2021-10-19 DIAGNOSIS — M48.02 DEGENERATIVE CERVICAL SPINAL STENOSIS: ICD-10-CM

## 2021-10-19 PROCEDURE — 99213 OFFICE O/P EST LOW 20 MIN: CPT | Performed by: NURSE PRACTITIONER

## 2021-10-19 PROCEDURE — G8484 FLU IMMUNIZE NO ADMIN: HCPCS | Performed by: NURSE PRACTITIONER

## 2021-10-19 PROCEDURE — 1036F TOBACCO NON-USER: CPT | Performed by: NURSE PRACTITIONER

## 2021-10-19 PROCEDURE — G8417 CALC BMI ABV UP PARAM F/U: HCPCS | Performed by: NURSE PRACTITIONER

## 2021-10-19 PROCEDURE — G8427 DOCREV CUR MEDS BY ELIG CLIN: HCPCS | Performed by: NURSE PRACTITIONER

## 2021-10-19 NOTE — PROGRESS NOTES
Chief Complaint   Patient presents with    Follow-up     6 month chronic cond.  Arm Pain     when lifting        History obtained from chart review and the patient. SUBJECTIVE:  Kyaw Taylor is a 35 y.o. female that presents today for follow up chronic conditions    HTN    Does patient check BP regularly at home? - No  Current Medication regimen - hctz, norvasc, Toprol  Tolerating medications well? - yes    Shortness of breath or chest pain? No  Headache or visual complaints? Yes - some HA  Neurologic changes like confusion? No  Extremity edema? No  Dizziness? no    BP Readings from Last 3 Encounters:   10/19/21 126/78   08/08/21 (!) 140/86   07/21/21 (!) 167/97         Headaches  Headaches have been decent. Gets a headache maybe once a week. Usually aborts with motrin. Cervical DDD  Still deals with pain in her neck and numbness down her back and neck. She does also have some arm pain.      Age/Gender Health Maintenance    Lipid   Lab Results   Component Value Date    CHOL 195 12/28/2019    CHOL 194 05/15/2019    CHOL 205 (H) 02/12/2019     Lab Results   Component Value Date    TRIG 129 12/28/2019    TRIG 199 05/15/2019    TRIG 87 02/12/2019     Lab Results   Component Value Date    HDL 53 03/24/2021    HDL 51 12/28/2019    HDL 49 05/15/2019     Lab Results   Component Value Date    LDLCALC 157 03/24/2021    LDLCALC 118 12/28/2019    LDLCALC 105 05/15/2019     Lab Results   Component Value Date    LABVLDL 20 09/23/2015     Lab Results   Component Value Date    CHOLHDLRATIO 4.2 09/23/2015     DM Screen -   Lab Results   Component Value Date    LABA1C 5.8 05/20/2021     Colon Cancer Screening - 50  Lung Cancer Screening (Age 54 to [de-identified] with 30 pack year hx, current smoker or quit within past 15 years) - n/a    Tetanus - needs  Influenza Vaccine - needs  Pneumonia Vaccine - 65  Zostavax - 50    Breast Cancer Screening - 50  Cervical Cancer Screening - per OB, Dr. Anjelica Otoole  Osteoporosis Screening - 70    AAA Screening - n/a    Falls screening - n/a    Current Outpatient Medications   Medication Sig Dispense Refill    hydroCHLOROthiazide (MICROZIDE) 12.5 MG capsule take 1 capsule by mouth once daily 90 capsule 1    sertraline (ZOLOFT) 100 MG tablet take 1 tablet by mouth once daily 90 tablet 3    amLODIPine (NORVASC) 10 MG tablet Take 1 tablet by mouth daily 90 tablet 3    metoprolol succinate (TOPROL XL) 100 MG extended release tablet take 1 tablet by mouth once daily 90 tablet 3    omeprazole (PRILOSEC) 40 MG delayed release capsule Take 40 mg by mouth nightly      ibuprofen (ADVIL;MOTRIN) 600 MG tablet Take 1 tablet by mouth every 8 hours as needed for Pain (Headache) 20 tablet 0     No current facility-administered medications for this visit. No orders of the defined types were placed in this encounter. All medications reviewed and reconciled, including OTC and herbal medications. Updated list given to patient. Patient Active Problem List   Diagnosis    Essential hypertension    Panic disorder without agoraphobia with moderate panic attacks    Hashimoto's thyroiditis    Family history of early CAD    Intermittent palpitations    Diarrhea    Hyperlipidemia    Reactive thrombocytosis    Obesity (BMI 30-39. 9)    Right Ramirez's palsy    Prediabetes    Pelvic pain    S/P hysterectomy    Spinal stenosis of lumbar region without neurogenic claudication    Herniated lumbar intervertebral disc       Past Medical History:   Diagnosis Date    Bell palsy     right side droop    Essential hypertension 2016    GERD (gastroesophageal reflux disease)     Hashimoto's thyroiditis 2016    HIGH CHOLESTEROL     Hyperlipidemia     Hypokalemia     Prediabetes 2020    Subclinical hyperthyroidism 2016       Past Surgical History:   Procedure Laterality Date     SECTION  ,     COLONOSCOPY  2021    1 polyp removed     DILATION AND CURETTAGE OF UTERUS  1994    ENDOSCOPY, COLON, DIAGNOSTIC      HYSTERECTOMY, TOTAL ABDOMINAL N/A 9/10/2020    HYSTERECTOMY ABDOMINAL TOTAL, BS, POSS DANIELLE performed by Josefa Gil MD at [place] [address] 6/15/2020    DIAGNOSTIC LAPAROSCOPY, performed by Josefa Gil MD at [address] [facility]   Allergen Reactions    Bactrim Swelling     tongue    Sulfamethoxazole-Trimethoprim Swelling     tongue       Social History     Socioeconomic History    Marital status: Single     Spouse name: Not on file    Number of children: 2    Years of education: Not on file    Highest education level: Not on file   Occupational History    Not on file   Tobacco Use    Smoking status: Never Smoker    Smokeless tobacco: Never Used   Vaping Use    Vaping Use: Never used   Substance and Sexual Activity    Alcohol use: Yes     Comment: social    Drug use: No    Sexual activity: Not on file   Other Topics Concern    Not on file   Social History Narrative    Not on file     Social Determinants of Health     Financial Resource Strain:     Difficulty of Paying Living Expenses:    Food Insecurity:     Worried About Running Out of Food in the Last Year:     Ran Out of Food in the Last Year:    Transportation Needs:     Lack of Transportation (Medical):      Lack of Transportation (Non-Medical):    Physical Activity:     Days of Exercise per Week:     Minutes of Exercise per Session:    Stress:     Feeling of Stress :    Social Connections:     Frequency of Communication with Friends and Family:     Frequency of Social Gatherings with Friends and Family:     Attends [employer] Services:     Active Member of Clubs or Organizations:     Attends Club or Organization Meetings:     Marital Status:    Intimate Partner Violence:     Fear of Current or Ex-Partner:     Emotionally Abused:     Physically Abused:     Sexually Abused:        Family History   Problem Relation Age of Onset    High Blood Pressure Mother     Diabetes Mother     Heart Disease Mother     High Blood Pressure Father     Diabetes Father          I have reviewed the patient's past medical history, past surgical history, allergies, medications, social and family history and I have made updates where appropriate.       Review of Systems  Positive responses are highlighted in bold    Constitutional:  Fever, Chills, Night Sweats, Fatigue, Unexpected changes in weight  Eyes:  Eye discharge, Eye pain, Eye redness, Visual disturbances   HENT:  Ear pain, Tinnitus, Nosebleeds, Trouble swallowing, Hearing loss, Sore throat  Cardiovascular:  Chest Pain, Palpitations, Orthopnea, Paroxysmal Nocturnal Dyspnea  Respiratory:  Cough, Wheezing, Shortness of breath, Chest tightness, Apnea  Gastrointestinal:  Nausea, Vomiting, Diarrhea, Constipation, Heartburn, Blood in stool  Genitourinary:  Difficulty or painful urination, Flank pain, Change in frequency, Urgency  Skin:  Color change, Rash, Itching, Wound  Psychiatric:  Hallucinations, Anxiety, Depression, Suicidal ideation  Hematological:  Enlarged glands, Easy bleeding, Easily bruising  Musculoskeletal:  Joint pain, Back pain, Gait problems, Joint swelling, Myalgias  Neurological:  Dizziness, Headaches, Presyncope, Numbness, Seizures, Tremors  Allergy:  Environmental allergies, Food allergies  Endocrine:  Heat Intolerance, Cold Intolerance, Polydipsia, Polyphagia, Polyuria    Lab Results   Component Value Date    TSH 0.670 05/29/2020     Lab Results   Component Value Date     07/21/2021    K 4.1 07/21/2021     07/21/2021    CO2 22 (L) 07/21/2021    BUN 15 07/21/2021    CREATININE 0.7 07/21/2021    GLUCOSE 121 (H) 07/21/2021    CALCIUM 10.0 07/21/2021    PROT 7.8 07/21/2021    LABALBU 4.4 07/21/2021    BILITOT <0.2 (L) 07/21/2021    ALKPHOS 117 07/21/2021    AST 34 07/21/2021    ALT 50 07/21/2021    LABGLOM >90 07/21/2021       Lab Results   Component Value Date    WBC 13.3 (H) 07/21/2021 HGB 12.9 07/21/2021    HCT 40.2 07/21/2021    MCV 84.5 07/21/2021     (H) 07/21/2021     MRI cervical spine 8/16/21  Multilevel degenerative changes most pronounced at C4-5 where there is a disc bulge with superimposed central extrusion which causes moderate spinal canal stenosis and flattens the ventral aspect of spinal cord with mild abnormal T2 signal in the cord. There is also moderate right and severe left neuroforaminal stenosis at C5-6. MRI Brain 8/16/21  1. No evidence of acute intracranial abnormality. 2. Stable small nonspecific focus of T2/FLAIR prolongation adjacent to the left frontal horn is nonspecific and of doubtful clinical significance. PHYSICAL EXAM:  Vitals:    10/19/21 1410   BP: 126/78   Pulse: 69   Resp: 16   Temp: 98.4 °F (36.9 °C)   TempSrc: Oral   SpO2: 99%   Weight: 222 lb (100.7 kg)   Height: 5' 4\" (1.626 m)     Body mass index is 38.11 kg/m². VS Reviewed  General Appearance: A&O x 3, No acute distress,well developed and well- nourished  Head: normocephalic and atraumatic, small tender firm contusion left occipital  Eyes: pupils equal, round, and reactive to light, extraocular eye movements intact, conjunctivae and eye lids without erythema  Neck: supple and non-tender without mass, mild thyromegaly but no thyroid nodules, no cervical lymphadenopathy  Pulmonary/Chest: clear to auscultation bilaterally- no wheezes, rales or rhonchi, normal air movement, no respiratory distress or retractions  Cardiovascular: S1 and S2 auscultated w/ RRR. No murmurs, rubs, clicks, or gallops, distal pulses intact. Abdomen: soft, nontender non-distended, bowl sounds physiologic,  no rebound or guarding, no masses or hernias noted. Liver and spleen without enlargement. Extremities: no cyanosis, clubbing or edema of the lower extremities.    Musculoskeletal: No joint swelling or gross deformity  Neuro:  Alert, 5/5 strength globally and symmetrically  Psych: Affect and mood are normal. Thought process is normal. Good insight and appropriate interaction. Cognition and memory appear to be intact. Skin: warm and dry, no rash or erythema  Lymph:  No cervical, auricular or supraclavicular lymph nodes palpated      ASSESSMENT & PLAN  Deloris was seen today for follow-up and arm pain. Diagnoses and all orders for this visit:    Nonintractable episodic headache, unspecified headache type    Degenerative cervical spinal stenosis  -     [address], José Antonio, DO, Pain Medicine, [place]    Herniated cervical disc  -     [facility], José Antonio, DO, Pain Medicine, [place] [email or web address]    Essential hypertension       - BP stable and reasonable control, con't current meds  - HA also stable  - refer to Pain management for her neck    DISPOSITION    Return in about 1 year (around 10/19/2022) for chronic conditions. Deloris released without restrictions. PATIENT COUNSELING    Counseling was provided today regarding the following topics: Healthy eating habits, Regular exercise, substance abuse and healthy sleep habits. Deloris received counseling on the following healthy behaviors: nutrition, exercise and medication adherence    Patient given educational materials on: See Attached    I have instructed Deloris to complete a self tracking handout on Blood Pressures  and instructed them to bring it with them to her next appointment. Barriers to learning and self management: none    Discussed use, benefit, and side effects of prescribed medications. Barriers to medication compliance addressed. All patient questions answered. Pt voiced understanding.        Electronically signed by MARÍA Renteria CNP on 10/19/2021 at 2:28 PM

## 2021-10-25 ENCOUNTER — PATIENT MESSAGE (OUTPATIENT)
Dept: FAMILY MEDICINE CLINIC | Age: 41
End: 2021-10-25

## 2021-10-25 NOTE — TELEPHONE ENCOUNTER
From: Ken Sands  To: MARÍA Dugan - CNP  Sent: 10/25/2021 3:16 PM EDT  Subject: Non-Urgent Medical Question    Afternoon Brenden Nelson. I have some [facility] papers I need to have signed. I called off work 10-20/10-21and you only approved 2 days a month but my bp was elevated and I was having headaches and wasnt feeling good. My abscence manager said I had to have you fill these out for more days so they wont count against me. If you could do that it would be appreciated.  Thank you

## 2021-11-11 ENCOUNTER — PATIENT MESSAGE (OUTPATIENT)
Dept: FAMILY MEDICINE CLINIC | Age: 41
End: 2021-11-11

## 2021-11-11 DIAGNOSIS — B37.31 VAGINAL CANDIDA: Primary | ICD-10-CM

## 2021-11-11 RX ORDER — FLUCONAZOLE 150 MG/1
150 TABLET ORAL
Qty: 2 TABLET | Refills: 0 | Status: SHIPPED | OUTPATIENT
Start: 2021-11-11 | End: 2021-11-17

## 2021-11-11 NOTE — TELEPHONE ENCOUNTER
From: Teresa Sands  Sent: 11/11/2021 11:50 AM EST  To: [facility]  Subject: Prescription     Itching and irritationand Cristinae had the symptoms for a couple days now      Mark Campo, wondering if you can send me in a prescription for diflucan or do I need to be seen?

## 2021-11-23 ENCOUNTER — VIRTUAL VISIT (OUTPATIENT)
Dept: FAMILY MEDICINE CLINIC | Age: 41
End: 2021-11-23
Payer: COMMERCIAL

## 2021-11-23 DIAGNOSIS — J20.9 ACUTE BRONCHITIS, UNSPECIFIED ORGANISM: Primary | ICD-10-CM

## 2021-11-23 PROCEDURE — 99213 OFFICE O/P EST LOW 20 MIN: CPT | Performed by: NURSE PRACTITIONER

## 2021-11-23 PROCEDURE — G8428 CUR MEDS NOT DOCUMENT: HCPCS | Performed by: NURSE PRACTITIONER

## 2021-11-23 RX ORDER — AZITHROMYCIN 250 MG/1
250 TABLET, FILM COATED ORAL SEE ADMIN INSTRUCTIONS
Qty: 6 TABLET | Refills: 0 | Status: SHIPPED | OUTPATIENT
Start: 2021-11-23 | End: 2021-11-28

## 2021-11-23 RX ORDER — OMEPRAZOLE 40 MG/1
40 CAPSULE, DELAYED RELEASE ORAL NIGHTLY
Qty: 90 CAPSULE | Refills: 1 | Status: SHIPPED | OUTPATIENT
Start: 2021-11-23 | End: 2022-10-19

## 2021-11-23 RX ORDER — GUAIFENESIN AND CODEINE PHOSPHATE 100; 10 MG/5ML; MG/5ML
5 SOLUTION ORAL 4 TIMES DAILY PRN
Qty: 60 ML | Refills: 0 | Status: SHIPPED | OUTPATIENT
Start: 2021-11-23 | End: 2021-11-26

## 2021-11-23 ASSESSMENT — ENCOUNTER SYMPTOMS
COLOR CHANGE: 0
NAUSEA: 0
SORE THROAT: 0
VOMITING: 0
WHEEZING: 0
ABDOMINAL PAIN: 0
RHINORRHEA: 1
SHORTNESS OF BREATH: 0
DIARRHEA: 0
COUGH: 1
EYE PAIN: 0
CHEST TIGHTNESS: 1
EYE REDNESS: 0
TROUBLE SWALLOWING: 0

## 2021-11-23 NOTE — PROGRESS NOTES
2021    TELEHEALTH EVALUATION -- Audio/Visual (During [facility] public health emergency)    HPI:    Caty Sands (:  1980) has requested an audio/video evaluation for the following concern(s):    URI Symptoms    HPI:      Symptoms have been present for 3 day(s). Symptoms are unchanged since they initially started. Fever? No  Runny nose or congestion? Yes   Cough? Yes - and having chest pains when she cough  Sore throat? Yes  Headache, fatigue, joint pains, muscle aches? Yes - headache  Shortness of breath/Wheezing? No  Nausea/Vomiting/Diarrhea? No  Double Sickening? No  Sick contacts? No    Patient has tried OTC cold and sinus medicine without improvement. She took a home COVID test yesterday and it was negative. Review of Systems   Constitutional: Negative for chills, diaphoresis, fatigue and fever. HENT: Positive for congestion and rhinorrhea. Negative for sore throat and trouble swallowing. Eyes: Negative for pain, redness and visual disturbance. Respiratory: Positive for cough and chest tightness. Negative for shortness of breath and wheezing. Cardiovascular: Negative for chest pain, palpitations and leg swelling. Gastrointestinal: Negative for abdominal pain, diarrhea, nausea and vomiting. Endocrine: Negative for polydipsia, polyphagia and polyuria. Genitourinary: Negative for decreased urine volume, dysuria, frequency and urgency. Musculoskeletal: Negative for arthralgias and myalgias. Skin: Negative for color change and rash. Allergic/Immunologic: Negative for environmental allergies, food allergies and immunocompromised state. Neurological: Negative for dizziness, tremors, syncope and headaches. Hematological: Negative for adenopathy. Psychiatric/Behavioral: Negative for behavioral problems, confusion, self-injury and suicidal ideas. All other systems reviewed and are negative. Prior to Visit Medications    Medication Sig Taking? Authorizing Provider   azithromycin (ZITHROMAX) 250 MG tablet Take 1 tablet by mouth See Admin Instructions for 5 days 500mg on day 1 followed by 250mg on days 2 - 5 Yes Hulon [place], APRN - CNP   guaiFENesin-codeine (CHERATUSSIN AC) 100-10 MG/5ML syrup Take 5 mLs by mouth 4 times daily as needed for Cough for up to 3 days.  Yes Hulon [place], APRN - CNP   omeprazole (PRILOSEC) 40 MG delayed release capsule Take 1 capsule by mouth nightly Yes Hulon [place], APRN - CNP   hydroCHLOROthiazide (MICROZIDE) 12.5 MG capsule take 1 capsule by mouth once daily  Hulon [place], APRN - CNP   sertraline (ZOLOFT) 100 MG tablet take 1 tablet by mouth once daily  Hulon [place], APRN - CNP   ibuprofen (ADVIL;MOTRIN) 600 MG tablet Take 1 tablet by mouth every 8 hours as needed for Pain (Headache)  Suly Marte MD   amLODIPine (NORVASC) 10 MG tablet Take 1 tablet by mouth daily  Hulon [place], APRN - CNP   metoprolol succinate (TOPROL XL) 100 MG extended release tablet take 1 tablet by mouth once daily  Hulon [place], APRN - CNP       Social History     Tobacco Use    Smoking status: Never Smoker    Smokeless tobacco: Never Used   Vaping Use    Vaping Use: Never used   Substance Use Topics    Alcohol use: Yes     Comment: social    Drug use: No        Allergies   Allergen Reactions    Bactrim Swelling     tongue    Sulfamethoxazole-Trimethoprim Swelling     tongue   ,   Past Medical History:   Diagnosis Date    Bell palsy     right side droop    Essential hypertension 2016    GERD (gastroesophageal reflux disease)     Hashimoto's thyroiditis 2016    HIGH CHOLESTEROL     Hyperlipidemia     Hypokalemia     Prediabetes 2020    Subclinical hyperthyroidism 2016   ,   Past Surgical History:   Procedure Laterality Date     SECTION  ,     COLONOSCOPY  2021    1 polyp removed     DILATION AND CURETTAGE OF UTERUS      ENDOSCOPY, COLON, DIAGNOSTIC      HYSTERECTOMY, TOTAL ABDOMINAL N/A 9/10/2020    HYSTERECTOMY ABDOMINAL TOTAL, BS, POSS DANIELLE performed by Minna Xie MD at [place] [address] 6/15/2020    DIAGNOSTIC LAPAROSCOPY, performed by Minna Xie MD at [place]   ,   Family History   Problem Relation Age of Onset    High Blood Pressure Mother     Diabetes Mother     Heart Disease Mother     High Blood Pressure Father     Diabetes Father    ,   Immunization History   Administered Date(s) Administered    Influenza Vaccine, unspecified formulation 10/01/2016    Influenza Virus Vaccine 11/01/2017, 11/05/2018, 11/27/2019    Tdap (Boostrix, Adacel) 01/01/2016   ,   Health Maintenance   Topic Date Due    COVID-19 Vaccine (1) Never done    HIV screen  Never done    Flu vaccine (1) 09/01/2021    A1C test (Diabetic or Prediabetic)  05/20/2022    Potassium monitoring  07/21/2022    Creatinine monitoring  07/21/2022    DTaP/Tdap/Td vaccine (2 - Td or Tdap) 01/01/2026    Lipid screen  03/24/2026    Hepatitis C screen  Completed    Hepatitis A vaccine  Aged Out    Hepatitis B vaccine  Aged Out    Hib vaccine  Aged Out    Meningococcal (ACWY) vaccine  Aged Out    Pneumococcal 0-64 years Vaccine  Aged Out       PHYSICAL EXAMINATION:  [ INSTRUCTIONS:  \"[x]\" Indicates a positive item  \"[]\" Indicates a negative item  -- DELETE ALL ITEMS NOT EXAMINED]  Vital Signs: (As obtained by patient/caregiver or practitioner observation)    Blood pressure-  Heart rate-    Respiratory rate-    Temperature-  Pulse oximetry-     Constitutional: [x] Appears well-developed and well-nourished [x] No apparent distress      [] Abnormal-   Mental status  [x] Alert and awake  [x] Oriented to person/place/time [x]Able to follow commands      Eyes:  EOM    [x]  Normal  [] Abnormal-  Sclera  [x]  Normal  [] Abnormal -         Discharge [x]  None visible  [] Abnormal -    HENT:   [x] Normocephalic, atraumatic.   [] Abnormal   [x] Mouth/Throat: Mucous membranes are moist. External Ears [x] Normal  [] Abnormal-     Neck: [x] No visualized mass     Pulmonary/Chest: [x] Respiratory effort normal.  [x] No visualized signs of difficulty breathing or respiratory distress        [] Abnormal-      Musculoskeletal:   [x] Normal gait with no signs of ataxia         [x] Normal range of motion of neck        [] Abnormal-       Neurological:        [x] No Facial Asymmetry (Cranial nerve 7 motor function) (limited exam to video visit)          [x] No gaze palsy        [] Abnormal-         Skin:        [x] No significant exanthematous lesions or discoloration noted on facial skin         [] Abnormal-            Psychiatric:       [x] Normal Affect [x] No Hallucinations        [] Abnormal-     Other pertinent observable physical exam findings-     ASSESSMENT & PLAN  Jonathan Hunter was seen today for cough. Diagnoses and all orders for this visit:    Acute bronchitis, unspecified organism  -     azithromycin (ZITHROMAX) 250 MG tablet; Take 1 tablet by mouth See Admin Instructions for 5 days 500mg on day 1 followed by 250mg on days 2 - 5  -     guaiFENesin-codeine (CHERATUSSIN AC) 100-10 MG/5ML syrup; Take 5 mLs by mouth 4 times daily as needed for Cough for up to 3 days. Other orders  -     omeprazole (PRILOSEC) 40 MG delayed release capsule; Take 1 capsule by mouth nightly      - defer COVID testing, had covid less than 3 months ago  - will start Sulma Shorts and cheratussin    Controlled Substance Monitoring:    Acute and Chronic Pain Monitoring:   RX Monitoring 11/23/2021   Attestation -   Periodic Controlled Substance Monitoring No signs of potential drug abuse or diversion identified. Return if symptoms worsen or fail to improve. Ne Atkins is a 35 y.o. female being evaluated by a Virtual Visit (video visit) encounter to address concerns as mentioned above. A caregiver was present when appropriate.  Due to this being a TeleHealth encounter (During [place] [address] public health emergency), evaluation of the following organ systems was limited: Vitals/Constitutional/EENT/Resp/CV/GI//MS/Neuro/Skin/Heme-Lymph-Imm. Pursuant to the emergency declaration under the [address] [place] [address], [address] [place] [address] and the [employer] Act, this Virtual Visit was conducted with patient's (and/or legal guardian's) consent, to reduce the patient's risk of exposure to COVID-19 and provide necessary medical care. The patient (and/or legal guardian) has also been advised to contact this office for worsening conditions or problems, and seek emergency medical treatment and/or call 911 if deemed necessary. Services were provided through a video synchronous discussion virtually to substitute for in-person clinic visit. Patient and provider were located at their individual homes. --MARÍA Harley - CNP on 11/23/2021 at 3:04 PM    An electronic signature was used to authenticate this note.

## 2021-12-29 ENCOUNTER — TELEPHONE (OUTPATIENT)
Dept: FAMILY MEDICINE CLINIC | Age: 41
End: 2021-12-29

## 2021-12-29 ENCOUNTER — HOSPITAL ENCOUNTER (OUTPATIENT)
Dept: WOMENS IMAGING | Age: 41
Discharge: HOME | End: 2021-12-29
Payer: COMMERCIAL

## 2021-12-29 DIAGNOSIS — Z12.31 VISIT FOR SCREENING MAMMOGRAM: ICD-10-CM

## 2021-12-29 PROCEDURE — 77063 BREAST TOMOSYNTHESIS BI: CPT

## 2021-12-29 NOTE — TELEPHONE ENCOUNTER
----- Message from MARÍA Reyes CNP sent at 12/29/2021  4:13 PM EST -----  Let Niko Round know her mammogram is normal. No evidence of malignancy.

## 2022-01-15 ENCOUNTER — APPOINTMENT (OUTPATIENT)
Dept: CT IMAGING | Age: 42
End: 2022-01-15
Payer: COMMERCIAL

## 2022-01-15 ENCOUNTER — HOSPITAL ENCOUNTER (EMERGENCY)
Age: 42
Discharge: HOME | End: 2022-01-15
Attending: EMERGENCY MEDICINE
Payer: COMMERCIAL

## 2022-01-15 VITALS
HEART RATE: 78 BPM | RESPIRATION RATE: 16 BRPM | SYSTOLIC BLOOD PRESSURE: 158 MMHG | TEMPERATURE: 98.3 F | DIASTOLIC BLOOD PRESSURE: 85 MMHG | OXYGEN SATURATION: 98 %

## 2022-01-15 DIAGNOSIS — N28.9 LESION OF LEFT NATIVE KIDNEY: ICD-10-CM

## 2022-01-15 DIAGNOSIS — N20.0 RIGHT NEPHROLITHIASIS: ICD-10-CM

## 2022-01-15 DIAGNOSIS — N83.209 CYST OF OVARY, UNSPECIFIED LATERALITY: Primary | ICD-10-CM

## 2022-01-15 DIAGNOSIS — D72.829 LEUKOCYTOSIS, UNSPECIFIED TYPE: ICD-10-CM

## 2022-01-15 LAB
BACTERIA: ABNORMAL /HPF
BASOPHILS # BLD: 0.3 %
BASOPHILS ABSOLUTE: 0 THOU/MM3 (ref 0–0.1)
BILIRUBIN URINE: NEGATIVE
BILIRUBIN URINE: NEGATIVE
BLOOD, URINE: ABNORMAL
BLOOD, URINE: ABNORMAL
BUN, WHOLE BLOOD: 13 MG/DL (ref 8–26)
CASTS 2: ABNORMAL /LPF
CASTS UA: ABNORMAL /LPF
CHARACTER, URINE: CLEAR
CHARACTER, URINE: CLEAR
CHLORIDE, WHOLE BLOOD: 103 MEQ/L (ref 98–109)
COLOR: YELLOW
COLOR: YELLOW
CREATININE, WHOLE BLOOD: 0.7 MG/DL (ref 0.5–1.2)
CRYSTALS, UA: ABNORMAL
EOSINOPHIL # BLD: 0.7 %
EOSINOPHILS ABSOLUTE: 0.1 THOU/MM3 (ref 0–0.4)
EPITHELIAL CELLS, UA: ABNORMAL /HPF
GFR, ESTIMATED: > 90 ML/MIN/1.73M2
GLUCOSE URINE: NEGATIVE MG/DL
GLUCOSE URINE: NEGATIVE MG/DL
GLUCOSE, WHOLE BLOOD: 120 MG/DL (ref 70–108)
HCT VFR BLD CALC: 35.7 % (ref 37–47)
HEMOGLOBIN: 12.2 GM/DL (ref 12–16)
IMMATURE GRANS (ABS): 0.04 THOU/MM3 (ref 0–0.07)
IMMATURE GRANULOCYTES: 0.3 %
KETONES, URINE: NEGATIVE
KETONES, URINE: NEGATIVE
LEUKOCYTE ESTERASE, URINE: NEGATIVE
LEUKOCYTE ESTERASE, URINE: NEGATIVE
LYMPHOCYTES # BLD: 25.9 %
LYMPHOCYTES ABSOLUTE: 3.4 THOU/MM3 (ref 1–4.8)
MCH RBC QN AUTO: 28.8 PG (ref 27–31)
MCHC RBC AUTO-ENTMCNC: 34.2 GM/DL (ref 33–37)
MCV RBC AUTO: 84 FL (ref 81–99)
MISCELLANEOUS 2: ABNORMAL
MONOCYTES # BLD: 6.4 %
MONOCYTES ABSOLUTE: 0.8 THOU/MM3 (ref 0.4–1.3)
NITRITE, URINE: NEGATIVE
NITRITE, URINE: NEGATIVE
NUCLEATED RED BLOOD CELLS: 0 /100 WBC
PDW BLD-RTO: 14.3 % (ref 11.5–14.5)
PH UA: 5 (ref 5–9)
PH UA: 5 (ref 5–9)
PLATELET # BLD: 471 THOU/MM3 (ref 130–400)
PMV BLD AUTO: 9.7 FL (ref 7.4–10.4)
POTASSIUM, WHOLE BLOOD: 3.8 MEQ/L (ref 3.5–4.9)
PROTEIN UA: NEGATIVE
PROTEIN UA: NEGATIVE MG/DL
RBC # BLD: 4.24 MILL/MM3 (ref 4.2–5.4)
RBC URINE: ABNORMAL /HPF
RENAL EPITHELIAL, UA: ABNORMAL
SEG NEUTROPHILS: 66.4 %
SEGMENTED NEUTROPHILS ABSOLUTE COUNT: 8.7 THOU/MM3 (ref 1.8–7.7)
SODIUM, WHOLE BLOOD: 139 MEQ/L (ref 138–146)
SPECIFIC GRAVITY UA: 1.02 (ref 1–1.03)
SPECIFIC GRAVITY, URINE: 1.01 (ref 1–1.03)
TOTAL CO2, VENOUS: 27 MEQ/L (ref 23–33)
UROBILINOGEN, URINE: 0.2 EU/DL (ref 0.2–1)
UROBILINOGEN, URINE: 0.2 EU/DL (ref 0–1)
WBC # BLD: 13.1 THOU/MM3 (ref 4.8–10.8)
WBC UA: ABNORMAL /HPF
YEAST: ABNORMAL

## 2022-01-15 PROCEDURE — 99282 EMERGENCY DEPT VISIT SF MDM: CPT

## 2022-01-15 PROCEDURE — 74177 CT ABD & PELVIS W/CONTRAST: CPT

## 2022-01-15 PROCEDURE — 6360000004 HC RX CONTRAST MEDICATION: Performed by: EMERGENCY MEDICINE

## 2022-01-15 PROCEDURE — 36415 COLL VENOUS BLD VENIPUNCTURE: CPT

## 2022-01-15 PROCEDURE — 96374 THER/PROPH/DIAG INJ IV PUSH: CPT

## 2022-01-15 PROCEDURE — 82947 ASSAY GLUCOSE BLOOD QUANT: CPT

## 2022-01-15 PROCEDURE — 84520 ASSAY OF UREA NITROGEN: CPT

## 2022-01-15 PROCEDURE — 81003 URINALYSIS AUTO W/O SCOPE: CPT

## 2022-01-15 PROCEDURE — 80051 ELECTROLYTE PANEL: CPT

## 2022-01-15 PROCEDURE — 85025 COMPLETE CBC W/AUTO DIFF WBC: CPT

## 2022-01-15 PROCEDURE — 99213 OFFICE O/P EST LOW 20 MIN: CPT | Performed by: EMERGENCY MEDICINE

## 2022-01-15 PROCEDURE — 82565 ASSAY OF CREATININE: CPT

## 2022-01-15 PROCEDURE — 81001 URINALYSIS AUTO W/SCOPE: CPT

## 2022-01-15 PROCEDURE — 6360000002 HC RX W HCPCS: Performed by: EMERGENCY MEDICINE

## 2022-01-15 PROCEDURE — 99215 OFFICE O/P EST HI 40 MIN: CPT

## 2022-01-15 RX ORDER — KETOROLAC TROMETHAMINE 10 MG/1
10 TABLET, FILM COATED ORAL 3 TIMES DAILY
Qty: 15 TABLET | Refills: 0 | Status: SHIPPED | OUTPATIENT
Start: 2022-01-15 | End: 2022-04-14 | Stop reason: SDUPTHER

## 2022-01-15 RX ORDER — DICYCLOMINE HCL 20 MG
20 TABLET ORAL 3 TIMES DAILY PRN
Qty: 15 TABLET | Refills: 0 | Status: SHIPPED | OUTPATIENT
Start: 2022-01-15 | End: 2022-04-14 | Stop reason: ALTCHOICE

## 2022-01-15 RX ORDER — KETOROLAC TROMETHAMINE 30 MG/ML
30 INJECTION, SOLUTION INTRAMUSCULAR; INTRAVENOUS ONCE
Status: COMPLETED | OUTPATIENT
Start: 2022-01-15 | End: 2022-01-15

## 2022-01-15 RX ADMIN — KETOROLAC TROMETHAMINE 30 MG: 30 INJECTION, SOLUTION INTRAMUSCULAR; INTRAVENOUS at 17:50

## 2022-01-15 RX ADMIN — IOPAMIDOL 80 ML: 755 INJECTION, SOLUTION INTRAVENOUS at 18:24

## 2022-01-15 ASSESSMENT — PAIN SCALES - GENERAL: PAINLEVEL_OUTOF10: 10

## 2022-01-15 ASSESSMENT — PAIN DESCRIPTION - PAIN TYPE: TYPE: ACUTE PAIN

## 2022-01-15 ASSESSMENT — ENCOUNTER SYMPTOMS
WHEEZING: 0
VOICE CHANGE: 0
ABDOMINAL PAIN: 1
BACK PAIN: 1
TROUBLE SWALLOWING: 0
SINUS PRESSURE: 0
VOMITING: 0
RHINORRHEA: 0
CONSTIPATION: 1
BACK PAIN: 0
COUGH: 0
CONSTIPATION: 0
NAUSEA: 0
SORE THROAT: 0
SHORTNESS OF BREATH: 0
DIARRHEA: 1
DIARRHEA: 0
CHEST TIGHTNESS: 0

## 2022-01-15 ASSESSMENT — PAIN DESCRIPTION - LOCATION: LOCATION: ABDOMEN

## 2022-01-15 ASSESSMENT — PAIN DESCRIPTION - ORIENTATION: ORIENTATION: LOWER

## 2022-01-15 NOTE — Clinical Note
Laure Phillips was seen and treated in our emergency department on 1/15/2022. She may return to work on 01/18/2022. If you have any questions or concerns, please don't hesitate to call.       Elida Molina MD

## 2022-01-15 NOTE — ED PROVIDER NOTES
[facility]  Urgent Care Encounter       CHIEF COMPLAINT       Chief Complaint   Patient presents with    Abdominal Pain    Back Pain    Urinary Frequency       Nurses Notes reviewed and I agree except as noted in the HPI. HISTORY OF PRESENT ILLNESS   Daquan Sands is a 35 y.o. female who presents with lower abdominal pain. Patient states the pain has been present for several months. Patient states that she has had a hysterectomy. She has been to a GI doctor and has had colonoscopy, she has been seen by urology. No we can determine where the pain is from. Patient states that the pain is increased in her right lower quadrant today. Patient states when she is driving and hit a bump it causes severe pain. She states that she sometimes feels constipated and then other times has loose stools no known fever. .  No nausea or vomiting. Patient currently rates her pain 10 on a 10 scale. HPI    REVIEW OF SYSTEMS     Review of Systems   Constitutional: Negative for fatigue and fever. Respiratory: Negative for cough and shortness of breath. Cardiovascular: Negative for chest pain. Gastrointestinal: Positive for abdominal pain, constipation and diarrhea. Negative for nausea and vomiting. Genitourinary: Negative for difficulty urinating and dysuria. Musculoskeletal: Negative for back pain. Neurological: Negative for dizziness and headaches. Psychiatric/Behavioral: Negative for behavioral problems. PAST MEDICAL HISTORY         Diagnosis Date    Bell palsy     right side droop    Essential hypertension 2016    GERD (gastroesophageal reflux disease)     Hashimoto's thyroiditis 2016    HIGH CHOLESTEROL     Hyperlipidemia     Hypokalemia     Prediabetes 2020    Subclinical hyperthyroidism 2016       SURGICALHISTORY     Patient  has a past surgical history that includes  section (, );  Endoscopy, colon, diagnostic; Dilation and curettage of uterus (1994); laparoscopy (N/A, 6/15/2020); Colonoscopy (01/2021); and Hysterectomy, total abdominal (N/A, 9/10/2020). CURRENT MEDICATIONS       Previous Medications    AMLODIPINE (NORVASC) 10 MG TABLET    Take 1 tablet by mouth daily    HYDROCHLOROTHIAZIDE (MICROZIDE) 12.5 MG CAPSULE    take 1 capsule by mouth once daily    IBUPROFEN (ADVIL;MOTRIN) 600 MG TABLET    Take 1 tablet by mouth every 8 hours as needed for Pain (Headache)    METOPROLOL SUCCINATE (TOPROL XL) 100 MG EXTENDED RELEASE TABLET    take 1 tablet by mouth once daily    OMEPRAZOLE (PRILOSEC) 40 MG DELAYED RELEASE CAPSULE    Take 1 capsule by mouth nightly    SERTRALINE (ZOLOFT) 100 MG TABLET    take 1 tablet by mouth once daily       ALLERGIES     Patient is is allergic to bactrim and sulfamethoxazole-trimethoprim. Patients   Immunization History   Administered Date(s) Administered    Influenza Vaccine, unspecified formulation 10/01/2016    Influenza Virus Vaccine 11/01/2017, 11/05/2018, 11/27/2019    Tdap (Boostrix, Adacel) 01/01/2016       FAMILY HISTORY     Patient's family history includes Breast Cancer (age of onset: 48) in her maternal aunt; Diabetes in her father and mother; Heart Disease in her mother; High Blood Pressure in her father and mother. SOCIAL HISTORY     Patient  reports that she has never smoked. She has never used smokeless tobacco. She reports current alcohol use. She reports that she does not use drugs. PHYSICAL EXAM     ED TRIAGE VITALS  BP: (!) 158/85, Temp: 98.3 °F (36.8 °C), Pulse: 78, Resp: 16, SpO2: 98 %,Estimated body mass index is 38.11 kg/m² as calculated from the following:    Height as of 10/19/21: 5' 4\" (1.626 m). Weight as of 10/19/21: 222 lb (100.7 kg). ,Patient's last menstrual period was 07/23/2020. Physical Exam  HENT:      Head: Normocephalic.       Mouth/Throat:      Mouth: Mucous membranes are moist.   Cardiovascular:      Rate and Rhythm: Normal rate and regular rhythm. Heart sounds: Normal heart sounds. Pulmonary:      Effort: Pulmonary effort is normal. No respiratory distress. Breath sounds: Normal breath sounds. No wheezing. Abdominal:      General: Bowel sounds are normal. There is no distension. Palpations: Abdomen is soft. Tenderness: There is abdominal tenderness in the right lower quadrant. There is no right CVA tenderness or left CVA tenderness. Hernia: No hernia is present. Skin:     General: Skin is warm and dry. Capillary Refill: Capillary refill takes less than 2 seconds. Neurological:      General: No focal deficit present. Mental Status: She is alert.    Psychiatric:         Mood and Affect: Mood normal.         DIAGNOSTIC RESULTS     Labs:  Results for orders placed or performed during the hospital encounter of 01/15/22   Urinalysis   Result Value Ref Range    Glucose, Ur Negative NEGATIVE mg/dl    Bilirubin Urine Negative NEGATIVE    Ketones, Urine Negative NEGATIVE    Specific Gravity, UA 1.020 1.002 - 1.030    Blood, Urine Moderate (A) NEGATIVE    pH, UA 5.00 5.0 - 9.0    Protein, UA Negative NEGATIVE mg/dl    Urobilinogen, Urine 0.20 0.2 - 1.0 eu/dl    Nitrite, Urine Negative NEGATIVE    Leukocyte Esterase, Urine Negative NEGATIVE    Color, UA Yellow STRAW-YELLOW    Character, Urine Clear CLEAR-SL CLOUD   CBC auto differential   Result Value Ref Range    WBC 13.1 (H) 4.8 - 10.8 thou/mm3    RBC 4.24 4.20 - 5.40 mill/mm3    Hemoglobin 12.2 12.0 - 16.0 gm/dl    Hematocrit 35.7 (L) 37.0 - 47.0 %    MCV 84 81 - 99 fL    MCH 28.8 27.0 - 31.0 pg    MCHC 34.2 33.0 - 37.0 gm/dl    RDW 14.3 11.5 - 14.5 %    Platelets 368 (H) 124 - 400 thou/mm3    MPV 9.7 7.4 - 10.4 fL   POC Basic Metabol Panel without Calcium   Result Value Ref Range    Sodium, Whole Blood 139 138 - 146 meq/l    Potassium, Whole Blood 3.8 3.5 - 4.9 meq/l    Chloride, Whole Blood 103 98 - 109 meq/l    Total CO2, Venous 27 23 - 33 meq/l    Glucose, Whole Blood 120 (H) 70 - 108 mg/dl    BUN, WHOLE BLOOD 13 8 - 26 mg/dl    CREATININE, WHOLE BLOOD 0.7 0.5 - 1.2 mg/dl   Glomerular Filtration Rate, Estimated   Result Value Ref Range    GFR, Estimated > 90 ml/min/1.73m2       IMAGING:    No orders to display         EKG:      URGENT CARE COURSE:     Vitals:    01/15/22 1556   BP: (!) 158/85   Pulse: 78   Resp: 16   Temp: 98.3 °F (36.8 °C)   TempSrc: Temporal   SpO2: 98%       Medications - No data to display         PROCEDURES:  None    FINAL IMPRESSION      1. Abdominal pain, right lower quadrant    2. Leukocytosis, unspecified type          DISPOSITION/ PLAN     Patient presents for chronic abdominal pain but has a acute severity. Pain is located in the right lower quadrant. She is tender in this area. Urinalysis is negative for infection. She does have hematuria noted. Available labs at urgent care were performed with white blood cell count resulting 13.1. Electrolytes reassuring. With the leukocytosis, right lower quadrant abdominal pain and tenderness, advised the patient that she should be evaluated in the emergency department for further testing, possible imaging. Patient is amendable to transfer. I contacted Jenae West, charge nurse [facility]'s emergency department. Patient will be transferred to the ED for further evaluation. Patient declines ambulance transport and will be driven per self. Prior to transfer I did send a prescription for Bentyl for the patient to try if she is discharged from the ED to see if she has improvement of her abdominal pain.       PATIENT REFERRED TO:  MARÍA Hudson CNP  [address] / [place] [address]      DISCHARGE MEDICATIONS:  New Prescriptions    DICYCLOMINE (BENTYL) 20 MG TABLET    Take 1 tablet by mouth 3 times daily as needed (abd discomfort)       Discontinued Medications    No medications on file       Current Discharge Medication List          MARÍA Alvarez CNP    (Please note that portions of this note were completed with a voice recognition program. Efforts were made to edit the dictations but occasionally words are mis-transcribed.)          MARÍA Barton - MISSAEL  01/15/22 [id number]

## 2022-01-15 NOTE — ED TRIAGE NOTES
Raquel Howe arrives to room with complaint of freq urination ab pain back pain symptoms started 7 days ago.

## 2022-01-16 NOTE — ED PROVIDER NOTES
[address] [address]        Room # 35/052Z    CHIEF COMPLAINT    Chief Complaint   Patient presents with    Abdominal Pain    Back Pain    Urinary Frequency       Nurses Notes reviewed and I agree except as noted in the HPI. [facility]    Annamary Medicine is a 35 y.o. female who presents for evaluation of abdominal pain and back pain. The patient just recently came from the urgent care because of concern about appendicitis. Patient has been having lower abdominal pain for several years and it was thought due to gynecological problem for which the patient gotten a total abdominal hysterectomy by Dr. Francheska Carcamo last September 2020 however she continues to have pain. The patient also had seen gastroenterology, urology services. Pain is getting worse in the lower abdomen hurts to walk cough however she admits that she is having lower low back pain as she had a bulging disc for 1 year. Patient denies any dysuria, no hematuria however he admits having urinary frequency. REVIEW OF SYSTEMS    Review of Systems   Constitutional: Negative for appetite change, chills, diaphoresis, fatigue and fever. HENT: Negative for congestion, ear pain, postnasal drip, rhinorrhea, sinus pressure, sneezing, sore throat, trouble swallowing and voice change. Respiratory: Negative for cough, chest tightness, shortness of breath and wheezing. Cardiovascular: Negative for chest pain, palpitations and leg swelling. Gastrointestinal: Positive for abdominal pain. Negative for constipation, diarrhea, nausea and vomiting. Genitourinary: Positive for frequency. Musculoskeletal: Positive for back pain. Negative for arthralgias, joint swelling, myalgias, neck pain and neck stiffness. Neurological: Negative for dizziness, syncope, weakness, light-headedness, numbness and headaches.        PAST MEDICAL HISTORY     has a past medical history of Bell palsy, Essential hypertension, GERD (gastroesophageal reflux disease), Hashimoto's thyroiditis, HIGH CHOLESTEROL, Hyperlipidemia, Hypokalemia, Prediabetes, and Subclinical hyperthyroidism. SURGICAL HISTORY   has a past surgical history that includes  section (, ); Endoscopy, colon, diagnostic; Dilation and curettage of uterus (); laparoscopy (N/A, 6/15/2020); Colonoscopy (2021); and Hysterectomy, total abdominal (N/A, 9/10/2020). CURRENT MEDICATIONS    Discharge Medication List as of 1/15/2022  9:41 PM      CONTINUE these medications which have NOT CHANGED    Details   omeprazole (PRILOSEC) 40 MG delayed release capsule Take 1 capsule by mouth nightly, Disp-90 capsule, R-1Normal      hydroCHLOROthiazide (MICROZIDE) 12.5 MG capsule take 1 capsule by mouth once daily, Disp-90 capsule, R-1Normal      sertraline (ZOLOFT) 100 MG tablet take 1 tablet by mouth once daily, Disp-90 tablet, R-3Normal      ibuprofen (ADVIL;MOTRIN) 600 MG tablet Take 1 tablet by mouth every 8 hours as needed for Pain (Headache), Disp-20 tablet, R-0Normal      amLODIPine (NORVASC) 10 MG tablet Take 1 tablet by mouth daily, Disp-90 tablet, R-3Normal      metoprolol succinate (TOPROL XL) 100 MG extended release tablet take 1 tablet by mouth once daily, Disp-90 tablet, R-3Normal             ALLERGIES    is allergic to bactrim and sulfamethoxazole-trimethoprim. FAMILY HISTORY    She indicated that her mother is . She indicated that her father is . She indicated that the status of her maternal aunt is unknown.   family history includes Breast Cancer (age of onset: 48) in her maternal aunt; Diabetes in her father and mother; Heart Disease in her mother; High Blood Pressure in her father and mother. SOCIAL HISTORY     reports that she has never smoked. She has never used smokeless tobacco. She reports current alcohol use. She reports that she does not use drugs.     PHYSICAL EXAM      INITIAL VITALS: BP (!) 158/85   Pulse 78   Temp 98.3 °F (36.8 °C) (Temporal)   Resp 16   LMP 07/23/2020   SpO2 98% Estimated body mass index is 38.11 kg/m² as calculated from the following:    Height as of 10/19/21: 5' 4\" (1.626 m). Weight as of 10/19/21: 222 lb (100.7 kg). Physical Exam  Vitals reviewed. Constitutional:       Appearance: She is well-developed. HENT:      Head: Normocephalic and atraumatic. Right Ear: External ear normal.      Left Ear: External ear normal.      Nose: Nose normal.   Eyes:      General: No scleral icterus. Conjunctiva/sclera: Conjunctivae normal.      Pupils: Pupils are equal, round, and reactive to light. Neck:      Thyroid: No thyromegaly. Vascular: No JVD. Cardiovascular:      Rate and Rhythm: Normal rate and regular rhythm. Heart sounds: No murmur heard. No friction rub. Pulmonary:      Effort: Pulmonary effort is normal.      Breath sounds: Normal breath sounds. No wheezing or rales. Chest:      Chest wall: No tenderness. Abdominal:      General: Bowel sounds are normal.      Palpations: Abdomen is soft. There is no mass. Tenderness: There is abdominal tenderness (Lower abdomen). There is no right CVA tenderness, left CVA tenderness, guarding or rebound. Negative signs include Dorado's sign and McBurney's sign. Musculoskeletal:      Cervical back: Normal range of motion and neck supple. Lymphadenopathy:      Cervical: No cervical adenopathy. Skin:     Findings: No rash. Neurological:      Mental Status: She is alert and oriented to person, place, and time. Psychiatric:         Behavior: Behavior is cooperative. MEDICAL DECISION MAKING    DIFFERENTIAL DIAGNOSIS:  Low abdominal pain back pain, UTI pyelonephritis ovarian cyst, diverticulitis, appendicitis stone      DIAGNOSTIC RESULTS      RADIOLOGY:  I have reviewed radiologic plain film image(s).   The plain films will be read or overread by the radiologist.All other non-plain film images(s) such as CT, Ultrasound and MRI have been read by the radiologist.  CT ABDOMEN PELVIS W IV CONTRAST Additional Contrast? None   Final Result   1. No acute bowel obstruction or acute inflammatory bowel process   2. A large 7.5 cm left multiloculated ovarian cyst is seen. 3. Hepatic steatosis. Hepatomegaly. 4. Right nephrolithiasis. No hydronephrosis. 5. A 1.1 exophytic hyperdense lesion of the inferior pole of the left kidney is noted. A renal ultrasound on a nonurgent basis recommended   6. Other incidental findings as described above. **This report has been created using voice recognition software. It may contain minor errors which are inherent in voice recognition technology. **      Final report electronically signed by Dr Edda Botello on 1/15/2022 6:49 PM          LABS:   Labs Reviewed   URINALYSIS - Abnormal; Notable for the following components:       Result Value    Blood, Urine Moderate (*)     All other components within normal limits   CBC WITH AUTO DIFFERENTIAL - Abnormal; Notable for the following components:    WBC 13.1 (*)     Hematocrit 35.7 (*)     Platelets 250 (*)     All other components within normal limits   POC BASIC METABOL PANEL W/O CALCIUM - Abnormal; Notable for the following components:    Glucose, Whole Blood 120 (*)     All other components within normal limits   DIFFERENTIAL - Abnormal; Notable for the following components:    Segs Absolute 8.7 (*)     All other components within normal limits   URINE WITH REFLEXED MICRO - Abnormal; Notable for the following components:    Blood, Urine SMALL (*)     All other components within normal limits   GLOMERULAR FILTRATION RATE, ESTIMATED     All other unresulted laboratory test above are normal:    Vitals:    Vitals:    01/15/22 1556   BP: (!) 158/85   Pulse: 78   Resp: 16   Temp: 98.3 °F (36.8 °C)   TempSrc: Temporal   SpO2: 98%       EMERGENCY DEPARTMENT COURSE:    Medications   ketorolac (TORADOL) injection 30 mg (30 mg IntraVENous Given 1/15/22 1750)   iopamidol (ISOVUE-370) 76 % injection 80 mL (80 mLs IntraVENous Given 1/15/22 1824)       The pt was seen and evaluated by me. Within the department, I observed the pt's vitalsigns to be within acceptable range. Laboratory and Radiological studies were performed, results were reviewed with the patient. Within the department, the pt was treated with Toradol and when reevaluated the patient's pain is much better. I observed the pt's condition to be hemodynamically stable during the duration of their stay. I explained my proposed course of treatment to the pt, and they were amenable to my decision. They were discharged home, and they will return to the ED if their symptoms become more severein nature, or otherwise change. Controlled Substances Monitoring:        CRITICAL CARE:   None. CONSULTS:  None    PROCEDURES:  None. FINAL IMPRESSION       1. Cyst of ovary, unspecified laterality    2. Leukocytosis, unspecified type    3. Right nephrolithiasis    4. Lesion of left native kidney          DISPOSITION/PLAN  PATIENT REFERRED TO:  Lorraine Zuleta, APRN - CNP  [address] [place]  [phone]    Schedule an appointment as soon as possible for a visit in 3 days      [facility] CASTRO II[facility]  [address]  [address] [address]  Schedule an appointment as soon as possible for a visit in 3 days      DISCHARGE MEDICATIONS:  Discharge Medication List as of 1/15/2022  9:41 PM      START taking these medications    Details   dicyclomine (BENTYL) 20 MG tablet Take 1 tablet by mouth 3 times daily as needed (abd discomfort), Disp-15 tablet, R-0Normal      ketorolac (TORADOL) 10 MG tablet Take 1 tablet by mouth 3 times daily, Disp-15 tablet, R-0Normal               (Please note that portions of this note were completed with a voice recognition program and electronically transcribed.   Efforts were [facility] edit the dictations but occasionally words are mis-transcribed . The transcription may contain errors not detected in proofreading.   This transcription was electronically signed.)     01/15/22 9:43 PM      Codey Negrete MD      Emergency room physician           Codey Negrete MD  01/15/22 [id number]

## 2022-01-17 ENCOUNTER — CARE COORDINATION (OUTPATIENT)
Dept: OTHER | Facility: CLINIC | Age: 42
End: 2022-01-17

## 2022-01-17 NOTE — CARE COORDINATION
Ambulatory Care Coordination Note  2022  CM Risk Score: 8  Charlson 10 Year Mortality Risk Score: 2%     ACC: Raegan Porter, RN    Summary Note: ACM contacted patient for introduction to Associate Care Management related to Ed visit for cyst of ovary and kidney stone. Verified name and  with patient as identifiers. Patient declines care management at this time, stating no needs at this time. Patient states she is doing good and feeling alright. Patient reports has all meds in the home and is compliant with meds. Patient states Toradol is helping with pain a little bit. Patient has discharge instructions in the home and is following them. Patient has appt with Urology next week and will call PCP to schedule. Patient declined offer by this ACM to assist with scheduling follow up appt with PCP. Nurse Access Line brochure and Right Care, Right Place, Right Time brochure sent to patient via [employer]. ACM provided contact information for self referral if patient would need care management in the future. ACM will sign off at this time.        Future Appointments   Date Time Provider Ravi Mccann   2022  3:00 PM DO MAHENDRA Chacon [facility] - [facility]O II[facility]   10/17/2022  2:00 PM Leta Aranda 19

## 2022-01-19 ENCOUNTER — OFFICE VISIT (OUTPATIENT)
Dept: UROLOGY | Age: 42
End: 2022-01-19
Payer: COMMERCIAL

## 2022-01-19 VITALS
DIASTOLIC BLOOD PRESSURE: 74 MMHG | WEIGHT: 222 LBS | HEIGHT: 64 IN | BODY MASS INDEX: 37.9 KG/M2 | SYSTOLIC BLOOD PRESSURE: 136 MMHG

## 2022-01-19 DIAGNOSIS — N32.81 OAB (OVERACTIVE BLADDER): Primary | ICD-10-CM

## 2022-01-19 DIAGNOSIS — R35.0 URINARY FREQUENCY: ICD-10-CM

## 2022-01-19 DIAGNOSIS — N28.1 RENAL CYST: ICD-10-CM

## 2022-01-19 DIAGNOSIS — R31.29 MICROSCOPIC HEMATURIA: ICD-10-CM

## 2022-01-19 LAB
BACTERIA: ABNORMAL
BILIRUBIN URINE: NEGATIVE
BILIRUBIN URINE: NEGATIVE
BLOOD URINE, POC: ABNORMAL
BLOOD, URINE: ABNORMAL
CASTS: ABNORMAL /LPF
CASTS: ABNORMAL /LPF
CHARACTER, URINE: ABNORMAL
CHARACTER, URINE: CLEAR
COLOR, URINE: ABNORMAL
COLOR: YELLOW
CRYSTALS: ABNORMAL
EPITHELIAL CELLS, UA: ABNORMAL /HPF
GLUCOSE URINE: NEGATIVE MG/DL
GLUCOSE, URINE: NEGATIVE MG/DL
KETONES, URINE: NEGATIVE
KETONES, URINE: NEGATIVE
LEUKOCYTE CLUMPS, URINE: NEGATIVE
LEUKOCYTE ESTERASE, URINE: NEGATIVE
MISCELLANEOUS LAB TEST RESULT: ABNORMAL
NITRITE, URINE: NEGATIVE
NITRITE, URINE: NEGATIVE
PH UA: 5 (ref 5–9)
PH, URINE: 5.5 (ref 5–9)
POST VOID RESIDUAL (PVR): 61 ML
PROTEIN UA: 30 MG/DL
PROTEIN, URINE: 30 MG/DL
RBC URINE: ABNORMAL /HPF
RENAL EPITHELIAL, UA: ABNORMAL
SPECIFIC GRAVITY UA: 1.02 (ref 1–1.03)
SPECIFIC GRAVITY, URINE: >= 1.03 (ref 1–1.03)
UROBILINOGEN, URINE: 0.2 EU/DL (ref 0–1)
UROBILINOGEN, URINE: 0.2 EU/DL (ref 0–1)
WBC UA: ABNORMAL /HPF
YEAST: ABNORMAL

## 2022-01-19 PROCEDURE — 51798 US URINE CAPACITY MEASURE: CPT | Performed by: NURSE PRACTITIONER

## 2022-01-19 PROCEDURE — 81003 URINALYSIS AUTO W/O SCOPE: CPT | Performed by: NURSE PRACTITIONER

## 2022-01-19 PROCEDURE — G8427 DOCREV CUR MEDS BY ELIG CLIN: HCPCS | Performed by: NURSE PRACTITIONER

## 2022-01-19 PROCEDURE — 1036F TOBACCO NON-USER: CPT | Performed by: NURSE PRACTITIONER

## 2022-01-19 PROCEDURE — G8417 CALC BMI ABV UP PARAM F/U: HCPCS | Performed by: NURSE PRACTITIONER

## 2022-01-19 PROCEDURE — G8484 FLU IMMUNIZE NO ADMIN: HCPCS | Performed by: NURSE PRACTITIONER

## 2022-01-19 PROCEDURE — 99214 OFFICE O/P EST MOD 30 MIN: CPT | Performed by: NURSE PRACTITIONER

## 2022-01-19 ASSESSMENT — ENCOUNTER SYMPTOMS
NAUSEA: 0
VOMITING: 0
ABDOMINAL PAIN: 0
BACK PAIN: 0

## 2022-01-19 NOTE — PROGRESS NOTES
[address] [address] [address]  Dept: [phone]  Loc: [phone]    Visit Date: 1/19/2022        HPI:     Mark Victor is a 35 y.o. female who presents today for:  Chief Complaint   Patient presents with    Follow-up     frequency/oab        HPI   Pt seen in follow up for frequency, oab. Janette Schwartz continues to have urinary frequency and urgency up to every 1 hour at times. Nocturia 1-2 x per night. No significant incontinence. Failed oxybutynin and Myrbetriq. Not interested in further medication therapy. Hx of nocturnal enuresis as a child. Recently presented to ER with pain in right lower abdomen. CT performed 1/15/22 with 5 mm and 3 mm R renal calculi, bilateral renal cysts, and a 1.2 cm exophytic hyperdense lesion at the inferior pole of the left kidney without hydronephrosis and multiple incidental findings. See report below. Urine dip 1/15 with small blood but micro had only 0-2 RBCs/hpf. No signs of infection. Pt reports pain improved but she does have chronic back pain. Has appts with GI and gynecology in the next 1-2 weeks.      Current Outpatient Medications   Medication Sig Dispense Refill    ketorolac (TORADOL) 10 MG tablet Take 1 tablet by mouth 3 times daily 15 tablet 0    omeprazole (PRILOSEC) 40 MG delayed release capsule Take 1 capsule by mouth nightly 90 capsule 1    hydroCHLOROthiazide (MICROZIDE) 12.5 MG capsule take 1 capsule by mouth once daily 90 capsule 1    sertraline (ZOLOFT) 100 MG tablet take 1 tablet by mouth once daily 90 tablet 3    ibuprofen (ADVIL;MOTRIN) 600 MG tablet Take 1 tablet by mouth every 8 hours as needed for Pain (Headache) 20 tablet 0    amLODIPine (NORVASC) 10 MG tablet Take 1 tablet by mouth daily 90 tablet 3    metoprolol succinate (TOPROL XL) 100 MG extended release tablet take 1 tablet by mouth once daily 90 tablet 3    dicyclomine (BENTYL) 20 MG tablet Take 1 tablet by mouth 3 times daily as needed (abd discomfort) (Patient not taking: Reported on 2022) 15 tablet 0     No current facility-administered medications for this visit. Past Medical History  Beatriz Norris  has a past medical history of Bell palsy, Essential hypertension, GERD (gastroesophageal reflux disease), Hashimoto's thyroiditis, HIGH CHOLESTEROL, Hyperlipidemia, Hypokalemia, Prediabetes, and Subclinical hyperthyroidism. Past Surgical History  The patient  has a past surgical history that includes  section (, ); Endoscopy, colon, diagnostic; Dilation and curettage of uterus (); laparoscopy (N/A, 6/15/2020); Colonoscopy (2021); and Hysterectomy, total abdominal (N/A, 9/10/2020). Family History  This patient's family history includes Breast Cancer (age of onset: 48) in her maternal aunt; Diabetes in her father and mother; Heart Disease in her mother; High Blood Pressure in her father and mother. Social History  Deloris  reports that she has never smoked. She has never used smokeless tobacco. She reports current alcohol use. She reports that she does not use drugs. Subjective:      Review of Systems   Constitutional: Negative for activity change, appetite change, chills, diaphoresis, fatigue, fever and unexpected weight change. Gastrointestinal: Negative for abdominal pain, nausea and vomiting. Genitourinary: Positive for frequency and urgency. Negative for decreased urine volume, difficulty urinating, dysuria, flank pain and hematuria. Musculoskeletal: Negative for back pain.        Objective:   /74   Ht 5' 4\" (1.626 m)   Wt 222 lb (100.7 kg)   LMP 2020   BMI 38.11 kg/m²     Physical Exam    POC  Results for POC orders placed in visit on 22   POCT Urinalysis No Micro (Auto)   Result Value Ref Range    Glucose, Ur Negative NEGATIVE mg/dl    Bilirubin Urine Negative     Ketones, Urine Negative NEGATIVE Specific Gravity, Urine >= 1.030 1.002 - 1.030    Blood, UA POC Large (A) NEGATIVE    pH, Urine 5.50 5.0 - 9.0    Protein, Urine 30 (A) NEGATIVE mg/dl    Urobilinogen, Urine 0.20 0.0 - 1.0 eu/dl    Nitrite, Urine Negative NEGATIVE    Leukocyte Clumps, Urine Negative NEGATIVE    Color, Urine Dark yellow (A) YELLOW-STRAW    Character, Urine Clear CLR-SL.CLOUD   poct post void residual   Result Value Ref Range    post void residual 61 ml         Patients recent PSA values are as follows  No results found for: PSA, PSADIA     Recent BUN/Creatinine:  Lab Results   Component Value Date    BUN 15 07/21/2021    CREATININE 0.7 01/15/2022    CREATININE 0.7 07/21/2021     Narrative   PROCEDURE: CT ABDOMEN PELVIS W IV CONTRAST       CLINICAL INFORMATION: low abdominal pain       COMPARISON: CT abdomen and pelvis Wo/26/2021       TECHNIQUE:    Helical CT acquisition of the abdomen and pelvis was performed with administration of intravenous contrast. Multiplanar reformats are provided.       All CT scans at this facility use dose modulation, iterative reconstruction, and/or weight based dosing when appropriate to reduce the radiation dose to as low as reasonably achievable.       CONTRAST: 80  cc of Isovue-370  intravenously           FINDINGS:                    No significant lower thoracic findings.       Mild fatty infiltration of the liver. A 5 mm calculus is seen in the inferior pole of the right kidney. A 3 mm calculus is seen in the superior pole of the right kidney. 8 mm hypodensity in the midpole of the right kidney is likely a cyst. 1.7 cm    hypodensity in the superior pole of the right kidney is also likely a cyst. 1.2 cm hypodensity in the midpole of the left kidney is likely a cyst. A 1.1 exophytic hyperdense lesion at the inferior pole of the left kidney posteriorly is seen. No    hydronephrosis. Marked hepatomegaly. There is mild thickening of the left adrenal gland.  The right adrenal gland is unremarkable.     Stool is present within the colon. A small fat-containing umbilical hernia is seen.       Otherwise, the gallbladder, biliary tree, pancreas, adrenal glands, and spleen are unremarkable.       No bowel obstruction or acute inflammatory bowel process. The appendix is unremarkable.       The abdominal aorta is not aneurysmal. Mild aortoiliac calcifications.       No significantly enlarged lymph nodes are seen.       The bladder is grossly unremarkable. The uterus is surgically absent. There is a 7.5 x 5.6 cm left multiloculated ovarian cyst.       Bones: No aggressive bony lesion is noted. Mild degenerative changes of the thoracolumbar lumbar spine.                   Impression   1. No acute bowel obstruction or acute inflammatory bowel process   2. A large 7.5 cm left multiloculated ovarian cyst is seen. 3. Hepatic steatosis. Hepatomegaly. 4. Right nephrolithiasis. No hydronephrosis. 5. A 1.1 exophytic hyperdense lesion of the inferior pole of the left kidney is noted. A renal ultrasound on a nonurgent basis recommended   6. Other incidental findings as described above.             Assessment:   OAB  Micro hematuria on urine dip in office today  Nonobstructive R nephrolithiasis-5 mm and 3 mm  Bilateral renal cysts  L inferior pole hyperdense exophytic lesion  L ovarian cyst  Hepatic steatosis with marked hepatomegaly  Plan:     Maribell Hernándezibb continues with significant OAB symptoms and has failed medication therapy. Is not interested in further medication therapy. Discussed advanced therapies for OAB today and pt provided with handouts on bladder botox, interstim, and PTNS. Urine with large blood and protein--send for micro and culture. We will schedule pt for appt with one of the physicians to discuss possible advanced therapies for OAB. Renal US to evaluate left lower pole hyperdense lesion prior to appt.

## 2022-01-20 ENCOUNTER — TELEPHONE (OUTPATIENT)
Dept: UROLOGY | Age: 42
End: 2022-01-20

## 2022-01-20 NOTE — TELEPHONE ENCOUNTER
Patient scheduled for US RENAL KIDNEY  at [facility] on 2/4/2022 ARRIVAL OF 2:15PM FOR A 230PM US. NO CARBONATED BEVERAGES ARRIVE WELL HYDRATED. Patient advised of instructions.   Order mailed/given to patient

## 2022-01-21 LAB
ORGANISM: ABNORMAL
URINE CULTURE, ROUTINE: ABNORMAL

## 2022-01-22 ENCOUNTER — TELEPHONE (OUTPATIENT)
Dept: UROLOGY | Age: 42
End: 2022-01-22

## 2022-01-22 DIAGNOSIS — R31.29 MICROSCOPIC HEMATURIA: Primary | ICD-10-CM

## 2022-01-22 NOTE — TELEPHONE ENCOUNTER
Pt's urine culture grew small amount of MRSA. She is allergic to Bactrim and no other oral option for treatment. Repeat urine culture using proper clean catch technique.

## 2022-01-24 NOTE — TELEPHONE ENCOUNTER
Patient advised of the urine results. She voiced understanding to repeat the culture using proper clean catch method.

## 2022-01-25 ENCOUNTER — HOSPITAL ENCOUNTER (OUTPATIENT)
Age: 42
Discharge: HOME | End: 2022-01-25
Payer: COMMERCIAL

## 2022-01-25 DIAGNOSIS — R31.29 MICROSCOPIC HEMATURIA: ICD-10-CM

## 2022-01-25 PROCEDURE — 87086 URINE CULTURE/COLONY COUNT: CPT

## 2022-01-27 LAB
ORGANISM: ABNORMAL
URINE CULTURE, ROUTINE: ABNORMAL

## 2022-02-01 ENCOUNTER — OFFICE VISIT (OUTPATIENT)
Dept: PHYSICAL MEDICINE AND REHAB | Age: 42
End: 2022-02-01
Payer: COMMERCIAL

## 2022-02-01 VITALS
HEIGHT: 64 IN | SYSTOLIC BLOOD PRESSURE: 106 MMHG | BODY MASS INDEX: 39.01 KG/M2 | DIASTOLIC BLOOD PRESSURE: 86 MMHG | WEIGHT: 228.5 LBS | HEART RATE: 70 BPM | OXYGEN SATURATION: 99 %

## 2022-02-01 DIAGNOSIS — M47.812 CERVICAL SPONDYLOSIS: Primary | ICD-10-CM

## 2022-02-01 DIAGNOSIS — M47.819 FACET ARTHROPATHY: ICD-10-CM

## 2022-02-01 DIAGNOSIS — G89.4 CHRONIC PAIN SYNDROME: ICD-10-CM

## 2022-02-01 PROCEDURE — G8427 DOCREV CUR MEDS BY ELIG CLIN: HCPCS | Performed by: NURSE PRACTITIONER

## 2022-02-01 PROCEDURE — 99214 OFFICE O/P EST MOD 30 MIN: CPT | Performed by: NURSE PRACTITIONER

## 2022-02-01 PROCEDURE — 1036F TOBACCO NON-USER: CPT | Performed by: NURSE PRACTITIONER

## 2022-02-01 PROCEDURE — G8484 FLU IMMUNIZE NO ADMIN: HCPCS | Performed by: NURSE PRACTITIONER

## 2022-02-01 PROCEDURE — G8417 CALC BMI ABV UP PARAM F/U: HCPCS | Performed by: NURSE PRACTITIONER

## 2022-02-01 RX ORDER — MELOXICAM 7.5 MG/1
7.5 TABLET ORAL DAILY
Qty: 30 TABLET | Refills: 0 | Status: SHIPPED | OUTPATIENT
Start: 2022-02-01 | End: 2022-04-14 | Stop reason: ALTCHOICE
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
## 2022-02-08 ENCOUNTER — HOSPITAL ENCOUNTER (OUTPATIENT)
Dept: ULTRASOUND IMAGING | Age: 42
Discharge: HOME | End: 2022-02-08
Payer: COMMERCIAL

## 2022-02-08 DIAGNOSIS — N28.1 RENAL CYST: ICD-10-CM

## 2022-02-08 PROCEDURE — 76770 US EXAM ABDO BACK WALL COMP: CPT

## 2022-02-08 PROCEDURE — 76775 US EXAM ABDO BACK WALL LIM: CPT

## 2022-02-16 ENCOUNTER — PREP FOR PROCEDURE (OUTPATIENT)
Dept: PHYSICAL MEDICINE AND REHAB | Age: 42
End: 2022-02-16

## 2022-02-22 ENCOUNTER — TELEPHONE (OUTPATIENT)
Dept: PHYSICAL MEDICINE AND REHAB | Age: 42
End: 2022-02-22

## 2022-02-22 NOTE — TELEPHONE ENCOUNTER
Notified by the Josiah Sandoval that pt. Did not come to procedure. Pt. Contacted. States that she forgot about it and also has a ill child. Procedure and follow up rescheduled. Verbalized understanding.

## 2022-03-01 ENCOUNTER — TELEPHONE (OUTPATIENT)
Dept: PHYSICAL MEDICINE AND REHAB | Age: 42
End: 2022-03-01

## 2022-03-01 NOTE — TELEPHONE ENCOUNTER
Pt. Called office. Unable to come to scheduled procedure due to work. Does not have work schedule at this time, requests procedure cancelled. Procedure and follow up cancelled. She will call back to schedule.

## 2022-03-05 DIAGNOSIS — I10 ESSENTIAL HYPERTENSION: ICD-10-CM

## 2022-03-07 RX ORDER — HYDROCHLOROTHIAZIDE 12.5 MG/1
CAPSULE, GELATIN COATED ORAL
Qty: 90 CAPSULE | Refills: 1 | Status: SHIPPED | OUTPATIENT
Start: 2022-03-07

## 2022-03-11 ENCOUNTER — OFFICE VISIT (OUTPATIENT)
Dept: UROLOGY | Age: 42
End: 2022-03-11
Payer: COMMERCIAL

## 2022-03-11 VITALS
DIASTOLIC BLOOD PRESSURE: 82 MMHG | SYSTOLIC BLOOD PRESSURE: 140 MMHG | BODY MASS INDEX: 38.93 KG/M2 | HEIGHT: 64 IN | WEIGHT: 228 LBS

## 2022-03-11 DIAGNOSIS — N28.1 RENAL CYST: ICD-10-CM

## 2022-03-11 DIAGNOSIS — R35.0 URINARY FREQUENCY: ICD-10-CM

## 2022-03-11 DIAGNOSIS — R10.2 PELVIC PAIN: ICD-10-CM

## 2022-03-11 DIAGNOSIS — N20.0 KIDNEY STONES: ICD-10-CM

## 2022-03-11 DIAGNOSIS — R31.29 MICROSCOPIC HEMATURIA: ICD-10-CM

## 2022-03-11 DIAGNOSIS — N32.81 OAB (OVERACTIVE BLADDER): Primary | ICD-10-CM

## 2022-03-11 PROCEDURE — G8484 FLU IMMUNIZE NO ADMIN: HCPCS | Performed by: UROLOGY

## 2022-03-11 PROCEDURE — 99214 OFFICE O/P EST MOD 30 MIN: CPT | Performed by: UROLOGY

## 2022-03-11 PROCEDURE — G8427 DOCREV CUR MEDS BY ELIG CLIN: HCPCS | Performed by: UROLOGY

## 2022-03-11 PROCEDURE — G8417 CALC BMI ABV UP PARAM F/U: HCPCS | Performed by: UROLOGY

## 2022-03-11 PROCEDURE — 1036F TOBACCO NON-USER: CPT | Performed by: UROLOGY

## 2022-03-11 RX ORDER — SOLIFENACIN SUCCINATE 10 MG/1
10 TABLET, FILM COATED ORAL DAILY
Qty: 30 TABLET | Refills: 1 | Status: SHIPPED | OUTPATIENT
Start: 2022-03-11 | End: 2022-04-14 | Stop reason: ALTCHOICE

## 2022-03-11 NOTE — PROGRESS NOTES
Gentry Curling, MD  Urology Clinic office visit    Patient:  Ayo Oreilly  YOB: 1980  Date: 3/11/2022    HISTORY OF PRESENT ILLNESS:   The patient is a 35 y.o. female who presents today for follow-up for the following problem(s):      1. OAB (overactive bladder)    2. Microscopic hematuria    3. Kidney stones    4. Renal cyst    5. Pelvic pain    6. Urinary frequency         Overall the problem(s) : are worsening. Associated Symptoms: No dysuria, gross hematuria. Pain Severity:      Summary of old records: seen Brandy in past for OAB  hysterectomy    Additional History: Onset years  Pelvic pain, also after sex  Reports frequency and urgency  Tried Ditropan  Tried myrbetriq  Severity is described as moderate to severe. The course of symptoms over time is worsening. Alleviating factors: none  Worsening factors: none  Lower urinary tract symptoms: urgency and frequency    CT 2022  A 5 mm calculus is seen in the inferior pole of the right kidney. A 3 mm calculus is seen in the superior pole of the right kidney. 8 mm hypodensity in the midpole of the right kidney is likely a cyst. 1.7 cm    hypodensity in the superior pole of the right kidney is also likely a cyst. 1.2 cm hypodensity in the midpole of the left kidney is likely a cyst. A 1.1 exophytic hyperdense lesion at the inferior pole of the left kidney posteriorly is seen. No    hydronephrosis. Imaging Reviewed during this Office Visit:   (results were independently reviewed by physician and radiology report verified)  I independently reviewed and verified the images and reports from:    US RENAL COMPLETE    Result Date: 2/8/2022  PROCEDURE: US RENAL COMPLETE CLINICAL INFORMATION: Renal cyst TECHNIQUE: Ultrasound of the kidneys and urinary bladder was performed. Grayscale and color images were obtained.  COMPARISON: None FINDINGS: The right kidney measures 11.3 x 6.8 x 7.1 cm and the left kidney measures 11.3 x 6.7 x 5.1 cm. Renal cortical thickness is normal bilaterally. An avascular anechoic structure in the lower right kidney measures 1.0 cm. An avascular anechoic structure in the left mid kidney measures 1.0 cm. There is an echogenic structure measuring 0.9 cm in the lower right kidney. No hydronephrosis is identified. Color Doppler demonstrates spectral waveforms in the bilateral renal arteries. Arcuate resistive indices are normal bilaterally. The distended urinary bladder is unremarkable. There is a small amount of postvoid residual urine in the bladder. 1. Probable bilateral renal cysts. 2. Nonobstructive right-sided nephrolithiasis. 3. Small amount of postvoid residual urine but otherwise unremarkable urinary bladder. Final report electronically signed by Dr. Jairo Coppola on 2022 4:39 PM      Urinalysis today:  No results found for this visit on 22.     Last BUN and creatinine:  Lab Results   Component Value Date    BUN 15 2021     Lab Results   Component Value Date    CREATININE 0.7 01/15/2022       PAST MEDICAL, FAMILY AND SOCIAL HISTORY UPDATE:  Past Medical History:   Diagnosis Date    Bell palsy     right side droop    Essential hypertension 2016    GERD (gastroesophageal reflux disease)     Hashimoto's thyroiditis 2016    HIGH CHOLESTEROL     Hyperlipidemia     Hypokalemia     Prediabetes 2020    Subclinical hyperthyroidism 2016     Past Surgical History:   Procedure Laterality Date     SECTION  ,     COLONOSCOPY  2021    1 polyp removed     DILATION AND CURETTAGE OF UTERUS      ENDOSCOPY, COLON, DIAGNOSTIC      HYSTERECTOMY, TOTAL ABDOMINAL N/A 9/10/2020    HYSTERECTOMY ABDOMINAL TOTAL, BS, POSS DANIELLE performed by Shanelle Salguero MD at [place] [address] 6/15/2020    DIAGNOSTIC LAPAROSCOPY, performed by Shanelle Salguero MD at [address] [place] [address] History   Problem Relation Age of Onset    High Blood Pressure Mother  Diabetes Mother     Heart Disease Mother     High Blood Pressure Father     Diabetes Father     Breast Cancer Maternal Aunt 50        reoccurred at 79     Outpatient Medications Marked as Taking for the 3/11/22 encounter (Office Visit) with Jenni Alcala MD   Medication Sig Dispense Refill    solifenacin (VESICARE) 10 MG tablet Take 1 tablet by mouth daily 30 tablet 1       Bactrim and Sulfamethoxazole-trimethoprim  Social History     Tobacco Use   Smoking Status Never Smoker   Smokeless Tobacco Never Used       Social History     Substance and Sexual Activity   Alcohol Use Yes    Comment: social       REVIEW OF SYSTEMS:  Constitutional: negative  Eyes: negative  Respiratory: negative  Cardiovascular: negative  Gastrointestinal: negative  Genitourinary: negative except for what is in HPI  Musculoskeletal: negative  Skin: negative   Neurological: negative  Hematological/Lymphatic: negative  Psychological: negative    Physical Exam:      Vitals:    03/11/22 1213   BP: (!) 140/82     Patient is a 35 y.o. female in no acute distress and alert and oriented to person, place and time. NAD, Alert  Non labored respiration  Normal peripheral pulses  Soft, non tender  Skin-warm and dry  Psych- normal mood and affect    Assessment and Plan      1. OAB (overactive bladder)    2. Microscopic hematuria    3. Kidney stones    4. Renal cyst    5. Pelvic pain    6.  Urinary frequency           Plan:       US reviewed: no masses noted  Kidney stones: check KUB  Trial of Vesicare; Good Rx Coupon  Cystoscopy and pelvic exam in office  Info given about 3rd line therapy         Prescriptions Ordered:  Orders Placed This Encounter   Medications    solifenacin (VESICARE) 10 MG tablet     Sig: Take 1 tablet by mouth daily     Dispense:  30 tablet     Refill:  1      Orders Placed:  Orders Placed This Encounter   Procedures    XR ABDOMEN (KUB) (SINGLE AP VIEW)     Standing Status:   Future     Standing Expiration Date: 3/11/2023            MD Ron Prince M.D, MD [facility]

## 2022-03-15 DIAGNOSIS — I10 ESSENTIAL HYPERTENSION: ICD-10-CM

## 2022-03-15 RX ORDER — AMLODIPINE BESYLATE 10 MG/1
TABLET ORAL
Qty: 90 TABLET | Refills: 3 | Status: SHIPPED | OUTPATIENT
Start: 2022-03-15

## 2022-04-12 ENCOUNTER — HOSPITAL ENCOUNTER (EMERGENCY)
Age: 42
Discharge: HOME | End: 2022-04-12
Attending: EMERGENCY MEDICINE
Payer: COMMERCIAL

## 2022-04-12 VITALS
RESPIRATION RATE: 18 BRPM | WEIGHT: 225 LBS | BODY MASS INDEX: 38.41 KG/M2 | HEIGHT: 64 IN | SYSTOLIC BLOOD PRESSURE: 183 MMHG | DIASTOLIC BLOOD PRESSURE: 97 MMHG | HEART RATE: 84 BPM | TEMPERATURE: 98.2 F | OXYGEN SATURATION: 100 %

## 2022-04-12 DIAGNOSIS — S39.012A STRAIN OF LUMBAR REGION, INITIAL ENCOUNTER: Primary | ICD-10-CM

## 2022-04-12 DIAGNOSIS — I10 ESSENTIAL HYPERTENSION: ICD-10-CM

## 2022-04-12 DIAGNOSIS — M54.32 SCIATICA OF LEFT SIDE: ICD-10-CM

## 2022-04-12 PROCEDURE — 6370000000 HC RX 637 (ALT 250 FOR IP): Performed by: NURSE PRACTITIONER

## 2022-04-12 PROCEDURE — 96372 THER/PROPH/DIAG INJ SC/IM: CPT

## 2022-04-12 PROCEDURE — 99283 EMERGENCY DEPT VISIT LOW MDM: CPT

## 2022-04-12 PROCEDURE — 6360000002 HC RX W HCPCS: Performed by: NURSE PRACTITIONER

## 2022-04-12 RX ORDER — TIZANIDINE 4 MG/1
4 TABLET ORAL EVERY 6 HOURS PRN
Qty: 20 TABLET | Refills: 0 | Status: SHIPPED | OUTPATIENT
Start: 2022-04-12 | End: 2022-05-12 | Stop reason: ALTCHOICE

## 2022-04-12 RX ORDER — LIDOCAINE 4 G/G
1 PATCH TOPICAL EVERY 12 HOURS PRN
Qty: 30 PATCH | Refills: 0 | Status: SHIPPED | OUTPATIENT
Start: 2022-04-12 | End: 2022-04-21

## 2022-04-12 RX ORDER — PREDNISONE 20 MG/1
TABLET ORAL
Qty: 15 TABLET | Refills: 0 | Status: SHIPPED | OUTPATIENT
Start: 2022-04-12 | End: 2022-04-22

## 2022-04-12 RX ORDER — METOPROLOL SUCCINATE 100 MG/1
TABLET, EXTENDED RELEASE ORAL
Qty: 90 TABLET | Refills: 3 | Status: SHIPPED | OUTPATIENT
Start: 2022-04-12

## 2022-04-12 RX ORDER — PREDNISONE 20 MG/1
60 TABLET ORAL ONCE
Status: COMPLETED | OUTPATIENT
Start: 2022-04-12 | End: 2022-04-12

## 2022-04-12 RX ORDER — LIDOCAINE 4 G/G
1 PATCH TOPICAL DAILY
Status: DISCONTINUED | OUTPATIENT
Start: 2022-04-12 | End: 2022-04-12 | Stop reason: HOSPADM

## 2022-04-12 RX ORDER — ORPHENADRINE CITRATE 30 MG/ML
60 INJECTION INTRAMUSCULAR; INTRAVENOUS ONCE
Status: COMPLETED | OUTPATIENT
Start: 2022-04-12 | End: 2022-04-12

## 2022-04-12 RX ADMIN — ORPHENADRINE CITRATE 60 MG: 30 INJECTION INTRAMUSCULAR; INTRAVENOUS at 07:15

## 2022-04-12 RX ADMIN — PREDNISONE 60 MG: 20 TABLET ORAL at 07:13

## 2022-04-12 ASSESSMENT — PAIN DESCRIPTION - FREQUENCY: FREQUENCY: CONTINUOUS

## 2022-04-12 ASSESSMENT — PAIN DESCRIPTION - ORIENTATION: ORIENTATION: LEFT

## 2022-04-12 ASSESSMENT — ENCOUNTER SYMPTOMS
SHORTNESS OF BREATH: 0
NAUSEA: 0
BACK PAIN: 1
ABDOMINAL PAIN: 0
VOMITING: 0
COLOR CHANGE: 0

## 2022-04-12 ASSESSMENT — PAIN DESCRIPTION - LOCATION: LOCATION: BACK

## 2022-04-12 ASSESSMENT — PAIN SCALES - GENERAL: PAINLEVEL_OUTOF10: 8

## 2022-04-12 ASSESSMENT — PAIN DESCRIPTION - PAIN TYPE: TYPE: ACUTE PAIN

## 2022-04-12 ASSESSMENT — PAIN - FUNCTIONAL ASSESSMENT: PAIN_FUNCTIONAL_ASSESSMENT: 0-10

## 2022-04-12 NOTE — ED NOTES
ED nurse-to-nurse bedside report    Chief Complaint   Patient presents with    Back Pain      LOC: alert and orientated to name, place, date  Vital signs   Vitals:    04/12/22 0650   BP: (!) 183/97   Pulse: 84   Resp: 18   Temp: 98.2 °F (36.8 °C)   TempSrc: Oral   SpO2: 100%   Weight: 225 lb (102.1 kg)   Height: 5' 4\" (1.626 m)      Pain:    Pain Interventions: see MAR  Pain Goal: 0  Oxygen: No    Current needs required room air    Telemetry: No  LDAs:    Continuous Infusions:   Mobility: Independent  [place] Fall Risk Score: No flowsheet data found.   Fall Interventions: pt is independent   Report given to: Paulette Dumont RN  04/12/22 2936

## 2022-04-12 NOTE — ED PROVIDER NOTES
[facility] Emergency Department    CHIEF COMPLAINT       Chief Complaint   Patient presents with    Back Pain       Nurses Notes reviewed and I agree except as noted in the HPI. HISTORY OF PRESENT ILLNESS    Jackie Sands is a 38 y.o. female who presents to the ED for evaluation of back pain. Patient notes symptoms started on Sunday, she denies any eliciting event, she notes pain is located in the left lower back, radiates down into her left hip. Worse with turning. Notes spasms when this happens. Notes she has tried taking Aleve, she notes little improvement. She notes history of degenerative disc disease and spinal stenosis, denies any history of surgeries in the past.  Denies any current fevers or chills, denies any genital numbness or tingling, denies radiation of pain down the leg, does continue to be able to ambulate. Has past medical history of prediabetes, hyperlipidemia, Hashimoto's thyroiditis, Bell's palsy. HPI was provided by the patient. REVIEW OF SYSTEMS     Review of Systems   Constitutional: Negative for activity change, chills, fatigue and fever. Respiratory: Negative for shortness of breath. Cardiovascular: Negative for chest pain. Gastrointestinal: Negative for abdominal pain, nausea and vomiting. Genitourinary: Negative for decreased urine volume, difficulty urinating, dysuria and frequency. Musculoskeletal: Positive for back pain. Negative for myalgias, neck pain and neck stiffness. Skin: Negative for color change and wound. Allergic/Immunologic: Negative for immunocompromised state. Neurological: Negative for dizziness, weakness and numbness. Hematological: Does not bruise/bleed easily. Psychiatric/Behavioral: Negative for agitation and confusion.         PAST MEDICAL HISTORY     Past Medical History:   Diagnosis Date    Bell palsy 2020    right side droop    Essential hypertension 11/7/2016    GERD (gastroesophageal reflux disease)     Hashimoto's thyroiditis 2016    HIGH CHOLESTEROL     Hyperlipidemia     Hypokalemia     Prediabetes 2020    Subclinical hyperthyroidism 2016       SURGICALHISTORY      has a past surgical history that includes  section (, ); Endoscopy, colon, diagnostic; Dilation and curettage of uterus (); laparoscopy (N/A, 6/15/2020); Colonoscopy (2021); and Hysterectomy, total abdominal (N/A, 9/10/2020). CURRENT MEDICATIONS       Discharge Medication List as of 2022  7:37 AM      CONTINUE these medications which have NOT CHANGED    Details   amLODIPine (NORVASC) 10 MG tablet take 1 tablet by mouth once daily, Disp-90 tablet, R-3Normal      solifenacin (VESICARE) 10 MG tablet Take 1 tablet by mouth daily, Disp-30 tablet, R-1Print      hydroCHLOROthiazide (MICROZIDE) 12.5 MG capsule take 1 capsule by mouth once daily, Disp-90 capsule, R-1Normal      meloxicam (MOBIC) 7.5 MG tablet Take 1 tablet by mouth daily, Disp-30 tablet, R-0Normal      dicyclomine (BENTYL) 20 MG tablet Take 1 tablet by mouth 3 times daily as needed (abd discomfort), Disp-15 tablet, R-0Normal      ketorolac (TORADOL) 10 MG tablet Take 1 tablet by mouth 3 times daily, Disp-15 tablet, R-0Normal      omeprazole (PRILOSEC) 40 MG delayed release capsule Take 1 capsule by mouth nightly, Disp-90 capsule, R-1Normal      sertraline (ZOLOFT) 100 MG tablet take 1 tablet by mouth once daily, Disp-90 tablet, R-3Normal      ibuprofen (ADVIL;MOTRIN) 600 MG tablet Take 1 tablet by mouth every 8 hours as needed for Pain (Headache), Disp-20 tablet, R-0Normal      metoprolol succinate (TOPROL XL) 100 MG extended release tablet take 1 tablet by mouth once daily, Disp-90 tablet, R-3Normal             ALLERGIES     is allergic to bactrim and sulfamethoxazole-trimethoprim. FAMILY HISTORY     She indicated that her mother is . She indicated that her father is .  She indicated that the status of her maternal aunt is unknown.   family history includes Breast Cancer (age of onset: 48) in her maternal aunt; Diabetes in her father and mother; Heart Disease in her mother; High Blood Pressure in her father and mother. SOCIAL HISTORY       Social History     Socioeconomic History    Marital status: Single     Spouse name: Not on file    Number of children: 2    Years of education: Not on file    Highest education level: Not on file   Occupational History    Not on file   Tobacco Use    Smoking status: Never Smoker    Smokeless tobacco: Never Used   Vaping Use    Vaping Use: Never used   Substance and Sexual Activity    Alcohol use: Yes     Comment: social    Drug use: No    Sexual activity: Not on file   Other Topics Concern    Not on file   Social History Narrative    Not on file     Social Determinants of Health     Financial Resource Strain:     Difficulty of Paying Living Expenses: Not on file   Food Insecurity:     Worried About Running Out of Food in the Last Year: Not on file    Alfa of Food in the Last Year: Not on file   Transportation Needs:     Lack of Transportation (Medical): Not on file    Lack of Transportation (Non-Medical):  Not on file   Physical Activity:     Days of Exercise per Week: Not on file    Minutes of Exercise per Session: Not on file   Stress:     Feeling of Stress : Not on file   Social Connections:     Frequency of Communication with Friends and Family: Not on file    Frequency of Social Gatherings with Friends and Family: Not on file    Attends Zoroastrian Services: Not on file    Active Member of Clubs or Organizations: Not on file    Attends Club or Organization Meetings: Not on file    Marital Status: Not on file   Intimate Partner Violence:     Fear of Current or Ex-Partner: Not on file    Emotionally Abused: Not on file    Physically Abused: Not on file    Sexually Abused: Not on file   Housing Stability:     Unable to Pay for Housing in the Last Year: Not on file    Number of Places Lived in the Last Year: Not on file    Unstable Housing in the Last Year: Not on file       PHYSICAL EXAM     INITIAL VITALS:  height is 5' 4\" (1.626 m) and weight is 225 lb (102.1 kg). Her oral temperature is 98.2 °F (36.8 °C). Her blood pressure is 183/97 (abnormal) and her pulse is 84. Her respiration is 18 and oxygen saturation is 100%. Physical Exam  Vitals and nursing note reviewed. Constitutional:       General: She is not in acute distress. Appearance: Normal appearance. She is well-developed and overweight. HENT:      Head: Normocephalic. Mouth/Throat:      Pharynx: Uvula midline. Eyes:      Conjunctiva/sclera: Conjunctivae normal.   Cardiovascular:      Rate and Rhythm: Normal rate and regular rhythm. Pulses: Normal pulses. Heart sounds: Normal heart sounds, S1 normal and S2 normal.   Pulmonary:      Effort: Pulmonary effort is normal. No respiratory distress. Breath sounds: Normal breath sounds. Chest:      Chest wall: No tenderness. Musculoskeletal:      Cervical back: Normal range of motion and neck supple. Lumbar back: Spasms and tenderness present. No swelling, edema, deformity or bony tenderness. Decreased range of motion. Negative right straight leg raise test and negative left straight leg raise test.      Right hip: No deformity or bony tenderness. Normal range of motion. Normal strength. Left hip: No deformity or bony tenderness. Normal range of motion. Normal strength. Lymphadenopathy:      Cervical: No cervical adenopathy. Skin:     General: Skin is warm and dry. Capillary Refill: Capillary refill takes less than 2 seconds. Neurological:      Mental Status: She is alert and oriented to person, place, and time. GCS: GCS eye subscore is 4. GCS verbal subscore is 5. GCS motor subscore is 6. Sensory: Sensation is intact. Motor: Motor function is intact. Coordination: Coordination is intact. Gait: Gait is intact. Psychiatric:         Speech: Speech normal.         Behavior: Behavior normal.         Thought Content: Thought content normal.         DIFFERENTIAL DIAGNOSIS:   Sciatica, degenerative disc disease, spinal stenosis, low suspicion of cauda equina. DIAGNOSTIC RESULTS       RADIOLOGY: non-plainfilm images(s) such as CT, Ultrasound and MRI are read by the radiologist.  Plain radiographic images are visualized and preliminarily interpreted by the emergency physician unless otherwise stated below. No orders to display         LABS:   Labs Reviewed - No data to display    EMERGENCY DEPARTMENT COURSE:   Vitals:    Vitals:    04/12/22 0650   BP: (!) 183/97   Pulse: 84   Resp: 18   Temp: 98.2 °F (36.8 °C)   TempSrc: Oral   SpO2: 100%   Weight: 225 lb (102.1 kg)   Height: 5' 4\" (1.626 m)       MDM    Patient was seen and evaluated in the emergency department, patient appeared to be in no acute distress, vital signs reviewed, hypertension noted. Physical exam was completed, she had some left lower lumbar tenderness, tenderness at the SI joint. There is negative straight leg raise, she is able to ambulate with no difficulty, deep tendon reflexes were intact. She was treated medications below, she maintained a stable course, should be prescribed prednisone discharge, given Zanaflex and lidocaine patch. She is advised to follow-up with her family doctor if symptoms fail to improve. She verbalized understanding of plan of care. Medications   lidocaine 4 % external patch 1 patch (1 patch TransDERmal Patch Applied 4/12/22 0714)   orphenadrine (NORFLEX) injection 60 mg (60 mg IntraMUSCular Given 4/12/22 0715)   predniSONE (DELTASONE) tablet 60 mg (60 mg Oral Given 4/12/22 0713)       Patient was seenindependently by myself. The patient's final impression and disposition and plan was determined by myself. CRITICAL CARE:   None    CONSULTS:  None    PROCEDURES:  None    FINAL IMPRESSION     1.  Strain of lumbar region, initial encounter    2. Sciatica of left side          DISPOSITION/PLAN   Patient discharged    PATIENT REFERREDTO:  No follow-up provider specified. DISCHARGE MEDICATIONS:  Discharge Medication List as of 4/12/2022  7:37 AM      START taking these medications    Details   predniSONE (DELTASONE) 20 MG tablet Take 60 mg (3 tabs) once daily for 2 days then 40 mg (2 tabs) once daily for 3 days then 20 mg (1 tab) once daily for 3 days, Disp-15 tablet, R-0Normal      tiZANidine (ZANAFLEX) 4 MG tablet Take 1 tablet by mouth every 6 hours as needed (muscle spasm), Disp-20 tablet, R-0Normal      lidocaine 4 % external patch Place 1 patch onto the skin every 12 hours as needed (back pain), TransDERmal, EVERY 12 HOURS PRN Starting Tue 4/12/2022, Until Thu 5/12/2022 at 2359, For 30 days, Disp-30 patch, R-0, Normal             (Please note that portions of this note were completed with a voice recognition program.  Efforts were made to edit the dictations but occasionally words are mis-transcribed.)      Provider:  I personally performed the services described in the documentation,reviewed and edited the documentation which was dictated to the scribe in my presence, and it accurately records my words and actions.     Abebe Gerber CNP 04/12/22 7:50 AM    Valentino Gerber, APRN - CNP        [employer], APRMAHENDRA - CNP  04/12/22 7623

## 2022-04-12 NOTE — TELEPHONE ENCOUNTER
Recent Visits  Date Type Provider Dept   10/19/21 Office Visit Jermaine Garcia, APRN - CNP [facility]   07/14/21 Office Visit Jermaine Garcia, APRN - CNP [facility]   04/28/21 Office Visit Jermaine Garcia, APRN - CNP [facility]   04/19/21 Office Visit Jermaine Garcia, APRN - CNP [facility]   03/08/21 Office Visit Jermaine Garcia, APRN - CNP [facility]   Showing recent visits within past 540 days with a meds authorizing provider and meeting all other requirements  Future Appointments  No visits were found meeting these conditions.   Showing future appointments within next 150 days with a meds authorizing provider and meeting all other requirements      Future Appointments   Date Time Provider Ravi Mccann   10/17/2022  2:00 PM Jermaine RadhaLeta

## 2022-04-12 NOTE — ED TRIAGE NOTES
Pt presents to the ED from home with complaints of lower left back pain. Pt states it started Sunday at work/ pt states she has a bulge disc that normally affects the right side however now her left leg hurts. Pt rates pain 8/10. Pt was able to walk to room independently. Pt is alert and oriented x 4 with respirations even and unlabored.

## 2022-04-14 ENCOUNTER — OFFICE VISIT (OUTPATIENT)
Dept: FAMILY MEDICINE CLINIC | Age: 42
End: 2022-04-14
Payer: COMMERCIAL

## 2022-04-14 VITALS
HEART RATE: 71 BPM | HEIGHT: 64 IN | TEMPERATURE: 98.7 F | OXYGEN SATURATION: 98 % | SYSTOLIC BLOOD PRESSURE: 152 MMHG | WEIGHT: 225 LBS | BODY MASS INDEX: 38.41 KG/M2 | DIASTOLIC BLOOD PRESSURE: 92 MMHG | RESPIRATION RATE: 12 BRPM

## 2022-04-14 DIAGNOSIS — M54.16 LUMBAR RADICULOPATHY: Primary | ICD-10-CM

## 2022-04-14 PROCEDURE — G8427 DOCREV CUR MEDS BY ELIG CLIN: HCPCS | Performed by: NURSE PRACTITIONER

## 2022-04-14 PROCEDURE — 96372 THER/PROPH/DIAG INJ SC/IM: CPT | Performed by: NURSE PRACTITIONER

## 2022-04-14 PROCEDURE — G8417 CALC BMI ABV UP PARAM F/U: HCPCS | Performed by: NURSE PRACTITIONER

## 2022-04-14 PROCEDURE — 1036F TOBACCO NON-USER: CPT | Performed by: NURSE PRACTITIONER

## 2022-04-14 PROCEDURE — 99213 OFFICE O/P EST LOW 20 MIN: CPT | Performed by: NURSE PRACTITIONER

## 2022-04-14 RX ORDER — TRAMADOL HYDROCHLORIDE 50 MG/1
50 TABLET ORAL EVERY 6 HOURS PRN
Qty: 28 TABLET | Refills: 0 | Status: SHIPPED | OUTPATIENT
Start: 2022-04-14 | End: 2022-04-19 | Stop reason: ALTCHOICE

## 2022-04-14 RX ORDER — KETOROLAC TROMETHAMINE 30 MG/ML
30 INJECTION, SOLUTION INTRAMUSCULAR; INTRAVENOUS ONCE
Status: COMPLETED | OUTPATIENT
Start: 2022-04-14 | End: 2022-04-14

## 2022-04-14 RX ORDER — KETOROLAC TROMETHAMINE 10 MG/1
10 TABLET, FILM COATED ORAL EVERY 6 HOURS PRN
Qty: 20 TABLET | Refills: 0 | Status: SHIPPED | OUTPATIENT
Start: 2022-04-14 | End: 2022-04-21

## 2022-04-14 RX ADMIN — KETOROLAC TROMETHAMINE 30 MG: 30 INJECTION, SOLUTION INTRAMUSCULAR; INTRAVENOUS at 15:24

## 2022-04-14 ASSESSMENT — PATIENT HEALTH QUESTIONNAIRE - PHQ9
SUM OF ALL RESPONSES TO PHQ QUESTIONS 1-9: 0
SUM OF ALL RESPONSES TO PHQ QUESTIONS 1-9: 0
2. FEELING DOWN, DEPRESSED OR HOPELESS: 0
SUM OF ALL RESPONSES TO PHQ9 QUESTIONS 1 & 2: 0
SUM OF ALL RESPONSES TO PHQ QUESTIONS 1-9: 0
SUM OF ALL RESPONSES TO PHQ QUESTIONS 1-9: 0
1. LITTLE INTEREST OR PLEASURE IN DOING THINGS: 0

## 2022-04-14 NOTE — PATIENT INSTRUCTIONS
Patient Education        Sciatica: Exercises  Introduction  Here are some examples of typical rehabilitation exercises for your condition. Start each exercise slowly. Ease off the exercise if you start to have pain. Your doctor or physical therapist will tell you when you can start theseexercises and which ones will work best for you. When you are not being active, find a comfortable position for rest. Some people are comfortable on the floor or a medium-firm bed with a small pillow under their head and another under their knees. Some people prefer to lie on their side with a pillow between their knees. Don't stay in one position fortoo long. Take short walks (10 to 20 minutes) every 2 to 3 hours. Avoid slopes, hills, and stairs until you feel better. Walk only distances you can manage withoutpain, especially leg pain. How to do the exercises  Back stretches    1. Get down on your hands and knees on the floor. 2. Relax your head and allow it to droop. Round your back up toward the ceiling until you feel a nice stretch in your upper, middle, and lower back. Hold this stretch for as long as it feels comfortable, or about 15 to 30 seconds. 3. Return to the starting position with a flat back while you are on your hands and knees. 4. Let your back sway by pressing your stomach toward the floor. Lift your buttocks toward the ceiling. 5. Hold this position for 15 to 30 seconds. 6. Repeat 2 to 4 times. Follow-up care is a key part of your treatment and safety. Be sure to make and go to all appointments, and call your doctor if you are having problems. It's also a good idea to know your test results and keep alist of the medicines you take. Where can you learn more? Go to [email or web address] org and sign in to your [employer] account. Enter R023 in the [employer] box to learn more about \"Sciatica: Exercises. \"     If you do not have an account, please click on the \"Sign Up Now\" link.  Current as of: July 1, 2021               Content Version: 13.2  © 5045-7582 [employer]. Care instructions adapted under license by [facility] ([facility]). If you have questions about a medical condition or this instruction, always ask your healthcare professional. [address] any warranty or liability for your use of this information.

## 2022-04-14 NOTE — PROGRESS NOTES
Chief Complaint   Patient presents with    Follow-up     back pain started sunday        History obtained from chart review and the patient. SUBJECTIVE:  Ira Bennett is a 38 y.o. female that presents today for follow up ER visit for back pain    Back Pain    HPI:  Pain is present in the lumbar region. Symptoms have been present for 5 day(s). The pain is constant. The patient describes the pain as sharp / stabbing and throbbing. Inciting injury or history of trauma? No  Pain is aggravated by - certain positions,  Pain is relieved by - nothing  Radiation of the pain? Yes - down her left leg  Paresthesias of the extremities? No  Saddle anesthesia? No  Bowel or bladder incontinence?  No  Treatments tried - NSAIDs, prednisone, lidocaine patch, Zanaflex    Age/Gender Health Maintenance    Lipid   Lab Results   Component Value Date    CHOL 195 12/28/2019    CHOL 194 05/15/2019    CHOL 205 (H) 02/12/2019     Lab Results   Component Value Date    TRIG 129 12/28/2019    TRIG 199 05/15/2019    TRIG 87 02/12/2019     Lab Results   Component Value Date    HDL 53 03/24/2021    HDL 51 12/28/2019    HDL 49 05/15/2019     Lab Results   Component Value Date    LDLCALC 157 03/24/2021    LDLCALC 118 12/28/2019    LDLCALC 105 05/15/2019     Lab Results   Component Value Date    LABVLDL 20 09/23/2015     Lab Results   Component Value Date    CHOLHDLRATIO 4.2 09/23/2015     DM Screen -   Lab Results   Component Value Date    LABA1C 5.8 05/20/2021     Colon Cancer Screening - 46  Lung Cancer Screening (Age 54 to [de-identified] with 30 pack year hx, current smoker or quit within past 15 years) - n/a    Tetanus - needs  Influenza Vaccine - needs  Pneumonia Vaccine - 65  Zostavax - 48    Breast Cancer Screening - 48  Cervical Cancer Screening - per OB, Dr. [facility]  Osteoporosis Screening - 70    AAA Screening - n/a    Falls screening - n/a    Current Outpatient Medications   Medication Sig Dispense Refill    ketorolac (TORADOL) 10 MG tablet Take 1 tablet by mouth every 6 hours as needed for Pain 20 tablet 0    traMADol (ULTRAM) 50 MG tablet Take 1 tablet by mouth every 6 hours as needed for Pain for up to 7 days. Intended supply: 7 days. Take lowest dose possible to manage pain 28 tablet 0    predniSONE (DELTASONE) 20 MG tablet Take 60 mg (3 tabs) once daily for 2 days then 40 mg (2 tabs) once daily for 3 days then 20 mg (1 tab) once daily for 3 days 15 tablet 0    tiZANidine (ZANAFLEX) 4 MG tablet Take 1 tablet by mouth every 6 hours as needed (muscle spasm) 20 tablet 0    lidocaine 4 % external patch Place 1 patch onto the skin every 12 hours as needed (back pain) 30 patch 0    metoprolol succinate (TOPROL XL) 100 MG extended release tablet take 1 tablet by mouth once daily 90 tablet 3    amLODIPine (NORVASC) 10 MG tablet take 1 tablet by mouth once daily 90 tablet 3    hydroCHLOROthiazide (MICROZIDE) 12.5 MG capsule take 1 capsule by mouth once daily 90 capsule 1    omeprazole (PRILOSEC) 40 MG delayed release capsule Take 1 capsule by mouth nightly 90 capsule 1    sertraline (ZOLOFT) 100 MG tablet take 1 tablet by mouth once daily 90 tablet 3    ibuprofen (ADVIL;MOTRIN) 600 MG tablet Take 1 tablet by mouth every 8 hours as needed for Pain (Headache) 20 tablet 0     Current Facility-Administered Medications   Medication Dose Route Frequency Provider Last Rate Last Admin    ketorolac (TORADOL) injection 30 mg  30 mg IntraMUSCular Once Elfredia Smoker, APRN - CNP         Orders Placed This Encounter   Medications    ketorolac (TORADOL) 10 MG tablet     Sig: Take 1 tablet by mouth every 6 hours as needed for Pain     Dispense:  20 tablet     Refill:  0    ketorolac (TORADOL) injection 30 mg    traMADol (ULTRAM) 50 MG tablet     Sig: Take 1 tablet by mouth every 6 hours as needed for Pain for up to 7 days. Intended supply: 7 days.  Take lowest dose possible to manage pain     Dispense:  28 tablet     Refill:  0     Reduce doses taken as pain becomes manageable         All medications reviewed and reconciled, including OTC and herbal medications. Updated list given to patient. Patient Active Problem List   Diagnosis    Essential hypertension    Panic disorder without agoraphobia with moderate panic attacks    Hashimoto's thyroiditis    Family history of early CAD    Intermittent palpitations    Diarrhea    Hyperlipidemia    Reactive thrombocytosis    Obesity (BMI 30-39. 9)    Right Ramirez's palsy    Prediabetes    Pelvic pain    S/P hysterectomy    Spinal stenosis of lumbar region without neurogenic claudication    Herniated lumbar intervertebral disc       Past Medical History:   Diagnosis Date    Bell palsy     right side droop    Essential hypertension 2016    GERD (gastroesophageal reflux disease)     Hashimoto's thyroiditis 2016    HIGH CHOLESTEROL     Hyperlipidemia     Hypokalemia     Prediabetes 2020    Subclinical hyperthyroidism 2016       Past Surgical History:   Procedure Laterality Date     SECTION  ,     COLONOSCOPY  2021    1 polyp removed     DILATION AND CURETTAGE OF UTERUS      ENDOSCOPY, COLON, DIAGNOSTIC      HYSTERECTOMY, TOTAL ABDOMINAL N/A 9/10/2020    HYSTERECTOMY ABDOMINAL TOTAL, BS, POSS DANIELLE performed by Evelina Adam MD at [place] [address] 6/15/2020    DIAGNOSTIC LAPAROSCOPY, performed by [facility] at [facility]       Allergies   Allergen Reactions    Bactrim Swelling     tongue    Sulfamethoxazole-Trimethoprim Swelling     tongue       Social History     Socioeconomic History    Marital status: Single     Spouse name: Not on file    Number of children: 2    Years of education: Not on file    Highest education level: Not on file   Occupational History    Not on file   Tobacco Use    Smoking status: Never Smoker    Smokeless tobacco: Never Used   Vaping Use    Vaping Use: Never used   Substance and Sexual Activity    Alcohol use: Yes     Comment: social    Drug use: No    Sexual activity: Not on file   Other Topics Concern    Not on file   Social History Narrative    Not on file     Social Determinants of Health     Financial Resource Strain:     Difficulty of Paying Living Expenses: Not on file   Food Insecurity:     Worried About Running Out of Food in the Last Year: Not on file    Alfa of Food in the Last Year: Not on file   Transportation Needs:     Lack of Transportation (Medical): Not on file    Lack of Transportation (Non-Medical): Not on file   Physical Activity:     Days of Exercise per Week: Not on file    Minutes of Exercise per Session: Not on file   Stress:     Feeling of Stress : Not on file   Social Connections:     Frequency of Communication with Friends and Family: Not on file    Frequency of Social Gatherings with Friends and Family: Not on file    Attends Caodaism Services: Not on file    Active Member of [address] [place] [address] or Organizations: Not on file    Attends Club or Organization Meetings: Not on file    Marital Status: Not on file   Intimate Partner Violence:     Fear of Current or Ex-Partner: Not on file    Emotionally Abused: Not on file    Physically Abused: Not on file    Sexually Abused: Not on file   Housing Stability:     Unable to Pay for Housing in the Last Year: Not on file    Number of [place] in the Last Year: Not on file    Unstable Housing in the Last Year: Not on file       Family History   Problem Relation Age of Onset    High Blood Pressure Mother     Diabetes Mother     Heart Disease Mother     High Blood Pressure Father     Diabetes Father     Breast Cancer Maternal Aunt 50        reoccurred at 79         I have reviewed the patient's past medical history, past surgical history, allergies, medications, social and family history and I have made updates where appropriate.       Review of Systems  Positive responses are highlighted in bold    Constitutional:  Fever, Chills, Night Sweats, Fatigue, Unexpected changes in weight  Eyes:  Eye discharge, Eye pain, Eye redness, Visual disturbances   HENT:  Ear pain, Tinnitus, Nosebleeds, Trouble swallowing, Hearing loss, Sore throat  Cardiovascular:  Chest Pain, Palpitations, Orthopnea, Paroxysmal Nocturnal Dyspnea  Respiratory:  Cough, Wheezing, Shortness of breath, Chest tightness, Apnea  Gastrointestinal:  Nausea, Vomiting, Diarrhea, Constipation, Heartburn, Blood in stool  Genitourinary:  Difficulty or painful urination, Flank pain, Change in frequency, Urgency  Skin:  Color change, Rash, Itching, Wound  Psychiatric:  Hallucinations, Anxiety, Depression, Suicidal ideation  Hematological:  Enlarged glands, Easy bleeding, Easily bruising  Musculoskeletal:  Joint pain, Back pain, Gait problems, Joint swelling, Myalgias  Neurological:  Dizziness, Headaches, Presyncope, Numbness, Seizures, Tremors  Allergy:  Environmental allergies, Food allergies  Endocrine:  Heat Intolerance, Cold Intolerance, Polydipsia, Polyphagia, Polyuria    Lab Results   Component Value Date    TSH 0.670 05/29/2020     Lab Results   Component Value Date     01/15/2022    K 3.8 01/15/2022     07/21/2021    CO2 22 (L) 07/21/2021    BUN 15 07/21/2021    CREATININE 0.7 01/15/2022    GLUCOSE 121 (H) 07/21/2021    CALCIUM 10.0 07/21/2021    PROT 7.8 07/21/2021    LABALBU 4.4 07/21/2021    BILITOT <0.2 (L) 07/21/2021    ALKPHOS 117 07/21/2021    AST 34 07/21/2021    ALT 50 07/21/2021    LABGLOM >90 07/21/2021       Lab Results   Component Value Date    WBC 13.1 (H) 01/15/2022    HGB 12.2 01/15/2022    HCT 35.7 (L) 01/15/2022    MCV 84 01/15/2022     (H) 01/15/2022     MRI Lumbar spine 10/1/19  Congenital spinal canal narrowing at the lower lumbar levels with superimposed degenerative changes most pronounced at L4-5 where congenital spinal canal narrowing, disc bulge and facet hypertrophy and ligament flavum thickening combined to cause    moderate to severe spinal canal stenosis, mild to moderate right and moderate to severe left neural foraminal stenosis.           PHYSICAL EXAM:  Vitals:    04/14/22 1501 04/14/22 1513   BP: (!) 150/88 (!) 152/92   Pulse: 71    Resp: 12    Temp: 98.7 °F (37.1 °C)    TempSrc: Oral    SpO2: 98%    Weight: 225 lb (102.1 kg)    Height: 5' 4\" (1.626 m)      Body mass index is 38.62 kg/m². VS Reviewed  General Appearance: A&O x 3, No acute distress,well developed and well- nourished  Head: normocephalic and atraumatic, small tender firm contusion left occipital  Eyes: pupils equal, round, and reactive to light, extraocular eye movements intact, conjunctivae and eye lids without erythema  Neck: supple and non-tender without mass, mild thyromegaly but no thyroid nodules, no cervical lymphadenopathy  Pulmonary/Chest: clear to auscultation bilaterally- no wheezes, rales or rhonchi, normal air movement, no respiratory distress or retractions  Cardiovascular: S1 and S2 auscultated w/ RRR. No murmurs, rubs, clicks, or gallops, distal pulses intact. Abdomen: soft, nontender non-distended, bowl sounds physiologic,  no rebound or guarding, no masses or hernias noted. Liver and spleen without enlargement. Extremities: no cyanosis, clubbing or edema of the lower extremities. Musculoskeletal: No joint swelling or gross deformity  Lumbar: + left sided and midline lumbar pain, strength 5/5, neurovascularly intact, antalgic gain  Neuro:  Alert, 5/5 strength globally and symmetrically  Psych: Affect and mood are normal. Thought process is normal. Good insight and appropriate interaction. Cognition and memory appear to be intact. Skin: warm and dry, no rash or erythema  Lymph:  No cervical, auricular or supraclavicular lymph nodes palpated      ASSESSMENT & PLAN  Derek Sanderson was seen today for follow-up.     Diagnoses and all orders for this visit:    Lumbar radiculopathy  -     ketorolac (TORADOL) 10 MG tablet; Take 1 tablet by mouth every 6 hours as needed for Pain  -     ketorolac (TORADOL) injection 30 mg  -     traMADol (ULTRAM) 50 MG tablet; Take 1 tablet by mouth every 6 hours as needed for Pain for up to 7 days. Intended supply: 7 days. Take lowest dose possible to manage pain  -     [facility]       - symptoms all consistent with herniated lumbar disc  - con't prednisone and Zanaflex  - add oral toradol for short duration and Tramadol as needed  - refer to PT  - work slip    Controlled Substance Monitoring:    Acute and Chronic Pain Monitoring:   RX Monitoring 4/14/2022   Attestation -   Periodic Controlled Substance Monitoring No signs of potential drug abuse or diversion identified. DISPOSITION    Return in about 2 weeks (around 4/28/2022) for nurse visit for BP check. Deloris released without restrictions. PATIENT COUNSELING    Counseling was provided today regarding the following topics: Healthy eating habits, Regular exercise, substance abuse and healthy sleep habits. Deloris received counseling on the following healthy behaviors: nutrition, exercise and medication adherence    Patient given educational materials on: See Attached    I have instructed Deloris to complete a self tracking handout on Blood Pressures  and instructed them to bring it with them to her next appointment. Barriers to learning and self management: none    Discussed use, benefit, and side effects of prescribed medications. Barriers to medication compliance addressed. All patient questions answered. Pt voiced understanding.        Electronically signed by MARÍA Ley CNP on 4/14/2022 at 3:20 PM

## 2022-04-14 NOTE — LETTER
[facility]  [address] [place] [address]  Phone: [phone]  Fax: [phone]    MARÍA Hanna CNP        April 14, 2022     Patient: Ken Qiu   YOB: 1980   Date of Visit: 4/14/2022       To Whom it May Concern:    Ken Qiu was seen in my clinic on 4/14/2022. She may return to work on 4/19/22. She missed work from 4/14-4/18 due to an acute health condition. If you have any questions or concerns, please don't hesitate to call.     Sincerely,         MARÍA Hanna CNP

## 2022-04-19 ENCOUNTER — HOSPITAL ENCOUNTER (EMERGENCY)
Age: 42
Discharge: HOME | End: 2022-04-19
Payer: COMMERCIAL

## 2022-04-19 ENCOUNTER — APPOINTMENT (OUTPATIENT)
Dept: INTERVENTIONAL RADIOLOGY/VASCULAR | Age: 42
End: 2022-04-19
Payer: COMMERCIAL

## 2022-04-19 ENCOUNTER — APPOINTMENT (OUTPATIENT)
Dept: GENERAL RADIOLOGY | Age: 42
End: 2022-04-19
Payer: COMMERCIAL

## 2022-04-19 VITALS
OXYGEN SATURATION: 99 % | DIASTOLIC BLOOD PRESSURE: 93 MMHG | SYSTOLIC BLOOD PRESSURE: 164 MMHG | TEMPERATURE: 98.3 F | BODY MASS INDEX: 38.41 KG/M2 | RESPIRATION RATE: 16 BRPM | HEIGHT: 64 IN | WEIGHT: 225 LBS | HEART RATE: 65 BPM

## 2022-04-19 DIAGNOSIS — M54.42 LOW BACK PAIN WITH LEFT-SIDED SCIATICA, UNSPECIFIED BACK PAIN LATERALITY, UNSPECIFIED CHRONICITY: Primary | ICD-10-CM

## 2022-04-19 PROCEDURE — 72100 X-RAY EXAM L-S SPINE 2/3 VWS: CPT

## 2022-04-19 PROCEDURE — 72170 X-RAY EXAM OF PELVIS: CPT

## 2022-04-19 PROCEDURE — 6360000002 HC RX W HCPCS: Performed by: PHYSICIAN ASSISTANT

## 2022-04-19 PROCEDURE — 6370000000 HC RX 637 (ALT 250 FOR IP): Performed by: PHYSICIAN ASSISTANT

## 2022-04-19 PROCEDURE — 96372 THER/PROPH/DIAG INJ SC/IM: CPT

## 2022-04-19 PROCEDURE — 99284 EMERGENCY DEPT VISIT MOD MDM: CPT

## 2022-04-19 PROCEDURE — 93971 EXTREMITY STUDY: CPT

## 2022-04-19 RX ORDER — HYDROCODONE BITARTRATE AND ACETAMINOPHEN 5; 325 MG/1; MG/1
1 TABLET ORAL EVERY 6 HOURS PRN
Qty: 12 TABLET | Refills: 0 | Status: SHIPPED | OUTPATIENT
Start: 2022-04-19 | End: 2022-04-22

## 2022-04-19 RX ORDER — ONDANSETRON 4 MG/1
4 TABLET, ORALLY DISINTEGRATING ORAL ONCE
Status: COMPLETED | OUTPATIENT
Start: 2022-04-19 | End: 2022-04-19

## 2022-04-19 RX ORDER — MORPHINE SULFATE 2 MG/ML
4 INJECTION, SOLUTION INTRAMUSCULAR; INTRAVENOUS ONCE
Status: COMPLETED | OUTPATIENT
Start: 2022-04-19 | End: 2022-04-19

## 2022-04-19 RX ADMIN — ONDANSETRON 4 MG: 4 TABLET, ORALLY DISINTEGRATING ORAL at 11:30

## 2022-04-19 RX ADMIN — MORPHINE SULFATE 4 MG: 2 INJECTION, SOLUTION INTRAMUSCULAR; INTRAVENOUS at 11:31

## 2022-04-19 ASSESSMENT — PAIN DESCRIPTION - ORIENTATION: ORIENTATION: LOWER;LEFT

## 2022-04-19 ASSESSMENT — ENCOUNTER SYMPTOMS
SHORTNESS OF BREATH: 0
EYE PAIN: 0
RHINORRHEA: 0
NAUSEA: 0
BACK PAIN: 1
VOMITING: 0
ABDOMINAL PAIN: 0
DIARRHEA: 0
COUGH: 0
SORE THROAT: 0
EYE ITCHING: 0
COLOR CHANGE: 0
EYE DISCHARGE: 0
WHEEZING: 0

## 2022-04-19 ASSESSMENT — PAIN SCALES - GENERAL: PAINLEVEL_OUTOF10: 8

## 2022-04-19 ASSESSMENT — PAIN DESCRIPTION - FREQUENCY: FREQUENCY: CONTINUOUS

## 2022-04-19 ASSESSMENT — PAIN - FUNCTIONAL ASSESSMENT: PAIN_FUNCTIONAL_ASSESSMENT: 0-10

## 2022-04-19 ASSESSMENT — PAIN DESCRIPTION - DESCRIPTORS: DESCRIPTORS: CRAMPING;SHOOTING

## 2022-04-19 ASSESSMENT — PAIN DESCRIPTION - ONSET: ONSET: ON-GOING

## 2022-04-19 ASSESSMENT — PAIN DESCRIPTION - PAIN TYPE: TYPE: ACUTE PAIN

## 2022-04-19 ASSESSMENT — PAIN DESCRIPTION - LOCATION: LOCATION: BACK;LEG

## 2022-04-19 ASSESSMENT — PAIN DESCRIPTION - PROGRESSION: CLINICAL_PROGRESSION: GRADUALLY WORSENING

## 2022-04-19 NOTE — ED PROVIDER NOTES
[facility] [address] 22 COMPLAINT       Chief Complaint   Patient presents with    Back Pain     Lower    Leg Pain     Left       Nurses Notes reviewed and I agree except as notedin the HPI. HISTORY OF PRESENT ILLNESS    Sultana Smith is a 38 y.o. female who presents complains of low back pain that started on Sunday. She complains of pain in left lower part spine. She was seen on Tuesday by Jeanette Gerber, NP was prescribed Norflex, Toradol, and lidocaine patch with very little relief. She follow-up with her PCP on Wednesday and was put on Zanaflex prednisone and tramadol. She continues to have pain. She is got pain going down the medial aspect of her left thigh is concerned about a DVT. She is requesting an ultrasound. She is otherwise without complaint denies any bowel or bladder issues. REVIEW OF SYSTEMS     Review of Systems   Constitutional: Negative for activity change, appetite change, chills and fever. HENT: Negative for congestion, ear pain, rhinorrhea and sore throat. Eyes: Negative for pain, discharge and itching. Respiratory: Negative for cough, shortness of breath and wheezing. Cardiovascular: Negative for chest pain. Gastrointestinal: Negative for abdominal pain, diarrhea, nausea and vomiting. Genitourinary: Negative for difficulty urinating and dysuria. Musculoskeletal: Positive for back pain. Negative for arthralgias and myalgias. Skin: Negative for color change and rash. Neurological: Negative for dizziness, seizures, light-headedness and headaches. Psychiatric/Behavioral: Negative for agitation, confusion, self-injury and suicidal ideas. All other systems reviewed and are negative.        PAST MEDICAL HISTORY    has a past medical history of Bell palsy, Essential hypertension, GERD (gastroesophageal reflux disease), Hashimoto's thyroiditis, HIGH CHOLESTEROL, Hyperlipidemia, Hypokalemia, Prediabetes, and Subclinical hyperthyroidism. SURGICAL HISTORY      has a past surgical history that includes  section (, ); Endoscopy, colon, diagnostic; Dilation and curettage of uterus (); laparoscopy (N/A, 6/15/2020); Colonoscopy (2021); and Hysterectomy, total abdominal (N/A, 9/10/2020). CURRENT MEDICATIONS       Discharge Medication List as of 2022 11:48 AM      CONTINUE these medications which have NOT CHANGED    Details   ketorolac (TORADOL) 10 MG tablet Take 1 tablet by mouth every 6 hours as needed for Pain, Disp-20 tablet, R-0Normal      predniSONE (DELTASONE) 20 MG tablet Take 60 mg (3 tabs) once daily for 2 days then 40 mg (2 tabs) once daily for 3 days then 20 mg (1 tab) once daily for 3 days, Disp-15 tablet, R-0Normal      tiZANidine (ZANAFLEX) 4 MG tablet Take 1 tablet by mouth every 6 hours as needed (muscle spasm), Disp-20 tablet, R-0Normal      lidocaine 4 % external patch Place 1 patch onto the skin every 12 hours as needed (back pain), TransDERmal, EVERY 12 HOURS PRN Starting Tue 2022, Until u 2022 at 2359, For 30 days, Disp-30 patch, R-0, Normal      metoprolol succinate (TOPROL XL) 100 MG extended release tablet take 1 tablet by mouth once daily, Disp-90 tablet, R-3Normal      amLODIPine (NORVASC) 10 MG tablet take 1 tablet by mouth once daily, Disp-90 tablet, R-3Normal      hydroCHLOROthiazide (MICROZIDE) 12.5 MG capsule take 1 capsule by mouth once daily, Disp-90 capsule, R-1Normal      omeprazole (PRILOSEC) 40 MG delayed release capsule Take 1 capsule by mouth nightly, Disp-90 capsule, R-1Normal      sertraline (ZOLOFT) 100 MG tablet take 1 tablet by mouth once daily, Disp-90 tablet, R-3Normal      ibuprofen (ADVIL;MOTRIN) 600 MG tablet Take 1 tablet by mouth every 8 hours as needed for Pain (Headache), Disp-20 tablet, R-0Normal             ALLERGIES     is allergic to bactrim and sulfamethoxazole-trimethoprim.     HISTORY     She indicated that her mother is . She indicated that her father is . She indicated that the status of her maternal aunt is unknown.   family history includes Breast Cancer (age of onset: 48) in her maternal aunt; Diabetes in her father and mother; Heart Disease in her mother; High Blood Pressure in her father and mother. SOCIALHISTORY      reports that she has never smoked. She has never used smokeless tobacco. She reports current alcohol use. She reports that she does not use drugs. PHYSICAL EXAM     INITIAL VITALS:  height is 5' 4\" (1.626 m) and weight is 225 lb (102.1 kg). Her oral temperature is 98.3 °F (36.8 °C). Her blood pressure is 164/93 (abnormal) and her pulse is 65. Her respiration is 16 and oxygen saturation is 99%. Physical Exam  Vitals and nursing note reviewed. Constitutional:       Comments: Well Developed Well Nourished Appearing     HENT:      Head: Normocephalic and atraumatic. Eyes:      Pupils: Pupils are equal, round, and reactive to light. Cardiovascular:      Rate and Rhythm: Normal rate and regular rhythm. Heart sounds: Normal heart sounds. Pulmonary:      Effort: Pulmonary effort is normal. No respiratory distress. Breath sounds: Normal breath sounds. No wheezing. Abdominal:      General: Bowel sounds are normal. There is no distension. Palpations: Abdomen is soft. Musculoskeletal:      Cervical back: Normal range of motion and neck supple. Comments: Tender paraspinous muscles on the left-hand side. With good strength and sensation lower extremities. She does have some tenderness to medial aspect of her thigh. DIFFERENTIAL DIAGNOSIS:   Low back pain. Possible sciatica. She is concerned about a DVT we will do an ultrasound.     DIAGNOSTIC RESULTS     EKG: All EKG's are interpreted by the Emergency Department Physician who either signs or Co-signs this chart in the absence of a cardiologist.      RADIOLOGY: non-plain film images(s) such as CT, Ultrasound REFERRED TO:  Baljit Amaro, APRN - CNP  [address] [place]  [phone]    In 2 days        DISCHARGE MEDICATIONS:  Discharge Medication List as of 4/19/2022 11:48 AM      START taking these medications    Details   HYDROcodone-acetaminophen (NORCO) 5-325 MG per tablet Take 1 tablet by mouth every 6 hours as needed for Pain for up to 3 days. Intended supply: 3 days.  Take lowest dose possible to manage pain, Disp-12 tablet, R-0Print             (Please note that portions of this note were completed with a voice recognitionprogram.  Efforts were made to edit the dictations but occasionally words are mis-transcribed.)    Philip Lane, [address]  04/19/22 1222

## 2022-04-19 NOTE — ED NOTES
Pt presents to the ER for lower back pain and now the pain is going down her L leg. Pt states she has been seen here in the ER for this and has been to her family doc. Pt states the pain is getting worse and she cannot stand up straight. It hurts in every position and she is having trouble sleeping because of the pain.      Amalia Matias  04/19/22 [id number]

## 2022-04-20 ENCOUNTER — CARE COORDINATION (OUTPATIENT)
Dept: OTHER | Facility: CLINIC | Age: 42
End: 2022-04-20

## 2022-04-20 NOTE — CARE COORDINATION
[facility] ED Follow Up Call    2022    Patient: Haydee Ramirez Patient : 1980   MRN: [id number]  Reason for Admission: Low back pain  Discharge Date: 2022    ACM attempted to reach patient for Care Transitions call. HIPAA compliant message left requesting a return phone call at patients convenience. Will continue to follow. CAMDEN Botello RN  Associate Care Manager  Phone: [phone]  Email: [email or web address]

## 2022-04-21 ENCOUNTER — CARE COORDINATION (OUTPATIENT)
Dept: OTHER | Facility: CLINIC | Age: 42
End: 2022-04-21

## 2022-04-21 ENCOUNTER — HOSPITAL ENCOUNTER (OUTPATIENT)
Dept: PHYSICAL THERAPY | Age: 42
Setting detail: THERAPIES SERIES
Discharge: HOME | End: 2022-04-21
Payer: COMMERCIAL

## 2022-04-21 PROCEDURE — 97162 PT EVAL MOD COMPLEX 30 MIN: CPT

## 2022-04-21 PROCEDURE — 97530 THERAPEUTIC ACTIVITIES: CPT

## 2022-04-21 PROCEDURE — 97110 THERAPEUTIC EXERCISES: CPT

## 2022-04-21 NOTE — PROGRESS NOTES
** PLEASE SIGN, DATE AND TIME CERTIFICATION BELOW AND RETURN TO [facility] OUTPATIENT REHABILITATION (FAX #: [phone]). ATTEST/CO-SIGN IF ACCESSING VIA [employer]. THANK YOU.**    I certify that I have examined the patient below and determined that Physical Medicine and Rehabilitation service is necessary and that I approve the established plan of care for up to 90 days or as specifically noted. Attestation, signature or co-signature of physician indicates approval of certification requirements.    ________________________ ____________ __________  Physician Signature   Date   Time  [address] [facility]  PHYSICAL THERAPY  [x] EVALUATION  [] DAILY NOTE (LAND) [] DAILY NOTE (AQUATIC ) [] PROGRESS NOTE [] DISCHARGE NOTE    [x] [facility]   [] [address]    [] [address] [facility]   [] Jarett Linear    Date: 2022  Patient Name:  Enriqueta Ladd  : 1980  MRN: [id number]  CSN: [id number]    Referring Practitioner MARÍA Iyer -*   Diagnosis Radiculopathy, lumbar region [M54.16]    Treatment Diagnosis Lumbar radiculopathy, back pain, hip pain, core weakness, B LE weakness, abnormal gait, lumbar stiffness, hip stiffness   Date of Evaluation 22    Additional Pertinent History HTN, kidney stone      Functional Outcome Measure Used Modified Oswestry   Functional Outcome Score 26/50 (22)       Insurance: Primary: Payor: [place] /  /  / ,   Secondary: PARAMOUNT ADVANTAGE   Authorization Information: No Pre-cert required   Visit # 1, 1/10 for progress note   Visits Allowed: ALLOWED 30 PT VISITS PER CALENDAR YEAR, NONE OF THESE VISITS HAVE BEEN USED   Recertification Date: 2022   Physician Follow-Up:    Physician Orders: eval and treat   History of Present Illness: Pt is a 37 y/o female presenting to skilled PT services with c/o lumbar radiculopathy. Pt reports having back pain for about 1 year of insidious onset.  MRI at that time revealed herniated disc into lumbar spine. Had flare up about a week ago of unknown cause where back \"locked out\". Went to ER and was given pain patch/pill and prednisone with little effect. Pain now radiated into L hip and thigh with numbness in thigh. Reports difficulty with standing activity, bending, lifting, sleeping and walking. SUBJECTIVE: Pt presents very antalgic. Reports significant soreness in L LB radiating into L hip and thigh, 8/10. Very uncomfortable seated in chair during subjective interview, changing position often for comfort. Social/Functional History and Current Status:  Medications and Allergies have been reviewed and are listed on Medical History Questionnaire. Deborah Lewis lives with family in a multiple floor home with stairs and no handrail to enter. Task Previous Current   ADLs  Independent Modified Independent   IADL's Independent Modified Independent, only tolerates about 5 min standing activity   Ambulation Independent Modified Independent   Transfers Independent Modified Independent + pain   Recreation Independent Dependent/Unable, enjoys dancing but unable at this time    Community Integration Independent Modified Independent   Driving Active  Active    Work [employer]. Occupation: phlebotomist  Full-Time.        Objective:    OBSERVATION   Pain 8/10 L LBP, hip, thigh   Palpation No TTP lumbar spine or gluteals   Sensation B LE light touch sensation intact, except L thigh diminished (L2-4 dermatome)   Bed Mobility Mod I, increased time to complete, guarded   Transfers Mod I, decreased wt shift L   Ambulation Mod I, no AD, antalgic, decreased stance time on L decreased TKE on L   Stairs    Balance SLS: R 20+ sec, L 15 sec       POSTURE    No Deficit Deficit Comments   Forward Head x     Rounded Shoulders x     Kyphosis      Lordosis x  Reduced lumbar   Leg Length Discrp      Slumped Sitting          TRUNK RANGE OF MOTION   Flexion: 50% limited   Extension:  To neutral   Lateral Flexion Left: [facility]   Lateral Flexion Right: 75% limited +pain   Rotation Left: WFL   Rotation Right: WFL   Trunk Range of Motion is [facility]  []     LOWER EXTREMITY RANGE OF MOTION    Left Right Comments   Hip Flexion knee to chest      Hip Extension      Hip ABDuction      Hip ADDuction      Hip Internal Rotation      Hip External Rotation   R hip limited   Hip Range of Motion is [facility]  [x]      Knee Flexion      Knee Extension      Knee Range of Motion is [facility]  [x]       LOWER EXTREMITY STRENGTH    Left Right Comments   Hip Flexion 4 4    Hip Abduction 4 4+    Hip Adduction      Hip Extension 4- 4    Hip External Rotation      Knee Flexion 4- 4+    Knee Extension 4 4+    Ankle DF 5 5    Ankle PF      LE strength is WFL []      CORE STRENGTH   B SLR TEST:    7      seconds       SPECIAL TESTS (+/-)    Left Right Comments   SLR   -    90/90 Hamstring length 55 55    SKTC  -    DKTC - -    Slump      SI Compression/Distraction      FRANK - +    FADIR + -          TREATMENT   Precautions:    Pain: 8/10 LBP, L hip/thigh    X in shaded column indicates activity completed today   Modalities Parameters/  Location  Notes   Cold pack-supine   Concurrent with supine therex               Manual Therapy Time/Technique  Notes                     Exercise/Intervention   Notes   NuStep              LTR- to R only for L stretch 5x10 sec  x    Piriformis stretch into ER and IR 2x20 sec ea  x Increased LBP when performed on R   HS stretch seated 2x30 sec  x    SKTC 2x20 sec  x                  TA activation 10x5 sec  x    TA + alt march 10x  x                                                       Specific Interventions Next Treatment: NuSTep warm up, core stabilization, B LE strengthening, balance training, B hip/LE stretching, modalities PRN, aquatics    Activity/Treatment Tolerance:  []  Patient tolerated treatment well  []  Patient limited by fatigue  [x]  Patient limited by pain   []  Patient of Care, Goals, HO provided for HEP: TA, TA with march, HS stretch, SKTC, Education provided on aquatic therapy purpose and benefits, HO provided and agreement signed. Tour of pool given  [employer] Code: [facility]  []  No new Education completed  []  Reviewed Prior HEP      [x]  Patient verbalized and/or demonstrated understanding of education provided. []  Patient unable to verbalize and/or demonstrate understanding of education provided. Will continue education. []  Barriers to learning:     PLAN:  Treatment Recommendations: Strengthening, Range of Motion, Balance Training, Functional Mobility Training, Endurance Training, Gait Training, Stair Training, Manual Therapy - Soft Tissue Mobilization, Manual Therapy - Joint Manipulation, Pain Management, Home Exercise Program, Patient Education, Safety Education and Training, Positioning, Aquatics and Modalities    [x]  Plan of care initiated. Plan to see patient 2 times per week for 8 weeks alternating land and aquatic therapy to address the treatment planned outlined above.   []  Continue with current plan of care  []  Modify plan of care as follows:    []  Hold pending physician visit  []  Discharge    Time In 1345   Time Out 1440   Timed Code Minutes: 25 min   Total Treatment Time: 55 min     Electronically Signed by: Virginia Wallace, [place] [id number]

## 2022-04-21 NOTE — CARE COORDINATION
Ambulatory Care Coordination Note  2022  CM Risk Score: 8  Charlson 10 Year Mortality Risk Score: 2%     ACC: Antonio Ashley RN       Care Transitions ED Follow Up    Care Transitions Interventions    Social Work: Completed    Do you have any ongoing symptoms?: Yes   Onset of Patient-reported symptoms: In the past 7 days   Patient-reported symptoms: Pain   Did you call your PCP prior to going to the ED?: No - Did not call PCP   Do you have a copy of your discharge instructions?: Yes   Do you understand what to report and when to return?: Yes   Are you following your discharge instructions?: Yes   Do you have all of your prescriptions and are they filled?: Yes   Have you scheduled your follow up appointment?: Yes   Were you discharged with any Home Care or Post Acute Services or do you currently have any active services?: Yes   Post Acute Services: Outpatient/Community Services (Comment: starting OP PT today)         Do you have any needs or concerns that I can assist you with?: Yes   Identified Barriers: Stress        Summary Note: Associate Care Manager (ACM) called patient for CC ED follow up. Verified patient's full name and  as identifiers. Introduction to self and explanation of ACM program provided to patient. Patient agreed to [employer] enrollment this date. Patient states still with constant pain to low back and upper left leg that she describes as \"sharp/cramping\". Rates current pain \"6\"/10 at present. States little to no relief with Norco PRN. States Prednisone and Tizanidine not helping. Not sleeping well. Patient states starting OP PT today. Patient states has history of arthritis and stenosis in spine. ACM discussed option of orthopedic referral. Patient states not interested in pursuing this yet- wants to see if PT helps first.     Patient states missed last week for back pain and had continuous FMLA to cover days missed.  States in the process of starting intermittent FMLA for missed days this week and in the future. Patient denies needs for assistance with getting intermittent FMLA initiated. Healthcare decision maker updated with patient this date. Patient states interested in getting domestic partner listed as primary decision maker. ACM offered MSW referral for initiating advance directives to get this implemented. Patient accepted offer. Ambulatory Care Coordination Assessment    Care Coordination Protocol  Week 1 - Initial Assessment     Do you have all of your prescriptions and are they filled?: Yes                          Suggested Interventions and Community Resources                  Prior to Admission medications    Medication Sig Start Date End Date Taking? Authorizing Provider   HYDROcodone-acetaminophen (NORCO) 5-325 MG per tablet Take 1 tablet by mouth every 6 hours as needed for Pain for up to 3 days. Intended supply: 3 days.  Take lowest dose possible to manage pain 4/19/22 4/22/22 Yes RUSSELL Jamison   predniSONE (DELTASONE) 20 MG tablet Take 60 mg (3 tabs) once daily for 2 days then 40 mg (2 tabs) once daily for 3 days then 20 mg (1 tab) once daily for 3 days 4/12/22 4/22/22 Yes Abebe Gerber APRN - MISSAEL   tiZANidine (ZANAFLEX) 4 MG tablet Take 1 tablet by mouth every 6 hours as needed (muscle spasm) 4/12/22  Yes Abebe Gerber APRN - MISSAEL   metoprolol succinate (TOPROL XL) 100 MG extended release tablet take 1 tablet by mouth once daily 4/12/22  Yes Aubrey Mcintyre APRN - CNP   amLODIPine (NORVASC) 10 MG tablet take 1 tablet by mouth once daily 3/15/22  Yes Aubrey Francisco Javier APRN - MISSAEL   hydroCHLOROthiazide (MICROZIDE) 12.5 MG capsule take 1 capsule by mouth once daily 3/7/22  Yes Aubrey Francisco Javier, APRN - CNP   omeprazole (PRILOSEC) 40 MG delayed release capsule Take 1 capsule by mouth nightly 11/23/21  Yes Aubrey Francisco Javier, APRN - CNP   sertraline (ZOLOFT) 100 MG tablet take 1 tablet by mouth once daily 8/10/21  Yes Aubrey Francisco Javier APRN - CNP   ibuprofen (ADVIL;MOTRIN) 600 MG tablet Take 1 tablet by mouth every 8 hours as needed for Pain (Headache) 7/21/21 4/14/22  Nicholas Bee MD       Future Appointments   Date Time Provider Ravi Siobhan   4/21/2022  1:45 PM Felisha Bentley PT [facility] HOD   4/28/2022  2:00 PM SCHEDULE, NURSE Shawn Brandon [facility] [place]   10/17/2022  2:00 PM MARÍA Ward:  -ACM to send referral to TEZ Walsh for initiating advanced directives (done- referral sent). -Patient to start OP PT this afternoon. -ACM to follow up with patient next week. Ngoc Clarke, MSN RN  Associate Care Manager  Phone: [phone]  Email: [email or web address]

## 2022-04-25 ENCOUNTER — CARE COORDINATION (OUTPATIENT)
Dept: OTHER | Facility: CLINIC | Age: 42
End: 2022-04-25

## 2022-04-25 NOTE — CARE COORDINATION
TC to pt for social work referral follow up. Left message, will attempt outreach next week. Will send MyChart message advising pt that I am sending HCPOA/Living Will paperwork in the mail and will touch base next week.      TEZ Dc, [facility]    Associate Care Management   [phone]

## 2022-04-26 ENCOUNTER — PATIENT MESSAGE (OUTPATIENT)
Dept: FAMILY MEDICINE CLINIC | Age: 42
End: 2022-04-26

## 2022-04-26 DIAGNOSIS — M54.16 LUMBAR RADICULOPATHY: ICD-10-CM

## 2022-04-26 RX ORDER — GABAPENTIN 300 MG/1
CAPSULE ORAL
Qty: 270 CAPSULE | Refills: 0 | Status: SHIPPED | OUTPATIENT
Start: 2022-04-26 | End: 2022-08-04

## 2022-04-26 NOTE — TELEPHONE ENCOUNTER
From: Amy Sands  To: Brenna Marilynsweetie  Sent: 4/26/2022 11:22 AM EDT  Subject: Pain    Good morning Yann Starr, I was wondering about a KWABENA. I went to work yesterday and left early bc it hurt so much, it made me sick. I tried to go today and couldnt make it. What do I need to do? Abdirahman Delia been doing exercises to help relieve the pain and nothing is working. Therapy is tomorrow, aquatic. I just dont know what else to do.  Thank you

## 2022-04-27 ENCOUNTER — HOSPITAL ENCOUNTER (OUTPATIENT)
Dept: PHYSICAL THERAPY | Age: 42
Setting detail: THERAPIES SERIES
Discharge: HOME | End: 2022-04-27
Payer: COMMERCIAL

## 2022-04-27 PROCEDURE — 97113 AQUATIC THERAPY/EXERCISES: CPT

## 2022-04-27 NOTE — PROGRESS NOTES
[address] [facility]  PHYSICAL THERAPY  [] EVALUATION  [x] DAILY NOTE (LAND) [] DAILY NOTE (AQUATIC ) [] PROGRESS NOTE [] DISCHARGE NOTE    [x] OUTPATIENT REHABILITATION CENTER [facility]   [] [place] [address]    [] [facility]   [] [facility]    Date: 2022  Patient Name:  Sultana Smith  : 1980  MRN: [id number]  CSN: [id number]    Referring Practitioner MARÍA Fowler -*   Diagnosis Radiculopathy, lumbar region [M54.16]    Treatment Diagnosis Lumbar radiculopathy, back pain, hip pain, core weakness, B LE weakness, abnormal gait, lumbar stiffness, hip stiffness   Date of Evaluation 22    Additional Pertinent History HTN, kidney stone      Functional Outcome Measure Used Modified Oswestry   Functional Outcome Score 26/50 (22)       Insurance: Primary: Payor: Stephanie [address] /  /  / ,   Secondary: PARAMOUNT ADVANTAGE   Authorization Information: No Pre-cert required   Visit # 2, 2/10 for progress note   Visits Allowed: ALLOWED 30 PT VISITS PER CALENDAR YEAR, NONE OF THESE VISITS HAVE BEEN USED   Recertification Date: 2022   Physician Follow-Up:    Physician Orders: eval and treat   History of Present Illness: Pt is a 39 y/o female presenting to skilled PT services with c/o lumbar radiculopathy. Pt reports having back pain for about 1 year of insidious onset. MRI at that time revealed herniated disc into lumbar spine. Had flare up about a week ago of unknown cause where back \"locked out\". Went to ER and was given pain patch/pill and prednisone with little effect. Pain now radiated into L hip and thigh with numbness in thigh. Reports difficulty with standing activity, bending, lifting, sleeping and walking. SUBJECTIVE:  Pt states 8/10 pain with radicular symptoms on LLE. States laying down and CP/Heat help alleviate the pain slightly.        AQUATICS TREATMENT   Precautions:    Pain: 8/10 LBP    X in shaded column indicates activity completed today   Exercise/Intervention Sets/Sec  Notes   Walk Forward 2 laps   x    Walk Backward 2 laps  x    Walk Sideways 2 laps  x           Lower Extremity Exercises:    4'10\"   Heel/Toe Raises x10  x    Marches x10  x    Squats x10  x    3 Way Hip x10  x    Hamstring Curls x10  x    Lunges       Step-Ups              Lower Extremity Stretches:              Seated Exercises:              Upper Extremity Exercises:       Shoulder Flexion       Shoulder ABD/ADD       Shoulder Horizontal ABD/ADD       Shoulder IR/ER       Shoulder Circles       Shoulder Shrugs       Rows       Bicep Curls              Upper Extremity Stretches:              Balance:              Dynamic Gait:              [place]:       Hang 5 min  x    Bicycle 2 min  x    Hip ABD/ADD 2 min  x    Hip Flex/Ext 2 min  x      Specific Interventions Next Treatment: NuSTep warm up, core stabilization, B LE strengthening, balance training, B hip/LE stretching, modalities PRN, aquatics    Activity/Treatment Tolerance:  [x]  Patient tolerated treatment well  []  Patient limited by fatigue  []  Patient limited by pain   []  Patient limited by medical complications  []  Other:     Assessment:  Initiated aquatic therapy as tolerated by pt. Pt c/o tightness/pain in LLE throughout treatment. Vcs for proper technique required to ensure maximum muscle activation. Pt stated a decrease in pain 6/10 and stiffness at conclusion of treatment session. Will continue to progress as tolerated by pt per POC. Body Structures/Functions/Activity Limitations: impaired activity tolerance, impaired balance, impaired ROM, impaired strength, pain and abnormal gait  Prognosis: good      GOALS:  Patient Goal: improve activity tolerance to perform work duties    Short Term Goals:  Time Frame: 4 weeks    1. Patient will report decrease in pain to 5/10 at most to promote ease of work duties. 2. Patient will improve trunk AROM to [facility] to improve ability to bend and lift.   3. Patient will improve B hip ER to [facility] [place] degrees to improve ability to sleep comfortably. 4. Patient will improve B LE strength to 4+/5 to improve ability to ambulate with a normalized gait pattern. 5. Patient will perform B SLR for 10 seconds to promote increased core strength needed for ease of standing tasks. 6. Patient will increase 90/90 hamstring length to 65 degrees B to promote reduced pain and ease of functional mobility tasks. 7. Patient will be indep with HEP in order to meet long term goals. Long Term Goals:  Time Frame: 8 weeks    1. Patient will improve Modified Oswestry score from 26/50 to 13/50 to allow decrease in disability and improved functional mobility for improved overall QOL. 2. Patient will be indep with HEP in order to prevent re-injury and improve ability to perform functional mobility tasks. Patient Education:   []  HEP/Education Completed: Plan of Care, Goals, HO provided for HEP: TA, TA with march, HS stretch, SKTC, Education provided on aquatic therapy purpose and benefits, HO provided and agreement signed. Tour of pool given  [employer] Code: [facility]  []  No new Education completed  [x]  Reviewed Prior HEP      [x]  Patient verbalized and/or demonstrated understanding of education provided. []  Patient unable to verbalize and/or demonstrate understanding of education provided. Will continue education. []  Barriers to learning:     PLAN:  Treatment Recommendations: Strengthening, Range of Motion, Balance Training, Functional Mobility Training, Endurance Training, Gait Training, Stair Training, Manual Therapy - Soft Tissue Mobilization, Manual Therapy - Joint Manipulation, Pain Management, Home Exercise Program, Patient Education, Safety Education and Training, Positioning, Aquatics and Modalities    []  Plan of care initiated.   Plan to see patient 2 times per week for 8 weeks alternating land and aquatic therapy to address the treatment planned outlined above.  [x]  Continue with current plan of care  []  Modify plan of care as follows:    []  Hold pending physician visit  []  Discharge    Time In 1435   Time Out 1515   Timed Code Minutes: 40   Total Treatment Time: 40     Electronically Signed by: Yo Victoria, PTA [id number]

## 2022-04-28 ENCOUNTER — TELEPHONE (OUTPATIENT)
Dept: FAMILY MEDICINE CLINIC | Age: 42
End: 2022-04-28

## 2022-04-28 ENCOUNTER — NURSE ONLY (OUTPATIENT)
Dept: FAMILY MEDICINE CLINIC | Age: 42
End: 2022-04-28

## 2022-04-28 VITALS — DIASTOLIC BLOOD PRESSURE: 88 MMHG | SYSTOLIC BLOOD PRESSURE: 142 MMHG

## 2022-04-28 DIAGNOSIS — I10 ESSENTIAL HYPERTENSION: Primary | ICD-10-CM

## 2022-04-28 NOTE — TELEPHONE ENCOUNTER
BP Readings from Last 3 Encounters:   04/28/22 (!) 142/88   04/19/22 (!) 164/93   04/14/22 (!) 152/92        Patient here today for BP check   1st  102  I let patient sit quietly in room for a it and retook BP   2nd  88    Patient states that she was in a great deal of back pain today

## 2022-04-28 NOTE — TELEPHONE ENCOUNTER
I'm ok to continue her current meds without change. Her BP has been pretty well controlled until she injured her back. Can she follow up with me in a couple weeks for her Back?

## 2022-04-28 NOTE — TELEPHONE ENCOUNTER
Pt informed and verbalized understanding.   Future Appointments   Date Time Provider Ravi Siobhan   5/3/2022  1:45 PM Alex Shearing, PTA [facility] [place] [facility]   5/5/2022  1:45 PM [place], PT [facility] [place] [facility]   5/10/2022  1:45 PM [place], PT [facility] [place] [facility]   5/12/2022  1:20 PM Moreno Levy APRN - CNP [facility] [place]   5/12/2022  1:45 PM Chestine Espino, PTA [facility] [place]   5/17/2022  1:45 PM [place], PT [facility] [place] [facility]   5/19/2022  1:45 PM [place], PT [facility] [place] [facility]   5/24/2022  1:45 PM Alex Shearing, PTA [facility] [place] [facility]   5/26/2022  1:45 PM [place], PT [facility] [place] [facility]   5/31/2022  1:45 PM Alex Shearing, PTA [facility] [place] HOD   6/2/2022  1:45 PM Chestine Espino, PTA [facility] [place]   10/17/2022  2:00 PM Moreno Levy APRN - CNP [facility] [place]

## 2022-05-01 ENCOUNTER — HOSPITAL ENCOUNTER (EMERGENCY)
Age: 42
Discharge: HOME | End: 2022-05-01
Payer: COMMERCIAL

## 2022-05-01 VITALS
WEIGHT: 225 LBS | DIASTOLIC BLOOD PRESSURE: 92 MMHG | OXYGEN SATURATION: 95 % | HEIGHT: 64 IN | BODY MASS INDEX: 38.41 KG/M2 | HEART RATE: 74 BPM | SYSTOLIC BLOOD PRESSURE: 173 MMHG | TEMPERATURE: 98.6 F | RESPIRATION RATE: 18 BRPM

## 2022-05-01 DIAGNOSIS — M79.605 LEFT LEG PAIN: Primary | ICD-10-CM

## 2022-05-01 DIAGNOSIS — E83.42 HYPOMAGNESEMIA: ICD-10-CM

## 2022-05-01 LAB
ANION GAP SERPL CALCULATED.3IONS-SCNC: 11 MEQ/L (ref 8–16)
BASOPHILS # BLD: 0.2 %
BASOPHILS ABSOLUTE: 0 THOU/MM3 (ref 0–0.1)
BUN BLDV-MCNC: 17 MG/DL (ref 7–22)
CALCIUM SERPL-MCNC: 9.4 MG/DL (ref 8.5–10.5)
CHLORIDE BLD-SCNC: 103 MEQ/L (ref 98–111)
CO2: 24 MEQ/L (ref 23–33)
CREAT SERPL-MCNC: 0.7 MG/DL (ref 0.4–1.2)
EOSINOPHIL # BLD: 0.6 %
EOSINOPHILS ABSOLUTE: 0.1 THOU/MM3 (ref 0–0.4)
ERYTHROCYTE [DISTWIDTH] IN BLOOD BY AUTOMATED COUNT: 14 % (ref 11.5–14.5)
ERYTHROCYTE [DISTWIDTH] IN BLOOD BY AUTOMATED COUNT: 45.1 FL (ref 35–45)
GFR SERPL CREATININE-BSD FRML MDRD: > 90 ML/MIN/1.73M2
GLUCOSE BLD-MCNC: 133 MG/DL (ref 70–108)
HCT VFR BLD CALC: 36.3 % (ref 37–47)
HEMOGLOBIN: 11.9 GM/DL (ref 12–16)
IMMATURE GRANS (ABS): 0.05 THOU/MM3 (ref 0–0.07)
IMMATURE GRANULOCYTES: 0.4 %
LYMPHOCYTES # BLD: 25.3 %
LYMPHOCYTES ABSOLUTE: 3.6 THOU/MM3 (ref 1–4.8)
MAGNESIUM: 1.5 MG/DL (ref 1.6–2.4)
MCH RBC QN AUTO: 28.7 PG (ref 26–33)
MCHC RBC AUTO-ENTMCNC: 32.8 GM/DL (ref 32.2–35.5)
MCV RBC AUTO: 87.7 FL (ref 81–99)
MONOCYTES # BLD: 7.9 %
MONOCYTES ABSOLUTE: 1.1 THOU/MM3 (ref 0.4–1.3)
NUCLEATED RED BLOOD CELLS: 0 /100 WBC
OSMOLALITY CALCULATION: 279.1 MOSMOL/KG (ref 275–300)
PLATELET # BLD: 336 THOU/MM3 (ref 130–400)
PMV BLD AUTO: 9.4 FL (ref 9.4–12.4)
POTASSIUM SERPL-SCNC: 3.6 MEQ/L (ref 3.5–5.2)
RBC # BLD: 4.14 MILL/MM3 (ref 4.2–5.4)
SEG NEUTROPHILS: 65.6 %
SEGMENTED NEUTROPHILS ABSOLUTE COUNT: 9.2 THOU/MM3 (ref 1.8–7.7)
SODIUM BLD-SCNC: 138 MEQ/L (ref 135–145)
WBC # BLD: 14.1 THOU/MM3 (ref 4.8–10.8)

## 2022-05-01 PROCEDURE — 99284 EMERGENCY DEPT VISIT MOD MDM: CPT

## 2022-05-01 PROCEDURE — 85025 COMPLETE CBC W/AUTO DIFF WBC: CPT

## 2022-05-01 PROCEDURE — 6370000000 HC RX 637 (ALT 250 FOR IP): Performed by: NURSE PRACTITIONER

## 2022-05-01 PROCEDURE — 83735 ASSAY OF MAGNESIUM: CPT

## 2022-05-01 PROCEDURE — 6360000002 HC RX W HCPCS: Performed by: NURSE PRACTITIONER

## 2022-05-01 PROCEDURE — 96366 THER/PROPH/DIAG IV INF ADDON: CPT

## 2022-05-01 PROCEDURE — 96375 TX/PRO/DX INJ NEW DRUG ADDON: CPT

## 2022-05-01 PROCEDURE — 80048 BASIC METABOLIC PNL TOTAL CA: CPT

## 2022-05-01 PROCEDURE — 96372 THER/PROPH/DIAG INJ SC/IM: CPT

## 2022-05-01 PROCEDURE — 96365 THER/PROPH/DIAG IV INF INIT: CPT

## 2022-05-01 RX ORDER — MAGNESIUM SULFATE IN WATER 40 MG/ML
2000 INJECTION, SOLUTION INTRAVENOUS ONCE
Status: COMPLETED | OUTPATIENT
Start: 2022-05-01 | End: 2022-05-01

## 2022-05-01 RX ORDER — KETOROLAC TROMETHAMINE 30 MG/ML
15 INJECTION, SOLUTION INTRAMUSCULAR; INTRAVENOUS ONCE
Status: COMPLETED | OUTPATIENT
Start: 2022-05-01 | End: 2022-05-01

## 2022-05-01 RX ORDER — KETOROLAC TROMETHAMINE 30 MG/ML
30 INJECTION, SOLUTION INTRAMUSCULAR; INTRAVENOUS ONCE
Status: DISCONTINUED | OUTPATIENT
Start: 2022-05-01 | End: 2022-05-01

## 2022-05-01 RX ORDER — MORPHINE SULFATE 10 MG/ML
4 INJECTION INTRAVENOUS ONCE
Status: COMPLETED | OUTPATIENT
Start: 2022-05-01 | End: 2022-05-01

## 2022-05-01 RX ORDER — DIAZEPAM 2 MG/1
2 TABLET ORAL ONCE
Status: COMPLETED | OUTPATIENT
Start: 2022-05-01 | End: 2022-05-01

## 2022-05-01 RX ORDER — LIDOCAINE 4 G/G
1 PATCH TOPICAL ONCE
Status: DISCONTINUED | OUTPATIENT
Start: 2022-05-01 | End: 2022-05-01 | Stop reason: HOSPADM

## 2022-05-01 RX ADMIN — KETOROLAC TROMETHAMINE 15 MG: 30 INJECTION, SOLUTION INTRAMUSCULAR; INTRAVENOUS at 01:53

## 2022-05-01 RX ADMIN — MAGNESIUM SULFATE HEPTAHYDRATE 2000 MG: 40 INJECTION, SOLUTION INTRAVENOUS at 02:35

## 2022-05-01 RX ADMIN — MORPHINE SULFATE 4 MG: 10 INJECTION INTRAVENOUS at 03:22

## 2022-05-01 RX ADMIN — DIAZEPAM 2 MG: 2 TABLET ORAL at 01:53

## 2022-05-01 ASSESSMENT — PAIN DESCRIPTION - LOCATION
LOCATION: LEG
LOCATION: LEG

## 2022-05-01 ASSESSMENT — PAIN SCALES - GENERAL
PAINLEVEL_OUTOF10: 8
PAINLEVEL_OUTOF10: 4
PAINLEVEL_OUTOF10: 8

## 2022-05-01 ASSESSMENT — PAIN DESCRIPTION - ORIENTATION
ORIENTATION: LEFT
ORIENTATION: LEFT

## 2022-05-01 ASSESSMENT — PAIN DESCRIPTION - DESCRIPTORS
DESCRIPTORS: SQUEEZING
DESCRIPTORS: SQUEEZING

## 2022-05-01 ASSESSMENT — PAIN - FUNCTIONAL ASSESSMENT
PAIN_FUNCTIONAL_ASSESSMENT: 0-10
PAIN_FUNCTIONAL_ASSESSMENT: 0-10

## 2022-05-01 NOTE — ED NOTES
Pt states pain remains 8/10. RN medicated per MAR. RN dimmed lights for patient comfort.      Shannon Dyson RN  05/01/22 3815

## 2022-05-01 NOTE — ED TRIAGE NOTES
Pt presents to the ED with complaints of 8/10 left leg pain. Pt states her L thigh and states she feels squeezing. Pt states she took sister Katerina Villela PTA. Pt was able to stand pivot from wheelchair. Pt denies any injury.

## 2022-05-01 NOTE — ED NOTES
RN medicated per MAR. Prior to RN assessment patient resting on cot respirations even and unlabored.      Trent Fitzpatrick RN  05/01/22 8225

## 2022-05-02 ENCOUNTER — CARE COORDINATION (OUTPATIENT)
Dept: OTHER | Facility: CLINIC | Age: 42
End: 2022-05-02

## 2022-05-02 PROBLEM — M43.10 ACQUIRED SPONDYLOLISTHESIS: Status: ACTIVE | Noted: 2022-05-02

## 2022-05-02 PROBLEM — R42 HEAD REVOLVING AROUND: Status: ACTIVE | Noted: 2022-05-02

## 2022-05-02 PROBLEM — M54.16 LUMBAR RADICULOPATHY: Status: ACTIVE | Noted: 2022-05-02

## 2022-05-02 NOTE — CARE COORDINATION
[address] [facility] ED Follow Up Call    2022    Patient: Mamie Mcguire Patient : 1980   MRN: [id number]  Reason for Admission: Left leg pain, Hypomagnesemia  Discharge Date: 2022        Care Transitions ED Follow Up    Care Transitions Interventions    Social Work: Completed    Do you have any ongoing symptoms?: Yes   Onset of Patient-reported symptoms: Other   Patient-reported symptoms: Pain   Do you have a copy of your discharge instructions?: Yes   Do you understand what to report and when to return?: Yes   Are you following your discharge instructions?: Yes   Do you have all of your prescriptions and are they filled?: Yes   Have you scheduled your follow up appointment?: Yes   How are you going to get to your appointment?: Car - drive self   Were you discharged with any Home Care or Post Acute Services or do you currently have any active services?: Yes   Post Acute Services: Outpatient/Community Services (Comment: OP PT )         Do you have any needs or concerns that I can assist you with?: No   Identified Barriers: Stress        Needs to be reviewed by the provider   Additional needs identified to be addressed with provider No  none           Ambulatory Care Manager (ACM) contacted the patient by telephone to perform post ED visit assessment. Call within 2 business days of discharge: Yes. Verified name and  with patient as identifiers. Patient reports went to ED yesterday due to severe left leg pain. States took Percocet from a friend and got \"a little relief\" prior to pain becoming more severe. ACM discussed importance of not taking any medication that is not prescribed. Patient states active with PT for water and regular therapy. Reports therapy is helping. States compliant with  HEP. ACM discussed importance of PCP follow up and offered to reschedule upcoming PCP office visit to this week. Patient declined offer.  ACM discussed option of discussing possible MRI or pain management referral with PCP at office visit. Patient verbalized understanding. ACM to follow up with patient next week. ACM reminded patient of using NAL prior to going to ED for non-emergent needs. Interventions:    Counseling and education provided at today's visit on:   Red Flag symptoms to report  Self monitoring compliance  Symptom management  Medication compliance  Provider follow up appointment compliance  Utilization of appropriate level of care: Right Care, Right Place, Right Time  Reinforced Discharge instructions    Medication reconciliation was performed with patient, who verbalizes understanding of administration of home medications. Advised obtaining a 90-day supply of all daily and as-needed medications. Reinforced resources available to patient including: PCP  Benefits related nurse triage line  [employer] Messaging. ACM encouraged outreach to Nurse Access Line and/or ACM for assessment and intervention guidance as needed. ACM encouraged patient to contact 911 for life threatening emergencies and PCP office 24/7 for non life-threatening symptoms. Reminded patient of alternatives to ED such as urgent care, walk in clinics and e-visits as available. Reviewed proper ED utilization using Right Care, Right Place, Right Time Flyer. Discussed follow-up appointments. If no appointment was previously scheduled, appointment scheduling offered: No, already scheduled  Is follow up appointment scheduled within 7 days of discharge?  No  [facility] follow up appointment(s):   Future Appointments   Date Time Provider Ravi Mccann   5/3/2022  1:45 PM María Berrios PTA [facility] [place] [facility]   5/5/2022  1:45 PM Maralee Klinefelter, PT [facility] [place] [facility]   5/10/2022  1:45 PM Maralee Klinefelter, PT [facility] [place] [facility]   5/12/2022  1:20 PM MARÍA Gastelum - [facility] [place]   5/12/2022  1:45 PM KATHI Conklin PT [place]   5/17/2022  1:45 PM Maralee Klinefelter, PT [facility] [place] [facility]   5/19/2022 1:45 PM Barby Victoria, PT [facility] PT [place] [facility]   5/24/2022  1:45 PM Teto Bahena, PTA [facility] PT [place] [facility]   5/26/2022  1:45 PM Barby Schwartzon, PT [facility] PT [place] [facility]   5/31/2022  1:45 PM Teto Bahena, PTA [facility] [place] [facility]   6/2/2022  1:45 PM eNvaeh Calero, PTA [facility] [place] [facility]   10/17/2022  2:00 PM Elfredia Smoker, APRN - [address]     Non-BS follow up appointment(s): None    Plan:  Continue weekly outreaches to provide telephonic support, education and resources as needed. Discuss / follow up on:   Red Flag symptoms to report  Symptom management  Medication compliance  Provider follow up appointment compliance    Patient verbalized understanding and is agreeable. Saima Banuelos MSN RN  Associate Care Manager  Phone: [phone]  Email: [email or web address]

## 2022-05-03 ENCOUNTER — HOSPITAL ENCOUNTER (OUTPATIENT)
Dept: PHYSICAL THERAPY | Age: 42
Setting detail: THERAPIES SERIES
Discharge: HOME | End: 2022-05-03
Payer: COMMERCIAL

## 2022-05-03 PROCEDURE — 97113 AQUATIC THERAPY/EXERCISES: CPT

## 2022-05-03 ASSESSMENT — ENCOUNTER SYMPTOMS
EYE REDNESS: 0
NAUSEA: 0
ABDOMINAL PAIN: 0
VOMITING: 0
COUGH: 0
RHINORRHEA: 0
CHEST TIGHTNESS: 0
BACK PAIN: 0

## 2022-05-03 NOTE — ED PROVIDER NOTES
[facility] Emergency Department    CHIEF COMPLAINT       Chief Complaint   Patient presents with    Leg Pain       Nurses Notes reviewed and I agree except as noted in the HPI. HISTORY OF PRESENT ILLNESS    Deloris Sands skylar 38 y.o. female who presents to the ED for evaluation of left thigh pain. She has been seen multiple times for the pain. She states it started as sciatica and she got the pain under control everywhere except in her left lateral thigh. FROm. Had a venous doppler done the other day and was negative. HPI was provided by the patient    REVIEW OF SYSTEMS     Review of Systems   Constitutional: Negative for chills, fatigue and fever. HENT: Negative for congestion, ear discharge, ear pain, postnasal drip and rhinorrhea. Eyes: Negative for redness. Respiratory: Negative for cough and chest tightness. Cardiovascular: Negative for chest pain and leg swelling. Gastrointestinal: Negative for abdominal pain, nausea and vomiting. Genitourinary: Negative for difficulty urinating, dysuria, enuresis, flank pain and hematuria. Musculoskeletal: Positive for arthralgias and myalgias. Negative for back pain and joint swelling. Skin: Negative for rash. Neurological: Negative for dizziness, light-headedness, numbness and headaches. Psychiatric/Behavioral: Negative for agitation, behavioral problems and confusion. All other systems negative except as noted. PAST MEDICAL HISTORY     Past Medical History:   Diagnosis Date    Bell palsy     right side droop    Essential hypertension 2016    GERD (gastroesophageal reflux disease)     Hashimoto's thyroiditis 2016    HIGH CHOLESTEROL     Hyperlipidemia     Hypokalemia     Prediabetes 2020    Subclinical hyperthyroidism 2016       SURGICALHISTORY      has a past surgical history that includes  section (, );  Endoscopy, colon, diagnostic; Dilation and curettage of uterus (); laparoscopy (N/A, 6/15/2020); Colonoscopy (2021); and Hysterectomy, total abdominal (N/A, 9/10/2020). CURRENT MEDICATIONS       Discharge Medication List as of 2022  3:35 AM      CONTINUE these medications which have NOT CHANGED    Details   gabapentin (NEURONTIN) 300 MG capsule Take 1 capsule PO daily x 1 day, then take 1 capsule PO BID x 1 day, then take 1 capsule PO TID. Intended supply: 90 days, Disp-270 capsule, R-0Normal      tiZANidine (ZANAFLEX) 4 MG tablet Take 1 tablet by mouth every 6 hours as needed (muscle spasm), Disp-20 tablet, R-0Normal      metoprolol succinate (TOPROL XL) 100 MG extended release tablet take 1 tablet by mouth once daily, Disp-90 tablet, R-3Normal      amLODIPine (NORVASC) 10 MG tablet take 1 tablet by mouth once daily, Disp-90 tablet, R-3Normal      hydroCHLOROthiazide (MICROZIDE) 12.5 MG capsule take 1 capsule by mouth once daily, Disp-90 capsule, R-1Normal      omeprazole (PRILOSEC) 40 MG delayed release capsule Take 1 capsule by mouth nightly, Disp-90 capsule, R-1Normal      sertraline (ZOLOFT) 100 MG tablet take 1 tablet by mouth once daily, Disp-90 tablet, R-3Normal      ibuprofen (ADVIL;MOTRIN) 600 MG tablet Take 1 tablet by mouth every 8 hours as needed for Pain (Headache), Disp-20 tablet, R-0Normal             ALLERGIES     is allergic to bactrim and sulfamethoxazole-trimethoprim. FAMILY HISTORY     She indicated that her mother is . She indicated that her father is . She indicated that the status of her maternal aunt is unknown.   family history includes Breast Cancer (age of onset: 48) in her maternal aunt; Diabetes in her father and mother; Heart Disease in her mother; High Blood Pressure in her father and mother.     SOCIAL HISTORY       Social History     Socioeconomic History    Marital status: Single     Spouse name: Not on file    Number of children: 2    Years of education: Not on file    Highest education level: Not on file Occupational History    Not on file   Tobacco Use    Smoking status: Never Smoker    Smokeless tobacco: Never Used   Vaping Use    Vaping Use: Never used   Substance and Sexual Activity    Alcohol use: Yes     Comment: social    Drug use: No    Sexual activity: Not on file   Other Topics Concern    Not on file   Social History Narrative    Not on file     Social Determinants of Health     Financial Resource Strain:     Difficulty of Paying Living Expenses: Not on file   Food Insecurity:     Worried About Running Out of Food in the Last Year: Not on file    Alfa of Food in the Last Year: Not on file   Transportation Needs:     Lack of Transportation (Medical): Not on file    Lack of Transportation (Non-Medical): Not on file   Physical Activity:     Days of Exercise per Week: Not on file    Minutes of Exercise per Session: Not on file   Stress:     Feeling of Stress : Not on file   Social Connections:     Frequency of Communication with Friends and Family: Not on file    Frequency of Social Gatherings with Friends and Family: Not on file    Attends Christianity Services: Not on file    Active Member of [address] [place] [address] or Organizations: Not on file    Attends Club or Organization Meetings: Not on file    Marital Status: Not on file   Intimate Partner Violence:     Fear of Current or Ex-Partner: Not on file    Emotionally Abused: Not on file    Physically Abused: Not on file    Sexually Abused: Not on file   Housing Stability:     Unable to Pay for Housing in the Last Year: Not on file    Number of [place] in the Last Year: Not on file    Unstable Housing in the Last Year: Not on file       PHYSICAL EXAM     INITIAL VITALS:  height is 5' 4\" (1.626 m) and weight is 225 lb (102.1 kg). Her oral temperature is 98.6 °F (37 °C). Her blood pressure is 173/92 (abnormal) and her pulse is 74. Her respiration is 18 and oxygen saturation is 95%.     Physical Exam  Constitutional:       General: She is not in acute distress. Appearance: She is well-developed. She is not diaphoretic. HENT:      Head: Normocephalic and atraumatic. Nose: Nose normal.      Mouth/Throat:      Mouth: Mucous membranes are moist.      Pharynx: Oropharynx is clear. Eyes:      Conjunctiva/sclera: Conjunctivae normal.   Cardiovascular:      Pulses: Normal pulses. Pulmonary:      Effort: Pulmonary effort is normal.   Musculoskeletal:         General: Tenderness present. No deformity. Normal range of motion. Cervical back: Normal range of motion. Legs:    Skin:     General: Skin is warm and dry. Capillary Refill: Capillary refill takes less than 2 seconds. Neurological:      General: No focal deficit present. Mental Status: She is alert and oriented to person, place, and time. Psychiatric:         Mood and Affect: Mood normal.         Behavior: Behavior normal.         DIFFERENTIAL DIAGNOSIS:   Strain, muscle spasm, electrolyte imbalance    DIAGNOSTIC RESULTS     EKG: All EKG's are interpreted by the Emergency Department Physician who eithersigns or Co-signs this chart in the absence of a cardiologist.        RADIOLOGY: non-plainfilm images(s) such as CT, Ultrasound and MRI are read by the radiologist.  Plain radiographic images are visualized and preliminarily interpreted by the emergency physician unless otherwise stated below.   No orders to display         LABS:   Labs Reviewed   CBC WITH AUTO DIFFERENTIAL - Abnormal; Notable for the following components:       Result Value    WBC 14.1 (*)     RBC 4.14 (*)     Hemoglobin 11.9 (*)     Hematocrit 36.3 (*)     RDW-SD 45.1 (*)     Segs Absolute 9.2 (*)     All other components within normal limits   BASIC METABOLIC PANEL - Abnormal; Notable for the following components:    Glucose 133 (*)     All other components within normal limits   MAGNESIUM - Abnormal; Notable for the following components:    Magnesium 1.5 (*)     All other components within normal limits   ANION GAP   OSMOLALITY   GLOMERULAR FILTRATION RATE, ESTIMATED       EMERGENCY DEPARTMENT COURSE:   Vitals:    Vitals:    05/01/22 0118 05/01/22 0152 05/01/22 0235 05/01/22 0334   BP: (!) 164/91 (!) 180/99 (!) 170/94 (!) 173/92   Pulse: 90 88 80 74   Resp: 18 20 20 18   Temp: 98.6 °F (37 °C)      TempSrc: Oral      SpO2: 100% 100% 100% 95%   Weight: 225 lb (102.1 kg)      Height: 5' 4\" (1.626 m)                                    Internal Administration   First Dose      Second Dose           Last COVID Lab SARS-CoV-2 (no units)   Date Value   09/05/2020 Not Detected     SARS-CoV-2 RNA, RT PCR (no units)   Date Value   08/11/2021 DETECTED (AA)            MDM    Patient was seen in the ER for left thigh pain. Appropriate labs are ordered and reviewed. Patient is treated with valium, toradol, morphine, and magnesium. She experienced some improvement. I advised her she should follow up with pcp and with ortho. She is agreeable. Medications   diazePAM (VALIUM) tablet 2 mg (2 mg Oral Given 5/1/22 0153)   ketorolac (TORADOL) injection 15 mg (15 mg IntraVENous Given 5/1/22 0153)   magnesium sulfate 2000 mg in 50 mL IVPB premix (0 mg IntraVENous Stopped 5/1/22 0442)   morphine injection 4 mg (4 mg IntraMUSCular Given 5/1/22 0322)       Please note that the patient was evaluated during a pandemic. All efforts were made for HIPPA compliance as well as provision of appropriate care. Patient was seen independently by myself. The patient's final impression and disposition and plan was determined by myself. Strict return precautions and follow up instructions were discussed with the patient prior to discharge, with which the patient agrees. Physical assessment findings, diagnostic testing(s) if applicable, and vital signs reviewed with patient/patient representative. Questions answered. Medications asdirected, including OTC medications for supportive care.    Education provided on medications. Differential diagnosis(s) discussed with patient/patient representative. Home care/self care instructions reviewed withpatient/patient representative. Patient is to follow-up with family care provider in 2-3 days if no improvement. Patient is to go to the emergency department if symptoms worsen. Patient/patient representative isaware of care plan, questions answered, verbalizes understanding and is in agreement. CRITICAL CARE:   None    CONSULTS:  None    PROCEDURES:  None    FINAL IMPRESSION     1. Left leg pain    2.  Hypomagnesemia          DISPOSITION/PLAN   DISPOSITION Decision To Discharge 05/01/2022 04:43:23 AM      PATIENT REFERREDTO:  MARÍA Zamarripa CNP  [address]  [phone]    Schedule an appointment as soon as possible for a visit in 3 days  For follow up      Lacey Rosales:  Discharge Medication List as of 5/1/2022  3:35 AM          (Please note that portions of this note were completed with a voice recognition program.  Efforts were made to edit the dictations but occasionally words are mis-transcribed.)         MARÍA Smith CNP, APRN - CNP  05/03/22 0141

## 2022-05-03 NOTE — PROGRESS NOTES
[address] [facility]  PHYSICAL THERAPY  [] EVALUATION  [] DAILY NOTE (LAND) [x] DAILY NOTE (AQUATIC ) [] PROGRESS NOTE [] DISCHARGE NOTE    [x] [facility] [place]   [] [place] [address]    [] [facility]   [] Janay Hull [facility]    Date: 5/3/2022  Patient Name:  Rory Monreal  : 1980  MRN: [id number]  CSN: [id number]    Referring Practitioner MARÍA Bauer -*   Diagnosis Radiculopathy, lumbar region [M54.16]    Treatment Diagnosis Lumbar radiculopathy, back pain, hip pain, core weakness, B LE weakness, abnormal gait, lumbar stiffness, hip stiffness   Date of Evaluation 22    Additional Pertinent History HTN, kidney stone      Functional Outcome Measure Used Modified Oswestry   Functional Outcome Score 26/50 (22)       Insurance: Primary: Payor: Xavier Melendez /  /  / ,   Secondary: PARAMOUNT ADVANTAGE   Authorization Information: No Pre-cert required   Visit # 3, 3/10 for progress note   Visits Allowed: ALLOWED 30 PT VISITS  [employer] [address] USED   Recertification Date: 2022   Physician Follow-Up:    Physician Orders: eval and treat   History of Present Illness: Pt is a 37 y/o female presenting to skilled PT services with c/o lumbar radiculopathy. Pt reports having back pain for about 1 year of insidious onset. MRI at that time revealed herniated disc into lumbar spine. Had flare up about a week ago of unknown cause where back \"locked out\". Went to ER and was given pain patch/pill and prednisone with little effect. Pain now radiated into L hip and thigh with numbness in thigh. Reports difficulty with standing activity, bending, lifting, sleeping and walking. SUBJECTIVE:  Pt states 8/10 pain with radicular symptoms to just knee area whereas before it was down into toes. Pt noted relief for a few hours after previous treatment session.         AQUATICS TREATMENT   Precautions:    Pain: 8/10 LBP X in shaded column indicates activity completed today   Exercise/Intervention Sets/Sec  Notes   Walk Forward 2 laps   x    Walk Backward 2 laps  x    Walk Sideways 2 laps  x           Lower Extremity Exercises:    4'10\"   Heel/Toe Raises x12  x    Marches x12  x    Squats x12  x    3 Way Hip x12  x    Hamstring Curls x12  x    Lunges       Step-Ups              Lower Extremity Stretches:       HS stretch  x3 15 sec x           Seated Exercises:              Upper Extremity Exercises:       Shoulder Flexion       Shoulder ABD/ADD       Shoulder Horizontal ABD/ADD       Shoulder IR/ER       Shoulder Circles       Shoulder Shrugs       Rows       Bicep Curls              Upper Extremity Stretches:              Balance:              Dynamic Gait:              [place]:       Hang 5 min  x    Bicycle 3 min  x    Hip ABD/ADD 3 min  x    Hip Flex/Ext 3 min  x      Specific Interventions Next Treatment: NuSTep warm up, core stabilization, B LE strengthening, balance training, B hip/LE stretching, modalities PRN, aquatics    Activity/Treatment Tolerance:  [x]  Patient tolerated treatment well  []  Patient limited by fatigue  []  Patient limited by pain   []  Patient limited by medical complications  []  Other:     Assessment:  Continued with therex as stated above with increased reps as tolerated. Vcs for proper technique required. Pt noted slight decrease in pain 6/10 at conclusion of treatment session. Will continue to progress as tolerated by pt per POC. Body Structures/Functions/Activity Limitations: impaired activity tolerance, impaired balance, impaired ROM, impaired strength, pain and abnormal gait  Prognosis: good      GOALS:  Patient Goal: improve activity tolerance to perform work duties    Short Term Goals:  Time Frame: 4 weeks    1. Patient will report decrease in pain to 5/10 at most to promote ease of work duties. 2. Patient will improve trunk AROM to [facility] to improve ability to bend and lift.   3. Patient will improve B hip ER to [facility] degrees to improve ability to sleep comfortably. 4. Patient will improve B LE strength to 4+/5 to improve ability to ambulate with a normalized gait pattern. 5. Patient will perform B SLR for 10 seconds to promote increased core strength needed for ease of standing tasks. 6. Patient will increase 90/90 hamstring length to 65 degrees B to promote reduced pain and ease of functional mobility tasks. 7. Patient will be indep with HEP in order to meet long term goals. Long Term Goals:  Time Frame: 8 weeks    1. Patient will improve Modified Oswestry score from 26/50 to 13/50 to allow decrease in disability and improved functional mobility for improved overall QOL. 2. Patient will be indep with HEP in order to prevent re-injury and improve ability to perform functional mobility tasks. Patient Education:   []  HEP/Education Completed: Plan of Care, Goals, HO provided for HEP: TA, TA with march, HS stretch, SKTC, Education provided on aquatic therapy purpose and benefits, HO provided and agreement signed. Tour of pool given  [employer] Code: [facility]  []  No new Education completed  [x]  Reviewed Prior HEP      [x]  Patient verbalized and/or demonstrated understanding of education provided. []  Patient unable to verbalize and/or demonstrate understanding of education provided. Will continue education. []  Barriers to learning:     PLAN:  Treatment Recommendations: Strengthening, Range of Motion, Balance Training, Functional Mobility Training, Endurance Training, Gait Training, Stair Training, Manual Therapy - Soft Tissue Mobilization, Manual Therapy - Joint Manipulation, Pain Management, Home Exercise Program, Patient Education, Safety Education and Training, Positioning, Aquatics and Modalities    []  Plan of care initiated.   Plan to see patient 2 times per week for 8 weeks alternating land and aquatic therapy to address the treatment planned outlined above.  [x]  Continue with current plan of care  []  Modify plan of care as follows:    []  Hold pending physician visit  []  Discharge    Time In [id number]   Time Out 1428   Timed Code Minutes: 39   Total Treatment Time: 39     Electronically Signed by: Betsy Mcfadden, PTA [id number]

## 2022-05-04 ENCOUNTER — NURSE ONLY (OUTPATIENT)
Dept: LAB | Age: 42
End: 2022-05-04

## 2022-05-05 ENCOUNTER — CARE COORDINATION (OUTPATIENT)
Dept: OTHER | Facility: CLINIC | Age: 42
End: 2022-05-05

## 2022-05-05 ENCOUNTER — HOSPITAL ENCOUNTER (OUTPATIENT)
Dept: PHYSICAL THERAPY | Age: 42
Setting detail: THERAPIES SERIES
Discharge: HOME | End: 2022-05-05
Payer: COMMERCIAL

## 2022-05-05 PROCEDURE — 97110 THERAPEUTIC EXERCISES: CPT

## 2022-05-05 NOTE — PROGRESS NOTES
[address] [facility]  PHYSICAL THERAPY  [] EVALUATION  [x] DAILY NOTE (LAND) [] DAILY NOTE (AQUATIC ) [] PROGRESS NOTE [] DISCHARGE NOTE    [x] OUTPATIENT REHABILITATION CENTER [facility]   [] [place] [address]    [] [facility]   [] John Garcia    Date: 2022  Patient Name:  Jules Daniels  : 1980  MRN: [id number]  CSN: [id number]    Referring Practitioner MARÍA Bird -*   Diagnosis Radiculopathy, lumbar region [M54.16]    Treatment Diagnosis Lumbar radiculopathy, back pain, hip pain, core weakness, B LE weakness, abnormal gait, lumbar stiffness, hip stiffness   Date of Evaluation 22    Additional Pertinent History HTN, kidney stone      Functional Outcome Measure Used Modified Oswestry   Functional Outcome Score 26/50 (22)       Insurance: Primary: Payor: Allison Rodriguez /  /  / ,   Secondary: [employer]   Authorization Information: No Pre-cert required   Visit # 4, 4/10 for progress note   Visits Allowed: ALLOWED 30 PT VISITS PER CALENDAR YEAR, NONE OF THESE VISITS HAVE BEEN USED   Recertification Date: 2022   Physician Follow-Up:    Physician Orders: eval and treat   History of Present Illness: Pt is a 37 y/o female presenting to skilled PT services with c/o lumbar radiculopathy. Pt reports having back pain for about 1 year of insidious onset. MRI at that time revealed herniated disc into lumbar spine. Had flare up about a week ago of unknown cause where back \"locked out\". Went to ER and was given pain patch/pill and prednisone with little effect. Pain now radiated into L hip and thigh with numbness in thigh. Reports difficulty with standing activity, bending, lifting, sleeping and walking. SUBJECTIVE: Patient reports that she has been feeling better after her pool sessions. She states that she felt good for a few hours after. She rates her current lower back and left hip/thigh pain a 8/10.      TREATMENT   Precautions:   Pain: 8/10 LBP, L hip/thigh     X in shaded column indicates activity completed today   Modalities Parameters/  Location   Notes   Cold pack-supine    X Concurrent with supine therex                       Manual Therapy Time/Technique   Notes                                 Exercise/Intervention     Notes   NuStep 5 min    X Seat 8, arms 7   Mat exercises:            LTR- to R only for L stretch 5x10 sec   X     Piriformis stretch into ER and IR 2x20 sec ea   X Increased LBP when performed on R so held at R    SKTC 2x20 sec   X     L hip flexor stretch  2x20 sec   X                 TA activation 10x5 sec   X     TA + alt march 10x   X     TA + hip adduction (ball squeeze)  10x5 sec  X    TA + bent knee fall out (bilateral, unilateral)  10x 3 sec  X    Sciatic nerve glides  10 ankle pumps L  X                       Seated:            HS stretch seated 2x30 sec   X     TA activation  10x5 sec   X     TA + LAQ 10x3 sec   X     TA + marches  10x   X                     Specific Interventions Next Treatment: NuStep warm up, core stabilization, B LE strengthening, balance training, B hip/LE stretching, modalities PRN, aquatics    Activity/Treatment Tolerance:  [x]  Patient tolerated treatment well  []  Patient limited by fatigue  []  Patient limited by pain   []  Patient limited by medical complications  []  Other:     Assessment: Initiated land therapy today as documented above. Patient was provided with cues on proper technique with exercises to ensure maximal muscle activation. Continued with use of cold pack to assist with decreasing patient's pain. She had to sit up after awhile of performing supine therapeutic exercises due to her pain. She noted a little more soreness at her left hip and knee at the conclusion of session.      Body Structures/Functions/Activity Limitations: impaired activity tolerance, impaired balance, impaired ROM, impaired strength, pain and abnormal gait  Prognosis: good      GOALS:  Patient Goal: improve activity tolerance to perform work duties    Short Term Goals:  Time Frame: 4 weeks    1. Patient will report decrease in pain to 5/10 at most to promote ease of work duties. 2. Patient will improve trunk AROM to [facility] to improve ability to bend and lift. 3. Patient will improve B hip ER to [facility] [address] degrees to improve ability to sleep comfortably. 4. Patient will improve B LE strength to 4+/5 to improve ability to ambulate with a normalized gait pattern. 5. Patient will perform B SLR for 10 seconds to promote increased core strength needed for ease of standing tasks. 6. Patient will increase 90/90 hamstring length to 65 degrees B to promote reduced pain and ease of functional mobility tasks. 7. Patient will be indep with HEP in order to meet long term goals. Long Term Goals:  Time Frame: 8 weeks    1. Patient will improve Modified Oswestry score from 26/50 to 13/50 to allow decrease in disability and improved functional mobility for improved overall QOL. 2. Patient will be indep with HEP in order to prevent re-injury and improve ability to perform functional mobility tasks. Patient Education:   [x]  HEP/Education Completed: Added exercises, provided with updated HEP.  Architectural Daily Access Code: [email or web address]  []  No new Education completed  [x]  Reviewed Prior HEP      [x]  Patient verbalized and/or demonstrated understanding of education provided. []  Patient unable to verbalize and/or demonstrate understanding of education provided. Will continue education. []  Barriers to learning:     PLAN:  Treatment Recommendations: Strengthening, Range of Motion, Balance Training, Functional Mobility Training, Endurance Training, Gait Training, Stair Training, Manual Therapy - Soft Tissue Mobilization, Manual Therapy - Joint Manipulation, Pain Management, Home Exercise Program, Patient Education, Safety Education and Training, Positioning, Aquatics and Modalities    []  Plan of care initiated.   Plan to see patient 2 times per week for 8 weeks alternating land and aquatic therapy to address the treatment planned outlined above.   [x]  Continue with current plan of care  []  Modify plan of care as follows:    []  Hold pending physician visit  []  Discharge    Time In 1353   Time Out 1432   Timed Code Minutes: 39   Total Treatment Time: 39     Electronically Signed by: Michael Smith PTA

## 2022-05-05 NOTE — CARE COORDINATION
TC to pt for social work referral outreach, second attempt. Phone went right to recording that vm has not been set up and unable to leave message.  Will outreach via [address], MSW, [facility]    Associate Care Management   [phone]

## 2022-05-06 LAB
ORGANISM: ABNORMAL
URINE CULTURE, ROUTINE: ABNORMAL

## 2022-05-08 LAB
APTIMA MEDIA TYPE: NORMAL
CHLAMYDIA TRACHOMATIS AMPLIFIED DET: NEGATIVE
NEISSERIA GONORRHOEAE BY AMP: NEGATIVE
SPECIMEN SOURCE: NORMAL
T. VAGINALIS SPECIMEN SOURCE: NORMAL
TRICHOMONAS VAGINALIS BY NAA: NEGATIVE

## 2022-05-10 ENCOUNTER — APPOINTMENT (OUTPATIENT)
Dept: PHYSICAL THERAPY | Age: 42
End: 2022-05-10
Payer: COMMERCIAL

## 2022-05-12 ENCOUNTER — HOSPITAL ENCOUNTER (OUTPATIENT)
Dept: PHYSICAL THERAPY | Age: 42
Setting detail: THERAPIES SERIES
End: 2022-05-12
Payer: COMMERCIAL

## 2022-05-12 ENCOUNTER — OFFICE VISIT (OUTPATIENT)
Dept: FAMILY MEDICINE CLINIC | Age: 42
End: 2022-05-12
Payer: COMMERCIAL

## 2022-05-12 VITALS
BODY MASS INDEX: 36.98 KG/M2 | SYSTOLIC BLOOD PRESSURE: 130 MMHG | TEMPERATURE: 98.5 F | OXYGEN SATURATION: 99 % | WEIGHT: 216.6 LBS | DIASTOLIC BLOOD PRESSURE: 88 MMHG | RESPIRATION RATE: 14 BRPM | HEIGHT: 64 IN | HEART RATE: 90 BPM

## 2022-05-12 DIAGNOSIS — M47.816 LUMBAR FACET ARTHROPATHY: ICD-10-CM

## 2022-05-12 DIAGNOSIS — M51.26 HERNIATED LUMBAR INTERVERTEBRAL DISC: ICD-10-CM

## 2022-05-12 DIAGNOSIS — M48.061 SPINAL STENOSIS OF LUMBAR REGION WITHOUT NEUROGENIC CLAUDICATION: ICD-10-CM

## 2022-05-12 DIAGNOSIS — M54.16 LUMBAR RADICULOPATHY: Primary | ICD-10-CM

## 2022-05-12 PROCEDURE — G8417 CALC BMI ABV UP PARAM F/U: HCPCS | Performed by: NURSE PRACTITIONER

## 2022-05-12 PROCEDURE — G8427 DOCREV CUR MEDS BY ELIG CLIN: HCPCS | Performed by: NURSE PRACTITIONER

## 2022-05-12 PROCEDURE — 99214 OFFICE O/P EST MOD 30 MIN: CPT | Performed by: NURSE PRACTITIONER

## 2022-05-12 PROCEDURE — 1036F TOBACCO NON-USER: CPT | Performed by: NURSE PRACTITIONER

## 2022-05-12 SDOH — ECONOMIC STABILITY: FOOD INSECURITY: WITHIN THE PAST 12 MONTHS, YOU WORRIED THAT YOUR FOOD WOULD RUN OUT BEFORE YOU GOT MONEY TO BUY MORE.: NEVER TRUE

## 2022-05-12 SDOH — ECONOMIC STABILITY: FOOD INSECURITY: WITHIN THE PAST 12 MONTHS, THE FOOD YOU BOUGHT JUST DIDN'T LAST AND YOU DIDN'T HAVE MONEY TO GET MORE.: NEVER TRUE

## 2022-05-12 ASSESSMENT — SOCIAL DETERMINANTS OF HEALTH (SDOH): HOW HARD IS IT FOR YOU TO PAY FOR THE VERY BASICS LIKE FOOD, HOUSING, MEDICAL CARE, AND HEATING?: NOT HARD AT ALL

## 2022-05-12 NOTE — PROGRESS NOTES
Chief Complaint   Patient presents with    Follow-up       History obtained from chart review and the patient. SUBJECTIVE:  Naresh Baca is a 38 y.o. female that presents today for follow up visit for back pain    Back Pain    HPI:  Pain is present in the lumbar region. Symptoms have been present for several weeks. The pain is constant. The patient describes the pain as sharp / stabbing and throbbing. Inciting injury or history of trauma? No  Pain is aggravated by - certain positions,  Pain is relieved by - nothing  Radiation of the pain? Yes - down her left leg  Paresthesias of the extremities? Numbness in her left thigh  Saddle anesthesia? No  Bowel or bladder incontinence? No  Treatments tried - NSAIDs, prednisone, lidocaine patch, Zanaflex    She has been doing PT, nsaids, Gabapentin and none of it is helping.     Age/Gender Health Maintenance    Lipid   Lab Results   Component Value Date    CHOL 195 12/28/2019    CHOL 194 05/15/2019    CHOL 205 (H) 02/12/2019     Lab Results   Component Value Date    TRIG 129 12/28/2019    TRIG 199 05/15/2019    TRIG 87 02/12/2019     Lab Results   Component Value Date    HDL 53 03/24/2021    HDL 51 12/28/2019    HDL 49 05/15/2019     Lab Results   Component Value Date    LDLCALC 157 03/24/2021    LDLCALC 118 12/28/2019    LDLCALC 105 05/15/2019     Lab Results   Component Value Date    LABVLDL 20 09/23/2015     Lab Results   Component Value Date    CHOLHDLRATIO 4.2 09/23/2015     DM Screen -   Lab Results   Component Value Date    LABA1C 5.8 05/20/2021     Colon Cancer Screening - 46  Lung Cancer Screening (Age 54 to [de-identified] with 30 pack year hx, current smoker or quit within past 15 years) - n/a    Tetanus - needs  Influenza Vaccine - needs  Pneumonia Vaccine - 65  Zostavax - 50    Breast Cancer Screening - 50  Cervical Cancer Screening - per OB, Dr. Rika Dominguez  Osteoporosis Screening - 72    AAA Screening - n/a    Falls screening - n/a    Current Outpatient Medications   Medication Sig Dispense Refill    gabapentin (NEURONTIN) 300 MG capsule Take 1 capsule PO daily x 1 day, then take 1 capsule PO BID x 1 day, then take 1 capsule PO TID. Intended supply: 90 days 270 capsule 0    metoprolol succinate (TOPROL XL) 100 MG extended release tablet take 1 tablet by mouth once daily 90 tablet 3    amLODIPine (NORVASC) 10 MG tablet take 1 tablet by mouth once daily 90 tablet 3    hydroCHLOROthiazide (MICROZIDE) 12.5 MG capsule take 1 capsule by mouth once daily 90 capsule 1    omeprazole (PRILOSEC) 40 MG delayed release capsule Take 1 capsule by mouth nightly 90 capsule 1    sertraline (ZOLOFT) 100 MG tablet take 1 tablet by mouth once daily 90 tablet 3     No current facility-administered medications for this visit. No orders of the defined types were placed in this encounter. All medications reviewed and reconciled, including OTC and herbal medications. Updated list given to patient. Patient Active Problem List   Diagnosis    Essential hypertension    Panic disorder without agoraphobia with moderate panic attacks    Hashimoto's thyroiditis    Family history of early CAD    Intermittent palpitations    Diarrhea    Hyperlipidemia    Reactive thrombocytosis    Obesity (BMI 30-39. 9)    Right Ramirez's palsy    Prediabetes    Pelvic pain    S/P hysterectomy    Spinal stenosis of lumbar region without neurogenic claudication    Herniated lumbar intervertebral disc    Acquired spondylolisthesis    Head revolving around    Lumbar radiculopathy    Lumbar facet arthropathy       Past Medical History:   Diagnosis Date    Bell palsy 2020    right side droop    Essential hypertension 11/7/2016    GERD (gastroesophageal reflux disease)     Hashimoto's thyroiditis 12/22/2016    HIGH CHOLESTEROL     Hyperlipidemia     Hypokalemia     Prediabetes 6/1/2020    Subclinical hyperthyroidism 11/8/2016       Past Surgical History:   Procedure Laterality Date     SECTION  2014    COLONOSCOPY  2021    1 polyp removed     DILATION AND CURETTAGE OF UTERUS      ENDOSCOPY, COLON, DIAGNOSTIC      HYSTERECTOMY, TOTAL ABDOMINAL N/A 9/10/2020    HYSTERECTOMY ABDOMINAL TOTAL, BS, POSS DANIELLE performed by Rose [facility] Bunn [facility] 6/15/2020    DIAGNOSTIC LAPAROSCOPY, performed by Rose Mary Bunn MD at [address] [address]   Allergen Reactions    Bactrim Swelling     tongue    Sulfamethoxazole-Trimethoprim Swelling     tongue       Social History     Socioeconomic History    Marital status: Single     Spouse name: Not on file    Number of children: 2    Years of education: Not on file    Highest education level: Not on file   Occupational History    Not on file   Tobacco Use    Smoking status: Never Smoker    Smokeless tobacco: Never Used   Vaping Use    Vaping Use: Never used   Substance and Sexual Activity    Alcohol use: Yes     Comment: social    Drug use: No    Sexual activity: Not on file   Other Topics Concern    Not on file   Social History Narrative    Not on file     Social Determinants of Health     Financial Resource Strain: Low Risk     Difficulty of Paying Living Expenses: Not hard at all   Food Insecurity: No Food Insecurity    Worried About [facility] in the Last Year: Never true    [address] [facility] [address] in the Last Year: Never true   Transportation Needs:     Lack of Transportation (Medical): Not on file    Lack of Transportation (Non-Medical):  Not on file   Physical Activity:     Days of Exercise per Week: Not on file    Minutes of Exercise per Session: Not on file   Stress:     Feeling of Stress : Not on file   Social Connections:     Frequency of Communication with Friends and Family: Not on file    Frequency of Social Gatherings with Friends and Family: Not on file    Attends Tenriism Services: Not on file    Active Member of Clubs or Organizations: Not on file   Olivier Self Attends Club or Organization Meetings: Not on file    Marital Status: Not on file   Intimate Partner Violence:     Fear of Current or Ex-Partner: Not on file    Emotionally Abused: Not on file    Physically Abused: Not on file    Sexually Abused: Not on file   Housing Stability:     Unable to Pay for Housing in the Last Year: Not on file    Number of [place] in the Last Year: Not on file    Unstable Housing in the Last Year: Not on file       Family History   Problem Relation Age of Onset    High Blood Pressure Mother     Diabetes Mother     Heart Disease Mother     High Blood Pressure Father     Diabetes Father     Breast Cancer Maternal Aunt 50        reoccurred at 79         I have reviewed the patient's past medical history, past surgical history, allergies, medications, social and family history and I have made updates where appropriate.       Review of Systems  Positive responses are highlighted in bold    Constitutional:  Fever, Chills, Night Sweats, Fatigue, Unexpected changes in weight  Eyes:  Eye discharge, Eye pain, Eye redness, Visual disturbances   HENT:  Ear pain, Tinnitus, Nosebleeds, Trouble swallowing, Hearing loss, Sore throat  Cardiovascular:  Chest Pain, Palpitations, Orthopnea, Paroxysmal Nocturnal Dyspnea  Respiratory:  Cough, Wheezing, Shortness of breath, Chest tightness, Apnea  Gastrointestinal:  Nausea, Vomiting, Diarrhea, Constipation, Heartburn, Blood in stool  Genitourinary:  Difficulty or painful urination, Flank pain, Change in frequency, Urgency  Skin:  Color change, Rash, Itching, Wound  Psychiatric:  Hallucinations, Anxiety, Depression, Suicidal ideation  Hematological:  Enlarged glands, Easy bleeding, Easily bruising  Musculoskeletal:  Joint pain, Back pain, Gait problems, Joint swelling, Myalgias  Neurological:  Dizziness, Headaches, Presyncope, Numbness, Seizures, Tremors  Allergy:  Environmental allergies, Food allergies  Endocrine:  Heat Intolerance, Cold Intolerance, Polydipsia, Polyphagia, Polyuria    Lab Results   Component Value Date    TSH 0.670 05/29/2020     Lab Results   Component Value Date     05/01/2022    K 3.6 05/01/2022     05/01/2022    CO2 24 05/01/2022    BUN 17 05/01/2022    CREATININE 0.7 05/01/2022    GLUCOSE 133 (H) 05/01/2022    CALCIUM 9.4 05/01/2022    PROT 7.8 07/21/2021    LABALBU 4.4 07/21/2021    BILITOT <0.2 (L) 07/21/2021    ALKPHOS 117 07/21/2021    AST 34 07/21/2021    ALT 50 07/21/2021    LABGLOM >90 05/01/2022       Lab Results   Component Value Date    WBC 14.1 (H) 05/01/2022    HGB 11.9 (L) 05/01/2022    HCT 36.3 (L) 05/01/2022    MCV 87.7 05/01/2022     05/01/2022     MRI Lumbar spine 10/1/19  Congenital spinal canal narrowing at the lower lumbar levels with superimposed degenerative changes most pronounced at L4-5 where congenital spinal canal narrowing, disc bulge and facet hypertrophy and ligament flavum thickening combined to cause    moderate to severe spinal canal stenosis, mild to moderate right and moderate to severe left neural foraminal stenosis.           PHYSICAL EXAM:  Vitals:    05/12/22 1618   BP: 130/88   Pulse: 90   Resp: 14   Temp: 98.5 °F (36.9 °C)   TempSrc: Oral   SpO2: 99%   Weight: 216 lb 9.6 oz (98.2 kg)   Height: 5' 4\" (1.626 m)     Body mass index is 37.18 kg/m². VS Reviewed  General Appearance: A&O x 3, No acute distress,well developed and well- nourished  Head: normocephalic and atraumatic, small tender firm contusion left occipital  Eyes: pupils equal, round, and reactive to light, extraocular eye movements intact, conjunctivae and eye lids without erythema  Neck: supple and non-tender without mass, mild thyromegaly but no thyroid nodules, no cervical lymphadenopathy  Pulmonary/Chest: clear to auscultation bilaterally- no wheezes, rales or rhonchi, normal air movement, no respiratory distress or retractions  Cardiovascular: S1 and S2 auscultated w/ RRR.  No murmurs, rubs, clicks, or gallops, distal pulses intact. Abdomen: soft, nontender non-distended, bowl sounds physiologic,  no rebound or guarding, no masses or hernias noted. Liver and spleen without enlargement. Extremities: no cyanosis, clubbing or edema of the lower extremities. Musculoskeletal: No joint swelling or gross deformity  Lumbar: + left sided and midline lumbar pain, strength 5/5, neurovascularly intact, antalgic gain  Neuro:  Alert, 5/5 strength globally and symmetrically  Psych: Affect and mood are normal. Thought process is normal. Good insight and appropriate interaction. Cognition and memory appear to be intact. Skin: warm and dry, no rash or erythema  Lymph:  No cervical, auricular or supraclavicular lymph nodes palpated      ASSESSMENT & PLAN  Brandee Arnett was seen today for follow-up. Diagnoses and all orders for this visit:    Lumbar radiculopathy  -     MRI LUMBAR SPINE WO CONTRAST; Future    Herniated lumbar intervertebral disc  -     MRI LUMBAR SPINE WO CONTRAST; Future    Spinal stenosis of lumbar region without neurogenic claudication  -     MRI LUMBAR SPINE WO CONTRAST; Future    Lumbar facet arthropathy  -     MRI LUMBAR SPINE WO CONTRAST; Future       - con't with being off work  - con't with PT  - increase Gabapentin to 600 mg TID (has 300 mg tablets, ok to take 2 tablets)  - proceed with MRI Lumbar spine    Controlled Substance Monitoring:    Acute and Chronic Pain Monitoring:   RX Monitoring 4/14/2022   Attestation -   Periodic Controlled Substance Monitoring No signs of potential drug abuse or diversion identified. DISPOSITION    Return if symptoms worsen or fail to improve. Radhae released without restrictions. PATIENT COUNSELING    Counseling was provided today regarding the following topics: Healthy eating habits, Regular exercise, substance abuse and healthy sleep habits.     Deloris received counseling on the following healthy behaviors: nutrition, exercise and medication adherence    Patient given educational materials on: See Attached    I have instructed Deloris to complete a self tracking handout on Blood Pressures  and instructed them to bring it with them to her next appointment. Barriers to learning and self management: none    Discussed use, benefit, and side effects of prescribed medications. Barriers to medication compliance addressed. All patient questions answered. Pt voiced understanding.        Electronically signed by MARÍA Gastelum CNP on 5/12/2022 at 4:36 PM

## 2022-05-13 ENCOUNTER — CARE COORDINATION (OUTPATIENT)
Dept: OTHER | Facility: CLINIC | Age: 42
End: 2022-05-13

## 2022-05-13 NOTE — CARE COORDINATION
Associate Care Manager (ACM) called patient for CC outreach. No answer; unable to leave voicemail message due to no voicemail set up. ACM sent [employer] message to patient. Will continue to follow. CAMDEN Zimmer RN  Associate Care Manager  Phone: [phone]  Email: [email or web address]

## 2022-05-16 ENCOUNTER — TELEPHONE (OUTPATIENT)
Dept: FAMILY MEDICINE CLINIC | Age: 42
End: 2022-05-16

## 2022-05-16 NOTE — TELEPHONE ENCOUNTER
Scheduling MRI lumbar 05/27/22 @ [facility]   Arrive @ 3:30 to 1st floor radiology   No Metal   Wear mask bring ID and Insurance cards

## 2022-05-17 ENCOUNTER — CARE COORDINATION (OUTPATIENT)
Dept: OTHER | Facility: CLINIC | Age: 42
End: 2022-05-17

## 2022-05-17 ENCOUNTER — HOSPITAL ENCOUNTER (OUTPATIENT)
Dept: PHYSICAL THERAPY | Age: 42
Setting detail: THERAPIES SERIES
Discharge: HOME | End: 2022-05-17
Payer: COMMERCIAL

## 2022-05-17 PROCEDURE — 97113 AQUATIC THERAPY/EXERCISES: CPT

## 2022-05-17 NOTE — PROGRESS NOTES
[address] [facility]  PHYSICAL THERAPY  [] EVALUATION  [] DAILY NOTE (LAND) [x] DAILY NOTE (AQUATIC ) [] PROGRESS NOTE [] DISCHARGE NOTE    [x] [facility] [place]   [] [place] [address]    [] [facility]   [] Donna Delgado    Date: 2022  Patient Name:  Brian Vang  : 1980  MRN: [id number]  CSN: [id number]    Referring Practitioner MARÍA Barraza -*   Diagnosis Radiculopathy, lumbar region [M54.16]    Treatment Diagnosis Lumbar radiculopathy, back pain, hip pain, core weakness, B LE weakness, abnormal gait, lumbar stiffness, hip stiffness   Date of Evaluation 22    Additional Pertinent History HTN, kidney stone      Functional Outcome Measure Used Modified Oswestry   Functional Outcome Score 26/50 (22)       Insurance: Primary: Payor: Stephanie Breaux 150 /  /  / ,   Secondary: PARAMOUNT ADVANTAGE   Authorization Information: No Pre-cert required   Visit # 5, 5/10 for progress note   Visits Allowed: ALLOWED 30 PT VISITS  [address] USED   Recertification Date: 2022   Physician Follow-Up:    Physician Orders: eval and treat   History of Present Illness: Pt is a 39 y/o female presenting to skilled PT services with c/o lumbar radiculopathy. Pt reports having back pain for about 1 year of insidious onset. MRI at that time revealed herniated disc into lumbar spine. Had flare up about a week ago of unknown cause where back \"locked out\". Went to ER and was given pain patch/pill and prednisone with little effect. Pain now radiated into L hip and thigh with numbness in thigh. Reports difficulty with standing activity, bending, lifting, sleeping and walking. SUBJECTIVE:  Patient reports that she is doing better. Reports pain in L hip and lateral thigh at 7/10 today. Thinks she is ready to go back to work. First day back to work is .      AQUATICS TREATMENT   Precautions:    Pain: 8/10 LBP X in shaded column indicates activity completed today   Exercise/Intervention Sets/Sec  Notes   Walk Forward 2 laps   x    Walk Backward 2 laps  x    Walk Sideways 2 laps  x           Lower Extremity Exercises:    4'10\"   Heel/Toe Raises x12  x    Marches x12  x    Squats x12  x    3 Way Hip x12  x    Hamstring Curls x12  x    Lunges: fwd/lat x12  x    Step-Ups: fwd/lat x12  x           Lower Extremity Stretches:       HS stretch  x3 15 sec x At step          Seated Exercises:              Upper Extremity Exercises:       Shoulder Flexion       Shoulder ABD/ADD       Shoulder Horizontal ABD/ADD       Shoulder IR/ER       Shoulder Circles       Shoulder Shrugs       Rows       Bicep Curls              Upper Extremity Stretches:              Balance:              Dynamic Gait:              Deep Water:       Hang 5 min  x    Bicycle 3 min  x    Hip ABD/ADD 3 min  x    Hip Flex/Ext 3 min  x      Specific Interventions Next Treatment: NuSTep warm up, core stabilization, B LE strengthening, balance training, B hip/LE stretching, modalities PRN, aquatics    Activity/Treatment Tolerance:  [x]  Patient tolerated treatment well  []  Patient limited by fatigue  []  Patient limited by pain   []  Patient limited by medical complications  []  Other:     Assessment:  Progressed therex with step ups and lunges. Pt demonstrating slow and controlled movements during therex. Occasional cues required for proper technique to ensure optimal muscle activation. Reported decreased LB and L hip/lateral thigh pain to 5/10 at end of session. Body Structures/Functions/Activity Limitations: impaired activity tolerance, impaired balance, impaired ROM, impaired strength, pain and abnormal gait  Prognosis: good      GOALS:  Patient Goal: improve activity tolerance to perform work duties    Short Term Goals:  Time Frame: 4 weeks    1. Patient will report decrease in pain to 5/10 at most to promote ease of work duties.   2. Patient will improve trunk AROM to [facility] [place] to improve ability to bend and lift. 3. Patient will improve [facility] to improve ability to sleep comfortably. 4. Patient will improve B LE strength to 4+/5 to improve ability to ambulate with a normalized gait pattern. 5. Patient will perform B SLR for 10 seconds to promote increased core strength needed for ease of standing tasks. 6. Patient will increase 90/90 hamstring length to 65 degrees B to promote reduced pain and ease of functional mobility tasks. 7. Patient will be indep with HEP in order to meet long term goals. Long Term Goals:  Time Frame: 8 weeks    1. Patient will improve Modified Oswestry score from 26/50 to 13/50 to allow decrease in disability and improved functional mobility for improved overall QOL. 2. Patient will be indep with HEP in order to prevent re-injury and improve ability to perform functional mobility tasks. Patient Education:   []  HEP/Education Completed: Plan of Care, Goals, continue HEP   [facility] Access Code: [email or web address]  []  No new Education completed  [x]  Reviewed Prior HEP      [x]  Patient verbalized and/or demonstrated understanding of education provided. []  Patient unable to verbalize and/or demonstrate understanding of education provided. Will continue education. []  Barriers to learning:     PLAN:  Treatment Recommendations: Strengthening, Range of Motion, Balance Training, Functional Mobility Training, Endurance Training, Gait Training, Stair Training, Manual Therapy - Soft Tissue Mobilization, Manual Therapy - Joint Manipulation, Pain Management, Home Exercise Program, Patient Education, Safety Education and Training, Positioning, Aquatics and Modalities    []  Plan of care initiated. Plan to see patient 2 times per week for 8 weeks alternating land and aquatic therapy to address the treatment planned outlined above.   [x]  Continue with current plan of care  []  Modify plan of care as follows:    []  Hold pending physician visit  []  Discharge    Time In 1348   Time Out 1429   Timed Code Minutes: 41   Total Treatment Time: 41     Electronically Signed by: Saundra Hoff, [address]

## 2022-05-17 NOTE — CARE COORDINATION
Ambulatory Care Coordination Note  5/17/2022  CM Risk Score: 8  Charlson 10 Year Mortality Risk Score: 2%     ACC: Eduardo Contreras RN    Summary Note: Associate Care Manager (ACM) called patient for CC outreach. Patient reports back pain improving with therapy and Gabapentin dose increase. States pain now in left hip/thigh and is constant. Patient states has mentioned this to PT; not mentioned yet to PCP as the pain changed after PCP follow up. States will send VERTILAS message to PCP to notify. Patient states heat application helps left hip/thigh pain some. Has not yet received payment from [employer] for short term disability and was notified per Negin Gonsalez that PCP completed something on paperwork incorrectly. States she was told that [employer] is re-submitting paperwork to PCP to have appropriate corrections made. Patient reports compliance with HEP- as instructed per PT. Has MRI lumbar spine scheduled 5/27/2022. ACM to follow up with patient in ~3 weeks. Care Coordination Interventions    Referral from Primary Care Provider: No  Suggested Interventions and Community Resources         Prior to Admission medications    Medication Sig Start Date End Date Taking? Authorizing Provider   gabapentin (NEURONTIN) 300 MG capsule Take 1 capsule PO daily x 1 day, then take 1 capsule PO BID x 1 day, then take 1 capsule PO TID. Intended supply: 90 days  Patient taking differently: Take 600 mg by mouth 3 times daily. Take 1 capsule PO daily x 1 day, then take 1 capsule PO BID x 1 day, then take 1 capsule PO TID.  Intended supply: 90 days 4/26/22 6/26/22 Yes MARÍA Rey CNP   metoprolol succinate (TOPROL XL) 100 MG extended release tablet take 1 tablet by mouth once daily 4/12/22   MARÍA Rey CNP   amLODIPine (NORVASC) 10 MG tablet take 1 tablet by mouth once daily 3/15/22   MARÍA Rey CNP   hydroCHLOROthiazide (MICROZIDE) 12.5 MG capsule take 1 capsule by mouth once daily 3/7/22   Jerome Baeza Jarek Cruz, MARÍA - CNP   omeprazole (PRILOSEC) 40 MG delayed release capsule Take 1 capsule by mouth nightly 11/23/21   Tonja Covington APRN - CNP   sertraline (ZOLOFT) 100 MG tablet take 1 tablet by mouth once daily 8/10/21   Tonja PoagMARÍA - CNP       Future Appointments   Date Time Provider Ravi Mccann   5/19/2022  1:45 PM Carola Aguayo, PT [facility] [place] [facility]   5/24/2022  1:45 PM Vince Alfonso, PTA [facility] [place] [facility]   5/26/2022  1:45 PM [facility] [place] [facility]   5/27/2022  4:00 PM STR MRI RM1 [facility]   5/31/2022  1:45 PM Vince Alfonso, PTA [facility] [place] [facility]   6/2/2022  1:45 PM Tim Jewell, PTA [facility] [place] [facility] [facility]   10/17/2022  2:00 PM Tonja PoagMARÍA - CNP [facility] [address]:  -Patient to notify PCP of change to pain- now moved from back to left hip/thigh. -ACM to follow up with patient in ~3 weeks. Kiersten Rodriguez, MSN RN  Associate Care Manager  Phone: [phone]  Email: [email or web address]

## 2022-05-19 ENCOUNTER — HOSPITAL ENCOUNTER (OUTPATIENT)
Dept: PHYSICAL THERAPY | Age: 42
Setting detail: THERAPIES SERIES
Discharge: HOME | End: 2022-05-19
Payer: COMMERCIAL

## 2022-05-19 PROCEDURE — 97110 THERAPEUTIC EXERCISES: CPT

## 2022-05-19 NOTE — PROGRESS NOTES
[address] [facility]  PHYSICAL THERAPY  [] EVALUATION  [x] DAILY NOTE (LAND) [] DAILY NOTE (AQUATIC ) [] PROGRESS NOTE [] DISCHARGE NOTE    [x] OUTPATIENT REHABILITATION CENTER [facility]   [] [place] [address]    [] [facility]   [] Romelia Lights    Date: 2022  Patient Name:  Daniel Kehr  : 1980  MRN: [id number]  CSN: [id number]    Referring Practitioner MARÍA Marcial -*   Diagnosis Radiculopathy, lumbar region [M54.16]    Treatment Diagnosis Lumbar radiculopathy, back pain, hip pain, core weakness, B LE weakness, abnormal gait, lumbar stiffness, hip stiffness   Date of Evaluation 22    Additional Pertinent History HTN, kidney stone      Functional Outcome Measure Used Modified Oswestry   Functional Outcome Score 26/50 (22)       Insurance: Primary: Payor: Osito Catherine /  /  / ,   Secondary: [place] ADVANTAGE   Authorization Information: No Pre-cert required   Visit # 6, 6/10 for progress note   Visits Allowed: ALLOWED 30 PT VISITS  [address] USED   Recertification Date: 2022   Physician Follow-Up:    Physician Orders: eval and treat   History of Present Illness: Pt is a 39 y/o female presenting to skilled PT services with c/o lumbar radiculopathy. Pt reports having back pain for about 1 year of insidious onset. MRI at that time revealed herniated disc into lumbar spine. Had flare up about a week ago of unknown cause where back \"locked out\". Went to ER and was given pain patch/pill and prednisone with little effect. Pain now radiated into L hip and thigh with numbness in thigh. Reports difficulty with standing activity, bending, lifting, sleeping and walking. SUBJECTIVE: Patient reports feeling good pool session until last nigth when she turned in bed which fired things up. She states she is very stiff and things have tightened up in her back.  Reports 6/10 pain radiating around L hip.    TREATMENT   Precautions:     Pain: 6/10 LBP, L hip/thigh     X in shaded column indicates activity completed today   Modalities Parameters/  Location   Notes   Cold pack to LB and L hip    X Concurrent with supine and seated therex                       Manual Therapy Time/Technique   Notes                                 Exercise/Intervention     Notes   NuStep 5 min    X Seat 8, arms 7   Mat exercises:            LTR 5x10 sec   X     Piriformis stretch into ER and IR 2x20 sec ea   X Increased LBP when performed on R so held at R    SKTC 2x20 sec   X     L hip flexor stretch  2x20 sec                    TA activation 12x5 sec   X     TA + alt march 12x   X     TA + hip adduction (ball squeeze)  12x5 sec  X    TA + bent knee fall out (bilateral, unilateral)  12x 3 sec  X    Sciatic nerve glides  10 ankle pumps L                         Seated:            Colombian ball lumbar stretch: fwd/L/R 5x10 sec  X    HS stretch seated 2x30 sec   X     TA activation  12x5 sec   X     TA + LAQ 12x3 sec   X     TA + marches  12x    X            Standing:       Standing hip flexor stretch     x Attempted, however pt reported increase hip pain      Specific Interventions Next Treatment: NuStep warm up, core stabilization, B LE strengthening, balance training, B hip/LE stretching, modalities PRN, aquatics    Activity/Treatment Tolerance:  [x]  Patient tolerated treatment well  []  Patient limited by fatigue  []  Patient limited by pain   []  Patient limited by medical complications  []  Other:     Assessment:  Continued with core stabilization and lumbar/hip girdle stretching, increasing reps of therex. Demonstrates good TrA activation. Added swiss ball lumbar stretch with pt reported feeling a good stretch. Continued with use of cold pack to assist with decreasing patient's pain. B hip irritation noted with piriformis stretch into IR.  Pt had to sit up during supine therex due to hip pain, so continued with use of cold pack in seated position. Reported reduced LBP to 4-5/10 at end of session, however irritation to L hip following interventions to 7/10. Body Structures/Functions/Activity Limitations: impaired activity tolerance, impaired balance, impaired ROM, impaired strength, pain and abnormal gait  Prognosis: good      GOALS:  Patient Goal: improve activity tolerance to perform work duties    Short Term Goals:  Time Frame: 4 weeks    1. Patient will report decrease in pain to 5/10 at most to promote ease of work duties. 2. Patient will improve trunk AROM to [facility] [place] to improve ability to bend and lift. 3. Patient will improve B hip ER to [facility] [place] degrees to improve ability to sleep comfortably. 4. Patient will improve B LE strength to 4+/5 to improve ability to ambulate with a normalized gait pattern. 5. Patient will perform B SLR for 10 seconds to promote increased core strength needed for ease of standing tasks. 6. Patient will increase 90/90 hamstring length to 65 degrees B to promote reduced pain and ease of functional mobility tasks. 7. Patient will be indep with HEP in order to meet long term goals. Long Term Goals:  Time Frame: 8 weeks    1. Patient will improve Modified Oswestry score from 26/50 to 13/50 to allow decrease in disability and improved functional mobility for improved overall QOL. 2. Patient will be indep with HEP in order to prevent re-injury and improve ability to perform functional mobility tasks. Patient Education:   [x]  HEP/Education Completed: continue HEP    [facility] Access Code: [email or web address]  []  No new Education completed  [x]  Reviewed Prior HEP      [x]  Patient verbalized and/or demonstrated understanding of education provided. []  Patient unable to verbalize and/or demonstrate understanding of education provided. Will continue education.   []  Barriers to learning:     PLAN:  Treatment Recommendations: Strengthening, Range of Motion, Balance Training, Functional Mobility Training, 2 Endurance Training, Gait Training, Stair Training, Manual Therapy - Soft Tissue Mobilization, Manual Therapy - Joint Manipulation, Pain Management, Home Exercise Program, Patient Education, Safety Education and Training, Positioning, Aquatics and Modalities    []  Plan of care initiated. Plan to see patient 2 times per week for 8 weeks alternating land and aquatic therapy to address the treatment planned outlined above.   [x]  Continue with current plan of care  []  Modify plan of care as follows:    []  Hold pending physician visit  []  Discharge    Time In 1450   Time Out 1530   Timed Code Minutes: 40   Total Treatment Time: 40     Electronically Signed by: Fareed Mai, PT

## 2022-05-23 ENCOUNTER — PATIENT MESSAGE (OUTPATIENT)
Dept: FAMILY MEDICINE CLINIC | Age: 42
End: 2022-05-23

## 2022-05-23 NOTE — LETTER
[address] [facility] [address]  Phone: [phone]  Fax: [phone]    MARÍA Evans CNP        May 23, 2022     Patient: Suellen Etienne   YOB: 1980   Date of Visit: 5/23/2022       To Whom It May Concern: It is my medical opinion that Suellen Etienne may return to work on 6/6/22. She has missed work due to ongoing health conditions. If you have any questions or concerns, please don't hesitate to call.     Sincerely,        MARÍA Evans CNP

## 2022-05-23 NOTE — TELEPHONE ENCOUNTER
From: Leo Sands  To: Jud Conklin  Sent: 5/23/2022 1:06 PM EDT  Subject: Sara Oats life    Good afternoon Ne Kuo, NP [employer] needs some info so they can start my disability. They said I had to be off for 8 days which technically I was bc when I went back to work on the 19th of April, I left early and then was off until the 25th and left early that day too and thanks when you took me off for the 4 weeks. One more thing, Im still having pain and its in my hip and thigh, can we extend for a couple more weeks? I have the MRI Friday and we can see from there. If any questions or concerns, I can be reached at [phone]. Thank you very much.  Have a great day

## 2022-05-24 ENCOUNTER — HOSPITAL ENCOUNTER (OUTPATIENT)
Dept: PHYSICAL THERAPY | Age: 42
Setting detail: THERAPIES SERIES
Discharge: HOME | End: 2022-05-24
Payer: COMMERCIAL

## 2022-05-24 PROCEDURE — 97113 AQUATIC THERAPY/EXERCISES: CPT

## 2022-05-24 NOTE — PROGRESS NOTES
[facility]  PHYSICAL THERAPY  [] EVALUATION  [] DAILY NOTE (LAND) [x] DAILY NOTE (AQUATIC ) [] PROGRESS NOTE [] DISCHARGE NOTE    [x] [facility] [place]   [] [place] [address]    [] [facility]   [] Reynaldo Ceja    Date: 2022  Patient Name:  Dwain Ontiveros  : 1980  MRN: [id number]  CSN: [id number]    Referring Practitioner Joshua Hodgkins, APRN -*   Diagnosis Radiculopathy, lumbar region [M54.16]    Treatment Diagnosis Lumbar radiculopathy, back pain, hip pain, core weakness, B LE weakness, abnormal gait, lumbar stiffness, hip stiffness   Date of Evaluation 22    Additional Pertinent History HTN, kidney stone      Functional Outcome Measure Used Modified Oswestry   Functional Outcome Score 26/50 (22)       Insurance: Primary: Payor: Mayra Ivy /  /  / ,   Secondary: [employer] ADVANTAGE   Authorization Information: No Pre-cert required   Visit # 7, 7/10 for progress note   Visits Allowed: ALLOWED 30 PT VISITS  [address] USED   Recertification Date: 2022   Physician Follow-Up:    Physician Orders: eval and treat   History of Present Illness: Pt is a 37 y/o female presenting to skilled PT services with c/o lumbar radiculopathy. Pt reports having back pain for about 1 year of insidious onset. MRI at that time revealed herniated disc into lumbar spine. Had flare up about a week ago of unknown cause where back \"locked out\". Went to ER and was given pain patch/pill and prednisone with little effect. Pain now radiated into L hip and thigh with numbness in thigh. Reports difficulty with standing activity, bending, lifting, sleeping and walking. SUBJECTIVE:  Pt notes sharp pain in L hip and rates it 8/10. Denies radicular symptoms. Reports unsure what caused this pain. Pt states having a scheduled MRI on Friday. Notes feeling ok from previous treatment session.      AQUATICS TREATMENT   Precautions:    Pain: 8/10 LBP    X in shaded column indicates activity completed today   Exercise/Intervention Sets/Sec  Notes   Walk Forward 2 laps   x    Walk Backward 2 laps  x    Walk Sideways 2 laps  x           Lower Extremity Exercises:    4'10\"   Heel/Toe Raises x15  x    Marches x15  x    Squats x15  x    3 Way Hip x15  x    Hamstring Curls x15  x    Lunges: fwd/lat x15  x    Step-Ups: fwd/lat x12             Lower Extremity Stretches:       HS stretch  x3 15 sec x At step          Seated Exercises:              Upper Extremity Exercises:       Shoulder Flexion       Shoulder ABD/ADD       Shoulder Horizontal ABD/ADD       Shoulder IR/ER       Shoulder Circles       Shoulder Shrugs       Rows       Bicep Curls              Upper Extremity Stretches:              Balance:              Dynamic Gait:              Deep Water:       Hang 5 min  x    Bicycle 3 min  x    Hip ABD/ADD 3 min  x    Hip Flex/Ext 3 min  x      Specific Interventions Next Treatment: NuSTep warm up, core stabilization, B LE strengthening, balance training, B hip/LE stretching, modalities PRN, aquatics    Activity/Treatment Tolerance:  [x]  Patient tolerated treatment well  []  Patient limited by fatigue  []  Patient limited by pain   []  Patient limited by medical complications  []  Other:     Assessment:  Continued therex as stated above with increased reps as tolerated by pt. Pt denied increase in pain throughout treatment session. Vcs for proper technique to ensure maximum muscle activation needed. Will continue to progress as tolerated by pt per POC. Body Structures/Functions/Activity Limitations: impaired activity tolerance, impaired balance, impaired ROM, impaired strength, pain and abnormal gait  Prognosis: good      GOALS:  Patient Goal: improve activity tolerance to perform work duties    Short Term Goals:  Time Frame: 4 weeks    1.  Patient will report decrease in pain to 5/10 at most to promote ease of work duties. 2. Patient will improve trunk AROM to [facility] [place] to improve ability to bend and lift. 3. Patient will improve B hip ER to [facility] degrees to improve ability to sleep comfortably. 4. Patient will improve B LE strength to 4+/5 to improve ability to ambulate with a normalized gait pattern. 5. Patient will perform B SLR for 10 seconds to promote increased core strength needed for ease of standing tasks. 6. Patient will increase 90/90 hamstring length to 65 degrees B to promote reduced pain and ease of functional mobility tasks. 7. Patient will be indep with HEP in order to meet long term goals. Long Term Goals:  Time Frame: 8 weeks    1. Patient will improve Modified Oswestry score from 26/50 to 13/50 to allow decrease in disability and improved functional mobility for improved overall QOL. 2. Patient will be indep with HEP in order to prevent re-injury and improve ability to perform functional mobility tasks. Patient Education:   []  HEP/Education Completed: Plan of Care, Goals, continue HEP   [facility] [employer] [facility] [employer] Access Code: [email or web address]  []  No new Education completed  [x]  Reviewed Prior HEP      [x]  Patient verbalized and/or demonstrated understanding of education provided. []  Patient unable to verbalize and/or demonstrate understanding of education provided. Will continue education. []  Barriers to learning:     PLAN:  Treatment Recommendations: Strengthening, Range of Motion, Balance Training, Functional Mobility Training, Endurance Training, Gait Training, Stair Training, Manual Therapy - Soft Tissue Mobilization, Manual Therapy - Joint Manipulation, Pain Management, Home Exercise Program, Patient Education, Safety Education and Training, Positioning, Aquatics and Modalities    []  Plan of care initiated. Plan to see patient 2 times per week for 8 weeks alternating land and aquatic therapy to address the treatment planned outlined above.   [x]  Continue with current plan of care  []  Modify plan of care as follows:    []  Hold pending physician visit  []  Discharge    Time In [phone] [id number]   Time Out 1429   Timed Code Minutes: 42   Total Treatment Time: 42     Electronically Signed by: Beth Steven, PTA [id number]

## 2022-05-27 ENCOUNTER — HOSPITAL ENCOUNTER (OUTPATIENT)
Dept: MRI IMAGING | Age: 42
Discharge: HOME | End: 2022-05-27
Payer: COMMERCIAL

## 2022-05-27 DIAGNOSIS — M48.061 SPINAL STENOSIS OF LUMBAR REGION WITHOUT NEUROGENIC CLAUDICATION: ICD-10-CM

## 2022-05-27 DIAGNOSIS — M47.816 LUMBAR FACET ARTHROPATHY: ICD-10-CM

## 2022-05-27 DIAGNOSIS — M54.16 LUMBAR RADICULOPATHY: ICD-10-CM

## 2022-05-27 DIAGNOSIS — M51.26 HERNIATED LUMBAR INTERVERTEBRAL DISC: ICD-10-CM

## 2022-05-27 PROCEDURE — 72148 MRI LUMBAR SPINE W/O DYE: CPT

## 2022-05-31 ENCOUNTER — APPOINTMENT (OUTPATIENT)
Dept: PHYSICAL THERAPY | Age: 42
End: 2022-05-31
Payer: COMMERCIAL

## 2022-05-31 ENCOUNTER — TELEPHONE (OUTPATIENT)
Dept: FAMILY MEDICINE CLINIC | Age: 42
End: 2022-05-31

## 2022-05-31 DIAGNOSIS — M47.816 LUMBAR FACET ARTHROPATHY: ICD-10-CM

## 2022-05-31 DIAGNOSIS — M51.26 HERNIATED LUMBAR INTERVERTEBRAL DISC: ICD-10-CM

## 2022-05-31 DIAGNOSIS — M48.061 SPINAL STENOSIS OF LUMBAR REGION WITHOUT NEUROGENIC CLAUDICATION: ICD-10-CM

## 2022-05-31 DIAGNOSIS — M54.16 LUMBAR RADICULOPATHY: Primary | ICD-10-CM

## 2022-05-31 NOTE — TELEPHONE ENCOUNTER
----- Message from Claude Members, APRN - CNP sent at 5/31/2022  9:15 AM EDT -----  Let Jose Bentley know her MRI of her lumbar spine shows a bulging disc at L3-L4. This is likely the source of her pain as this is a new finding from her prior MRI. She also has bulging discs L4-L5 and L5-S1 with spinal canal stenosis. She does need to see orthopedic. Is she ok with [facility]?

## 2022-06-01 NOTE — PROGRESS NOTES
Visit Information    Have you changed or started any medications since your last visit including any over-the-counter medicines, vitamins, or herbal medicines? no   Are you having any side effects from any of your medications? -  no  Have you stopped taking any of your medications? Is so, why? -  yes - see med list    Have you seen any other physician or provider since your last visit? Yes - Records Obtained  Have you had any other diagnostic tests since your last visit? no  Have you been seen in the emergency room and/or had an admission to a hospital since we last saw you? No  Have you had your routine dental cleaning in the past 6 months? no    Have you activated your [employer] account? If not, what are your barriers?  Yes     Patient Care Team:  Herberth Pena CNP as PCP - General    Medical History Review  Past Medical, Family, and Social History reviewed and does contribute to the patient presenting condition    Health Maintenance   Topic Date Due    HIV screen  03/19/1993    Flu vaccine (1) 09/01/2017    Cervical cancer screen  09/19/2020    DTaP/Tdap/Td vaccine (2 - Td) 01/01/2026 Notification Instructions: Patient will be notified of pathology results. However, patient instructed to call the office if not contacted within 2 weeks.

## 2022-06-07 NOTE — DISCHARGE SUMMARY
[facility] NOTE  OUTPATIENT  [facility]    Patient Name: Tavon San        CSN: [id number]   YOB: 1980  Gender: female  Hermes Hillman, APRN -*,    Radiculopathy, lumbar region [M54.16] ,      Patient is discharged from Physical Therapy services at this time. See last note for details related to results of therapy and goal achievement. Reason for discharge: Attendance. Pt no showed/cancelled last 3 scheduled visits. Pt contacted about attendance policy and that she would be d/c from skilled PT services.     Gray Gabriel, PT, DPT

## 2022-06-09 ENCOUNTER — CARE COORDINATION (OUTPATIENT)
Dept: OTHER | Facility: CLINIC | Age: 42
End: 2022-06-09

## 2022-06-09 NOTE — CARE COORDINATION
Associate Care Manager called patient for CC outreach; no answer. HIPAA compliant voicemail message left with request for return call at patient's convenience. Will continue to follow. CAMDEN Ozuna RN  Associate Care Manager  Phone: [phone]  Email: [email or web address]

## 2022-06-16 ENCOUNTER — CARE COORDINATION (OUTPATIENT)
Dept: OTHER | Facility: CLINIC | Age: 42
End: 2022-06-16

## 2022-06-16 NOTE — CARE COORDINATION
Ambulatory Care Coordination Note  6/16/2022  CM Risk Score: 8  Charlson 10 Year Mortality Risk Score: 2%     ACC: Conner Irizarry RN    Summary Note: Associate Care Manager called patient for CC follow up. Patient states still with low back pain; no longer with LLE pain. She is now established with Dr. Arti WELLS @ [facility] who said patient can have epidural injections or surgery. Patient opted for epidural injections- now waiting prior auth with [facility] then she will get scheduled. Patient to follow up with [facility] after injection(s). She has returned to work last week; states had to call off work 3 times since return to work due to uncontrolled pain. Patient states PCP completed forms for intermittent FMLA. She has completed Outpatient PT; reports still doing HEP. Denies needs or concerns for ACM. Patient graduated from care coordination this date with no further needs. Patient verbalized agreement and understanding. ACM signing off. Care Coordination Interventions    Referral from Primary Care Provider: No  Suggested Interventions and Community Resources         Prior to Admission medications    Medication Sig Start Date End Date Taking? Authorizing Provider   gabapentin (NEURONTIN) 300 MG capsule Take 1 capsule PO daily x 1 day, then take 1 capsule PO BID x 1 day, then take 1 capsule PO TID. Intended supply: 90 days  Patient taking differently: Take 600 mg by mouth 3 times daily. Take 1 capsule PO daily x 1 day, then take 1 capsule PO BID x 1 day, then take 1 capsule PO TID.  Intended supply: 90 days 4/26/22 6/26/22  MARÍA Bosch CNP   metoprolol succinate (TOPROL XL) 100 MG extended release tablet take 1 tablet by mouth once daily 4/12/22   MARÍA Bosch - CNP   amLODIPine (NORVASC) 10 MG tablet take 1 tablet by mouth once daily 3/15/22   MARÍA Bosch - MISSAEL   hydroCHLOROthiazide (MICROZIDE) 12.5 MG capsule take 1 capsule by mouth once daily 3/7/22   MARÍA Bosch CNP   omeprazole (PRILOSEC) 40 MG delayed release capsule Take 1 capsule by mouth nightly 11/23/21   MARÍA Price CNP   sertraline (ZOLOFT) 100 MG tablet take 1 tablet by mouth once daily 8/10/21   MARÍA Price CNP       Future Appointments   Date Time Provider Ravi Mccann   6/30/2022  7:15 AM STR SPECIAL PROCEDURE ROOM 1 [facility] SPEC STR Radiolog   10/17/2022  2:00 PM MARÍA Price 86     Plan:  -Patient graduated from care coordination this date. Marques Coburn, MSN RN  Associate Care Manager  Phone: [phone]     Email: [email or web address]

## 2022-07-24 ENCOUNTER — HOSPITAL ENCOUNTER (EMERGENCY)
Age: 42
Discharge: HOME | End: 2022-07-24
Attending: STUDENT IN AN ORGANIZED HEALTH CARE EDUCATION/TRAINING PROGRAM
Payer: COMMERCIAL

## 2022-07-24 ENCOUNTER — APPOINTMENT (OUTPATIENT)
Dept: CT IMAGING | Age: 42
End: 2022-07-24
Payer: COMMERCIAL

## 2022-07-24 VITALS
BODY MASS INDEX: 37.56 KG/M2 | TEMPERATURE: 98.1 F | SYSTOLIC BLOOD PRESSURE: 164 MMHG | HEIGHT: 64 IN | OXYGEN SATURATION: 99 % | DIASTOLIC BLOOD PRESSURE: 96 MMHG | RESPIRATION RATE: 18 BRPM | HEART RATE: 65 BPM | WEIGHT: 220 LBS

## 2022-07-24 DIAGNOSIS — N20.1 URETEROLITHIASIS: Primary | ICD-10-CM

## 2022-07-24 DIAGNOSIS — N13.30 HYDRONEPHROSIS, UNSPECIFIED HYDRONEPHROSIS TYPE: ICD-10-CM

## 2022-07-24 LAB
ALBUMIN SERPL-MCNC: 4.3 G/DL (ref 3.5–5.1)
ALP BLD-CCNC: 101 U/L (ref 38–126)
ALT SERPL-CCNC: 38 U/L (ref 11–66)
ANION GAP SERPL CALCULATED.3IONS-SCNC: 12 MEQ/L (ref 8–16)
AST SERPL-CCNC: 24 U/L (ref 5–40)
BACTERIA: ABNORMAL /HPF
BASOPHILS # BLD: 0.3 %
BASOPHILS ABSOLUTE: 0 THOU/MM3 (ref 0–0.1)
BILIRUB SERPL-MCNC: 0.2 MG/DL (ref 0.3–1.2)
BILIRUBIN DIRECT: < 0.2 MG/DL (ref 0–0.3)
BILIRUBIN URINE: NEGATIVE
BLOOD, URINE: ABNORMAL
BUN BLDV-MCNC: 15 MG/DL (ref 7–22)
CALCIUM SERPL-MCNC: 10.3 MG/DL (ref 8.5–10.5)
CASTS 2: ABNORMAL /LPF
CASTS UA: ABNORMAL /LPF
CHARACTER, URINE: CLEAR
CHLORIDE BLD-SCNC: 105 MEQ/L (ref 98–111)
CO2: 24 MEQ/L (ref 23–33)
COLOR: YELLOW
CREAT SERPL-MCNC: 0.6 MG/DL (ref 0.4–1.2)
CRYSTALS, UA: ABNORMAL
EOSINOPHIL # BLD: 0.8 %
EOSINOPHILS ABSOLUTE: 0.1 THOU/MM3 (ref 0–0.4)
EPITHELIAL CELLS, UA: ABNORMAL /HPF
ERYTHROCYTE [DISTWIDTH] IN BLOOD BY AUTOMATED COUNT: 13.5 % (ref 11.5–14.5)
ERYTHROCYTE [DISTWIDTH] IN BLOOD BY AUTOMATED COUNT: 42.4 FL (ref 35–45)
GFR SERPL CREATININE-BSD FRML MDRD: > 90 ML/MIN/1.73M2
GLUCOSE BLD-MCNC: 134 MG/DL (ref 70–108)
GLUCOSE URINE: NEGATIVE MG/DL
HCT VFR BLD CALC: 36.7 % (ref 37–47)
HEMOGLOBIN: 12 GM/DL (ref 12–16)
IMMATURE GRANS (ABS): 0.03 THOU/MM3 (ref 0–0.07)
IMMATURE GRANULOCYTES: 0.3 %
KETONES, URINE: NEGATIVE
LEUKOCYTE ESTERASE, URINE: NEGATIVE
LIPASE: 50.4 U/L (ref 5.6–51.3)
LYMPHOCYTES # BLD: 26.7 %
LYMPHOCYTES ABSOLUTE: 3 THOU/MM3 (ref 1–4.8)
MCH RBC QN AUTO: 28.5 PG (ref 26–33)
MCHC RBC AUTO-ENTMCNC: 32.7 GM/DL (ref 32.2–35.5)
MCV RBC AUTO: 87.2 FL (ref 81–99)
MISCELLANEOUS 2: ABNORMAL
MONOCYTES # BLD: 5.7 %
MONOCYTES ABSOLUTE: 0.6 THOU/MM3 (ref 0.4–1.3)
NITRITE, URINE: NEGATIVE
NUCLEATED RED BLOOD CELLS: 0 /100 WBC
OSMOLALITY CALCULATION: 284.1 MOSMOL/KG (ref 275–300)
PH UA: 6.5 (ref 5–9)
PLATELET # BLD: 454 THOU/MM3 (ref 130–400)
PMV BLD AUTO: 9.6 FL (ref 9.4–12.4)
POTASSIUM SERPL-SCNC: 3.9 MEQ/L (ref 3.5–5.2)
PROTEIN UA: NEGATIVE
RBC # BLD: 4.21 MILL/MM3 (ref 4.2–5.4)
RBC URINE: > 100 /HPF
RENAL EPITHELIAL, UA: ABNORMAL
SEG NEUTROPHILS: 66.2 %
SEGMENTED NEUTROPHILS ABSOLUTE COUNT: 7.5 THOU/MM3 (ref 1.8–7.7)
SODIUM BLD-SCNC: 141 MEQ/L (ref 135–145)
SPECIFIC GRAVITY, URINE: 1.02 (ref 1–1.03)
TOTAL PROTEIN: 7.5 G/DL (ref 6.1–8)
UROBILINOGEN, URINE: 0.2 EU/DL (ref 0–1)
WBC # BLD: 11.3 THOU/MM3 (ref 4.8–10.8)
WBC UA: ABNORMAL /HPF
YEAST: ABNORMAL

## 2022-07-24 PROCEDURE — 96374 THER/PROPH/DIAG INJ IV PUSH: CPT

## 2022-07-24 PROCEDURE — 2580000003 HC RX 258: Performed by: EMERGENCY MEDICINE

## 2022-07-24 PROCEDURE — 81001 URINALYSIS AUTO W/SCOPE: CPT

## 2022-07-24 PROCEDURE — 6360000004 HC RX CONTRAST MEDICATION: Performed by: EMERGENCY MEDICINE

## 2022-07-24 PROCEDURE — 6360000002 HC RX W HCPCS: Performed by: EMERGENCY MEDICINE

## 2022-07-24 PROCEDURE — 99285 EMERGENCY DEPT VISIT HI MDM: CPT

## 2022-07-24 PROCEDURE — 74177 CT ABD & PELVIS W/CONTRAST: CPT

## 2022-07-24 PROCEDURE — 83690 ASSAY OF LIPASE: CPT

## 2022-07-24 PROCEDURE — 80053 COMPREHEN METABOLIC PANEL: CPT

## 2022-07-24 PROCEDURE — 82248 BILIRUBIN DIRECT: CPT

## 2022-07-24 PROCEDURE — 85025 COMPLETE CBC W/AUTO DIFF WBC: CPT

## 2022-07-24 RX ORDER — 0.9 % SODIUM CHLORIDE 0.9 %
1000 INTRAVENOUS SOLUTION INTRAVENOUS ONCE
Status: COMPLETED | OUTPATIENT
Start: 2022-07-24 | End: 2022-07-24

## 2022-07-24 RX ORDER — KETOROLAC TROMETHAMINE 30 MG/ML
15 INJECTION, SOLUTION INTRAMUSCULAR; INTRAVENOUS ONCE
Status: COMPLETED | OUTPATIENT
Start: 2022-07-24 | End: 2022-07-24

## 2022-07-24 RX ADMIN — KETOROLAC TROMETHAMINE 15 MG: 30 INJECTION, SOLUTION INTRAMUSCULAR; INTRAVENOUS at 16:22

## 2022-07-24 RX ADMIN — IOPAMIDOL 80 ML: 755 INJECTION, SOLUTION INTRAVENOUS at 15:24

## 2022-07-24 RX ADMIN — SODIUM CHLORIDE 1000 ML: 9 INJECTION, SOLUTION INTRAVENOUS at 16:21

## 2022-07-24 ASSESSMENT — ENCOUNTER SYMPTOMS
ABDOMINAL PAIN: 1
VOMITING: 0
DIARRHEA: 1
COLOR CHANGE: 0
NAUSEA: 0
WHEEZING: 0
BLOOD IN STOOL: 0
SHORTNESS OF BREATH: 0
EYE PAIN: 0
CONSTIPATION: 1
EYE REDNESS: 0
RHINORRHEA: 0

## 2022-07-24 ASSESSMENT — PAIN SCALES - GENERAL
PAINLEVEL_OUTOF10: 8

## 2022-07-24 ASSESSMENT — PAIN - FUNCTIONAL ASSESSMENT
PAIN_FUNCTIONAL_ASSESSMENT: 0-10

## 2022-07-24 ASSESSMENT — PAIN DESCRIPTION - ORIENTATION: ORIENTATION: LOWER

## 2022-07-24 ASSESSMENT — PAIN DESCRIPTION - LOCATION: LOCATION: ABDOMEN

## 2022-07-24 NOTE — ED NOTES
Pt medicated per MAR. Pt ambulated to bathroom, tolerated well.  [address] [facility],  [address], [facility]  07/24/22 4706

## 2022-07-24 NOTE — ED TRIAGE NOTES
Pt presents to the ER from home with c/o lower abdominal pain that started last night. Pt states she feels bloated. Pt states she was having diarrhea but is now constipated. Pt took Tylenol this morning. Pt is alert and oriented, respirations even and unlabored.  VSS

## 2022-07-24 NOTE — ED NOTES
Pt back from radiology, resting in bed. No needs expressed at this time.  [facility]  07/24/22 1530

## 2022-07-24 NOTE — DISCHARGE INSTRUCTIONS
Go to your urology appointment 8 AM on Tuesday. Follow-up with your PCP as needed. Return to the emergency department if your symptoms worsen or if you develop fever. Take ibuprofen as needed and drink plenty of fluids.

## 2022-07-24 NOTE — ED PROVIDER NOTES
[address] [address]          Pt Name: Olvin Lopez  MRN: [id number]  [place] 1980  Date of evaluation: 7/24/2022  Treating Resident Physician: Luisa Wallace DO  Supervising Physician: Rita Mondragon MD    History obtained from the patient. CHIEF COMPLAINT       Chief Complaint   Patient presents with    Abdominal Pain           HISTORY OF PRESENT ILLNESS    HPI  Olvin Lopez is a 38 y.o. female who presents to the emergency department for evaluation of lower abdominal pain started last night. Patient describes it as a sharp pressure, or it hurts to walk. Is also worse whenever she coughs and improves with certain positions of her legs. Patient has taken Tylenol. She complains of watery diarrhea and increased urination. Patient has had symptoms intermittently for several years, but this is the most painful episode yet. Pt has had a hysterectomy. She denies F/C, N/V, hematuria, hematochezia, dyspnea. The patient has no other acute complaints at this time. REVIEW OF SYSTEMS   Review of Systems   Constitutional:  Negative for chills and fever. HENT:  Negative for congestion and rhinorrhea. Eyes:  Negative for pain and redness. Respiratory:  Negative for shortness of breath and wheezing. Cardiovascular:  Negative for chest pain and leg swelling. Gastrointestinal:  Positive for abdominal pain, constipation and diarrhea. Negative for blood in stool, nausea and vomiting. Genitourinary:  Positive for frequency. Negative for dysuria. Musculoskeletal:  Negative for gait problem and joint swelling. Skin:  Negative for color change and pallor. Neurological:  Negative for facial asymmetry and speech difficulty.        PAST MEDICAL AND SURGICAL HISTORY     Past Medical History:   Diagnosis Date    Bell palsy 2020    right side droop    Essential hypertension 11/7/2016    GERD (gastroesophageal reflux disease) Hashimoto's thyroiditis 2016    HIGH CHOLESTEROL     Hyperlipidemia     Hypokalemia     Prediabetes 2020    Subclinical hyperthyroidism 2016     Past Surgical History:   Procedure Laterality Date     SECTION  ,     COLONOSCOPY  2021    1 polyp removed     DILATION AND CURETTAGE OF UTERUS      ENDOSCOPY, COLON, DIAGNOSTIC      HYSTERECTOMY, TOTAL ABDOMINAL (CERVIX REMOVED) N/A 9/10/2020    HYSTERECTOMY ABDOMINAL TOTAL, BS, POSS DANIELLE performed by Nicole Salmeron MD at [address] 6/15/2020    DIAGNOSTIC LAPAROSCOPY, performed by Niocle Salmeron MD at [address]     Current Facility-Administered Medications:     0.9 % sodium chloride bolus, 1,000 mL, IntraVENous, Once, Zoë Portal, DO, Last Rate: 1,000 mL/hr at 22 1621, 1,000 mL at 22 1621    Current Outpatient Medications:     gabapentin (NEURONTIN) 300 MG capsule, Take 1 capsule PO daily x 1 day, then take 1 capsule PO BID x 1 day, then take 1 capsule PO TID. Intended supply: 90 days (Patient taking differently: Take 600 mg by mouth 3 times daily. Take 1 capsule PO daily x 1 day, then take 1 capsule PO BID x 1 day, then take 1 capsule PO TID.  Intended supply: 90 days), Disp: 270 capsule, Rfl: 0    metoprolol succinate (TOPROL XL) 100 MG extended release tablet, take 1 tablet by mouth once daily, Disp: 90 tablet, Rfl: 3    amLODIPine (NORVASC) 10 MG tablet, take 1 tablet by mouth once daily, Disp: 90 tablet, Rfl: 3    hydroCHLOROthiazide (MICROZIDE) 12.5 MG capsule, take 1 capsule by mouth once daily, Disp: 90 capsule, Rfl: 1    omeprazole (PRILOSEC) 40 MG delayed release capsule, Take 1 capsule by mouth nightly, Disp: 90 capsule, Rfl: 1    sertraline (ZOLOFT) 100 MG tablet, take 1 tablet by mouth once daily, Disp: 90 tablet, Rfl: 3      SOCIAL HISTORY     Social History     Social History Narrative    Not on file     Social History     Tobacco Use    Smoking status: Never    Smokeless tobacco: Never   Vaping Use    Vaping Use: Never used   Substance Use Topics    Alcohol use: Yes     Comment: social    Drug use: No         ALLERGIES     Allergies   Allergen Reactions    Bactrim Swelling     tongue    Sulfamethoxazole-Trimethoprim Swelling     tongue         FAMILY HISTORY     Family History   Problem Relation Age of Onset    High Blood Pressure Mother     Diabetes Mother     Heart Disease Mother     High Blood Pressure Father     Diabetes Father     Breast Cancer Maternal Aunt 50        reoccurred at 79         PREVIOUS RECORDS   Previous records reviewed:  Last seen on 05/01/22 for left leg pain . PHYSICAL EXAM     ED Triage Vitals   BP Temp Temp src Pulse Resp SpO2 Height Weight   -- -- -- -- -- -- -- --     Initial vital signs and nursing assessment reviewed and abnormal from blood pressure 165/90 . Body mass index is 37.76 kg/m². Pulsoximetry is normal per my interpretation. Additional Vital Signs:  Vitals:    07/24/22 1624   BP: (!) 164/96   Pulse: 65   Resp: 18   Temp:    SpO2: 99%       Physical Exam  Constitutional:       General: She is not in acute distress. Appearance: Normal appearance. She is not ill-appearing or toxic-appearing. HENT:      Head: Normocephalic and atraumatic. Cardiovascular:      Rate and Rhythm: Normal rate and regular rhythm. Heart sounds: No murmur heard. No friction rub. No gallop. Pulmonary:      Effort: Pulmonary effort is normal.      Breath sounds: Normal breath sounds. No wheezing, rhonchi or rales. Abdominal:      Palpations: Abdomen is soft. Tenderness: There is abdominal tenderness (mild-moderate. Periumbilical, BLQ, and suprapubic). There is no right CVA tenderness, left CVA tenderness, guarding or rebound. Musculoskeletal:         General: Normal range of motion. Cervical back: Normal range of motion and neck supple. Skin:     General: Skin is warm and dry.    Neurological:      General: No focal deficit present. Mental Status: She is alert and oriented to person, place, and time. MEDICAL DECISION MAKING   Initial Assessment:   Is a nontoxic-appearing 40-year-old woman who presents with sharp lower abdominal pressure that started last night. She also complains of watery diarrhea and increased urinary frequency. Patient has had this pain intermittently for years but this is the most painful episode yet. She has yet to discover what caused the pain. Patient has a history of a hysterectomy. She does have tenderness to the suprapubic area, bilateral lower quadrants, and periumbilical with no CVA tenderness. Ddx: Surgical adhesions, ovarian cyst, nephrolithiasis, uterolithiasis, appendicitis, ovarian tumor. Plan:   CBC, BMP, LFTs, lipase, UA. CT revealed minimal right hydronephrosis and a 5 mm stone at the right uteropelvic junction. UA was negative for any signs of infection and Pt is afebrile so at this time we are not concerned for an infected stone. Consulted urology, Dionne Martinez MD, who requested she f/u in stone clinic. Appt was scheduled for Tuesday at 8 AM and Pt D/C. ED RESULTS   Laboratory results:  Labs Reviewed   CBC WITH AUTO DIFFERENTIAL - Abnormal; Notable for the following components:       Result Value    WBC 11.3 (*)     Hematocrit 36.7 (*)     Platelets 954 (*)     All other components within normal limits   BASIC METABOLIC PANEL - Abnormal; Notable for the following components:    Glucose 134 (*)     All other components within normal limits   HEPATIC FUNCTION PANEL - Abnormal; Notable for the following components:     Total Bilirubin 0.2 (*)     All other components within normal limits   URINE WITH REFLEXED MICRO - Abnormal; Notable for the following components:    Blood, Urine LARGE (*)     All other components within normal limits   LIPASE   ANION GAP   GLOMERULAR FILTRATION RATE, ESTIMATED   OSMOLALITY       Radiologic studies results:  CT ABDOMEN PELVIS W IV CONTRAST Additional Contrast? None   Final Result   1. Right-sided hydronephrosis secondary to a calcification at the right ureteropelvic junction. 2. Additional right-sided nephrolithiasis. 3. Sigmoid colon diverticulosis. 4. Left ovarian cyst.      Final report electronically signed by Dr. Matteo Silva on 7/24/2022 3:42 PM          ED Medications administered this visit:   Medications   0.9 % sodium chloride bolus (1,000 mLs IntraVENous New Bag 7/24/22 1621)   iopamidol (ISOVUE-370) 76 % injection 80 mL (80 mLs IntraVENous Given 7/24/22 1524)   ketorolac (TORADOL) injection 15 mg (15 mg IntraVENous Given 7/24/22 1622)         ED COURSE     ED Course as of 07/24/22 1657   Sun Jul 24, 2022   1616 Pt prescribed Toradol and IVF [AC]      ED Course User Index  [AC] Luke Kumar DO         Strict return precautions and follow up instructions were discussed with the patient prior to discharge, with which the patient agrees. MEDICATION CHANGES     Current Discharge Medication List            FINAL DISPOSITION     Final diagnoses:   Ureterolithiasis   Hydronephrosis, unspecified hydronephrosis type     Condition: condition: fair  Dispo: Discharge to home      This transcription was electronically signed. Parts of this transcriptions may have been dictated by use of voice recognition software and electronically transcribed, and parts may have been transcribed with the assistance of an ED scribe. The transcription may contain errors not detected in proofreading. Please refer to my supervising physician's documentation if my documentation differs.     Electronically Signed: Luke Kumar DO, 07/24/22, 4:57 PM         Luke Kumar DO  Resident  07/24/22 5743

## 2022-07-25 ENCOUNTER — CARE COORDINATION (OUTPATIENT)
Dept: OTHER | Facility: CLINIC | Age: 42
End: 2022-07-25

## 2022-07-25 NOTE — CARE COORDINATION
[address] [facility] ED Follow Up Call    2022    Patient: Caye Pallas Patient : 1980   MRN: [id number]  Reason for Admission: Ureterolithiasis, Hydronephrosis  Discharge Date: 2022    ACM attempted to reach patient for Care Transitions call. HIPAA compliant message left requesting a return phone call at patients convenience. Will continue to follow. CAMDEN Thomas RN  Associate Care Manager  Phone: [phone]  Email: [email or web address]

## 2022-07-26 ENCOUNTER — CARE COORDINATION (OUTPATIENT)
Dept: OTHER | Facility: CLINIC | Age: 42
End: 2022-07-26

## 2022-08-03 ENCOUNTER — CARE COORDINATION (OUTPATIENT)
Dept: OTHER | Facility: CLINIC | Age: 42
End: 2022-08-03

## 2022-08-03 NOTE — CARE COORDINATION
[address] ED Follow Up Call    8/3/2022    Patient: Kyaw Taylor Patient : 1980   MRN: [id number]  Reason for Admission: Ureterolithiasis, Hydronephrosis  Discharge Date: 2022    ACM attempted third and final call to patient for introduction to Associate Care Management. HIPAA compliant message left requesting a return phone call at patients convenience. Final Unable to Reach Letter sent via [employer]. No further outreach scheduled with this ACM, ACM will sign off care team at this time. Patient has been provided with this ACM's contact information. CAMDEN Mei RN  Associate Care Manager  Phone: [phone]  Email: [email or web address]

## 2022-08-04 ENCOUNTER — OFFICE VISIT (OUTPATIENT)
Dept: UROLOGY | Age: 42
End: 2022-08-04
Payer: COMMERCIAL

## 2022-08-04 ENCOUNTER — TELEPHONE (OUTPATIENT)
Dept: UROLOGY | Age: 42
End: 2022-08-04

## 2022-08-04 ENCOUNTER — PREP FOR PROCEDURE (OUTPATIENT)
Dept: UROLOGY | Age: 42
End: 2022-08-04

## 2022-08-04 VITALS
BODY MASS INDEX: 37.9 KG/M2 | DIASTOLIC BLOOD PRESSURE: 72 MMHG | TEMPERATURE: 97.4 F | SYSTOLIC BLOOD PRESSURE: 134 MMHG | HEIGHT: 64 IN | WEIGHT: 222 LBS

## 2022-08-04 DIAGNOSIS — N20.0 KIDNEY STONES: Primary | ICD-10-CM

## 2022-08-04 DIAGNOSIS — R35.0 URINARY FREQUENCY: ICD-10-CM

## 2022-08-04 DIAGNOSIS — Z01.818 PRE-OP TESTING: ICD-10-CM

## 2022-08-04 DIAGNOSIS — N28.1 RENAL CYST: ICD-10-CM

## 2022-08-04 DIAGNOSIS — N20.0 KIDNEY STONES: ICD-10-CM

## 2022-08-04 DIAGNOSIS — R31.29 MICROSCOPIC HEMATURIA: ICD-10-CM

## 2022-08-04 DIAGNOSIS — R10.9 FLANK PAIN: ICD-10-CM

## 2022-08-04 DIAGNOSIS — N32.81 OAB (OVERACTIVE BLADDER): Primary | ICD-10-CM

## 2022-08-04 PROCEDURE — 1036F TOBACCO NON-USER: CPT | Performed by: UROLOGY

## 2022-08-04 PROCEDURE — G8417 CALC BMI ABV UP PARAM F/U: HCPCS | Performed by: UROLOGY

## 2022-08-04 PROCEDURE — G8427 DOCREV CUR MEDS BY ELIG CLIN: HCPCS | Performed by: UROLOGY

## 2022-08-04 PROCEDURE — 99214 OFFICE O/P EST MOD 30 MIN: CPT | Performed by: UROLOGY

## 2022-08-04 RX ORDER — SODIUM CHLORIDE 9 MG/ML
INJECTION, SOLUTION INTRAVENOUS CONTINUOUS
Status: CANCELLED | OUTPATIENT
Start: 2022-08-23

## 2022-08-04 RX ORDER — HYDROCODONE BITARTRATE AND ACETAMINOPHEN 5; 325 MG/1; MG/1
1 TABLET ORAL EVERY 4 HOURS PRN
Qty: 28 TABLET | Refills: 0 | Status: SHIPPED | OUTPATIENT
Start: 2022-08-04 | End: 2022-08-11

## 2022-08-04 RX ORDER — TAMSULOSIN HYDROCHLORIDE 0.4 MG/1
0.4 CAPSULE ORAL DAILY
Qty: 14 CAPSULE | Refills: 1 | Status: SHIPPED | OUTPATIENT
Start: 2022-08-04 | End: 2022-09-28 | Stop reason: ALTCHOICE

## 2022-08-04 RX ORDER — ONDANSETRON 4 MG/1
4 TABLET, ORALLY DISINTEGRATING ORAL 3 TIMES DAILY PRN
Qty: 21 TABLET | Refills: 0 | Status: SHIPPED | OUTPATIENT
Start: 2022-08-04 | End: 2022-09-28 | Stop reason: ALTCHOICE

## 2022-08-04 NOTE — TELEPHONE ENCOUNTER
Patient is scheduled for surgery with Dr Tita Cardona on 8/23/22. Surgery consent on arrival. Patient to do pre op urine culture on 8/9/22. Patient surgery instructions verbally and mailed. Patient will have an adult over the age of 25 with them at discharge and 24 hours after procedure. Dr Trudy Watson to clear.

## 2022-08-04 NOTE — TELEPHONE ENCOUNTER
SURGERY [address] [place] [address] [address] [facility], [address]      Phone *[phone] *[phone]   Surgical Scheduling Direct Line Phone *[phone] Fax *[phone]      Aime Wilson 1980 female    [address]  [address] [facility] [place] [address]   Marital Status:          Home Phone: [phone]      Cell Phone:    Telephone Information:   Mobile [phone]          Surgeon: Dr. Rupesh Rea Surgery Date: 08/23/2022   Time: 09:30 am    Procedure: Cystoscopy, right ureteroscopy, laser lithotripsy, basket retrieval of stone fragments, and possible right ureteral stent placement     Diagnosis: Kidney Stone     Important Medical History:  In Epic    Special Inst/Equip:     CPT Codes:    75666  Latex Allergy: No     Cardiac Device:  No    Anesthesia:  General          Admission Type:  Same Day                        Admit Prior to Day of Surgery: No    Case Location:  Main OR            Preadmission Testing:  Phone Call          PAT Date and Time:______________________________________________________    PAT Confirmation #: ______________________________________________________    Post Op Visit: ___________________________________________________________    Need Preop Cardiac Clearance: Yes    Does Patient have Cardiologist/physician?      Dr Libby Catalan Confirmation #: __________________________________________________    Jenaro Backbone: ________________________   Date: __________________________     [employer] Name: Imer Hammond

## 2022-08-04 NOTE — PROGRESS NOTES
Sd Barbour MD  Urology Clinic office visit    Patient:  Claudine Crigler  YOB: 1980  Date: 8/4/2022    HISTORY OF PRESENT ILLNESS:   The patient is a 38 y.o. female who presents today for follow-up for the following problem(s):      1. OAB (overactive bladder)    2. Kidney stones    3. Microscopic hematuria    4. Urinary frequency    5. Renal cyst    6. Flank pain         Overall the problem(s) : are worsening. Associated Symptoms: No dysuria, gross hematuria. Pain Severity: Pain Score:   7    Summary of old records: seen [place] in past for OAB  hysterectomy    Additional History: Onset years  Pelvic pain, also after sex  Reports frequency and urgency  Tried Ditropan  Tried myrbetriq  Inconsistent Vesicare taking, LUTS unchanged  Lower urinary tract symptoms: urgency and frequency    1 week of pain, right side  Now radiating to right lower abdomen  No heamturia, no fevers, +nausea   5 mm calcification at the right ureteropelvic junction      I independently reviewed and verified the images and reports from:    CT ABDOMEN PELVIS W IV CONTRAST Additional Contrast? None    Result Date: 7/24/2022  PROCEDURE: CT ABDOMEN PELVIS W IV CONTRAST CLINICAL INFORMATION: Abdominal pain TECHNIQUE: CT of the abdomen and pelvis was performed following administration of 80 mL Isovue-370 intravenous contrast only. Axial images as well as coronal and sagittal reconstructions were obtained. All CT scans at this facility use dose modulation, iterative reconstruction, and/or weight-based dosing when appropriate to reduce radiation dose to as low as reasonably achievable. COMPARISON: CT abdomen and pelvis 1/15/2022 FINDINGS: Lower thorax: Visualized lung bases are clear. There is no pleural effusion. Abdomen: There is no free intraperitoneal air or fluid. Bowel is normal in course and caliber without evidence of obstruction. Diffuse hypoattenuation in the liver may be secondary to hepatic steatosis.  The gallbladder is contracted. Subcentimeter hypoattenuating lesions in the bilateral kidneys may be cysts but are incompletely characterized on the current study. There is a 1 mm calcification in the upper right kidney (image 37). There is a 5 mm calcification at the right ureteropelvic junction. Minimal hydronephrosis present on the right side. The pancreas, spleen and adrenal glands are normal. Atherosclerotic calcifications are present in the abdominal aorta without evidence of aneurysm. There is no mesenteric or retroperitoneal lymphadenopathy. Degenerative changes are present in the lumbar spine without evidence of aggressive osseous lesions. Pelvis: The urinary bladder is incompletely distended. The uterus is surgically absent. A lobulated hypoattenuating structure at the lower left pelvis measures 5.6 cm (image 68; prior measurement 7.3 cm). Phleboliths are noted in the pelvis. There are diverticula in the sigmoid colon. There is no pelvic or inguinal lymphadenopathy. Degenerative changes are present in the pelvis without evidence of aggressive osseous lesions. 1. Right-sided hydronephrosis secondary to a calcification at the right ureteropelvic junction. 2. Additional right-sided nephrolithiasis. 3. Sigmoid colon diverticulosis. 4. Left ovarian cyst. Final report electronically signed by Dr. Penelope Lopez on 7/24/2022 3:42 PM          Imaging Reviewed during this Office Visit:   (results were independently reviewed by physician and radiology report verified)  I independently reviewed and verified the images and reports from:    US RENAL COMPLETE    Result Date: 2/8/2022  PROCEDURE: US RENAL COMPLETE CLINICAL INFORMATION: Renal cyst TECHNIQUE: Ultrasound of the kidneys and urinary bladder was performed. Grayscale and color images were obtained. COMPARISON: None FINDINGS: The right kidney measures 11.3 x 6.8 x 7.1 cm and the left kidney measures 11.3 x 6.7 x 5.1 cm.  Renal cortical thickness is normal bilaterally. An avascular anechoic structure in the lower right kidney measures 1.0 cm. An avascular anechoic structure in the left mid kidney measures 1.0 cm. There is an echogenic structure measuring 0.9 cm in the lower right kidney. No hydronephrosis is identified. Color Doppler demonstrates spectral waveforms in the bilateral renal arteries. Arcuate resistive indices are normal bilaterally. The distended urinary bladder is unremarkable. There is a small amount of postvoid residual urine in the bladder. 1. Probable bilateral renal cysts. 2. Nonobstructive right-sided nephrolithiasis. 3. Small amount of postvoid residual urine but otherwise unremarkable urinary bladder. Final report electronically signed by Dr. Judy Long on 2022 4:39 PM      Urinalysis today:  No results found for this visit on 22.     Last BUN and creatinine:  Lab Results   Component Value Date    BUN 15 2022     Lab Results   Component Value Date    CREATININE 0.6 2022       PAST MEDICAL, FAMILY AND SOCIAL HISTORY UPDATE:  Past Medical History:   Diagnosis Date    Bell palsy     right side droop    Essential hypertension 2016    GERD (gastroesophageal reflux disease)     Hashimoto's thyroiditis 2016    HIGH CHOLESTEROL     Hyperlipidemia     Hypokalemia     Prediabetes 2020    Subclinical hyperthyroidism 2016     Past Surgical History:   Procedure Laterality Date     SECTION  2014    COLONOSCOPY  2021    1 polyp removed     DILATION AND CURETTAGE OF UTERUS      ENDOSCOPY, COLON, DIAGNOSTIC      HYSTERECTOMY, TOTAL ABDOMINAL (CERVIX REMOVED) N/A 9/10/2020    HYSTERECTOMY ABDOMINAL TOTAL, BS, POSS DANIELLE performed by Darby Escalante MD at [facility] N/A 6/15/2020    DIAGNOSTIC LAPAROSCOPY, performed by Darby Escalante MD at [facility] History   Problem Relation Age of Onset    High Blood Pressure Mother     Diabetes Mother     Heart Disease Mother     High Blood Pressure Father     Diabetes Father     Breast Cancer Maternal Aunt 50        reoccurred at 79     Outpatient Medications Marked as Taking for the 8/4/22 encounter (Office Visit) with Fab Ortiz MD   Medication Sig Dispense Refill    ondansetron (ZOFRAN-ODT) 4 MG disintegrating tablet Take 1 tablet by mouth 3 times daily as needed for Nausea or Vomiting 21 tablet 0    tamsulosin (FLOMAX) 0.4 MG capsule Take 1 capsule by mouth in the morning for 14 days. 14 capsule 1    HYDROcodone-acetaminophen (NORCO) 5-325 MG per tablet Take 1 tablet by mouth every 4 hours as needed for Pain for up to 7 days. Intended supply: 7 days. Take lowest dose possible to manage pain 28 tablet 0    metoprolol succinate (TOPROL XL) 100 MG extended release tablet take 1 tablet by mouth once daily 90 tablet 3    amLODIPine (NORVASC) 10 MG tablet take 1 tablet by mouth once daily 90 tablet 3    hydroCHLOROthiazide (MICROZIDE) 12.5 MG capsule take 1 capsule by mouth once daily 90 capsule 1    omeprazole (PRILOSEC) 40 MG delayed release capsule Take 1 capsule by mouth nightly 90 capsule 1    sertraline (ZOLOFT) 100 MG tablet take 1 tablet by mouth once daily 90 tablet 3       Bactrim and Sulfamethoxazole-trimethoprim  Social History     Tobacco Use   Smoking Status Never   Smokeless Tobacco Never       Social History     Substance and Sexual Activity   Alcohol Use Yes    Comment: social       REVIEW OF SYSTEMS:  Constitutional: negative  Eyes: negative  Respiratory: negative  Cardiovascular: negative  Gastrointestinal: negative  Genitourinary: negative except for what is in HPI  Musculoskeletal: negative  Skin: negative   Neurological: negative  Hematological/Lymphatic: negative  Psychological: negative    Physical Exam:      Vitals:    08/04/22 0757   BP: 134/72   Temp: 97.4 °F (36.3 °C)     Patient is a 38 y.o. female in no acute distress and alert and oriented to person, place and time.     NAD, Alert  Non labored respiration  Normal peripheral pulses  Soft, non tender  Skin-warm and dry  Psych- normal mood and affect    Assessment and Plan      1. OAB (overactive bladder)    2. Kidney stones    3. Microscopic hematuria    4. Urinary frequency    5. Renal cyst    6. Flank pain           Plan:       OAB: Trial of Vesicare; inconsistent taking; LUTS must not be too bothersome  US reviewed: no masses noted  Kidney stones: new episode of flank pain; ureteral stone, right side  TOP  Surgery in future,  pt will call and cancel if passes stone  Cysto right URS, HLL and stent placement               Prescriptions Ordered:  Orders Placed This Encounter   Medications    ondansetron (ZOFRAN-ODT) 4 MG disintegrating tablet     Sig: Take 1 tablet by mouth 3 times daily as needed for Nausea or Vomiting     Dispense:  21 tablet     Refill:  0    tamsulosin (FLOMAX) 0.4 MG capsule     Sig: Take 1 capsule by mouth in the morning for 14 days. Dispense:  14 capsule     Refill:  1    HYDROcodone-acetaminophen (NORCO) 5-325 MG per tablet     Sig: Take 1 tablet by mouth every 4 hours as needed for Pain for up to 7 days. Intended supply: 7 days. Take lowest dose possible to manage pain     Dispense:  28 tablet     Refill:  0     Reduce doses taken as pain becomes manageable        Orders Placed:  No orders of the defined types were placed in this encounter.            MD Andres Asher M.D, MD  [facility]

## 2022-08-04 NOTE — TELEPHONE ENCOUNTER
Patient is scheduled for a Cystoscopy, right ureteroscopy, laser lithotripsy, basket retrieval of stone fragments, and possible right ureteral stent placement with Dr Michele Alcantara on 8/23/22. We are asking for clearance and also and EKG. Thank you.

## 2022-08-04 NOTE — TELEPHONE ENCOUNTER
DO NOT TAKE ASPIRIN,  FISH OIL, MOBIC,COUMADIN, IBUPROFEN, MOTRIN-LIKE DRUGS AND ANY MULTIVITAMINS OR OVER THE COUNTER SUPPLEMENTS 14 DAYS PRIOR TO SURGERY. MUST HAVE AN ADULT OVER THE AGE OF 18 WITH YOU AT THE TIME OF THE DISCHARGE AND WITH YOU AT HOME AFTER THE PROCEDURE FOR 24 HOURS          Myathienblakesweetie AIMEE Sands 1980 Diagnosis:     Surgical Physician: Dr. Pang Dk have been scheduled for the procedure marked below:      Surgery: Cystoscopy, right ureteroscopy, laser lithotripsy, basket retrieval of stone fragments, and possible right ureteral stent placement          Date: 08/23/2022     Anesthesia: Anesthesiologist (General/Spinal)     Place of Service: [address] [place] [address] Second Floor Same Day Surgery         Arrive to same day surgery by:  07:30 am  (Surgery time is subject to change)      INSTRUCTIONS AS MARKED BELOW:    1.  DO NOT eat or drink anything after midnight before surgery. 2.  We prefer you shower or bathe with an antibacterial soap (Dial) the morning of surgery. 3  Please bring a current medication list, photo ID and insurance card(s) with you  4. Okay to take Tylenol  5. If you take Glucophage, Metformin or Janumet, hold 48-hours prior to surgery  6  Take blood pressure or heart medication as directed, if taken in the morning take with a small sip of water. 7. The office will call you in 1-2 days after your procedure to schedule a follow up. DATE SENSITIVE TESTING-DO ON THE DATE LISTED *WALK IN *NO APPOINTMENT    DO URINE CULTURE ON 8/9/22. ORDERS INCLUDED.         Date: 8/4/2022

## 2022-08-05 ENCOUNTER — TELEPHONE (OUTPATIENT)
Dept: UROLOGY | Age: 42
End: 2022-08-05

## 2022-08-05 NOTE — TELEPHONE ENCOUNTER
No prior authorization required for CPT 44884 per [facility]ple[facility] at [facility]. Ref # [id number].

## 2022-08-11 DIAGNOSIS — N20.0 KIDNEY STONE: Primary | ICD-10-CM

## 2022-08-11 NOTE — TELEPHONE ENCOUNTER
Patient called today and stated that she passed her stone so surgery is cancelled for 8/23/22. Per Dr Bryanna Hill have patient follow up in 2 months with a TONYA and KUB prior. Patient is scheduled for follow up appt on 10/6/22 at 1:45 pm. Surgery called and notified to cancel surgery for 8/23/22.

## 2022-08-13 ENCOUNTER — HOSPITAL ENCOUNTER (EMERGENCY)
Age: 42
Discharge: HOME | End: 2022-08-13
Payer: COMMERCIAL

## 2022-08-13 VITALS
DIASTOLIC BLOOD PRESSURE: 84 MMHG | HEART RATE: 76 BPM | SYSTOLIC BLOOD PRESSURE: 157 MMHG | RESPIRATION RATE: 18 BRPM | BODY MASS INDEX: 37.76 KG/M2 | OXYGEN SATURATION: 98 % | WEIGHT: 220 LBS | TEMPERATURE: 97.6 F

## 2022-08-13 DIAGNOSIS — R52 BODY ACHES: ICD-10-CM

## 2022-08-13 DIAGNOSIS — B34.9 VIRAL SYNDROME: Primary | ICD-10-CM

## 2022-08-13 DIAGNOSIS — Z20.822 EXPOSURE TO CONFIRMED CASE OF COVID-19: ICD-10-CM

## 2022-08-13 LAB — SARS-COV-2, NAA: NOT  DETECTED

## 2022-08-13 PROCEDURE — 99213 OFFICE O/P EST LOW 20 MIN: CPT | Performed by: EMERGENCY MEDICINE

## 2022-08-13 PROCEDURE — 87635 SARS-COV-2 COVID-19 AMP PRB: CPT

## 2022-08-13 PROCEDURE — 99213 OFFICE O/P EST LOW 20 MIN: CPT

## 2022-08-13 ASSESSMENT — ENCOUNTER SYMPTOMS
SHORTNESS OF BREATH: 0
COUGH: 0
SINUS PRESSURE: 0
SINUS PAIN: 0
NAUSEA: 1
RHINORRHEA: 0
SORE THROAT: 0
ABDOMINAL DISTENTION: 0
ABDOMINAL PAIN: 0
VOMITING: 0

## 2022-08-13 ASSESSMENT — PAIN - FUNCTIONAL ASSESSMENT: PAIN_FUNCTIONAL_ASSESSMENT: NONE - DENIES PAIN

## 2022-08-13 NOTE — ED PROVIDER NOTES
[facility]  Urgent Care Encounter       CHIEF COMPLAINT       Chief Complaint   Patient presents with    Covid Testing    Generalized Body Aches       Nurses Notes reviewed and I agree except as noted in the HPI. HISTORY OF PRESENT ILLNESS   Deloris Sands is a 38 y.o. female who presents body aches. HPI  Complains of generalized body aches and pains over the last 2 days. Patient reports known COVID-positive exposure from coworkers and family members, patient requesting COVID test.  Associated symptoms of chills, body aches, headache, nausea. Patient denies fevers, vomiting, diarrhea, constipation. Patient has attempted ibuprofen and Tylenol with some mild relief. REVIEW OF SYSTEMS     Review of Systems   Constitutional:  Positive for chills. Negative for appetite change and fever. HENT:  Negative for ear discharge, ear pain, postnasal drip, rhinorrhea, sinus pressure, sinus pain and sore throat. Respiratory:  Negative for cough and shortness of breath. Cardiovascular:  Negative for chest pain. Gastrointestinal:  Positive for nausea. Negative for abdominal distention, abdominal pain and vomiting. Neurological:  Positive for headaches. PAST MEDICAL HISTORY         Diagnosis Date    Bell palsy     right side droop    Essential hypertension 2016    GERD (gastroesophageal reflux disease)     Hashimoto's thyroiditis 2016    HIGH CHOLESTEROL     Hyperlipidemia     Hypokalemia     Prediabetes 2020    Subclinical hyperthyroidism 2016       SURGICALHISTORY     Patient  has a past surgical history that includes  section (, ); Endoscopy, colon, diagnostic; Dilation and curettage of uterus (); laparoscopy (N/A, 6/15/2020); Colonoscopy (2021); and Hysterectomy, total abdominal (N/A, 9/10/2020).     CURRENT MEDICATIONS       Previous Medications    AMLODIPINE (NORVASC) 10 MG TABLET    take 1 tablet by mouth once daily HYDROCHLOROTHIAZIDE (MICROZIDE) 12.5 MG CAPSULE    take 1 capsule by mouth once daily    METOPROLOL SUCCINATE (TOPROL XL) 100 MG EXTENDED RELEASE TABLET    take 1 tablet by mouth once daily    OMEPRAZOLE (PRILOSEC) 40 MG DELAYED RELEASE CAPSULE    Take 1 capsule by mouth nightly    ONDANSETRON (ZOFRAN-ODT) 4 MG DISINTEGRATING TABLET    Take 1 tablet by mouth 3 times daily as needed for Nausea or Vomiting    SERTRALINE (ZOLOFT) 100 MG TABLET    take 1 tablet by mouth once daily    TAMSULOSIN (FLOMAX) 0.4 MG CAPSULE    Take 1 capsule by mouth in the morning for 14 days. ALLERGIES     Patient is is allergic to bactrim and sulfamethoxazole-trimethoprim. Patients   Immunization History   Administered Date(s) Administered    Influenza Vaccine, unspecified formulation 10/01/2016    Influenza Virus Vaccine 11/01/2017, 11/05/2018, 11/27/2019    Tdap (Boostrix, Adacel) 01/01/2016       FAMILY HISTORY     Patient's family history includes Breast Cancer (age of onset: 48) in her maternal aunt; Diabetes in her father and mother; Heart Disease in her mother; High Blood Pressure in her father and mother. SOCIAL HISTORY     Patient  reports that she has never smoked. She has never used smokeless tobacco. She reports current alcohol use. She reports that she does not use drugs. PHYSICAL EXAM     ED TRIAGE VITALS  BP: (!) 157/84, Temp: 97.6 °F (36.4 °C), Heart Rate: 76, Resp: 18, SpO2: 98 %,Estimated body mass index is 37.76 kg/m² as calculated from the following:    Height as of 8/4/22: 5' 4\" (1.626 m). Weight as of this encounter: 220 lb (99.8 kg). ,Patient's last menstrual period was 07/23/2020. Physical Exam  Vitals and nursing note reviewed. Constitutional:       General: She is not in acute distress. Appearance: She is well-developed. She is obese. She is not diaphoretic. HENT:      Head: Normocephalic and atraumatic.       Right Ear: External ear normal.      Left Ear: External ear normal.      Nose: Nose normal.   Eyes:      General: No scleral icterus. Right eye: No discharge. Left eye: No discharge. Conjunctiva/sclera: Conjunctivae normal.   Cardiovascular:      Rate and Rhythm: Normal rate and regular rhythm. Heart sounds: Normal heart sounds. No murmur heard. Pulmonary:      Effort: Pulmonary effort is normal.      Breath sounds: Normal breath sounds. Abdominal:      General: Bowel sounds are normal.      Palpations: Abdomen is soft. Musculoskeletal:      Cervical back: Normal range of motion. Skin:     General: Skin is warm and dry. Findings: No erythema or rash. Neurological:      Mental Status: She is alert and oriented to person, place, and time. Cranial Nerves: No cranial nerve deficit. Psychiatric:         Behavior: Behavior normal.         Thought Content: Thought content normal.         Judgment: Judgment normal.       DIAGNOSTIC RESULTS     Labs:No results found for this visit on 08/13/22. IMAGING:    No orders to display       URGENT CARE COURSE:     Vitals:    08/13/22 1332   BP: (!) 157/84   Pulse: 76   Resp: 18   Temp: 97.6 °F (36.4 °C)   SpO2: 98%   Weight: 220 lb (99.8 kg)       Medications - No data to display         PROCEDURES:  None    FINAL IMPRESSION      1. Viral syndrome    2. Body aches    3.  Exposure to confirmed case of COVID-19      Viral illness syndrome, patient tested negative for COVID-19 on rapid COVID test.    DISPOSITION/ PLAN   DISPOSITION Decision To Discharge 08/13/2022 01:54:34 PM     Patient tested negative for COVID-19  Viral illness syndrome with body aches and chills  Continue home ibuprofen and Tylenol as needed    PATIENT REFERRED TO:  [address] / [place] [address]:  New Prescriptions    No medications on file       Discontinued Medications    No medications on file       Current Discharge Medication List          Malou Boyd MD    (Please note that portions of this note were completed with a voice recognition program. Efforts were made to edit the dictations but occasionally words are mis-transcribed.)           Maxwell Oropeza MD  Resident  08/13/22 [id number]

## 2022-08-13 NOTE — ED PROVIDER NOTES
[facility] Encounter      CHIEFCOMPLAINT       Chief Complaint   Patient presents with    Covid Testing    Generalized Body Aches       Nurses Notes reviewed and I agree except as noted in the HPI. HISTORY OF PRESENT ILLNESS   Odilia Sands is a 38 y.o. female who presents with muscle aches and congestion. Mirta Tee MD,  personally performed and participated in key or critical portions of the evaluation and management including personally performing the exam and medical decision making. I verify the accuracy of the documentation by the resident. Please review resident note for specifics and further details of this urgent care evaluation.      Electronically signed by Nimesh Nicholson MD on 8/13/2022 at 1:43 PM       Nimesh Nicholson MD  08/13/22 [id number]

## 2022-08-13 NOTE — Clinical Note
Chuy Louise was seen and treated in our emergency department on 8/13/2022. She may return to work on 08/15/2022. If you have any questions or concerns, please don't hesitate to call.       Varghese Soares MD

## 2022-08-13 NOTE — ED TRIAGE NOTES
Patient to room requesting COVID testing. C/o generalized body aches beginning two days ago. States exposure to COVID positive coworkers. Requests work excuse. Nasopharyngeal COVID swab obtained. Patient tolerated well.

## 2022-08-13 NOTE — Clinical Note
Maryellen Dickey was seen and treated in our emergency department on 8/13/2022. She may return to work on 08/15/2022. If you have any questions or concerns, please don't hesitate to call.       Jarod Gómez MD

## 2022-08-15 ENCOUNTER — HOSPITAL ENCOUNTER (EMERGENCY)
Age: 42
Discharge: HOME | End: 2022-08-15
Attending: EMERGENCY MEDICINE
Payer: COMMERCIAL

## 2022-08-15 VITALS
TEMPERATURE: 98 F | BODY MASS INDEX: 37.9 KG/M2 | OXYGEN SATURATION: 98 % | WEIGHT: 222 LBS | RESPIRATION RATE: 16 BRPM | HEART RATE: 93 BPM | SYSTOLIC BLOOD PRESSURE: 168 MMHG | HEIGHT: 64 IN | DIASTOLIC BLOOD PRESSURE: 102 MMHG

## 2022-08-15 DIAGNOSIS — M54.16 LUMBAR BACK PAIN WITH RADICULOPATHY AFFECTING LEFT LOWER EXTREMITY: Primary | ICD-10-CM

## 2022-08-15 PROCEDURE — 6360000002 HC RX W HCPCS: Performed by: EMERGENCY MEDICINE

## 2022-08-15 PROCEDURE — 99284 EMERGENCY DEPT VISIT MOD MDM: CPT

## 2022-08-15 PROCEDURE — 6370000000 HC RX 637 (ALT 250 FOR IP): Performed by: EMERGENCY MEDICINE

## 2022-08-15 PROCEDURE — 96372 THER/PROPH/DIAG INJ SC/IM: CPT

## 2022-08-15 RX ORDER — LIDOCAINE 4 G/G
1 PATCH TOPICAL ONCE
Status: DISCONTINUED | OUTPATIENT
Start: 2022-08-15 | End: 2022-08-15 | Stop reason: HOSPADM

## 2022-08-15 RX ORDER — KETOROLAC TROMETHAMINE 30 MG/ML
30 INJECTION, SOLUTION INTRAMUSCULAR; INTRAVENOUS ONCE
Status: DISCONTINUED | OUTPATIENT
Start: 2022-08-15 | End: 2022-08-15

## 2022-08-15 RX ORDER — PREDNISONE 20 MG/1
60 TABLET ORAL ONCE
Status: COMPLETED | OUTPATIENT
Start: 2022-08-15 | End: 2022-08-15

## 2022-08-15 RX ORDER — LIDOCAINE 50 MG/G
1 PATCH TOPICAL DAILY
Qty: 30 PATCH | Refills: 0 | Status: SHIPPED | OUTPATIENT
Start: 2022-08-15

## 2022-08-15 RX ORDER — ACETAMINOPHEN 500 MG
1000 TABLET ORAL ONCE
Status: COMPLETED | OUTPATIENT
Start: 2022-08-15 | End: 2022-08-15

## 2022-08-15 RX ORDER — KETOROLAC TROMETHAMINE 30 MG/ML
30 INJECTION, SOLUTION INTRAMUSCULAR; INTRAVENOUS ONCE
Status: COMPLETED | OUTPATIENT
Start: 2022-08-15 | End: 2022-08-15

## 2022-08-15 RX ORDER — PREDNISONE 50 MG/1
50 TABLET ORAL DAILY
Qty: 4 TABLET | Refills: 0 | Status: SHIPPED | OUTPATIENT
Start: 2022-08-15 | End: 2022-09-07

## 2022-08-15 RX ADMIN — ACETAMINOPHEN 1000 MG: 500 TABLET ORAL at 09:48

## 2022-08-15 RX ADMIN — PREDNISONE 60 MG: 20 TABLET ORAL at 09:48

## 2022-08-15 RX ADMIN — KETOROLAC TROMETHAMINE 30 MG: 30 INJECTION, SOLUTION INTRAMUSCULAR; INTRAVENOUS at 09:50

## 2022-08-15 ASSESSMENT — PAIN - FUNCTIONAL ASSESSMENT
PAIN_FUNCTIONAL_ASSESSMENT: 0-10
PAIN_FUNCTIONAL_ASSESSMENT: 0-10

## 2022-08-15 ASSESSMENT — PAIN DESCRIPTION - LOCATION: LOCATION: LEG

## 2022-08-15 ASSESSMENT — PAIN SCALES - GENERAL
PAINLEVEL_OUTOF10: 8
PAINLEVEL_OUTOF10: 4

## 2022-08-15 ASSESSMENT — ENCOUNTER SYMPTOMS: BACK PAIN: 1

## 2022-08-15 ASSESSMENT — PAIN DESCRIPTION - PAIN TYPE: TYPE: ACUTE PAIN

## 2022-08-15 ASSESSMENT — PAIN DESCRIPTION - ORIENTATION: ORIENTATION: LEFT

## 2022-08-15 NOTE — ED TRIAGE NOTES
Pt comes to ED through triage. Pt states that she is having 9/10 back pain that radiates down the left leg. Pt states she has a history of \"sciatica\" and states the pain worsened on 8/12. Pt states that she took \"norco at 2100\" yesterday.

## 2022-08-15 NOTE — ED PROVIDER NOTES
[address] [facility] [address] [address] EMERGENCY DEPT    EMERGENCY MEDICINE     Pt Name: Jagdish Temple  MRN: [id number]  [place] 1980  Date of evaluation: 8/15/2022  Provider: Pankaj Mock DO, FACEP    CHIEF COMPLAINT       Chief Complaint   Patient presents with    Leg Pain       HISTORY OF PRESENT ILLNESS    Deloris Calderon is a pleasant 38 y.o. female   Presents to the emergency department from home   L sided low back pain radiates into L thigh, descirbed as burning  Has longstanding history of low back pain with radiation, is under the care of orthopedics, has had imaging that showed bulging disks and is scheduled for lumbar spinal injections in the future for this. Will last few days, pain relentless but no red flags including the loss of bowel or bladder control, no fever, no saddle anesthesia or hyperesthesia. Triage notes and Nursing notes were reviewed by myself. Any discrepancies are addressed above.     PAST MEDICAL HISTORY     Past Medical History:   Diagnosis Date    Bell palsy     right side droop    Essential hypertension 2016    GERD (gastroesophageal reflux disease)     Hashimoto's thyroiditis 2016    HIGH CHOLESTEROL     Hyperlipidemia     Hypokalemia     Prediabetes 2020    Subclinical hyperthyroidism 2016       SURGICAL HISTORY       Past Surgical History:   Procedure Laterality Date     SECTION  ,     COLONOSCOPY  2021    1 polyp removed     DILATION AND CURETTAGE OF UTERUS      ENDOSCOPY, COLON, DIAGNOSTIC      HYSTERECTOMY, TOTAL ABDOMINAL (CERVIX REMOVED) N/A 9/10/2020    HYSTERECTOMY ABDOMINAL TOTAL, BS, POSS DANIELLE performed by Fantasma Crawford MD at [place] [address] N/A 6/15/2020    DIAGNOSTIC LAPAROSCOPY, performed by Fantasma Crawford MD at [facility]       Previous Medications    AMLODIPINE (NORVASC) 10 MG TABLET    take 1 tablet by mouth once daily    HYDROCHLOROTHIAZIDE (MICROZIDE) 12.5 MG CAPSULE    take 1 capsule by mouth once daily    METOPROLOL SUCCINATE (TOPROL XL) 100 MG EXTENDED RELEASE TABLET    take 1 tablet by mouth once daily    OMEPRAZOLE (PRILOSEC) 40 MG DELAYED RELEASE CAPSULE    Take 1 capsule by mouth nightly    ONDANSETRON (ZOFRAN-ODT) 4 MG DISINTEGRATING TABLET    Take 1 tablet by mouth 3 times daily as needed for Nausea or Vomiting    SERTRALINE (ZOLOFT) 100 MG TABLET    take 1 tablet by mouth once daily    TAMSULOSIN (FLOMAX) 0.4 MG CAPSULE    Take 1 capsule by mouth in the morning for 14 days. ALLERGIES     Bactrim and Sulfamethoxazole-trimethoprim    FAMILY HISTORY       Family History   Problem Relation Age of Onset    High Blood Pressure Mother     Diabetes Mother     Heart Disease Mother     High Blood Pressure Father     Diabetes Father     Breast Cancer Maternal Aunt 50        reoccurred at 79        SOCIAL HISTORY       Social History     Socioeconomic History    Marital status: Single     Spouse name: None    Number of children: 2    Years of education: None    Highest education level: None   Tobacco Use    Smoking status: Never    Smokeless tobacco: Never   Vaping Use    Vaping Use: Never used   Substance and Sexual Activity    Alcohol use: Yes     Comment: social    Drug use: No     Social Determinants of Health     Financial Resource Strain: Low Risk     Difficulty of Paying Living Expenses: Not hard at all   Food Insecurity: No Food Insecurity    Worried About Running Out of Food in the Last Year: Never true    Ran Out of Food in the Last Year: Never true       REVIEW OF SYSTEMS     Review of Systems   Constitutional:  Negative for chills and fever. Musculoskeletal:  Positive for back pain. Except as noted above the remainder of the review of systems was reviewed and is.    PHYSICAL EXAM    (up to 7 for level 4, 8 or more for level 5)     ED Triage Vitals [08/15/22 0843]   BP Temp Temp Source Heart Rate Resp SpO2 Height Weight   (!) 162/109 98 °F (36.7 °C) Oral 83 18 100 % 5' 4\" (1.626 m) 222 lb (100.7 kg)       Physical Exam  Vitals and nursing note reviewed. Constitutional:       Appearance: She is well-developed. HENT:      Head: Normocephalic. Eyes:      Extraocular Movements: Extraocular movements intact. Conjunctiva/sclera: Conjunctivae normal.      Pupils: Pupils are equal, round, and reactive to light. Neck:      Trachea: No tracheal deviation. Pulmonary:      Effort: Pulmonary effort is normal.   Musculoskeletal:         General: Normal range of motion. Cervical back: Neck supple. Comments: Low lumbar and back area without rashes, redness, or warmth. No midline tenderness   Skin:     General: Skin is warm and dry. Neurological:      Mental Status: She is alert and oriented to person, place, and time. Cranial Nerves: No cranial nerve deficit. DIAGNOSTIC RESULTS     EKG:(none if blank)  All EKG's are [facility] who either signs or Co-signs this chart in the absence of a cardiologist.        RADIOLOGY: (none if blank)   Interpretation per the Radiologistbelow, if available at the time of this note:    No orders to display       LABS:  Labs Reviewed - No data to display    All other labs were within normal range or not returned as of this dictation. Please note, any cultures that may have been sent were not resulted at the time of this patient visit. EMERGENCY DEPARTMENT COURSE andMedical Decision Making:     MDM  /   The patient presents with back pain. Based on clinical examination, as well as history of present illness, there is no evidence of cauda equina, diskitis, spinal epidural abscess, spinal hematoma, retroperitoneal hematoma or spinal malignancy. There is also no evidence of aortic dissection or AAA. These diagnoses, amongst a broad differential, were carefully considered in the evaluation of this patient.   The patient is able to heel walk and tippy-toe walk without foot drop and has normal neurosensory exam.    We will start her patient on a brief steroid course and encourage follow-up with [facility]. Strict returnprecautions and follow up instructions were discussed with the patient with which the patient agrees      ED Medications administered this visit:    Medications   acetaminophen (TYLENOL) tablet 1,000 mg (has no administration in time range)   lidocaine 4 % external patch 1 patch (1 patch TransDERmal Patch Applied 8/15/22 0945)   predniSONE (DELTASONE) tablet 60 mg (has no administration in time range)   ketorolac (TORADOL) injection 30 mg (has no administration in time range)         Procedures: (None if blank)         CLINICAL IMPRESSION     1. Lumbar back pain with radiculopathy affecting left lower extremity          DISPOSITION/PLAN   DISPOSITION Decision To Discharge 08/15/2022 09:41:41 AM      PATIENT REFERRED TO:  MARÍA Farfan - [facility]  [address] [facility]  [phone]    In 3 days      [address]   [address] [facility] [phone]  In 3 days      DISCHARGE MEDICATIONS:  New Prescriptions    LIDOCAINE (LIDODERM) 5 %    Place 1 patch onto the skin in the morning. 12 hours on, 12 hours off. Tatianna Bloodgood PREDNISONE (DELTASONE) 50 MG TABLET    Take 1 tablet by mouth in the morning.            (Please note that portions of this note were completed with a voice recognition program.  Efforts were made to edit the dictations but occasionallywords are mis-transcribed.)      Pennie Frye DOFACERENAN (electronically signed)  Attending Physician, Emergency [address], [place]  08/15/22 6315

## 2022-08-15 NOTE — ED NOTES
Pt reassessed at this time. Pt not experiencing adverse reactions due to IM injection.       Rose Marie Ch RN  08/15/22 8430

## 2022-08-15 NOTE — DISCHARGE INSTRUCTIONS
Return to the Emergency Department immediately if you develop fever, vomiting, worsenign pain, numbness, weakness, loss of bowel or bladder control , or you have any other concerns. Please follow up with your primary care/orthopaedic  doctor in 2-3 days.

## 2022-08-30 ENCOUNTER — NURSE ONLY (OUTPATIENT)
Dept: LAB | Age: 42
End: 2022-08-30

## 2022-08-31 RX ORDER — DEXAMETHASONE SODIUM PHOSPHATE 4 MG/ML
4 INJECTION, SOLUTION INTRA-ARTICULAR; INTRALESIONAL; INTRAMUSCULAR; INTRAVENOUS; SOFT TISSUE ONCE
Status: CANCELLED | OUTPATIENT
Start: 2022-08-31 | End: 2022-08-31

## 2022-08-31 RX ORDER — BUPIVACAINE HYDROCHLORIDE 2.5 MG/ML
5 INJECTION, SOLUTION EPIDURAL; INFILTRATION; INTRACAUDAL ONCE
Status: CANCELLED | OUTPATIENT
Start: 2022-08-31 | End: 2022-08-31

## 2022-09-02 LAB
APTIMA MEDIA TYPE: NORMAL
CHLAMYDIA TRACHOMATIS AMPLIFIED DET: NEGATIVE
NEISSERIA GONORRHOEAE BY AMP: NEGATIVE
ORGANISM: ABNORMAL
SPECIMEN SOURCE: NORMAL
T. VAGINALIS SPECIMEN SOURCE: NORMAL
TRICHOMONAS VAGINALIS BY NAA: NEGATIVE
URINE CULTURE, ROUTINE: ABNORMAL

## 2022-09-07 ENCOUNTER — HOSPITAL ENCOUNTER (OUTPATIENT)
Dept: INTERVENTIONAL RADIOLOGY/VASCULAR | Age: 42
Discharge: HOME | End: 2022-09-07
Payer: COMMERCIAL

## 2022-09-07 VITALS
SYSTOLIC BLOOD PRESSURE: 160 MMHG | TEMPERATURE: 97.9 F | DIASTOLIC BLOOD PRESSURE: 88 MMHG | RESPIRATION RATE: 18 BRPM | OXYGEN SATURATION: 99 % | HEART RATE: 72 BPM

## 2022-09-07 PROCEDURE — 2500000003 HC RX 250 WO HCPCS: Performed by: RADIOLOGY

## 2022-09-07 PROCEDURE — 6360000004 HC RX CONTRAST MEDICATION: Performed by: RADIOLOGY

## 2022-09-07 PROCEDURE — 2709999900 IR FLUORO GUIDED LUMBAR PUNCTURE THERAPY

## 2022-09-07 PROCEDURE — 6360000002 HC RX W HCPCS: Performed by: RADIOLOGY

## 2022-09-07 PROCEDURE — 62323 NJX INTERLAMINAR LMBR/SAC: CPT

## 2022-09-07 RX ORDER — DEXAMETHASONE SODIUM PHOSPHATE 4 MG/ML
4 INJECTION, SOLUTION INTRA-ARTICULAR; INTRALESIONAL; INTRAMUSCULAR; INTRAVENOUS; SOFT TISSUE ONCE
Status: COMPLETED | OUTPATIENT
Start: 2022-09-07 | End: 2022-09-07

## 2022-09-07 RX ORDER — PHENAZOPYRIDINE HYDROCHLORIDE 100 MG/1
100 TABLET, FILM COATED ORAL 3 TIMES DAILY PRN
COMMUNITY
End: 2022-09-28 | Stop reason: ALTCHOICE

## 2022-09-07 RX ORDER — BUPIVACAINE HYDROCHLORIDE 2.5 MG/ML
5 INJECTION, SOLUTION EPIDURAL; INFILTRATION; INTRACAUDAL ONCE
Status: COMPLETED | OUTPATIENT
Start: 2022-09-07 | End: 2022-09-07

## 2022-09-07 RX ORDER — NITROFURANTOIN 25; 75 MG/1; MG/1
100 CAPSULE ORAL 2 TIMES DAILY
COMMUNITY
End: 2022-09-28 | Stop reason: ALTCHOICE

## 2022-09-07 RX ADMIN — BUPIVACAINE HYDROCHLORIDE 2 ML: 2.5 INJECTION, SOLUTION EPIDURAL; INFILTRATION; INTRACAUDAL; PERINEURAL at 14:43

## 2022-09-07 RX ADMIN — IOHEXOL 1 ML: 180 INJECTION INTRAVENOUS at 14:43

## 2022-09-07 RX ADMIN — DEXAMETHASONE SODIUM PHOSPHATE 4 MG: 4 INJECTION, SOLUTION INTRAMUSCULAR; INTRAVENOUS at 14:44

## 2022-09-07 ASSESSMENT — PAIN SCALES - GENERAL
PAINLEVEL_OUTOF10: 8
PAINLEVEL_OUTOF10: 8

## 2022-09-07 NOTE — H&P
Formulation and discussion of sedation / procedure plans, risks, benefits, side effects and alternatives with patient and/or responsible adult completed.     Electronically signed by Kelsey Hickman MD on 9/7/2022 at 2:33 PM

## 2022-09-07 NOTE — DISCHARGE INSTRUCTIONS
NERVE BLOCK DISCHARGE INSTRUCTION    1. Take it easy and rest the remainder of the day. 2.  Do not drive for the remainder of the day. 3.  You may use ice on the area of the injection to help alleviate discomfort. Avoid heat for the remainder of the day. 4.  Do not soak in water or use a tub bath or hot tub for the remainder of the day. 5.  You may resume normal eating and drinking. 6. This evening you may resume your pain medications and any other medications you had stopped prior to the injection. 7.  Resume activities starting tomorrow in gradual manner. You may resume physical therapy. 8. Follow up with ordering doctor if you continue to have pain. 9.  If you do have another nerve block ordered, follow instructions below:  Bring someone to drive you home. Nothing to eat or drink 2 hours prior. No blood thinners or aspirin products 5 days prior. 8.  Notify Radiology ([phone]), or go to the emergency room immediately if you develop any of the following symptoms: fever, chills, changes in mental status, severe pain, difficulty breathing, prolonged, severe headache, numbness or weakness in your arms or legs, loss of control of your bladder or bowel, excessive redness, swelling, or drainage from the area of injection.

## 2022-09-07 NOTE — PROGRESS NOTES
1434 Patient received in IR for lumbar epidural injection. Pt states she has low back pain that radiates down her left thigh. States her pain is an 8/10. Pedal push and pull equal and strong bilaterally. [phone] This procedure has been fully reviewed with the patient and written informed consent has been obtained. 1442 Procedure started with Dr. Krystle Garvin. 1444 Procedure completed; patient tolerated well. Band aid to lower back; no bleeding noted. 1447 Patient on cart; comfort ensured. *** Patient taken to OPN via cart.

## 2022-09-07 NOTE — OP NOTE
Department of Radiology  Post Procedure Progress Note    Pre-Procedure Diagnosis:  Lumbar radiculopathy     Procedure Performed:  Epidural block/steroid injection      Anesthesia: local     Findings: successful    Immediate Complications:  None    Estimated Blood Loss: minimal    SEE DICTATED PROCEDURE NOTE FOR COMPLETE DETAILS.       Electronically signed by Josselin DeL a Cruz MD on 9/7/2022 at 2:45 PM

## 2022-09-07 NOTE — PROGRESS NOTES
[address] [facility] ambulated into room for epidural injection. Pt rights and responsibilities offered to her. Procedure explained and pt verbalized questions. No other needs at this time. NPO for over 2 hours, she does not take blood thinners. Her  is Marcoe. Pedal push and pull strong and equal.   Numbness on back of left thigh. Pain in lower back and left thigh 8/10  Pain goal 3/10    1425  Deloris was taken down for procedure by transport. Manohar Perez returned from procedure. Band aid intact, site soft. Pedal push and pull strong and equal. No new numbness nor tingling. She is awake and Talking with family member. 1515 band aid intact, site soft. No new numbness nor tingling. Pedal push and pull strong and equal. No new pain, pain 8/10 pain goal still 3/10 lower back and left thigh. D/c instructions explained and pt verbalized understanding.  Deloris ambulated out with [employer] for d/c.       __m__ Safety:       (Environmental)  [place] to environment  Ensure ID band is correct and in place/ allergy band as needed  Assess for fall risk  Initiate fall precautions as applicable (fall band, side rails, etc.)  Call light within reach  Bed in low position/ wheels locked    _m___ Pain:       Assess pain level and characteristics  Administer analgesics as ordered  Assess effectiveness of pain management and report to MD as needed    _m___ Knowledge Deficit:  Assess baseline knowledge  Provide teaching at level of understanding  Provide teaching via preferred learning method  Evaluate teaching effectiveness    __m__ Hemodynamic/Respiratory Status:       (Pre and Post Procedure Monitoring)  Assess/Monitor vital signs and LOC  Assess Baseline SpO2 prior to any sedation  Obtain weight/height  Assess vital signs/ LOC until patient meets discharge criteria  Monitor procedure site and notify MD of any issues

## 2022-09-24 DIAGNOSIS — F41.0 PANIC DISORDER WITHOUT AGORAPHOBIA WITH MODERATE PANIC ATTACKS: ICD-10-CM

## 2022-09-26 RX ORDER — SERTRALINE HYDROCHLORIDE 100 MG/1
TABLET, FILM COATED ORAL
Qty: 90 TABLET | Refills: 3 | Status: SHIPPED | OUTPATIENT
Start: 2022-09-26

## 2022-09-27 ENCOUNTER — OFFICE VISIT (OUTPATIENT)
Dept: FAMILY MEDICINE CLINIC | Age: 42
End: 2022-09-27
Payer: COMMERCIAL

## 2022-09-27 VITALS
OXYGEN SATURATION: 99 % | RESPIRATION RATE: 12 BRPM | TEMPERATURE: 98.4 F | SYSTOLIC BLOOD PRESSURE: 132 MMHG | DIASTOLIC BLOOD PRESSURE: 86 MMHG | BODY MASS INDEX: 37.94 KG/M2 | HEART RATE: 75 BPM | WEIGHT: 222.2 LBS | HEIGHT: 64 IN

## 2022-09-27 DIAGNOSIS — Z00.00 WELL ADULT HEALTH CHECK: Primary | ICD-10-CM

## 2022-09-27 DIAGNOSIS — Z00.8 ENCOUNTER FOR BIOMETRIC SCREENING: ICD-10-CM

## 2022-09-27 DIAGNOSIS — M48.061 SPINAL STENOSIS OF LUMBAR REGION WITHOUT NEUROGENIC CLAUDICATION: ICD-10-CM

## 2022-09-27 PROCEDURE — 99396 PREV VISIT EST AGE 40-64: CPT | Performed by: NURSE PRACTITIONER

## 2022-09-27 NOTE — PROGRESS NOTES
Chief Complaint   Patient presents with    Annual Exam         SUBJECTIVE:  René Ordaz is a 38 y.o. female for physical exam.    Diet - regular diet  Exercise - walking  Sleep - decent  Mood - is ok    Presents today for biometric screening labs and wellness check    Waist circumference 48 inches  She is following with [facility] for her back, trying injections epidurals which haven't helped. She is not wanting surgery. Would like referral to [facility] Pain management. Health Maintenance reviewed with patient today.      Past Medical History:   Diagnosis Date    Bell palsy     right side droop    Essential hypertension 2016    GERD (gastroesophageal reflux disease)     Hashimoto's thyroiditis 2016    HIGH CHOLESTEROL     Hyperlipidemia     Hypokalemia     Prediabetes 2020    Subclinical hyperthyroidism 2016       Past Surgical History:   Procedure Laterality Date     SECTION  ,     COLONOSCOPY  2021    1 polyp removed     DILATION AND CURETTAGE OF UTERUS      ENDOSCOPY, COLON, DIAGNOSTIC      HYSTERECTOMY, TOTAL ABDOMINAL (CERVIX REMOVED) N/A 9/10/2020    HYSTERECTOMY ABDOMINAL TOTAL, BS, POSS DANIELLE performed by Candy Pablo MD at [place] [address] N/A 6/15/2020    DIAGNOSTIC LAPAROSCOPY, performed by Candy Pablo MD at [facility] History     Socioeconomic History    Marital status: Single     Spouse name: Not on file    Number of children: 2    Years of education: Not on file    Highest education level: Not on file   Occupational History    Not on file   Tobacco Use    Smoking status: Never    Smokeless tobacco: Never   Vaping Use    Vaping Use: Never used   Substance and Sexual Activity    Alcohol use: Yes     Comment: social    Drug use: No    Sexual activity: Not on file   Other Topics Concern    Not on file   Social History Narrative    Not on file     Social Determinants of Health     Financial Resource Strain: Low Risk Difficulty of Paying Living Expenses: Not hard at all   Food Insecurity: No Food Insecurity    Worried About Running Out of Food in the Last Year: Never true    Ran Out of Food in the Last Year: Never true   Transportation Needs: Not on file   Physical Activity: Not on file   Stress: Not on file   Social Connections: Not on file   Intimate Partner Violence: Not on file   Housing Stability: Not on file       Family History   Problem Relation Age of Onset    High Blood Pressure Mother     Diabetes Mother     Heart Disease Mother     High Blood Pressure Father     Diabetes Father     Breast Cancer Maternal Aunt 50        reoccurred at 79           I have reviewed the patient's past medical history, past surgical history, allergies, medications, social and family history and I have made updates where appropriate. PHYSICAL EXAM:  /86   Pulse 75   Temp 98.4 °F (36.9 °C) (Oral)   Resp 12   Ht 5' 4\" (1.626 m)   Wt 222 lb 3.2 oz (100.8 kg)   LMP 07/23/2020   SpO2 99%   BMI 38.14 kg/m²       Physical Exam  Vitals and nursing note reviewed. Constitutional:       General: She is not in acute distress. Appearance: Normal appearance. She is well-developed. She is not ill-appearing or toxic-appearing. HENT:      Head: Normocephalic and atraumatic. Right Ear: Hearing, tympanic membrane, ear canal and external ear normal.      Left Ear: Hearing, tympanic membrane, ear canal and external ear normal.      Nose: Nose normal.      Right Sinus: No maxillary sinus tenderness or frontal sinus tenderness. Left Sinus: No maxillary sinus tenderness or frontal sinus tenderness. Mouth/Throat:      Pharynx: Uvula midline. Eyes:      General: Lids are normal. Lids are everted, no foreign bodies appreciated. Conjunctiva/sclera: Conjunctivae normal.      Pupils: Pupils are equal, round, and reactive to light. Neck:      Thyroid: No thyroid mass or thyromegaly.       Vascular: Normal carotid pulses. No carotid bruit or JVD. Trachea: Trachea and phonation normal.   Cardiovascular:      Rate and Rhythm: Normal rate and regular rhythm. No extrasystoles are present. Chest Wall: PMI is not displaced. Pulses: Normal pulses. Heart sounds: Normal heart sounds, S1 normal and S2 normal. No murmur heard. No gallop. Pulmonary:      Effort: Pulmonary effort is normal.      Breath sounds: Normal breath sounds. No decreased breath sounds, wheezing, rhonchi or rales. Abdominal:      General: Bowel sounds are normal.      Palpations: Abdomen is soft. Tenderness: There is no abdominal tenderness. Musculoskeletal:         General: Normal range of motion. Cervical back: Full passive range of motion without pain, normal range of motion and neck supple. Lymphadenopathy:      Head:      Right side of head: No submental, submandibular, tonsillar, preauricular, posterior auricular or occipital adenopathy. Left side of head: No submental, submandibular, tonsillar, preauricular, posterior auricular or occipital adenopathy. Cervical: No cervical adenopathy. Skin:     General: Skin is warm and dry. Findings: No abrasion or rash. Neurological:      Mental Status: She is alert and oriented to person, place, and time. GCS: GCS eye subscore is 4. GCS verbal subscore is 5. GCS motor subscore is 6. Cranial Nerves: No cranial nerve deficit. Sensory: No sensory deficit. Coordination: Coordination normal.      Gait: Gait normal.   Psychiatric:         Speech: Speech normal.         Behavior: Behavior normal. Behavior is cooperative. Thought Content: Thought content normal.         Judgment: Judgment normal.           ASSESSMENT & PLAN  Deloris was seen today for annual exam.    Diagnoses and all orders for this visit:    Well adult health check    Encounter for biometric screening  -     Be Well Health Screen;  Future    Spinal stenosis of lumbar region without neurogenic claudication  -     [address] [facility], DO José Antonio, Pain Medicine, [address]    - work on diet/exercise  - see attached form    Return if symptoms worsen or fail to improve. Counseling was provided today regarding the following topics: Healthy eating habits, Regular exercise, substance abuse and healthy sleep habits.

## 2022-09-28 ENCOUNTER — HOSPITAL ENCOUNTER (OUTPATIENT)
Age: 42
Discharge: HOME | End: 2022-09-28
Payer: COMMERCIAL

## 2022-09-28 ENCOUNTER — TELEPHONE (OUTPATIENT)
Dept: FAMILY MEDICINE CLINIC | Age: 42
End: 2022-09-28

## 2022-09-28 DIAGNOSIS — R73.03 PREDIABETES: Primary | ICD-10-CM

## 2022-09-28 DIAGNOSIS — Z00.8 ENCOUNTER FOR BIOMETRIC SCREENING: ICD-10-CM

## 2022-09-28 DIAGNOSIS — E78.00 PURE HYPERCHOLESTEROLEMIA: ICD-10-CM

## 2022-09-28 LAB
CHOLESTEROL, TOTAL: 273 MG/DL (ref 100–199)
FASTING: YES
GLUCOSE BLD-MCNC: 112 MG/DL (ref 70–108)
HDLC SERPL-MCNC: 44 MG/DL
LDL CHOLESTEROL CALCULATED: 207 MG/DL
TRIGL SERPL-MCNC: 110 MG/DL (ref 0–199)

## 2022-09-28 PROCEDURE — 87086 URINE CULTURE/COLONY COUNT: CPT

## 2022-09-28 PROCEDURE — 82947 ASSAY GLUCOSE BLOOD QUANT: CPT

## 2022-09-28 PROCEDURE — 87077 CULTURE AEROBIC IDENTIFY: CPT

## 2022-09-28 PROCEDURE — 87147 CULTURE TYPE IMMUNOLOGIC: CPT

## 2022-09-28 PROCEDURE — 87186 SC STD MICRODIL/AGAR DIL: CPT

## 2022-09-28 PROCEDURE — 83718 ASSAY OF LIPOPROTEIN: CPT

## 2022-09-28 PROCEDURE — 82465 ASSAY BLD/SERUM CHOLESTEROL: CPT

## 2022-09-28 PROCEDURE — 84478 ASSAY OF TRIGLYCERIDES: CPT

## 2022-09-28 PROCEDURE — 36415 COLL VENOUS BLD VENIPUNCTURE: CPT

## 2022-09-28 RX ORDER — ROSUVASTATIN CALCIUM 20 MG/1
20 TABLET, COATED ORAL DAILY
Qty: 30 TABLET | Refills: 5 | Status: SHIPPED | OUTPATIENT
Start: 2022-09-28

## 2022-09-28 NOTE — TELEPHONE ENCOUNTER
Patient informed and verbalized understanding.  No questions at this time    Labs mailed to given address

## 2022-09-28 NOTE — TELEPHONE ENCOUNTER
----- Message from Hildegard Babinski, APRN - CNP sent at 9/28/2022 11:42 AM EDT -----  Let Jing Martinez know I got her labs and completed her form. Will get it faxed today. Her sugar is high, so I ordered an A1C. Can we call the lab and see if they can run the A1C off her labs? Also cholesterol is very high, her LDL (bad) was 207. I would recommend cholesterol medication, particularly since she has HTN and prediabetes, both of which increase her cardiovascular risk. Is this ok with her?

## 2022-09-28 NOTE — TELEPHONE ENCOUNTER
Patient informed and verbalized understanding. Patient is okay with starting cholesterol medication. She uses [address] [place] [address] on [facility] she states they did not draw a lavender cap so they can not run a A1C .  Patient is going to go to [place] and have lab done

## 2022-09-28 NOTE — TELEPHONE ENCOUNTER
Rx for Crestor sent to pharmacy. I'd like to recheck her cholesterol and liver function in about 6-8 weeks. Orders placed. Thanks!

## 2022-10-02 LAB
ORGANISM: ABNORMAL
URINE CULTURE, ROUTINE: ABNORMAL

## 2022-10-03 ENCOUNTER — TELEPHONE (OUTPATIENT)
Dept: FAMILY MEDICINE CLINIC | Age: 42
End: 2022-10-03

## 2022-10-03 ENCOUNTER — TELEPHONE (OUTPATIENT)
Dept: UROLOGY | Age: 42
End: 2022-10-03

## 2022-10-03 DIAGNOSIS — N39.0 ACUTE URINARY TRACT INFECTION: Primary | ICD-10-CM

## 2022-10-03 RX ORDER — CIPROFLOXACIN 500 MG/1
500 TABLET, FILM COATED ORAL 2 TIMES DAILY
Qty: 14 TABLET | Refills: 0 | Status: SHIPPED | OUTPATIENT
Start: 2022-10-03 | End: 2022-10-11 | Stop reason: SDUPTHER

## 2022-10-03 NOTE — TELEPHONE ENCOUNTER
Patient is scheduled 10/5/2022 for renal ultrasound and kub. Is it ok for her to have the testing done without the magnesium citrate? Please call her to advise.

## 2022-10-03 NOTE — TELEPHONE ENCOUNTER
Patient advised magnesium citrate is on back order and would not be able to do it prior to the KUB. She voiced understanding and will complete KUB and renal US.

## 2022-10-03 NOTE — TELEPHONE ENCOUNTER
----- Message from MARÍA Kline CNP sent at 10/3/2022  8:14 AM EDT -----  Let Mariah Queenie know her urine culture did come back growing bacteria. I am sending in Abx which should take care of it. parents

## 2022-10-05 ENCOUNTER — HOSPITAL ENCOUNTER (OUTPATIENT)
Dept: GENERAL RADIOLOGY | Age: 42
Discharge: HOME | End: 2022-10-05
Payer: COMMERCIAL

## 2022-10-05 ENCOUNTER — HOSPITAL ENCOUNTER (OUTPATIENT)
Dept: ULTRASOUND IMAGING | Age: 42
Discharge: HOME | End: 2022-10-05
Payer: COMMERCIAL

## 2022-10-05 DIAGNOSIS — N20.0 KIDNEY STONE: ICD-10-CM

## 2022-10-05 PROCEDURE — 76775 US EXAM ABDO BACK WALL LIM: CPT

## 2022-10-05 PROCEDURE — 74018 RADEX ABDOMEN 1 VIEW: CPT

## 2022-10-11 ENCOUNTER — HOSPITAL ENCOUNTER (OUTPATIENT)
Age: 42
Discharge: HOME | End: 2022-10-11
Payer: COMMERCIAL

## 2022-10-11 ENCOUNTER — PATIENT MESSAGE (OUTPATIENT)
Dept: FAMILY MEDICINE CLINIC | Age: 42
End: 2022-10-11

## 2022-10-11 ENCOUNTER — TELEPHONE (OUTPATIENT)
Dept: FAMILY MEDICINE CLINIC | Age: 42
End: 2022-10-11

## 2022-10-11 DIAGNOSIS — N39.0 ACUTE URINARY TRACT INFECTION: ICD-10-CM

## 2022-10-11 DIAGNOSIS — R73.03 PREDIABETES: ICD-10-CM

## 2022-10-11 LAB
AVERAGE GLUCOSE: 132 MG/DL (ref 70–126)
HBA1C MFR BLD: 6.4 % (ref 4.4–6.4)

## 2022-10-11 PROCEDURE — 36415 COLL VENOUS BLD VENIPUNCTURE: CPT

## 2022-10-11 PROCEDURE — 83036 HEMOGLOBIN GLYCOSYLATED A1C: CPT

## 2022-10-11 RX ORDER — CIPROFLOXACIN 500 MG/1
500 TABLET, FILM COATED ORAL 2 TIMES DAILY
Qty: 14 TABLET | Refills: 0 | Status: SHIPPED | OUTPATIENT
Start: 2022-10-11 | End: 2022-10-18

## 2022-10-11 NOTE — TELEPHONE ENCOUNTER
From: Rowan Sands  To: Ildefonso Mcarthur  Sent: 10/11/2022 8:01 AM EDT  Subject: Prescription     Gm Maury Gonzalez, the office called me last week saying a prescription for the urine infection was called in to [facility] on [address] but I called and they said it hasnt.

## 2022-10-11 NOTE — TELEPHONE ENCOUNTER
----- Message from MARÍA Trejo CNP sent at 10/11/2022  2:06 PM EDT -----  Let Florina Briceño know her A1C came back at 6.4 which is higher than it was previously. This is the diabetes test, she's very close to being considered diabetic. She has an appt coming up with me next week, i'll discuss it further with her then.

## 2022-10-18 ENCOUNTER — OFFICE VISIT (OUTPATIENT)
Dept: FAMILY MEDICINE CLINIC | Age: 42
End: 2022-10-18
Payer: COMMERCIAL

## 2022-10-18 VITALS
WEIGHT: 221.8 LBS | SYSTOLIC BLOOD PRESSURE: 128 MMHG | OXYGEN SATURATION: 99 % | RESPIRATION RATE: 18 BRPM | DIASTOLIC BLOOD PRESSURE: 80 MMHG | HEIGHT: 64 IN | BODY MASS INDEX: 37.87 KG/M2 | TEMPERATURE: 98.4 F | HEART RATE: 63 BPM

## 2022-10-18 DIAGNOSIS — E78.00 PURE HYPERCHOLESTEROLEMIA: ICD-10-CM

## 2022-10-18 DIAGNOSIS — E11.9 TYPE 2 DIABETES MELLITUS WITHOUT COMPLICATION, WITHOUT LONG-TERM CURRENT USE OF INSULIN (HCC): Primary | ICD-10-CM

## 2022-10-18 PROCEDURE — 3044F HG A1C LEVEL LT 7.0%: CPT | Performed by: NURSE PRACTITIONER

## 2022-10-18 PROCEDURE — G8427 DOCREV CUR MEDS BY ELIG CLIN: HCPCS | Performed by: NURSE PRACTITIONER

## 2022-10-18 PROCEDURE — G8484 FLU IMMUNIZE NO ADMIN: HCPCS | Performed by: NURSE PRACTITIONER

## 2022-10-18 PROCEDURE — G8417 CALC BMI ABV UP PARAM F/U: HCPCS | Performed by: NURSE PRACTITIONER

## 2022-10-18 PROCEDURE — 99214 OFFICE O/P EST MOD 30 MIN: CPT | Performed by: NURSE PRACTITIONER

## 2022-10-18 PROCEDURE — 2022F DILAT RTA XM EVC RTNOPTHY: CPT | Performed by: NURSE PRACTITIONER

## 2022-10-18 PROCEDURE — 1036F TOBACCO NON-USER: CPT | Performed by: NURSE PRACTITIONER

## 2022-10-18 NOTE — PROGRESS NOTES
Chief Complaint   Patient presents with    Diabetes       History obtained from chart review and the patient. SUBJECTIVE:  Jennifer Sepulveda is a 38 y.o. female that presents today for follow up diabetes    Diabetes Type 2    Glucose control:   Does patient check blood glucoses at home? No  Report of hypoglycemia: no  Lab Results   Component Value Date    LABA1C 6.4 10/11/2022     No results found for: EAG    Symptoms  Polyuria, Polydipsia or Polyphagia? No  Chest Pain, SOB, or Palpitations? -  No  New Vision complaints? No  Paresthesias of the extremities? No    Medications  Current medication were reviewed. Compliant with medications? yes  Medication side effects? No  On ACE-I or ARB? No  On antiplatelet therapy? No  On Statin? Yes    Last Diabetic Eye Exam: needs    Exercise  Exercise? No  Wt Readings from Last 3 Encounters:   10/18/22 221 lb 12.8 oz (100.6 kg)   09/27/22 222 lb 3.2 oz (100.8 kg)   08/15/22 222 lb (100.7 kg)       Diet discipline?:  Low salt, fat, sugar diet?  no    Blood pressure control:  BP Readings from Last 3 Encounters:   10/18/22 128/80   09/27/22 132/86   09/07/22 (!) 160/88       Lab Results   Component Value Date    LABMICR 2.36 07/11/2017       Lab Results   Component Value Date    LDLCALC 207 09/28/2022     Dyslipidemia    Current Medication regimen - Crestor  Tolerating medications well? Yes  Personal History of CAD? No   Hx of CAD in first degree relatives? Yes - mom  Current smoker? No  History of HTN? Yes  History of DM? Yes  Goal LDL - < 120    Shortness of breath or chest pain? No  Neurologic changes? no  Extremity edema?  No    Lab Results   Component Value Date    LDLCALC 207 09/28/2022     BP Readings from Last 3 Encounters:   10/18/22 128/80   09/27/22 132/86   09/07/22 (!) 160/88     Wt Readings from Last 3 Encounters:   10/18/22 221 lb 12.8 oz (100.6 kg)   09/27/22 222 lb 3.2 oz (100.8 kg)   08/15/22 222 lb (100.7 kg)       Age/Gender Health Maintenance    Lipid   Lab Results   Component Value Date    CHOL 273 (H) 09/28/2022    CHOL 195 12/28/2019    CHOL 194 05/15/2019     Lab Results   Component Value Date    TRIG 110 09/28/2022    TRIG 129 12/28/2019    TRIG 199 05/15/2019     Lab Results   Component Value Date    HDL 44 09/28/2022    HDL 53 03/24/2021    HDL 51 12/28/2019     Lab Results   Component Value Date    LDLCALC 207 09/28/2022    LDLCALC 157 03/24/2021    LDLCALC 118 12/28/2019     Lab Results   Component Value Date    LABVLDL 20 09/23/2015     Lab Results   Component Value Date    CHOLHDLRATIO 4.2 09/23/2015     DM Screen -   Lab Results   Component Value Date    LABA1C 6.4 10/11/2022     Colon Cancer Screening - 46  Lung Cancer Screening (Age 54 to [de-identified] with 30 pack year hx, current smoker or quit within past 15 years) - n/a    Tetanus - needs  Influenza Vaccine - needs  Pneumonia Vaccine - 65  Zostavax - 50    Breast Cancer Screening - 50  Cervical Cancer Screening - per OB, Dr. Julio Doss  Osteoporosis Screening - 70    AAA Screening - n/a    Falls screening - n/a    Current Outpatient Medications   Medication Sig Dispense Refill    Semaglutide,0.25 or 0.5MG/DOS, 2 MG/1.5ML SOPN Inject 0.25 mg into the skin every 7 days 1.5 mL 0    ciprofloxacin (CIPRO) 500 MG tablet Take 1 tablet by mouth 2 times daily for 7 days 14 tablet 0    rosuvastatin (CRESTOR) 20 MG tablet Take 1 tablet by mouth daily 30 tablet 5    sertraline (ZOLOFT) 100 MG tablet take 1 tablet by mouth once daily 90 tablet 3    lidocaine (LIDODERM) 5 % Place 1 patch onto the skin in the morning. 12 hours on, 12 hours off. . 30 patch 0    metoprolol succinate (TOPROL XL) 100 MG extended release tablet take 1 tablet by mouth once daily 90 tablet 3    amLODIPine (NORVASC) 10 MG tablet take 1 tablet by mouth once daily 90 tablet 3    hydroCHLOROthiazide (MICROZIDE) 12.5 MG capsule take 1 capsule by mouth once daily 90 capsule 1    omeprazole (PRILOSEC) 40 MG delayed release capsule Take 1 capsule by mouth nightly 90 capsule 1     No current facility-administered medications for this visit. Orders Placed This Encounter   Medications    Semaglutide,0.25 or 0.5MG/DOS, 2 MG/1.5ML SOPN     Sig: Inject 0.25 mg into the skin every 7 days     Dispense:  1.5 mL     Refill:  0           All medications reviewed and reconciled, including OTC and herbal medications. Updated list given to patient. Patient Active Problem List   Diagnosis    Essential hypertension    Panic disorder without agoraphobia with moderate panic attacks    Hashimoto's thyroiditis    Family history of early CAD    Intermittent palpitations    Diarrhea    Hyperlipidemia    Reactive thrombocytosis    Obesity (BMI 30-39. 9)    Right Ramirez's palsy    Prediabetes    Pelvic pain    S/P hysterectomy    Spinal stenosis of lumbar region without neurogenic claudication    Herniated lumbar intervertebral disc    Acquired spondylolisthesis    Head revolving around    Lumbar radiculopathy    Lumbar facet arthropathy    Type 2 diabetes mellitus without complication, without long-term current use of insulin ([facility] [address]    Pure hypercholesterolemia       Past Medical History:   Diagnosis Date    Bell palsy     right side droop    Essential hypertension 2016    GERD (gastroesophageal reflux disease)     Hashimoto's thyroiditis 2016    HIGH CHOLESTEROL     Hyperlipidemia     Hypokalemia     Prediabetes 2020    Subclinical hyperthyroidism 2016       Past Surgical History:   Procedure Laterality Date     SECTION  ,     COLONOSCOPY  2021    1 polyp removed     DILATION AND CURETTAGE OF UTERUS      ENDOSCOPY, COLON, DIAGNOSTIC      HYSTERECTOMY, TOTAL ABDOMINAL (CERVIX REMOVED) N/A 9/10/2020    HYSTERECTOMY ABDOMINAL TOTAL, BS, POSS DANIELLE performed by Greg Dang MD at [place] [address] N/A 6/15/2020    DIAGNOSTIC LAPAROSCOPY, performed by Greg Dang MD at [address] [place] [address] Allergen Reactions    Bactrim Swelling     tongue    Sulfamethoxazole-Trimethoprim Swelling     tongue       Social History     Socioeconomic History    Marital status: Single     Spouse name: Not on file    Number of children: 2    Years of education: Not on file    Highest education level: Not on file   Occupational History    Not on file   Tobacco Use    Smoking status: Never    Smokeless tobacco: Never   Vaping Use    Vaping Use: Never used   Substance and Sexual Activity    Alcohol use: Yes     Comment: social    Drug use: No    Sexual activity: Not on file   Other Topics Concern    Not on file   Social History Narrative    Not on file     Social Determinants of Health     Financial Resource Strain: Low Risk     Difficulty of Paying Living Expenses: Not hard at all   Food Insecurity: No Food Insecurity    Worried About Running Out of Food in the Last Year: Never true    Ran Out of Food in the Last Year: Never true   Transportation Needs: Not on file   Physical Activity: Not on file   Stress: Not on file   Social Connections: Not on file   Intimate Partner Violence: Not on file   Housing Stability: Not on file       Family History   Problem Relation Age of Onset    High Blood Pressure Mother     Diabetes Mother     Heart Disease Mother     High Blood Pressure Father     Diabetes Father     Breast Cancer Maternal Aunt 50        reoccurred at 79         I have reviewed the patient's past medical history, past surgical history, allergies, medications, social and family history and I have made updates where appropriate.       Review of Systems  Positive responses are highlighted in bold    Constitutional:  Fever, Chills, Night Sweats, Fatigue, Unexpected changes in weight  Eyes:  Eye discharge, Eye pain, Eye redness, Visual disturbances   HENT:  Ear pain, Tinnitus, Nosebleeds, Trouble swallowing, Hearing loss, Sore throat  Cardiovascular:  Chest Pain, Palpitations, Orthopnea, Paroxysmal Nocturnal Dyspnea  Respiratory:  Cough, Wheezing, Shortness of breath, Chest tightness, Apnea  Gastrointestinal:  Nausea, Vomiting, Diarrhea, Constipation, Heartburn, Blood in stool  Genitourinary:  Difficulty or painful urination, Flank pain, Change in frequency, Urgency  Skin:  Color change, Rash, Itching, Wound  Psychiatric:  Hallucinations, Anxiety, Depression, Suicidal ideation  Hematological:  Enlarged glands, Easy bleeding, Easily bruising  Musculoskeletal:  Joint pain, Back pain, Gait problems, Joint swelling, Myalgias  Neurological:  Dizziness, Headaches, Presyncope, Numbness, Seizures, Tremors  Allergy:  Environmental allergies, Food allergies  Endocrine:  Heat Intolerance, Cold Intolerance, Polydipsia, Polyphagia, Polyuria    Lab Results   Component Value Date    TSH 0.670 05/29/2020     Lab Results   Component Value Date     07/24/2022    K 3.9 07/24/2022     07/24/2022    CO2 24 07/24/2022    BUN 15 07/24/2022    CREATININE 0.6 07/24/2022    GLUCOSE 112 (H) 09/28/2022    CALCIUM 10.3 07/24/2022    PROT 7.5 07/24/2022    LABALBU 4.3 07/24/2022    BILITOT 0.2 (L) 07/24/2022    ALKPHOS 101 07/24/2022    AST 24 07/24/2022    ALT 38 07/24/2022    LABGLOM >90 07/24/2022       Lab Results   Component Value Date    WBC 11.3 (H) 07/24/2022    HGB 12.0 07/24/2022    HCT 36.7 (L) 07/24/2022    MCV 87.2 07/24/2022     (H) 07/24/2022     PHYSICAL EXAM:  Vitals:    10/18/22 1353   BP: 128/80   Site: Right Upper Arm   Position: Sitting   Cuff Size: Large Adult   Pulse: 63   Resp: 18   Temp: 98.4 °F (36.9 °C)   TempSrc: Oral   SpO2: 99%   Weight: 221 lb 12.8 oz (100.6 kg)   Height: 5' 4\" (1.626 m)     Body mass index is 38.07 kg/m².          VS Reviewed  General Appearance: A&O x 3, No acute distress,well developed and well- nourished  Head: normocephalic and atraumatic, small tender firm contusion left occipital  Eyes: pupils equal, round, and reactive to light, extraocular eye movements intact, conjunctivae and eye lids without erythema  Neck: supple and non-tender without mass, mild thyromegaly but no thyroid nodules, no cervical lymphadenopathy  Pulmonary/Chest: clear to auscultation bilaterally- no wheezes, rales or rhonchi, normal air movement, no respiratory distress or retractions  Cardiovascular: S1 and S2 auscultated w/ RRR. No murmurs, rubs, clicks, or gallops, distal pulses intact. Abdomen: soft, nontender non-distended, bowl sounds physiologic,  no rebound or guarding, no masses or hernias noted. Liver and spleen without enlargement. Extremities: no cyanosis, clubbing or edema of the lower extremities. Visual inspection:  Deformity/amputation: absent  Skin lesions/pre-ulcerative calluses: absent  Edema: right- negative, left- negative  Sensory exam:  Monofilament sensation: normal  (minimum of 5 random plantar locations tested, avoiding callused areas - > 1 area with absence of sensation is + for neuropathy)  Plus at least one of the following:  Pulses: normal,   Pinprick: Intact  Proprioception: Intact  Musculoskeletal: No joint swelling or gross deformity  Neuro:  Alert, 5/5 strength globally and symmetrically  Psych: Affect and mood are normal. Thought process is normal. Good insight and appropriate interaction. Cognition and memory appear to be intact. Skin: warm and dry, no rash or erythema  Lymph:  No cervical, auricular or supraclavicular lymph nodes palpated      ASSESSMENT & PLAN  Ciara Simpson was seen today for diabetes.     Diagnoses and all orders for this visit:    Type 2 diabetes mellitus without complication, without long-term current use of insulin (HCC)  -     Semaglutide,0.25 or 0.5MG/DOS, 2 MG/1.5ML SOPN; Inject 0.25 mg into the skin every 7 days  -      DIABETES FOOT EXAM    Pure hypercholesterolemia    - discussed DM, diet/lifestyle modifications to help with weight loss  - start Ozempic to help with both sugars and weight loss  - discussed benefits, risks and side effects  - con't Crestor, needs to recheck FLP and LFT in 2-3 months    DISPOSITION    Return in about 6 weeks (around 11/29/2022) for DM, 1 week nurse visit for injection teaching. Deloris released without restrictions. PATIENT COUNSELING    Counseling was provided today regarding the following topics: Healthy eating habits, Regular exercise, substance abuse and healthy sleep habits. Deloris received counseling on the following healthy behaviors: nutrition, exercise and medication adherence    Patient given educational materials on: See Attached    I have instructed Deloris to complete a self tracking handout on Blood Pressures  and instructed them to bring it with them to her next appointment. Barriers to learning and self management: none    Discussed use, benefit, and side effects of prescribed medications. Barriers to medication compliance addressed. All patient questions answered. Pt voiced understanding.        Electronically signed by MARÍA Monsalve CNP on 10/18/2022 at 2:39 PM

## 2022-10-19 RX ORDER — OMEPRAZOLE 40 MG/1
CAPSULE, DELAYED RELEASE ORAL
Qty: 90 CAPSULE | Refills: 1 | Status: SHIPPED | OUTPATIENT
Start: 2022-10-19

## 2022-10-19 NOTE — TELEPHONE ENCOUNTER
Recent Visits  Date Type Provider Dept   10/18/22 Office Visit Lester Mood, APRN - CNP [facility]   09/27/22 Office Visit Lester Mood, APRN - CNP [facility]   05/12/22 Office Visit Lester Mood, APRN - CNP [facility]   04/14/22 Office Visit Lester Mood, APRN - CNP [facility]   10/19/21 Office Visit Lester Mood, APRN - CNP [facility]   07/14/21 Office Visit [place] Mood, APRN - CNP [facility]   04/28/21 Office Visit [place] Mood, APRN - CNP [facility]   Showing recent visits within past 540 days with a meds authorizing provider and meeting all other requirements  Future Appointments  Date Type Provider Dept   11/29/22 Appointment Lester Mood, APRN - CNP [facility]   Showing future appointments within next 150 days with a meds authorizing provider and meeting all other requirements      Future Appointments   Date Time Provider Ravi Mccann   10/25/2022  3:00 PM SCHEDULE, NURSE Caitlyn Montalvo [facility] [email or web address]   11/29/2022  2:00 PM Lester Mood, APRN - [address]   12/9/2022  9:30 AM Rubén Wyatt, [address]

## 2022-10-25 ENCOUNTER — TELEPHONE (OUTPATIENT)
Dept: FAMILY MEDICINE CLINIC | Age: 42
End: 2022-10-25

## 2022-10-25 NOTE — TELEPHONE ENCOUNTER
Spoke with pt, she is needing to sign her portion of the FMLA. Pt verbally understood and will be in the office to sign. Paperwork located in [employer].

## 2022-10-27 ENCOUNTER — NURSE ONLY (OUTPATIENT)
Dept: FAMILY MEDICINE CLINIC | Age: 42
End: 2022-10-27

## 2022-10-27 DIAGNOSIS — R73.03 PREDIABETES: Primary | ICD-10-CM

## 2022-11-29 ENCOUNTER — OFFICE VISIT (OUTPATIENT)
Dept: FAMILY MEDICINE CLINIC | Age: 42
End: 2022-11-29
Payer: COMMERCIAL

## 2022-11-29 VITALS
HEART RATE: 82 BPM | WEIGHT: 217.6 LBS | RESPIRATION RATE: 14 BRPM | DIASTOLIC BLOOD PRESSURE: 88 MMHG | TEMPERATURE: 98.4 F | OXYGEN SATURATION: 99 % | SYSTOLIC BLOOD PRESSURE: 136 MMHG | HEIGHT: 64 IN | BODY MASS INDEX: 37.15 KG/M2

## 2022-11-29 DIAGNOSIS — E11.9 TYPE 2 DIABETES MELLITUS WITHOUT COMPLICATION, WITHOUT LONG-TERM CURRENT USE OF INSULIN (HCC): ICD-10-CM

## 2022-11-29 DIAGNOSIS — J01.90 ACUTE RHINOSINUSITIS: Primary | ICD-10-CM

## 2022-11-29 PROCEDURE — 99214 OFFICE O/P EST MOD 30 MIN: CPT | Performed by: NURSE PRACTITIONER

## 2022-11-29 PROCEDURE — 2022F DILAT RTA XM EVC RTNOPTHY: CPT | Performed by: NURSE PRACTITIONER

## 2022-11-29 PROCEDURE — G8484 FLU IMMUNIZE NO ADMIN: HCPCS | Performed by: NURSE PRACTITIONER

## 2022-11-29 PROCEDURE — 3078F DIAST BP <80 MM HG: CPT | Performed by: NURSE PRACTITIONER

## 2022-11-29 PROCEDURE — G8427 DOCREV CUR MEDS BY ELIG CLIN: HCPCS | Performed by: NURSE PRACTITIONER

## 2022-11-29 PROCEDURE — 3044F HG A1C LEVEL LT 7.0%: CPT | Performed by: NURSE PRACTITIONER

## 2022-11-29 PROCEDURE — 1036F TOBACCO NON-USER: CPT | Performed by: NURSE PRACTITIONER

## 2022-11-29 PROCEDURE — 3074F SYST BP LT 130 MM HG: CPT | Performed by: NURSE PRACTITIONER

## 2022-11-29 PROCEDURE — G8417 CALC BMI ABV UP PARAM F/U: HCPCS | Performed by: NURSE PRACTITIONER

## 2022-11-29 RX ORDER — DOXYCYCLINE HYCLATE 100 MG
100 TABLET ORAL 2 TIMES DAILY
Qty: 14 TABLET | Refills: 0 | Status: SHIPPED | OUTPATIENT
Start: 2022-11-29 | End: 2022-12-06

## 2022-11-29 NOTE — PROGRESS NOTES
Chief Complaint   Patient presents with    Follow-up       History obtained from chart review and the patient. SUBJECTIVE:  Doris Mirza is a 38 y.o. female that presents today for follow up diabetes    Diabetes Type 2    Glucose control:   Does patient check blood glucoses at home? No  Report of hypoglycemia: no  Lab Results   Component Value Date    LABA1C 6.4 10/11/2022     No results found for: EAG    Symptoms  Polyuria, Polydipsia or Polyphagia? No  Chest Pain, SOB, or Palpitations? -  No  New Vision complaints? No  Paresthesias of the extremities? No    Medications  Current medication were reviewed. Compliant with medications? Yes Semaglutide 0.25 mg once a week  Medication side effects? No  On ACE-I or ARB? No  On antiplatelet therapy? No  On Statin? Yes    Last Diabetic Eye Exam: needs    Exercise  Exercise? No  Wt Readings from Last 3 Encounters:   11/29/22 217 lb 9.6 oz (98.7 kg)   10/18/22 221 lb 12.8 oz (100.6 kg)   09/27/22 222 lb 3.2 oz (100.8 kg)       Diet discipline?:  Low salt, fat, sugar diet?  no    Blood pressure control:  BP Readings from Last 3 Encounters:   11/29/22 136/88   10/18/22 128/80   09/27/22 132/86       Lab Results   Component Value Date    LABMICR 2.36 07/11/2017       Lab Results   Component Value Date    [address] 207 09/28/2022     Has been congested for at least a week. Started blowing out mucupurulent drainage the last couple of days. Denies any cough. She has had some headaches. No recent sick contacts.     Age/Gender Health Maintenance    Lipid   Lab Results   Component Value Date    CHOL 273 (H) 09/28/2022    CHOL 195 12/28/2019    CHOL 194 05/15/2019     Lab Results   Component Value Date    TRIG 110 09/28/2022    TRIG 129 12/28/2019    TRIG 199 05/15/2019     Lab Results   Component Value Date    HDL 44 09/28/2022    HDL 53 03/24/2021    HDL 51 12/28/2019     Lab Results   Component Value Date    LDLCALC 207 09/28/2022    LDLCALC 157 03/24/2021 [address] 118 12/28/2019     Lab Results   Component Value Date    LABVLDL 20 09/23/2015     Lab Results   Component Value Date    CHOLHDLRATIO 4.2 09/23/2015     DM Screen -   Lab Results   Component Value Date    LABA1C 6.4 10/11/2022     Colon Cancer Screening - 46  Lung Cancer Screening (Age 54 to [de-identified] with 30 pack year hx, current smoker or quit within past 15 years) - n/a    Tetanus - needs  Influenza Vaccine - needs  Pneumonia Vaccine - 65  Zostavax - 50    Breast Cancer Screening - 50  Cervical Cancer Screening - per OB, Dr. Clemons [facility]  Osteoporosis Screening - 70    AAA Screening - n/a    Falls screening - n/a    Current Outpatient Medications   Medication Sig Dispense Refill    Semaglutide,0.25 or 0.5MG/DOS, 2 MG/1.5ML SOPN Inject 0.5 mg into the skin every 7 days 6 mL 0    doxycycline hyclate (VIBRA-TABS) 100 MG tablet Take 1 tablet by mouth 2 times daily for 7 days 14 tablet 0    omeprazole (PRILOSEC) 40 MG delayed release capsule take 1 capsule by mouth once daily at bedtime 90 capsule 1    rosuvastatin (CRESTOR) 20 MG tablet Take 1 tablet by mouth daily 30 tablet 5    sertraline (ZOLOFT) 100 MG tablet take 1 tablet by mouth once daily 90 tablet 3    lidocaine (LIDODERM) 5 % Place 1 patch onto the skin in the morning. 12 hours on, 12 hours off. . 30 patch 0    metoprolol succinate (TOPROL XL) 100 MG extended release tablet take 1 tablet by mouth once daily 90 tablet 3    amLODIPine (NORVASC) 10 MG tablet take 1 tablet by mouth once daily 90 tablet 3    hydroCHLOROthiazide (MICROZIDE) 12.5 MG capsule take 1 capsule by mouth once daily 90 capsule 1     No current facility-administered medications for this visit.      Orders Placed This Encounter   Medications    Semaglutide,0.25 or 0.5MG/DOS, 2 MG/1.5ML SOPN     Sig: Inject 0.5 mg into the skin every 7 days     Dispense:  6 mL     Refill:  0    doxycycline hyclate (VIBRA-TABS) 100 MG tablet     Sig: Take 1 tablet by mouth 2 times daily for 7 days     Dispense:  14 Highest education level: Not on file   Occupational History    Not on file   Tobacco Use    Smoking status: Never    Smokeless tobacco: Never   Vaping Use    Vaping Use: Never used   Substance and Sexual Activity    Alcohol use: Yes     Comment: social    Drug use: No    Sexual activity: Not on file   Other Topics Concern    Not on file   Social History Narrative    Not on file     Social Determinants of Health     Financial Resource Strain: Low Risk     Difficulty of Paying Living Expenses: Not hard at all   Food Insecurity: No Food Insecurity    Worried About Running Out of Food in the Last Year: Never true    Ran Out of Food in the Last Year: Never true   Transportation Needs: Not on file   Physical Activity: Not on file   Stress: Not on file   Social Connections: Not on file   Intimate Partner Violence: Not on file   Housing Stability: Not on file       Family History   Problem Relation Age of Onset    High Blood Pressure Mother     Diabetes Mother     Heart Disease Mother     High Blood Pressure Father     Diabetes Father     Breast Cancer Maternal Aunt 50        reoccurred at 79         I have reviewed the patient's past medical history, past surgical history, allergies, medications, social and family history and I have made updates where appropriate.       Review of Systems  Positive responses are highlighted in bold    Constitutional:  Fever, Chills, Night Sweats, Fatigue, Unexpected changes in weight  Eyes:  Eye discharge, Eye pain, Eye redness, Visual disturbances   HENT:  Ear pain, Tinnitus, Nosebleeds, Trouble swallowing, Hearing loss, Sore throat  Cardiovascular:  Chest Pain, Palpitations, Orthopnea, Paroxysmal Nocturnal Dyspnea  Respiratory:  Cough, Wheezing, Shortness of breath, Chest tightness, Apnea  Gastrointestinal:  Nausea, Vomiting, Diarrhea, Constipation, Heartburn, Blood in stool  Genitourinary:  Difficulty or painful urination, Flank pain, Change in frequency, Urgency  Skin:  Color change, Rash, Itching, Wound  Psychiatric:  Hallucinations, Anxiety, Depression, Suicidal ideation  Hematological:  Enlarged glands, Easy bleeding, Easily bruising  Musculoskeletal:  Joint pain, Back pain, Gait problems, Joint swelling, Myalgias  Neurological:  Dizziness, Headaches, Presyncope, Numbness, Seizures, Tremors  Allergy:  Environmental allergies, Food allergies  Endocrine:  Heat Intolerance, Cold Intolerance, Polydipsia, Polyphagia, Polyuria    Lab Results   Component Value Date    TSH 0.670 05/29/2020     Lab Results   Component Value Date     07/24/2022    K 3.9 07/24/2022     07/24/2022    CO2 24 07/24/2022    BUN 15 07/24/2022    CREATININE 0.6 07/24/2022    GLUCOSE 112 (H) 09/28/2022    CALCIUM 10.3 07/24/2022    PROT 7.5 07/24/2022    LABALBU 4.3 07/24/2022    BILITOT 0.2 (L) 07/24/2022    ALKPHOS 101 07/24/2022    AST 24 07/24/2022    ALT 38 07/24/2022    LABGLOM >90 07/24/2022       Lab Results   Component Value Date    WBC 11.3 (H) 07/24/2022    HGB 12.0 07/24/2022    HCT 36.7 (L) 07/24/2022    MCV 87.2 07/24/2022     (H) 07/24/2022     PHYSICAL EXAM:  Vitals:    11/29/22 1352   BP: 136/88   Pulse: 82   Resp: 14   Temp: 98.4 °F (36.9 °C)   TempSrc: Oral   SpO2: 99%   Weight: 217 lb 9.6 oz (98.7 kg)   Height: 5' 4\" (1.626 m)     Body mass index is 37.35 kg/m². VS Reviewed  General Appearance: A&O x 3, No acute distress,well developed and well- nourished  Head: normocephalic and atraumatic, small tender firm contusion left occipital  Eyes: pupils equal, round, and reactive to light, extraocular eye movements intact, conjunctivae and eye lids without erythema  Neck: supple and non-tender without mass, mild thyromegaly but no thyroid nodules, no cervical lymphadenopathy  Pulmonary/Chest: clear to auscultation bilaterally- no wheezes, rales or rhonchi, normal air movement, no respiratory distress or retractions  Cardiovascular: S1 and S2 auscultated w/ RRR.  No murmurs, rubs, clicks, or gallops, distal pulses intact. Abdomen: soft, nontender non-distended, bowl sounds physiologic,  no rebound or guarding, no masses or hernias noted. Liver and spleen without enlargement. Extremities: no cyanosis, clubbing or edema of the lower extremities. Musculoskeletal: No joint swelling or gross deformity  Neuro:  Alert, 5/5 strength globally and symmetrically  Psych: Affect and mood are normal. Thought process is normal. Good insight and appropriate interaction. Cognition and memory appear to be intact. Skin: warm and dry, no rash or erythema  Lymph:  No cervical, auricular or supraclavicular lymph nodes palpated      ASSESSMENT & PLAN  Hoang Haines was seen today for follow-up. Diagnoses and all orders for this visit:    Acute rhinosinusitis  -     doxycycline hyclate (VIBRA-TABS) 100 MG tablet; Take 1 tablet by mouth 2 times daily for 7 days    Type 2 diabetes mellitus without complication, without long-term current use of insulin (HCC)  -     Semaglutide,0.25 or 0.5MG/DOS, 2 MG/1.5ML SOPN; Inject 0.5 mg into the skin every 7 days      - start Doxy for sinus  - increase Ozempic to 0.5 mg  - repeat A1C next appt    DISPOSITION    Return in about 6 weeks (around 1/10/2023) for diabetes. Deloris released without restrictions. PATIENT COUNSELING    Counseling was provided today regarding the following topics: Healthy eating habits, Regular exercise, substance abuse and healthy sleep habits. Deloris received counseling on the following healthy behaviors: nutrition, exercise and medication adherence    Patient given educational materials on: See Attached    I have instructed Deloris to complete a self tracking handout on Blood Pressures  and instructed them to bring it with them to her next appointment. Barriers to learning and self management: none    Discussed use, benefit, and side effects of prescribed medications. Barriers to medication compliance addressed.   All patient questions answered. Pt voiced understanding.        Electronically signed by MARÍA Cassidy CNP on 11/29/2022 at 2:15 PM

## 2022-12-06 ENCOUNTER — HOSPITAL ENCOUNTER (OUTPATIENT)
Age: 42
Discharge: HOME | End: 2022-12-06
Payer: COMMERCIAL

## 2022-12-06 ENCOUNTER — TELEPHONE (OUTPATIENT)
Dept: FAMILY MEDICINE CLINIC | Age: 42
End: 2022-12-06

## 2022-12-06 DIAGNOSIS — E78.00 PURE HYPERCHOLESTEROLEMIA: ICD-10-CM

## 2022-12-06 LAB
ALBUMIN SERPL-MCNC: 4.3 G/DL (ref 3.5–5.1)
ALP BLD-CCNC: 100 U/L (ref 38–126)
ALT SERPL-CCNC: 23 U/L (ref 11–66)
AST SERPL-CCNC: 21 U/L (ref 5–40)
BILIRUB SERPL-MCNC: 0.2 MG/DL (ref 0.3–1.2)
BILIRUBIN DIRECT: < 0.2 MG/DL (ref 0–0.3)
CHOLESTEROL, FASTING: 172 MG/DL (ref 100–199)
HDLC SERPL-MCNC: 44 MG/DL
LDL CHOLESTEROL CALCULATED: 112 MG/DL
TOTAL PROTEIN: 7.6 G/DL (ref 6.1–8)
TRIGLYCERIDE, FASTING: 78 MG/DL (ref 0–199)

## 2022-12-06 PROCEDURE — 80061 LIPID PANEL: CPT

## 2022-12-06 PROCEDURE — 80076 HEPATIC FUNCTION PANEL: CPT

## 2022-12-06 PROCEDURE — 36415 COLL VENOUS BLD VENIPUNCTURE: CPT

## 2022-12-06 RX ORDER — ROSUVASTATIN CALCIUM 20 MG/1
20 TABLET, COATED ORAL DAILY
Qty: 90 TABLET | Refills: 3 | Status: SHIPPED | OUTPATIENT
Start: 2022-12-06

## 2022-12-06 NOTE — TELEPHONE ENCOUNTER
----- Message from MARÍA Church - CNP sent at 12/6/2022  9:12 AM EST -----  Let James Victoria know her cholesterol and liver function labs look great! Cholesterol down from 230 to 172. LDL (bad) 112, down from 207. Con't Crestor. Rx sent for 90 day supply.

## 2022-12-12 ENCOUNTER — PATIENT MESSAGE (OUTPATIENT)
Dept: FAMILY MEDICINE CLINIC | Age: 42
End: 2022-12-12

## 2022-12-12 DIAGNOSIS — E78.00 PURE HYPERCHOLESTEROLEMIA: ICD-10-CM

## 2022-12-12 RX ORDER — ROSUVASTATIN CALCIUM 20 MG/1
20 TABLET, COATED ORAL DAILY
Qty: 90 TABLET | Refills: 3 | Status: SHIPPED | OUTPATIENT
Start: 2022-12-12

## 2023-01-10 ENCOUNTER — OFFICE VISIT (OUTPATIENT)
Dept: FAMILY MEDICINE CLINIC | Age: 43
End: 2023-01-10
Payer: MEDICARE

## 2023-01-10 VITALS
TEMPERATURE: 98.2 F | SYSTOLIC BLOOD PRESSURE: 156 MMHG | WEIGHT: 220.2 LBS | RESPIRATION RATE: 14 BRPM | OXYGEN SATURATION: 99 % | DIASTOLIC BLOOD PRESSURE: 94 MMHG | HEIGHT: 64 IN | HEART RATE: 76 BPM | BODY MASS INDEX: 37.59 KG/M2

## 2023-01-10 DIAGNOSIS — I10 ESSENTIAL HYPERTENSION: ICD-10-CM

## 2023-01-10 DIAGNOSIS — E11.9 TYPE 2 DIABETES MELLITUS WITHOUT COMPLICATION, WITHOUT LONG-TERM CURRENT USE OF INSULIN (HCC): Primary | ICD-10-CM

## 2023-01-10 LAB
HBA1C MFR BLD: 5.6 % (ref 4.3–5.7)
MICROALB/CREAT RATIO POC: ABNORMAL MG/G
MICROALBUMIN/CREAT UR-RTO: 150 MG/L
POC CREATININE: 200 MG/DL

## 2023-01-10 PROCEDURE — 2022F DILAT RTA XM EVC RTNOPTHY: CPT | Performed by: NURSE PRACTITIONER

## 2023-01-10 PROCEDURE — 3080F DIAST BP >= 90 MM HG: CPT | Performed by: NURSE PRACTITIONER

## 2023-01-10 PROCEDURE — G8417 CALC BMI ABV UP PARAM F/U: HCPCS | Performed by: NURSE PRACTITIONER

## 2023-01-10 PROCEDURE — 99214 OFFICE O/P EST MOD 30 MIN: CPT | Performed by: NURSE PRACTITIONER

## 2023-01-10 PROCEDURE — 3044F HG A1C LEVEL LT 7.0%: CPT | Performed by: NURSE PRACTITIONER

## 2023-01-10 PROCEDURE — 3077F SYST BP >= 140 MM HG: CPT | Performed by: NURSE PRACTITIONER

## 2023-01-10 PROCEDURE — G8484 FLU IMMUNIZE NO ADMIN: HCPCS | Performed by: NURSE PRACTITIONER

## 2023-01-10 PROCEDURE — G8427 DOCREV CUR MEDS BY ELIG CLIN: HCPCS | Performed by: NURSE PRACTITIONER

## 2023-01-10 PROCEDURE — 1036F TOBACCO NON-USER: CPT | Performed by: NURSE PRACTITIONER

## 2023-01-10 ASSESSMENT — PATIENT HEALTH QUESTIONNAIRE - PHQ9
SUM OF ALL RESPONSES TO PHQ QUESTIONS 1-9: 0
SUM OF ALL RESPONSES TO PHQ QUESTIONS 1-9: 0
SUM OF ALL RESPONSES TO PHQ9 QUESTIONS 1 & 2: 0
SUM OF ALL RESPONSES TO PHQ QUESTIONS 1-9: 0
SUM OF ALL RESPONSES TO PHQ QUESTIONS 1-9: 0
2. FEELING DOWN, DEPRESSED OR HOPELESS: 0
1. LITTLE INTEREST OR PLEASURE IN DOING THINGS: 0

## 2023-01-10 NOTE — PROGRESS NOTES
Chief Complaint   Patient presents with    Diabetes       History obtained from chart review and the patient. SUBJECTIVE:  Penelope Holguin is a 38 y.o. female that presents today for follow up diabetes    Diabetes Type 2    Glucose control:   Does patient check blood glucoses at home? No  Report of hypoglycemia: no  Lab Results   Component Value Date    LABA1C 5.6 01/10/2023     No results found for: EAG    Symptoms  Polyuria, Polydipsia or Polyphagia? No  Chest Pain, SOB, or Palpitations? -  No  New Vision complaints? No  Paresthesias of the extremities? No    Medications  Current medication were reviewed. Compliant with medications? yes  Medication side effects? No  On ACE-I or ARB? No  On antiplatelet therapy? No  On Statin? Yes    Last Diabetic Eye Exam: needs    Exercise  Exercise? No  Wt Readings from Last 3 Encounters:   01/10/23 220 lb 3.2 oz (99.9 kg)   11/29/22 217 lb 9.6 oz (98.7 kg)   10/18/22 221 lb 12.8 oz (100.6 kg)       Diet discipline?:  Low salt, fat, sugar diet?  no    Blood pressure control:  BP Readings from Last 3 Encounters:   01/10/23 (!) 156/94   11/29/22 136/88   10/18/22 128/80       Lab Results   Component Value Date    LABMICR 2.36 07/11/2017       Lab Results   Component Value Date    LDLCALC 112 12/06/2022     HTN    Does patient check BP regularly at home? - Yes  Current Medication regimen - Norvasc, Metoprolol, HCTZ  Tolerating medications well? - yes    Shortness of breath or chest pain? No  Headache or visual complaints? No  Neurologic changes like confusion? No  Extremity edema?  No    BP Readings from Last 3 Encounters:   01/10/23 (!) 156/94   11/29/22 136/88   10/18/22 128/80       Age/Gender Health Maintenance    Lipid   Lab Results   Component Value Date    CHOL 273 (H) 09/28/2022    CHOL 195 12/28/2019    CHOL 194 05/15/2019     Lab Results   Component Value Date    TRIG 110 09/28/2022    TRIG 129 12/28/2019    TRIG 199 05/15/2019     Lab Results Component Value Date    HDL 44 12/06/2022    HDL 44 09/28/2022    HDL 53 03/24/2021     Lab Results   Component Value Date    LDLCALC 112 12/06/2022    LDLCALC 207 09/28/2022    LDLCALC 157 03/24/2021     Lab Results   Component Value Date    LABVLDL 20 09/23/2015     Lab Results   Component Value Date    CHOLHDLRATIO 4.2 09/23/2015     DM Screen -   Lab Results   Component Value Date    LABA1C 5.6 01/10/2023     Colon Cancer Screening - 46  Lung Cancer Screening (Age 54 to [de-identified] with 30 pack year hx, current smoker or quit within past 15 years) - n/a    Tetanus - needs  Influenza Vaccine - needs  Pneumonia Vaccine - 65  Zostavax - 50    Breast Cancer Screening - 50  Cervical Cancer Screening - per Dr. Shyla HOLLIDAY  Osteoporosis Screening - 70    AAA Screening - n/a    Falls screening - n/a    Current Outpatient Medications   Medication Sig Dispense Refill    rosuvastatin (CRESTOR) 20 MG tablet Take 1 tablet by mouth daily 90 tablet 3    omeprazole (PRILOSEC) 40 MG delayed release capsule take 1 capsule by mouth once daily at bedtime 90 capsule 1    sertraline (ZOLOFT) 100 MG tablet take 1 tablet by mouth once daily 90 tablet 3    lidocaine (LIDODERM) 5 % Place 1 patch onto the skin in the morning. 12 hours on, 12 hours off. . 30 patch 0    metoprolol succinate (TOPROL XL) 100 MG extended release tablet take 1 tablet by mouth once daily 90 tablet 3    amLODIPine (NORVASC) 10 MG tablet take 1 tablet by mouth once daily 90 tablet 3    hydroCHLOROthiazide (MICROZIDE) 12.5 MG capsule take 1 capsule by mouth once daily 90 capsule 1     No current facility-administered medications for this visit. No orders of the defined types were placed in this encounter. All medications reviewed and reconciled, including OTC and herbal medications. Updated list given to patient.        Patient Active Problem List   Diagnosis    Essential hypertension    Panic disorder without agoraphobia with moderate panic attacks Hashimoto's thyroiditis    Family history of early CAD    Intermittent palpitations    Diarrhea    Hyperlipidemia    Reactive thrombocytosis    Obesity (BMI 30-39. 9)    Right Ramirez's palsy    Prediabetes    Pelvic pain    S/P hysterectomy    Spinal stenosis of lumbar region without neurogenic claudication    Herniated lumbar intervertebral disc    Acquired spondylolisthesis    Head revolving around    Lumbar radiculopathy    Lumbar facet arthropathy    Type 2 diabetes mellitus without complication, without long-term current use of insulin ([facility] [address]    Pure hypercholesterolemia       Past Medical History:   Diagnosis Date    Bell palsy     right side droop    Essential hypertension 2016    GERD (gastroesophageal reflux disease)     Hashimoto's thyroiditis 2016    HIGH CHOLESTEROL     Hyperlipidemia     Hypokalemia     Prediabetes 2020    Subclinical hyperthyroidism 2016       Past Surgical History:   Procedure Laterality Date     SECTION  ,     COLONOSCOPY  2021    1 polyp removed     DILATION AND CURETTAGE OF UTERUS      ENDOSCOPY, COLON, DIAGNOSTIC      HYSTERECTOMY, TOTAL ABDOMINAL (CERVIX REMOVED) N/A 9/10/2020    HYSTERECTOMY ABDOMINAL TOTAL, BS, POSS DANIELLE performed by Alma Goddard MD at [place] [address] N/A 6/15/2020    DIAGNOSTIC LAPAROSCOPY, performed by Alma Goddard MD at [place]       Allergies   Allergen Reactions    Bactrim Swelling     tongue    Sulfamethoxazole-Trimethoprim Swelling     tongue       Social History     Socioeconomic History    Marital status: Single     Spouse name: Not on file    Number of children: 2    Years of education: Not on file    Highest education level: Not on file   Occupational History    Not on file   Tobacco Use    Smoking status: Never    Smokeless tobacco: Never   Vaping Use    Vaping Use: Never used   Substance and Sexual Activity    Alcohol use: Yes     Comment: social    Drug use: No    Sexual activity: Not on file   Other Topics Concern    Not on file   Social History Narrative    Not on file     Social Determinants of Health     Financial Resource Strain: Low Risk     Difficulty of Paying Living Expenses: Not hard at all   Food Insecurity: No Food Insecurity    Worried About Running Out of Food in the Last Year: Never true    Ran Out of Food in the Last Year: Never true   Transportation Needs: Not on file   Physical Activity: Not on file   Stress: Not on file   Social Connections: Not on file   Intimate Partner Violence: Not on file   Housing Stability: Not on file       Family History   Problem Relation Age of Onset    High Blood Pressure Mother     Diabetes Mother     Heart Disease Mother     High Blood Pressure Father     Diabetes Father     Breast Cancer Maternal Aunt 50        reoccurred at 79         I have reviewed the patient's past medical history, past surgical history, allergies, medications, social and family history and I have made updates where appropriate.       Review of Systems  Positive responses are highlighted in bold    Constitutional:  Fever, Chills, Night Sweats, Fatigue, Unexpected changes in weight  Eyes:  Eye discharge, Eye pain, Eye redness, Visual disturbances   HENT:  Ear pain, Tinnitus, Nosebleeds, Trouble swallowing, Hearing loss, Sore throat  Cardiovascular:  Chest Pain, Palpitations, Orthopnea, Paroxysmal Nocturnal Dyspnea  Respiratory:  Cough, Wheezing, Shortness of breath, Chest tightness, Apnea  Gastrointestinal:  Nausea, Vomiting, Diarrhea, Constipation, Heartburn, Blood in stool  Genitourinary:  Difficulty or painful urination, Flank pain, Change in frequency, Urgency  Skin:  Color change, Rash, Itching, Wound  Psychiatric:  Hallucinations, Anxiety, Depression, Suicidal ideation  Hematological:  Enlarged glands, Easy bleeding, Easily bruising  Musculoskeletal:  Joint pain, Back pain, Gait problems, Joint swelling, Myalgias  Neurological:  Dizziness, Headaches, Presyncope, Numbness, Seizures, Tremors  Allergy:  Environmental allergies, Food allergies  Endocrine:  Heat Intolerance, Cold Intolerance, Polydipsia, Polyphagia, Polyuria    Lab Results   Component Value Date    TSH 0.670 05/29/2020     Lab Results   Component Value Date     07/24/2022    K 3.9 07/24/2022     07/24/2022    CO2 24 07/24/2022    BUN 15 07/24/2022    CREATININE 200 01/10/2023    GLUCOSE 112 (H) 09/28/2022    CALCIUM 10.3 07/24/2022    PROT 7.6 12/06/2022    LABALBU 4.3 12/06/2022    BILITOT 0.2 (L) 12/06/2022    ALKPHOS 100 12/06/2022    AST 21 12/06/2022    ALT 23 12/06/2022    LABGLOM >90 07/24/2022       Lab Results   Component Value Date    WBC 11.3 (H) 07/24/2022    HGB 12.0 07/24/2022    HCT 36.7 (L) 07/24/2022    MCV 87.2 07/24/2022     (H) 07/24/2022     PHYSICAL EXAM:  Vitals:    01/10/23 1352   BP: (!) 156/94   Pulse: 76   Resp: 14   Temp: 98.2 °F (36.8 °C)   TempSrc: Oral   SpO2: 99%   Weight: 220 lb 3.2 oz (99.9 kg)   Height: 5' 4\" (1.626 m)     Body mass index is 37.8 kg/m². VS Reviewed  General Appearance: A&O x 3, No acute distress,well developed and well- nourished  Head: normocephalic and atraumatic, small tender firm contusion left occipital  Eyes: pupils equal, round, and reactive to light, extraocular eye movements intact, conjunctivae and eye lids without erythema  Neck: supple and non-tender without mass, mild thyromegaly but no thyroid nodules, no cervical lymphadenopathy  Pulmonary/Chest: clear to auscultation bilaterally- no wheezes, rales or rhonchi, normal air movement, no respiratory distress or retractions  Cardiovascular: S1 and S2 auscultated w/ RRR. No murmurs, rubs, clicks, or gallops, distal pulses intact. Abdomen: soft, nontender non-distended, bowl sounds physiologic,  no rebound or guarding, no masses or hernias noted. Liver and spleen without enlargement. Extremities: no cyanosis, clubbing or edema of the lower extremities.    Musculoskeletal: No joint swelling or gross deformity  Neuro:  Alert, 5/5 strength globally and symmetrically  Psych: Affect and mood are normal. Thought process is normal. Good insight and appropriate interaction. Cognition and memory appear to be intact. Skin: warm and dry, no rash or erythema  Lymph:  No cervical, auricular or supraclavicular lymph nodes palpated      ASSESSMENT & PLAN  Isabel Pickard was seen today for diabetes. Diagnoses and all orders for this visit:    Type 2 diabetes mellitus without complication, without long-term current use of insulin (HCC)  -     POCT microalbumin  -     POCT glycosylated hemoglobin (Hb A1C)    Essential hypertension    - A1C much improved, down to 5.6  - hold off on meds for now, con't working on diet/exercise  - BP decent control, con't metoprolol, HCTZ, and Norvasc    DISPOSITION    Return in about 6 months (around 7/10/2023) for diabetes. Radhae released without restrictions. PATIENT COUNSELING    Counseling was provided today regarding the following topics: Healthy eating habits, Regular exercise, substance abuse and healthy sleep habits. Deloris received counseling on the following healthy behaviors: nutrition, exercise and medication adherence    Patient given educational materials on: See Attached    I have instructed Deloris to complete a self tracking handout on Blood Pressures  and instructed them to bring it with them to her next appointment. Barriers to learning and self management: none    Discussed use, benefit, and side effects of prescribed medications. Barriers to medication compliance addressed. All patient questions answered. Pt voiced understanding.        Electronically signed by MARÍA García CNP on 1/10/2023 at 2:10 PM

## 2023-01-16 ENCOUNTER — HOSPITAL ENCOUNTER (EMERGENCY)
Age: 43
Discharge: HOME | End: 2023-01-16
Payer: MEDICARE

## 2023-01-16 VITALS
SYSTOLIC BLOOD PRESSURE: 157 MMHG | DIASTOLIC BLOOD PRESSURE: 92 MMHG | HEART RATE: 76 BPM | OXYGEN SATURATION: 98 % | TEMPERATURE: 97.6 F | RESPIRATION RATE: 18 BRPM

## 2023-01-16 DIAGNOSIS — U07.1 COVID-19 VIRUS INFECTION: Primary | ICD-10-CM

## 2023-01-16 LAB
FLU A ANTIGEN: NEGATIVE
FLU B ANTIGEN: NEGATIVE
SARS-COV-2, NAA: DETECTED

## 2023-01-16 PROCEDURE — 87804 INFLUENZA ASSAY W/OPTIC: CPT

## 2023-01-16 PROCEDURE — 87635 SARS-COV-2 COVID-19 AMP PRB: CPT

## 2023-01-16 PROCEDURE — 99213 OFFICE O/P EST LOW 20 MIN: CPT

## 2023-01-16 PROCEDURE — 99213 OFFICE O/P EST LOW 20 MIN: CPT | Performed by: NURSE PRACTITIONER

## 2023-01-16 RX ORDER — IBUPROFEN 800 MG/1
800 TABLET ORAL
Qty: 90 TABLET | Refills: 0 | Status: SHIPPED | OUTPATIENT
Start: 2023-01-16

## 2023-01-16 RX ORDER — DEXTROMETHORPHAN HYDROBROMIDE AND PROMETHAZINE HYDROCHLORIDE 15; 6.25 MG/5ML; MG/5ML
5 SYRUP ORAL 4 TIMES DAILY PRN
Qty: 118 ML | Refills: 0 | Status: SHIPPED | OUTPATIENT
Start: 2023-01-16 | End: 2023-01-23

## 2023-01-16 RX ORDER — BENZONATATE 200 MG/1
200 CAPSULE ORAL 3 TIMES DAILY PRN
Qty: 21 CAPSULE | Refills: 0 | Status: SHIPPED | OUTPATIENT
Start: 2023-01-16 | End: 2023-01-23

## 2023-01-16 ASSESSMENT — PAIN DESCRIPTION - DESCRIPTORS: DESCRIPTORS: ACHING;POUNDING

## 2023-01-16 ASSESSMENT — ENCOUNTER SYMPTOMS
DIARRHEA: 0
COUGH: 1
VOMITING: 0
SORE THROAT: 1
SHORTNESS OF BREATH: 0
NAUSEA: 0

## 2023-01-16 ASSESSMENT — PAIN DESCRIPTION - ORIENTATION: ORIENTATION: ANTERIOR

## 2023-01-16 ASSESSMENT — PAIN DESCRIPTION - LOCATION: LOCATION: HEAD

## 2023-01-16 ASSESSMENT — PAIN SCALES - GENERAL: PAINLEVEL_OUTOF10: 8

## 2023-01-16 ASSESSMENT — PAIN - FUNCTIONAL ASSESSMENT: PAIN_FUNCTIONAL_ASSESSMENT: 0-10

## 2023-01-16 NOTE — Clinical Note
Darryle Saddler was seen and treated in our emergency department on 1/16/2023. She may return to work on 01/21/2023. She must wear a mask or face covering while in public until 6/19/6586. If you have any questions or concerns, please don't hesitate to call.       Maru Kahn, APRN - CNP

## 2023-01-16 NOTE — ED PROVIDER NOTES
[facility]  Urgent Care Encounter       CHIEF COMPLAINT       Chief Complaint   Patient presents with    Cough     Harsh  sputum yellow/orange  abdomen hurts from coughing. Nasal dripping with headache onset fri       Nurses Notes reviewed and I agree except as noted in the HPI. HISTORY OF PRESENT ILLNESS   Mamie Mcguire is a 38 y.o. female who presents for evaluation of cough, congestion, headache, and body ache that began last night. Patient states that she did have a mild scratchy throat 2 or 3 days ago but yesterday the symptoms began in full force. She denies any fevers or chills. States that she has been using over-the-counter [place] cold and flu medication. She denies any relief with these medications. She denies any known sick exposures. The history is provided by the patient. REVIEW OF SYSTEMS     Review of Systems   Constitutional:  Negative for chills and fever. HENT:  Positive for congestion and sore throat. Respiratory:  Positive for cough. Negative for shortness of breath. Cardiovascular:  Negative for chest pain. Gastrointestinal:  Negative for diarrhea, nausea and vomiting. Musculoskeletal:  Positive for myalgias. Negative for arthralgias. Skin:  Negative for rash. Allergic/Immunologic: Negative for immunocompromised state. Neurological:  Positive for headaches. PAST MEDICAL HISTORY         Diagnosis Date    Bell palsy     right side droop    Essential hypertension 2016    GERD (gastroesophageal reflux disease)     Hashimoto's thyroiditis 2016    HIGH CHOLESTEROL     Hyperlipidemia     Hypokalemia     Prediabetes 2020    Subclinical hyperthyroidism 2016       SURGICALHISTORY     Patient  has a past surgical history that includes  section (, ); Endoscopy, colon, diagnostic; Dilation and curettage of uterus (); laparoscopy (N/A, 6/15/2020);  Colonoscopy (2021); and Hysterectomy, total abdominal (N/A, 9/10/2020). CURRENT MEDICATIONS       Previous Medications    AMLODIPINE (NORVASC) 10 MG TABLET    take 1 tablet by mouth once daily    HYDROCHLOROTHIAZIDE (MICROZIDE) 12.5 MG CAPSULE    take 1 capsule by mouth once daily    LIDOCAINE (LIDODERM) 5 %    Place 1 patch onto the skin in the morning. 12 hours on, 12 hours off. Minh Vazquez METOPROLOL SUCCINATE (TOPROL XL) 100 MG EXTENDED RELEASE TABLET    take 1 tablet by mouth once daily    OMEPRAZOLE (PRILOSEC) 40 MG DELAYED RELEASE CAPSULE    take 1 capsule by mouth once daily at bedtime    ROSUVASTATIN (CRESTOR) 20 MG TABLET    Take 1 tablet by mouth daily    SERTRALINE (ZOLOFT) 100 MG TABLET    take 1 tablet by mouth once daily       ALLERGIES     Patient is is allergic to bactrim and sulfamethoxazole-trimethoprim. Patients   Immunization History   Administered Date(s) Administered    Influenza Vaccine, unspecified formulation 10/01/2016    Influenza Virus Vaccine 11/01/2017, 11/05/2018, 11/27/2019    Tdap (Boostrix, Adacel) 01/01/2016       FAMILY HISTORY     Patient's family history includes Breast Cancer (age of onset: 48) in her maternal aunt; Diabetes in her father and mother; Heart Disease in her mother; High Blood Pressure in her father and mother. SOCIAL HISTORY     Patient  reports that she has never smoked. She has never used smokeless tobacco. She reports current alcohol use. She reports that she does not use drugs. PHYSICAL EXAM     ED TRIAGE VITALS  BP: (!) 157/92, Temp: 97.6 °F (36.4 °C), Heart Rate: 76, Resp: 18, SpO2: 98 %,Estimated body mass index is 37.8 kg/m² as calculated from the following:    Height as of 1/10/23: 5' 4\" (1.626 m). Weight as of 1/10/23: 220 lb 3.2 oz (99.9 kg). ,Patient's last menstrual period was 07/23/2020. Physical Exam  Vitals and nursing note reviewed. Constitutional:       General: She is not in acute distress. Appearance: She is well-developed. She is not diaphoretic.    HENT:      Right Ear: Tympanic membrane and ear canal normal.      Left Ear: Tympanic membrane and ear canal normal.      Mouth/Throat:      Mouth: Mucous membranes are moist.      Pharynx: Oropharynx is clear. Eyes:      Conjunctiva/sclera:      Right eye: Right conjunctiva is not injected. Left eye: Left conjunctiva is not injected. Pupils: Pupils are equal.   Cardiovascular:      Rate and Rhythm: Normal rate and regular rhythm. Heart sounds: No murmur heard. Pulmonary:      Effort: Pulmonary effort is normal. No respiratory distress. Breath sounds: Normal breath sounds. Musculoskeletal:      Cervical back: Normal range of motion. Lymphadenopathy:      Head:      Right side of head: No tonsillar adenopathy. Left side of head: No tonsillar adenopathy. Cervical: No cervical adenopathy. Skin:     General: Skin is warm. Findings: No rash. Neurological:      Mental Status: She is alert and oriented to person, place, and time. Psychiatric:         Behavior: Behavior normal.       DIAGNOSTIC RESULTS     Labs:  Results for orders placed or performed during the hospital encounter of 01/16/23   COVID-19, Rapid   Result Value Ref Range    SARS-CoV-2, EDITA DETECTED (AA) NOT DETECTED       IMAGING:    No orders to display         EKG:      URGENT CARE COURSE:     Vitals:    01/16/23 1029   BP: (!) 157/92   Pulse: 76   Resp: 18   Temp: 97.6 °F (36.4 °C)   TempSrc: Temporal   SpO2: 98%       Medications - No data to display         PROCEDURES:  None    FINAL IMPRESSION      1. COVID-19 virus infection          DISPOSITION/ PLAN       Patient is positive for COVID-19 at this time. I discussed plan to treat symptomatically with Motrin 800, Tessalon Perles and Promethazine DM. Did discuss that she would be a candidate for Paxlovid, however patient is comfortable with symptomatic treatment at this time. Advised to rest and hydrate and did discuss the CDC guidelines for quarantine.   She will follow-up on an outpatient basis if symptoms do not improve. PATIENT REFERRED TO:  MARÍA Berger CNP  [id number] / [place] [id number]      DISCHARGE MEDICATIONS:  New Prescriptions    BENZONATATE (TESSALON) 200 MG CAPSULE    Take 1 capsule by mouth 3 times daily as needed for Cough    IBUPROFEN (ADVIL;MOTRIN) 800 MG TABLET    Take 1 tablet by mouth 3 times daily (with meals)    PROMETHAZINE-DEXTROMETHORPHAN (PROMETHAZINE-DM) 6.25-15 MG/5ML SYRUP    Take 5 mLs by mouth 4 times daily as needed for Cough Do not drive or operate heavy machinery while taking this medication.        Discontinued Medications    No medications on file       Current Discharge Medication List          MARÍA Adams CNP    (Please note that portions of this note were completed with a voice recognition program. Efforts were made to edit the dictations but occasionally words are mis-transcribed.)          MARÍA Adams CNP  01/16/23 2951

## 2023-01-25 ENCOUNTER — TELEMEDICINE (OUTPATIENT)
Dept: FAMILY MEDICINE CLINIC | Age: 43
End: 2023-01-25
Payer: MEDICARE

## 2023-01-25 DIAGNOSIS — H66.93 ACUTE BILATERAL OTITIS MEDIA: ICD-10-CM

## 2023-01-25 DIAGNOSIS — J01.90 ACUTE RHINOSINUSITIS: Primary | ICD-10-CM

## 2023-01-25 PROCEDURE — G8428 CUR MEDS NOT DOCUMENT: HCPCS | Performed by: NURSE PRACTITIONER

## 2023-01-25 PROCEDURE — 99213 OFFICE O/P EST LOW 20 MIN: CPT | Performed by: NURSE PRACTITIONER

## 2023-01-25 RX ORDER — AZITHROMYCIN 250 MG/1
250 TABLET, FILM COATED ORAL SEE ADMIN INSTRUCTIONS
Qty: 6 TABLET | Refills: 0 | Status: SHIPPED | OUTPATIENT
Start: 2023-01-25 | End: 2023-01-30

## 2023-01-25 RX ORDER — FLUTICASONE PROPIONATE 50 MCG
2 SPRAY, SUSPENSION (ML) NASAL DAILY
Qty: 1 EACH | Refills: 1 | Status: SHIPPED | OUTPATIENT
Start: 2023-01-25

## 2023-01-25 ASSESSMENT — ENCOUNTER SYMPTOMS
TROUBLE SWALLOWING: 0
EYE PAIN: 0
COUGH: 1
SORE THROAT: 1
VOMITING: 0
NAUSEA: 0
COLOR CHANGE: 0
SHORTNESS OF BREATH: 0
ABDOMINAL PAIN: 0
WHEEZING: 0
DIARRHEA: 0
EYE REDNESS: 0
RHINORRHEA: 1

## 2023-01-25 NOTE — PROGRESS NOTES
2023    TELEHEALTH EVALUATION -- Audio/Visual (During HYVJW- public health emergency)    HPI:    Shani Sands (:  1980) has requested an audio/video evaluation for the following concern(s):    URI Symptoms    HPI:      Symptoms have been present for  13  day(s). Symptoms are unchanged since they initially started. Fever? No  Runny nose or congestion? Yes   Cough? Yes  Sore throat?  no  Headache, fatigue, joint pains, muscle aches? Yes - headaches, but they are better  Shortness of breath/Wheezing? No  Nausea/Vomiting/Diarrhea? No  Double Sickening? No  Sick contacts? No    Patient has tried OTC meds without improvement. Tested positive for COVID about 10 days ago 23. Her symptoms have been lingering some. She is having some ear pressure/pain. Review of Systems   Constitutional:  Negative for chills, diaphoresis and fatigue. HENT:  Positive for congestion, ear pain, postnasal drip, rhinorrhea and sore throat. Negative for trouble swallowing. Eyes:  Negative for pain, redness and visual disturbance. Respiratory:  Positive for cough. Negative for shortness of breath and wheezing. Cardiovascular:  Negative for chest pain, palpitations and leg swelling. Gastrointestinal:  Negative for abdominal pain, diarrhea, nausea and vomiting. Endocrine: Negative for polydipsia, polyphagia and polyuria. Genitourinary:  Negative for decreased urine volume, dysuria, frequency and urgency. Musculoskeletal:  Negative for arthralgias and myalgias. Skin:  Negative for color change and rash. Allergic/Immunologic: Negative for environmental allergies, food allergies and immunocompromised state. Neurological:  Negative for dizziness, tremors, syncope and headaches. Hematological:  Negative for adenopathy. Psychiatric/Behavioral:  Negative for behavioral problems, confusion, self-injury and suicidal ideas. All other systems reviewed and are negative.     Prior to Visit Medications    Medication Sig Taking? Authorizing Provider   azithromycin (ZITHROMAX) 250 MG tablet Take 1 tablet by mouth See Admin Instructions for 5 days 500mg on day 1 followed by 250mg on days 2 - 5 Yes MARÍA Moreno CNP   fluticasone (FLONASE) 50 MCG/ACT nasal spray 2 sprays by Each Nostril route daily Yes MARÍA Moreno CNP   ibuprofen (ADVIL;MOTRIN) 800 MG tablet Take 1 tablet by mouth 3 times daily (with meals)  MARÍA Maldonado CNP   rosuvastatin (CRESTOR) 20 MG tablet Take 1 tablet by mouth daily  MARÍA Moreno CNP   omeprazole (PRILOSEC) 40 MG delayed release capsule take 1 capsule by mouth once daily at bedtime  MARÍA Moreno CNP   sertraline (ZOLOFT) 100 MG tablet take 1 tablet by mouth once daily  MARÍA Moreno CNP   lidocaine (LIDODERM) 5 % Place 1 patch onto the skin in the morning. 12 hours on, 12 hours off. Marian Hernandez,    metoprolol succinate (TOPROL XL) 100 MG extended release tablet take 1 tablet by mouth once daily  MARÍA Moreno CNP   amLODIPine (NORVASC) 10 MG tablet take 1 tablet by mouth once daily  MARÍA Moreno CNP   hydroCHLOROthiazide (MICROZIDE) 12.5 MG capsule take 1 capsule by mouth once daily  MARÍA Moreno CNP       Social History     Tobacco Use    Smoking status: Never    Smokeless tobacco: Never   Vaping Use    Vaping Use: Never used   Substance Use Topics    Alcohol use: Yes     Comment: social    Drug use: No        Allergies   Allergen Reactions    Bactrim Swelling     tongue    Sulfamethoxazole-Trimethoprim Swelling     tongue   ,   Past Medical History:   Diagnosis Date    Bell palsy 2020    right side droop    Essential hypertension 11/7/2016    GERD (gastroesophageal reflux disease)     Hashimoto's thyroiditis 12/22/2016    HIGH CHOLESTEROL     Hyperlipidemia     Hypokalemia     Prediabetes 6/1/2020    Subclinical hyperthyroidism 11/8/2016   ,   Past Surgical History:   Procedure Laterality Date     SECTION  2014    COLONOSCOPY  2021    1 polyp removed     DILATION AND CURETTAGE OF UTERUS      ENDOSCOPY, COLON, DIAGNOSTIC      HYSTERECTOMY, TOTAL ABDOMINAL (CERVIX REMOVED) N/A 9/10/2020    HYSTERECTOMY ABDOMINAL TOTAL, BS, POSS DANIELLE performed by Julia Cisneros MD at [facility] OR    LAPAROSCOPY N/A 6/15/2020    DIAGNOSTIC LAPAROSCOPY, performed by Julia Cisneros MD at [facility] OR   ,   Family History   Problem Relation Age of Onset    High Blood Pressure Mother     Diabetes Mother     Heart Disease Mother     High Blood Pressure Father     Diabetes Father     Breast Cancer Maternal Aunt 50        reoccurred at 67   ,   Immunization History   Administered Date(s) Administered    Influenza Vaccine, unspecified formulation 10/01/2016    Influenza Virus Vaccine 2017, 2018, 2019    Tdap (Boostrix, Adacel) 2016   ,   Health Maintenance   Topic Date Due    COVID-19 Vaccine (1) Never done    Pneumococcal 0-64 years Vaccine (1 - PCV) Never done    HIV screen  Never done    Diabetic retinal exam  Never done    Hepatitis B vaccine (1 of 3 - Risk 3-dose series) Never done    GFR test (Diabetes, CKD 3-4, OR last GFR 15-59)  2023    Diabetic foot exam  10/18/2023    Lipids  2023    A1C test (Diabetic or Prediabetic)  01/10/2024    Diabetic Alb to Cr ratio (uACR) test  01/10/2024    Depression Screen  01/10/2024    DTaP/Tdap/Td vaccine (6 - Td or Tdap) 2026    Flu vaccine  Completed    Hepatitis C screen  Completed    Hepatitis A vaccine  Aged Out    Hib vaccine  Aged Out    Meningococcal (ACWY) vaccine  Aged Out       PHYSICAL EXAMINATION:  [ INSTRUCTIONS:  \"[x]\" Indicates a positive item  \"[]\" Indicates a negative item  -- DELETE ALL ITEMS NOT EXAMINED]  Vital Signs: (As obtained by patient/caregiver or practitioner observation)    Blood pressure-  Heart rate-    Respiratory rate-    Temperature-  Pulse oximetry-  Constitutional: [x] Appears well-developed and well-nourished [x] No apparent distress      [] Abnormal-   Mental status  [x] Alert and awake  [x] Oriented to person/place/time [x]Able to follow commands      Eyes:  EOM    [x]  Normal  [] Abnormal-  Sclera  [x]  Normal  [] Abnormal -         Discharge [x]  None visible  [] Abnormal -    HENT:   [x] Normocephalic, atraumatic. [] Abnormal   [x] Mouth/Throat: Mucous membranes are moist.     External Ears [x] Normal  [] Abnormal-     Neck: [x] No visualized mass     Pulmonary/Chest: [x] Respiratory effort normal.  [x] No visualized signs of difficulty breathing or respiratory distress        [] Abnormal-      Musculoskeletal:   [x] Normal gait with no signs of ataxia         [x] Normal range of motion of neck        [] Abnormal-       Neurological:        [x] No Facial Asymmetry (Cranial nerve 7 motor function) (limited exam to video visit)          [x] No gaze palsy        [] Abnormal-         Skin:        [x] No significant exanthematous lesions or discoloration noted on facial skin         [] Abnormal-            Psychiatric:       [x] Normal Affect [x] No Hallucinations        [] Abnormal-     Other pertinent observable physical exam findings-     ASSESSMENT & PLAN  Claudine Harris was seen today for otalgia and sinusitis. Diagnoses and all orders for this visit:    Acute rhinosinusitis  -     azithromycin (ZITHROMAX) 250 MG tablet; Take 1 tablet by mouth See Admin Instructions for 5 days 500mg on day 1 followed by 250mg on days 2 - 5  -     fluticasone (FLONASE) 50 MCG/ACT nasal spray; 2 sprays by Each Nostril route daily    Acute bilateral otitis media  -     azithromycin (ZITHROMAX) 250 MG tablet;  Take 1 tablet by mouth See Admin Instructions for 5 days 500mg on day 1 followed by 250mg on days 2 - 5  -     fluticasone (FLONASE) 50 MCG/ACT nasal spray; 2 sprays by Each Nostril route daily      - con't OTC meds of choice  - had COVID, I would have expected her to have recovered by now  - reasonable to treat possible secondary bacterial infection  - start Kurtis Jalloha    Return if symptoms worsen or fail to improve. Ne Atkins is a 38 y.o. female being evaluated by a Virtual Visit (video visit) encounter to address concerns as mentioned above. A caregiver was present when appropriate. Due to this being a TeleHealth encounter (During OGTSZ-06 public health emergency), evaluation of the following organ systems was limited: Vitals/Constitutional/EENT/Resp/CV/GI//MS/Neuro/Skin/Heme-Lymph-Imm. Pursuant to the emergency declaration under the [facility], [id number] waiver authority and the [employer] and [employer] Act, this Virtual Visit was conducted with patient's (and/or legal guardian's) consent, to reduce the patient's risk of exposure to COVID-19 and provide necessary medical care. The patient (and/or legal guardian) has also been advised to contact this office for worsening conditions or problems, and seek emergency medical treatment and/or call 911 if deemed necessary. Services were provided through a video synchronous discussion virtually to substitute for in-person clinic visit. Patient and provider were located at their individual homes. --MARÍA Harley - CNP on 1/25/2023 at 11:44 AM    An electronic signature was used to authenticate this note.

## 2023-02-14 ENCOUNTER — HOSPITAL ENCOUNTER (EMERGENCY)
Age: 43
Discharge: HOME | End: 2023-02-14
Attending: STUDENT IN AN ORGANIZED HEALTH CARE EDUCATION/TRAINING PROGRAM
Payer: MEDICAID

## 2023-02-14 ENCOUNTER — APPOINTMENT (OUTPATIENT)
Dept: CT IMAGING | Age: 43
End: 2023-02-14
Payer: MEDICAID

## 2023-02-14 VITALS
HEART RATE: 73 BPM | BODY MASS INDEX: 37.56 KG/M2 | OXYGEN SATURATION: 98 % | SYSTOLIC BLOOD PRESSURE: 146 MMHG | DIASTOLIC BLOOD PRESSURE: 88 MMHG | WEIGHT: 220 LBS | TEMPERATURE: 98.7 F | RESPIRATION RATE: 18 BRPM | HEIGHT: 64 IN

## 2023-02-14 DIAGNOSIS — N20.1 URETEROLITHIASIS: Primary | ICD-10-CM

## 2023-02-14 LAB
ALBUMIN SERPL BCG-MCNC: 4.1 G/DL (ref 3.5–5.1)
ALP SERPL-CCNC: 89 U/L (ref 38–126)
ALT SERPL W/O P-5'-P-CCNC: 19 U/L (ref 11–66)
ANION GAP SERPL CALC-SCNC: 12 MEQ/L (ref 8–16)
AST SERPL-CCNC: 18 U/L (ref 5–40)
BASOPHILS ABSOLUTE: 0 THOU/MM3 (ref 0–0.1)
BASOPHILS NFR BLD AUTO: 0.3 %
BILIRUB SERPL-MCNC: 0.2 MG/DL (ref 0.3–1.2)
BILIRUB UR QL STRIP: NEGATIVE
BUN SERPL-MCNC: 14 MG/DL (ref 7–22)
CALCIUM SERPL-MCNC: 9.4 MG/DL (ref 8.5–10.5)
CHARACTER UR: CLEAR
CHLORIDE SERPL-SCNC: 102 MEQ/L (ref 98–111)
CO2 SERPL-SCNC: 26 MEQ/L (ref 23–33)
COLOR UR: YELLOW
CREAT SERPL-MCNC: 0.7 MG/DL (ref 0.4–1.2)
DEPRECATED RDW RBC AUTO: 42.8 FL (ref 35–45)
EOSINOPHIL NFR BLD AUTO: 0.9 %
EOSINOPHILS ABSOLUTE: 0.1 THOU/MM3 (ref 0–0.4)
ERYTHROCYTE [DISTWIDTH] IN BLOOD BY AUTOMATED COUNT: 13.8 % (ref 11.5–14.5)
GFR SERPL CREATININE-BSD FRML MDRD: > 60 ML/MIN/1.73M2
GLUCOSE SERPL-MCNC: 133 MG/DL (ref 70–108)
GLUCOSE UR QL STRIP.AUTO: NEGATIVE MG/DL
HCT VFR BLD AUTO: 36.6 % (ref 37–47)
HGB BLD-MCNC: 12.2 GM/DL (ref 12–16)
HGB UR QL STRIP.AUTO: NEGATIVE
IMM GRANULOCYTES # BLD AUTO: 0.06 THOU/MM3 (ref 0–0.07)
IMM GRANULOCYTES NFR BLD AUTO: 0.4 %
KETONES UR QL STRIP.AUTO: NEGATIVE
LEUKOCYTE ESTERASE UR QL STRIP.AUTO: NEGATIVE
LYMPHOCYTES ABSOLUTE: 2.8 THOU/MM3 (ref 1–4.8)
LYMPHOCYTES NFR BLD AUTO: 19.7 %
MCH RBC QN AUTO: 28.6 PG (ref 26–33)
MCHC RBC AUTO-ENTMCNC: 33.3 GM/DL (ref 32.2–35.5)
MCV RBC AUTO: 85.7 FL (ref 81–99)
MONOCYTES ABSOLUTE: 1 THOU/MM3 (ref 0.4–1.3)
MONOCYTES NFR BLD AUTO: 7 %
NEUTROPHILS NFR BLD AUTO: 71.7 %
NITRITE UR QL STRIP.AUTO: NEGATIVE
NRBC BLD AUTO-RTO: 0 /100 WBC
OSMOLALITY SERPL CALC.SUM OF ELEC: 281.8 MOSMOL/KG (ref 275–300)
PH UR STRIP.AUTO: 6.5 [PH] (ref 5–9)
PLATELET # BLD AUTO: 436 THOU/MM3 (ref 130–400)
PMV BLD AUTO: 9.7 FL (ref 9.4–12.4)
POTASSIUM SERPL-SCNC: 3.3 MEQ/L (ref 3.5–5.2)
PROT SERPL-MCNC: 7.2 G/DL (ref 6.1–8)
PROT UR STRIP.AUTO-MCNC: NEGATIVE MG/DL
RBC # BLD AUTO: 4.27 MILL/MM3 (ref 4.2–5.4)
SEGMENTED NEUTROPHILS ABSOLUTE COUNT: 10.1 THOU/MM3 (ref 1.8–7.7)
SODIUM SERPL-SCNC: 140 MEQ/L (ref 135–145)
SP GR UR REFRACT.AUTO: 1.01 (ref 1–1.03)
UROBILINOGEN UR QL STRIP.AUTO: 0.2 EU/DL (ref 0–1)
WBC # BLD AUTO: 14.1 THOU/MM3 (ref 4.8–10.8)

## 2023-02-14 PROCEDURE — 76376 3D RENDER W/INTRP POSTPROCES: CPT

## 2023-02-14 PROCEDURE — 2580000003 HC RX 258: Performed by: STUDENT IN AN ORGANIZED HEALTH CARE EDUCATION/TRAINING PROGRAM

## 2023-02-14 PROCEDURE — 74176 CT ABD & PELVIS W/O CONTRAST: CPT

## 2023-02-14 PROCEDURE — 96374 THER/PROPH/DIAG INJ IV PUSH: CPT

## 2023-02-14 PROCEDURE — 36415 COLL VENOUS BLD VENIPUNCTURE: CPT

## 2023-02-14 PROCEDURE — 99284 EMERGENCY DEPT VISIT MOD MDM: CPT

## 2023-02-14 PROCEDURE — 6360000002 HC RX W HCPCS: Performed by: STUDENT IN AN ORGANIZED HEALTH CARE EDUCATION/TRAINING PROGRAM

## 2023-02-14 PROCEDURE — 80053 COMPREHEN METABOLIC PANEL: CPT

## 2023-02-14 PROCEDURE — 85025 COMPLETE CBC W/AUTO DIFF WBC: CPT

## 2023-02-14 PROCEDURE — 81003 URINALYSIS AUTO W/O SCOPE: CPT

## 2023-02-14 RX ORDER — KETOROLAC TROMETHAMINE 10 MG/1
10 TABLET, FILM COATED ORAL EVERY 6 HOURS PRN
Qty: 10 TABLET | Refills: 0 | Status: SHIPPED | OUTPATIENT
Start: 2023-02-14

## 2023-02-14 RX ORDER — 0.9 % SODIUM CHLORIDE 0.9 %
1000 INTRAVENOUS SOLUTION INTRAVENOUS ONCE
Status: COMPLETED | OUTPATIENT
Start: 2023-02-14 | End: 2023-02-14

## 2023-02-14 RX ORDER — OXYCODONE HYDROCHLORIDE AND ACETAMINOPHEN 5; 325 MG/1; MG/1
1 TABLET ORAL EVERY 8 HOURS PRN
Qty: 9 TABLET | Refills: 0 | Status: SHIPPED | OUTPATIENT
Start: 2023-02-14 | End: 2023-02-17

## 2023-02-14 RX ORDER — KETOROLAC TROMETHAMINE 30 MG/ML
30 INJECTION, SOLUTION INTRAMUSCULAR; INTRAVENOUS ONCE
Status: COMPLETED | OUTPATIENT
Start: 2023-02-14 | End: 2023-02-14

## 2023-02-14 RX ORDER — ONDANSETRON 4 MG/1
4 TABLET, ORALLY DISINTEGRATING ORAL 3 TIMES DAILY PRN
Qty: 21 TABLET | Refills: 0 | Status: SHIPPED | OUTPATIENT
Start: 2023-02-14

## 2023-02-14 RX ADMIN — KETOROLAC TROMETHAMINE 30 MG: 30 INJECTION, SOLUTION INTRAMUSCULAR; INTRAVENOUS at 22:04

## 2023-02-14 RX ADMIN — SODIUM CHLORIDE 1000 ML: 9 INJECTION, SOLUTION INTRAVENOUS at 22:11

## 2023-02-14 ASSESSMENT — PAIN - FUNCTIONAL ASSESSMENT: PAIN_FUNCTIONAL_ASSESSMENT: 0-10

## 2023-02-14 ASSESSMENT — PAIN DESCRIPTION - FREQUENCY: FREQUENCY: CONTINUOUS

## 2023-02-14 ASSESSMENT — PAIN DESCRIPTION - ORIENTATION: ORIENTATION: RIGHT

## 2023-02-14 ASSESSMENT — PAIN DESCRIPTION - LOCATION: LOCATION: FLANK

## 2023-02-14 ASSESSMENT — PAIN SCALES - GENERAL: PAINLEVEL_OUTOF10: 8

## 2023-02-15 ENCOUNTER — OFFICE VISIT (OUTPATIENT)
Dept: UROLOGY | Age: 43
End: 2023-02-15
Payer: MEDICAID

## 2023-02-15 ENCOUNTER — TELEPHONE (OUTPATIENT)
Dept: UROLOGY | Age: 43
End: 2023-02-15

## 2023-02-15 VITALS — HEIGHT: 64 IN | WEIGHT: 220 LBS | BODY MASS INDEX: 37.56 KG/M2 | TEMPERATURE: 97.2 F

## 2023-02-15 DIAGNOSIS — N13.2 HYDRONEPHROSIS CONCURRENT WITH AND DUE TO CALCULI OF KIDNEY AND URETER: ICD-10-CM

## 2023-02-15 DIAGNOSIS — N20.1 URETEROLITHIASIS: Primary | ICD-10-CM

## 2023-02-15 LAB
BILIRUBIN URINE: NEGATIVE
BLOOD URINE, POC: NEGATIVE
CHARACTER, URINE: ABNORMAL
COLOR, URINE: YELLOW
GLUCOSE URINE: NEGATIVE MG/DL
KETONES, URINE: NEGATIVE
LEUKOCYTE CLUMPS, URINE: ABNORMAL
NITRITE, URINE: NEGATIVE
PH, URINE: 6 (ref 5–9)
PROTEIN, URINE: 30 MG/DL
SPECIFIC GRAVITY, URINE: 1.02 (ref 1–1.03)
UROBILINOGEN, URINE: 0.2 EU/DL (ref 0–1)

## 2023-02-15 PROCEDURE — 99214 OFFICE O/P EST MOD 30 MIN: CPT | Performed by: NURSE PRACTITIONER

## 2023-02-15 RX ORDER — DUTASTERIDE 0.5 MG/1
0.5 CAPSULE, LIQUID FILLED ORAL DAILY
Qty: 30 CAPSULE | Refills: 0 | Status: SHIPPED | OUTPATIENT
Start: 2023-02-15 | End: 2023-02-15 | Stop reason: CLARIF

## 2023-02-15 RX ORDER — ALFUZOSIN HYDROCHLORIDE 10 MG/1
10 TABLET, EXTENDED RELEASE ORAL DAILY
Qty: 30 TABLET | Refills: 0 | Status: SHIPPED | OUTPATIENT
Start: 2023-02-15 | End: 2023-03-17

## 2023-02-15 RX ORDER — CLINDAMYCIN HYDROCHLORIDE 300 MG/1
CAPSULE ORAL
COMMUNITY
Start: 2023-02-06

## 2023-02-15 NOTE — ED TRIAGE NOTES
Pt to ED from home with c/o right flank pain that shoots around to the front. Reports it feels like \"contractions\". Pt states this pain has been going on for 2 days. States she took ibuprophen this morning and it didn't help. Rates pain an 8/10 at this time. VSS.

## 2023-02-15 NOTE — TELEPHONE ENCOUNTER
Patient scheduled for US RENAL COMP  at [facility] on 3/9/2023 ARRIVAL OF 215PM FOR A 230PM SCAN. NO CARBONATED BEVERAGES; ARRIVE WELL HYDRATED WITH A FULL BLADDER.     Order mailed with instructions  to the patient

## 2023-02-15 NOTE — ED PROVIDER NOTES
[facility] [address] [address] EMERGENCY DEPT      EMERGENCY MEDICINE     Pt Name: Ancelmo Valentin  MRN: [id number]  Armsmontygflaurent 1980  Date of evaluation: 2/14/2023  Provider: Roney Molina DO    CHIEF COMPLAINT       Chief Complaint   Patient presents with    Flank Pain     Right     HISTORY OF PRESENT ILLNESS   Deloris Sands is a pleasant 38 y.o. female who presents to the emergency department from from home, by private vehicle for evaluation of right flank/groin pain. Patient reports that started today. Patient does have a history of significant lumbar spine radiculopathy and spondylolisthesis. Patient also reports history of ureterolithiasis on the right side. Patient denies any fevers or chills. Patient denies any changes in her urine. Patient denies any chest pain or shortness of breath. Patient denies any changes in her bowel movements. Patient has no other acute complaints at this time. PASTMEDICAL HISTORY     Past Medical History:   Diagnosis Date    Bell palsy 2020    right side droop    Essential hypertension 11/7/2016    GERD (gastroesophageal reflux disease)     Hashimoto's thyroiditis 12/22/2016    HIGH CHOLESTEROL     Hyperlipidemia     Hypokalemia     Prediabetes 6/1/2020    Subclinical hyperthyroidism 11/8/2016       Patient Active Problem List   Diagnosis Code    Essential hypertension I10    Panic disorder without agoraphobia with moderate panic attacks F41.0    Hashimoto's thyroiditis E06.3    Family history of early CAD Z82.49    Intermittent palpitations R00.2    Diarrhea R19.7    Hyperlipidemia E78.5    Reactive thrombocytosis D75.838    Obesity (BMI 30-39. 9) E66.9    Right Bell's palsy G51.0    Prediabetes R73.03    Pelvic pain R10.2    S/P hysterectomy Z90.710    Spinal stenosis of lumbar region without neurogenic claudication M48.061    Herniated lumbar intervertebral disc M51.26    Acquired spondylolisthesis M43.10    Head revolving around R42    Lumbar radiculopathy M54.16 Lumbar facet arthropathy M47.816    Type 2 diabetes mellitus without complication, without long-term current use of insulin ([facility] [address] E11.9    Pure hypercholesterolemia E78.00     SURGICAL HISTORY       Past Surgical History:   Procedure Laterality Date     SECTION  ,     COLONOSCOPY  2021    1 polyp removed     DILATION AND CURETTAGE OF UTERUS      ENDOSCOPY, COLON, DIAGNOSTIC      HYSTERECTOMY, TOTAL ABDOMINAL (CERVIX REMOVED) N/A 9/10/2020    HYSTERECTOMY ABDOMINAL TOTAL, BS, POSS DANIELLE performed by Philip Proctor MD at [facility] 6/15/2020    DIAGNOSTIC LAPAROSCOPY, performed by Philip Proctor MD at [address] [place] [address]       Discharge Medication List as of 2023 10:38 PM        CONTINUE these medications which have NOT CHANGED    Details   fluticasone (FLONASE) 50 MCG/ACT nasal spray 2 sprays by Each Nostril route daily, Disp-1 each, R-1Normal      ibuprofen (ADVIL;MOTRIN) 800 MG tablet Take 1 tablet by mouth 3 times daily (with meals), Disp-90 tablet, R-0Normal      rosuvastatin (CRESTOR) 20 MG tablet Take 1 tablet by mouth daily, Disp-90 tablet, R-3Normal      omeprazole (PRILOSEC) 40 MG delayed release capsule take 1 capsule by mouth once daily at bedtime, Disp-90 capsule, R-1Normal      sertraline (ZOLOFT) 100 MG tablet take 1 tablet by mouth once daily, Disp-90 tablet, R-3Normal      lidocaine (LIDODERM) 5 % Place 1 patch onto the skin in the morning. 12 hours on, 12 hours off. ., Disp-30 patch, R-0Normal      metoprolol succinate (TOPROL XL) 100 MG extended release tablet take 1 tablet by mouth once daily, Disp-90 tablet, R-3Normal      amLODIPine (NORVASC) 10 MG tablet take 1 tablet by mouth once daily, Disp-90 tablet, R-3Normal      hydroCHLOROthiazide (MICROZIDE) 12.5 MG capsule take 1 capsule by mouth once daily, Disp-90 capsule, R-1Normal             ALLERGIES     is allergic to bactrim and sulfamethoxazole-trimethoprim.     FAMILY HISTORY     She indicated that her mother is . She indicated that her father is . She indicated that the status of her maternal aunt is unknown. SOCIAL HISTORY       Social History     Tobacco Use    Smoking status: Never    Smokeless tobacco: Never   Vaping Use    Vaping Use: Never used   Substance Use Topics    Alcohol use: Yes     Comment: social    Drug use: No       PHYSICAL EXAM       ED Triage Vitals [23]   BP Temp Temp Source Heart Rate Resp SpO2 Height Weight   (!) 146/88 98.7 °F (37.1 °C) Oral 73 18 98 % 5' 4\" (1.626 m) 220 lb (99.8 kg)       Additional Vital Signs:  Vitals:    23   BP: (!) 146/88   Pulse: 73   Resp: 18   Temp: 98.7 °F (37.1 °C)   SpO2: 98%     Physical Exam  Vitals reviewed. Constitutional:       General: She is not in acute distress. Appearance: Normal appearance. She is normal weight. She is not ill-appearing or toxic-appearing. HENT:      Head: Normocephalic and atraumatic. Right Ear: External ear normal.      Left Ear: External ear normal.      Nose: Nose normal.      Mouth/Throat:      Mouth: Mucous membranes are moist.      Pharynx: Oropharynx is clear. Eyes:      Extraocular Movements: Extraocular movements intact. Pupils: Pupils are equal, round, and reactive to light. Cardiovascular:      Rate and Rhythm: Normal rate and regular rhythm. Pulmonary:      Effort: Pulmonary effort is normal.      Breath sounds: Normal breath sounds. Abdominal:      General: Abdomen is flat. There is no distension. Palpations: Abdomen is soft. Tenderness: There is abdominal tenderness (R flank/RLQ). There is no guarding or rebound. Musculoskeletal:         General: Normal range of motion. Cervical back: Normal range of motion and neck supple. No rigidity or tenderness. Right lower leg: No edema. Left lower leg: No edema. Skin:     General: Skin is warm and dry.       Capillary Refill: Capillary refill takes less than 2 seconds. Neurological:      General: No focal deficit present. Mental Status: She is alert and oriented to person, place, and time. FORMAL DIAGNOSTIC RESULTS     RADIOLOGY: Interpretation per the Radiologist below, if available at the time of this note (none if blank):    CT ABDOMEN PELVIS WO CONTRAST Additional Contrast? None   Final Result   7 mm obstructing calculus proximal right with moderate proximal right    hydronephrosis. Bilateral adnexal hypodensities. Consider outside pelvic ultrasound    imaging to further evaluate. This document has been electronically signed by: Elpidio White DO    on 02/14/2023 10:11 PM      All CTs at this facility use dose modulation techniques and iterative    reconstructions, and/or weight-based dosing   when appropriate to reduce radiation to a low as reasonably achievable. CT LUMBAR RECONSTRUCTION WO POST PROCESS   Final Result   Lumbar malalignment and degenerative changes. No visible fracture. This document has been electronically signed by: Elpidio White DO    on 02/14/2023 10:14 PM      All CTs at this facility use dose modulation techniques and iterative    reconstructions, and/or weight-based dosing   when appropriate to reduce radiation to a low as reasonably achievable.           LABS: (none if blank)  Labs Reviewed   CBC WITH AUTO DIFFERENTIAL - Abnormal; Notable for the following components:       Result Value    WBC 14.1 (*)     Hematocrit 36.6 (*)     Platelets 327 (*)     Segs Absolute 10.1 (*)     All other components within normal limits   COMPREHENSIVE METABOLIC PANEL - Abnormal; Notable for the following components:    Glucose 133 (*)     Potassium 3.3 (*)     Total Bilirubin 0.2 (*)     All other components within normal limits   URINALYSIS   ANION GAP   GLOMERULAR FILTRATION RATE, ESTIMATED   OSMOLALITY       (Any cultures that may have been sent were not resulted at the time of this patient visit)    MEDICAL DECISION MAKING / ED COURSE:     1) Number and Complexity of Problems            Problem List This Visit:         Chief Complaint   Patient presents with    Flank Pain     Right            Differential Diagnosis includes (but not limited to):  Ureterolithiasis, lumbar strain, appendicitis, acute cystitis        Diagnoses Considered but I have low suspicion of:   Aortic aneurysm             Pertinent Comorbid Conditions:    Prior ureterolithiasis, lumbar spondylolisthesis, hypertension, hyperlipidemia, type 2 diabetes    2)  Data Reviewed (none if left blank)          My Independent interpretations:     EKG:      Normal sinus rhythm    Imaging: CT of the abdomen pelvis shows concerns for ureterolithiasis    Labs:      Patient has mild leukocytosis. Remainder of laboratory studies not show any acute abnormalities. No evidence of UTI                 Decision Rules/Clinical Scores utilized:              External Documentation Reviewed:         Previous patient encounter documents & history available on EMR was reviewed              See Formal Diagnostic Results above for the lab and radiology tests and orders.     3)  Treatment and Disposition         ED Reassessment: Improved         Case discussed with consulting clinician: None         Shared Decision-Making was performed and disposition discussed with the        Patient/Family and questions answered          Social determinants of health impacting treatment or disposition:           Code Status:  full      Summary of Patient Presentation:      MDM  Number of Diagnoses or Management Options  Ureterolithiasis: new, needed workup     Amount and/or Complexity of Data Reviewed  Clinical lab tests: ordered and reviewed  Tests in the radiology section of CPT®: ordered and reviewed  Tests in the medicine section of CPT®: ordered and reviewed  Decide to obtain previous medical records or to obtain history from someone other than the patient: yes  Review and summarize past medical records: yes  Independent visualization of images, tracings, or specimens: yes    Risk of Complications, Morbidity, and/or Mortality  Presenting problems: moderate  Diagnostic procedures: low  Management options: low  General comments: Patient work-up in the emergency department for her symptoms. Laboratory studies were largely unremarkable. Urinalysis not show any evidence of infection. Patient had mild leukocytosis. CT scan of the abdomen pelvis showed a ureterolithiasis. Proxy 7 mm and on the right side. This matches patient's symptoms. Patient does have a follow-up with urology scheduled for Thursday, but I did give her a 1-Click for tomorrow at 8 AM.  Patient's symptoms were controlled with Toradol. Patient will be discharged home with a prescription for Toradol, Zofran, and Flomax. Patient verbalized understands medals plan. Patient discharged home at this time.    /   Vitals Reviewed:    Vitals:    02/14/23 2109   BP: (!) 146/88   Pulse: 73   Resp: 18   Temp: 98.7 °F (37.1 °C)   TempSrc: Oral   SpO2: 98%   Weight: 220 lb (99.8 kg)   Height: 5' 4\" (1.626 m)       The patient was seen and examined. Appropriate diagnostic testing was performed and results reviewed with the patient. The results of pertinent diagnostic studies and exam findings were discussed. The patients provisional diagnosis and plan of care were discussed with the patient and present family who expressed understanding. Any medications were reviewed and indications and risks of medications were discussed with the patient /family present. Strict verbal and written return precautions, instructions and appropriate follow-up provided to  the patient .      ED Medications administered this visit:  (None if blank)  Medications   ketorolac (TORADOL) injection 30 mg (30 mg IntraVENous Given 2/14/23 2204)   0.9 % sodium chloride bolus (0 mLs IntraVENous Stopped 2/14/23 2247)         PROCEDURES: (None if blank)  Procedures:     CRITICAL CARE: (None if blank)      DISCHARGE PRESCRIPTIONS: (None if blank)  Discharge Medication List as of 2/14/2023 10:38 PM        START taking these medications    Details   ketorolac (TORADOL) 10 MG tablet Take 1 tablet by mouth every 6 hours as needed for Pain, Disp-10 tablet, R-0Print      oxyCODONE-acetaminophen (PERCOCET) 5-325 MG per tablet Take 1 tablet by mouth every 8 hours as needed for Pain for up to 3 days. Intended supply: 3 days. Take lowest dose possible to manage pain Max Daily Amount: 3 tablets, Disp-9 tablet, R-0Print      ondansetron (ZOFRAN-ODT) 4 MG disintegrating tablet Take 1 tablet by mouth 3 times daily as needed for Nausea or Vomiting, Disp-21 tablet, R-0Print             FINAL IMPRESSION      1. Ureterolithiasis          DISPOSITION/PLAN   DISPOSITION Decision To Discharge 02/14/2023 10:33:26 PM      OUTPATIENT FOLLOW UP THE PATIENT:  No follow-up provider specified.     Rabia Foreman, [address] [place], [place]  02/15/23 0305

## 2023-02-15 NOTE — PROGRESS NOTES
[place] [address] [address]  Dept: [phone]  Loc: [phone]    Visit Date: 2/15/2023        HPI:     Jagdish Temple is a 38 y.o. female who presents today for:  Chief Complaint   Patient presents with    Follow-up     ER on 2/14/23    Nephrolithiasis     Lower right abd pain came and went in intervals, sharp shooting pain        HPI  Patient in office following ED visit 2/14/2023 for ureterolithiasis. Patient was experiencing right flank pain for 2 days. Patient follows with Dr. Kaur Nascimento and has history of kidney stones. Urologic history also includes OAB, renal cyst and mirco hematuria. Today patient reports pain is tolerable. Has toradol at home from ED. Patient has passed stones in the past. Discussed trial of passage vs stent placement. Patient would like to try to pass stone on her own. Current Outpatient Medications   Medication Sig Dispense Refill    clindamycin (CLEOCIN) 300 MG capsule take 1 capsule by mouth four times a day      dutasteride (AVODART) 0.5 MG capsule Take 1 capsule by mouth daily 30 capsule 0    ketorolac (TORADOL) 10 MG tablet Take 1 tablet by mouth every 6 hours as needed for Pain 10 tablet 0    oxyCODONE-acetaminophen (PERCOCET) 5-325 MG per tablet Take 1 tablet by mouth every 8 hours as needed for Pain for up to 3 days. Intended supply: 3 days.  Take lowest dose possible to manage pain Max Daily Amount: 3 tablets 9 tablet 0    ondansetron (ZOFRAN-ODT) 4 MG disintegrating tablet Take 1 tablet by mouth 3 times daily as needed for Nausea or Vomiting 21 tablet 0    fluticasone (FLONASE) 50 MCG/ACT nasal spray 2 sprays by Each Nostril route daily 1 each 1    ibuprofen (ADVIL;MOTRIN) 800 MG tablet Take 1 tablet by mouth 3 times daily (with meals) 90 tablet 0    rosuvastatin (CRESTOR) 20 MG tablet Take 1 tablet by mouth daily 90 tablet 3    omeprazole (PRILOSEC) 40 MG delayed release capsule take 1 capsule by mouth once daily at bedtime 90 capsule 1    sertraline (ZOLOFT) 100 MG tablet take 1 tablet by mouth once daily 90 tablet 3    lidocaine (LIDODERM) 5 % Place 1 patch onto the skin in the morning. 12 hours on, 12 hours off. . 30 patch 0    metoprolol succinate (TOPROL XL) 100 MG extended release tablet take 1 tablet by mouth once daily 90 tablet 3    amLODIPine (NORVASC) 10 MG tablet take 1 tablet by mouth once daily 90 tablet 3    hydroCHLOROthiazide (MICROZIDE) 12.5 MG capsule take 1 capsule by mouth once daily 90 capsule 1     No current facility-administered medications for this visit. Past Medical History  Jermaine Wiggins  has a past medical history of Bell palsy, Essential hypertension, GERD (gastroesophageal reflux disease), Hashimoto's thyroiditis, HIGH CHOLESTEROL, Hyperlipidemia, Hypokalemia, Prediabetes, and Subclinical hyperthyroidism. Past Surgical History  The patient  has a past surgical history that includes  section (, ); Endoscopy, colon, diagnostic; Dilation and curettage of uterus (); laparoscopy (N/A, 6/15/2020); Colonoscopy (2021); and Hysterectomy, total abdominal (N/A, 9/10/2020). Family History  This patient's family history includes Breast Cancer (age of onset: 48) in her maternal aunt; Diabetes in her father and mother; Heart Disease in her mother; High Blood Pressure in her father and mother. Social History  Deloris  reports that she has never smoked. She has never used smokeless tobacco. She reports current alcohol use. She reports that she does not use drugs.       Subjective:      REVIEW OF SYSTEMS:  Constitutional: negative  Eyes: negative  Respiratory: negative  Cardiovascular: negative  Gastrointestinal: negative  Musculoskeletal: negative  Genitourinary: negative except for what is in HPI  Skin: negative   Neurological: negative  Hematological/Lymphatic: negative  Psychological: negative    Objective:   Temp 97.2 °F (36.2 °C)   Ht 5' 4\" (1.626 m)   Wt 220 lb (99.8 kg)   LMP 07/23/2020   BMI 37.76 kg/m²     Patient is a 38 y.o. female in no acute distress and alert and oriented to person, place and time. Pulmonary: Non-labored respiration. Cardiovascular: Normal rate, regular rhythm, normal peripheral pulses. Skin: Warm and dry. Psych: Normal mood and affect. Genitourinary: Bladder non-distended and non-tender. POC  Results for POC orders placed in visit on 02/15/23   POCT Urinalysis No Micro (Auto)   Result Value Ref Range    Glucose, Ur Negative NEGATIVE mg/dl    Bilirubin Urine Negative     Ketones, Urine Negative NEGATIVE    Specific Gravity, Urine 1.025 1.002 - 1.030    Blood, UA POC Negative NEGATIVE    pH, Urine 6.00 5.0 - 9.0    Protein, Urine 30 (A) NEGATIVE mg/dl    Urobilinogen, Urine 0.20 0.0 - 1.0 eu/dl    Nitrite, Urine Negative NEGATIVE    Leukocyte Clumps, Urine Small (A) NEGATIVE    Color, Urine Yellow [id number]-STRAW    Character, Urine Slightly Cloudy [email or web address]         Patients recent PSA values are as follows  No results found for: PSA, PSADIA     Recent BUN/Creatinine:  Lab Results   Component Value Date/Time    BUN 14 02/14/2023 09:41 PM    CREATININE 0.7 02/14/2023 09:41 PM       Radiology  The patient has had a CT Without Contrast which I have independently reviewed along with its accompanying report. The study demonstrates   CT abdomen and pelvis without contrast       Comparison: None       Findings:   No consolidation or effusion. Lumbar spine straightening. Moderate DDD at L4-L5. Nodule left adrenal gland measuring 13 x 14 mm. Punctate intrarenal calculus posterior lower right kidney. Moderate right hydronephrosis. 7 mm calculus proximal right ureter. Mild fatty liver with fatty sparing. Mild distal aortic atherosclerosis. Bilateral adnexal hypodensities. No visible uterus. Mild sigmoid diverticulosis. No high-grade bowel inflammatory changes.        No acute fracture. Impression   7 mm obstructing calculus proximal right with moderate proximal right    hydronephrosis. Bilateral adnexal hypodensities. Consider outside pelvic ultrasound    imaging to further evaluate. Assessment/Plan:   1. Ureterolithiasis  Patient would like to try MET. Continue Uroxatral and Norco/Ketorolac. Increase fluids > 60 oz daily. Strain all urine. Follow up 1 month with KUB and US. Discussed signs of worsening symptoms including fevers, chills, and increased pain. Instructed to call the office if these occur.   - start Uroxatral po qd for stone passage. - XR ABDOMEN (KUB) (SINGLE AP VIEW); Future  - US RENAL COMPLETE; Future    2. Hydronephrosis concurrent with and due to calculi of kidney and ureter  - US RENAL COMPLETE; Future     -Patient has no other questions, comments, or concerns.   -They agree with and understand the plan of care. -The patient was encouraged to call the office or seek emergency care should this change.       MARÍA Wells - CNP

## 2023-02-23 ENCOUNTER — HOSPITAL ENCOUNTER (OUTPATIENT)
Dept: WOMENS IMAGING | Age: 43
Discharge: HOME | End: 2023-02-23
Payer: MEDICAID

## 2023-02-23 DIAGNOSIS — Z12.31 VISIT FOR SCREENING MAMMOGRAM: ICD-10-CM

## 2023-02-23 PROCEDURE — 77067 SCR MAMMO BI INCL CAD: CPT

## 2023-02-27 ENCOUNTER — TELEPHONE (OUTPATIENT)
Dept: FAMILY MEDICINE CLINIC | Age: 43
End: 2023-02-27

## 2023-02-27 NOTE — TELEPHONE ENCOUNTER
----- Message from Khanh Wright DO sent at 2/26/2023  5:39 PM EST -----  Please let pt know that mammogram is normal. Let me know if questions, thanks!

## 2023-03-29 ENCOUNTER — HOSPITAL ENCOUNTER (OUTPATIENT)
Dept: ULTRASOUND IMAGING | Age: 43
Discharge: HOME | End: 2023-03-29
Payer: MEDICAID

## 2023-03-29 DIAGNOSIS — N20.1 URETEROLITHIASIS: ICD-10-CM

## 2023-03-29 DIAGNOSIS — N13.2 HYDRONEPHROSIS CONCURRENT WITH AND DUE TO CALCULI OF KIDNEY AND URETER: ICD-10-CM

## 2023-03-29 PROCEDURE — 76775 US EXAM ABDO BACK WALL LIM: CPT

## 2023-03-30 ENCOUNTER — OFFICE VISIT (OUTPATIENT)
Dept: UROLOGY | Age: 43
End: 2023-03-30
Payer: MEDICAID

## 2023-03-30 VITALS
DIASTOLIC BLOOD PRESSURE: 70 MMHG | SYSTOLIC BLOOD PRESSURE: 124 MMHG | HEIGHT: 64 IN | BODY MASS INDEX: 37.73 KG/M2 | WEIGHT: 221 LBS

## 2023-03-30 DIAGNOSIS — R10.2 PELVIC PAIN: ICD-10-CM

## 2023-03-30 DIAGNOSIS — N28.1 RENAL CYST: ICD-10-CM

## 2023-03-30 DIAGNOSIS — N32.81 OAB (OVERACTIVE BLADDER): ICD-10-CM

## 2023-03-30 DIAGNOSIS — N13.30 HYDRONEPHROSIS, RIGHT: ICD-10-CM

## 2023-03-30 DIAGNOSIS — R31.0 GROSS HEMATURIA: ICD-10-CM

## 2023-03-30 DIAGNOSIS — N20.0 NEPHROLITHIASIS: Primary | ICD-10-CM

## 2023-03-30 PROCEDURE — 3078F DIAST BP <80 MM HG: CPT | Performed by: UROLOGY

## 2023-03-30 PROCEDURE — 99214 OFFICE O/P EST MOD 30 MIN: CPT | Performed by: UROLOGY

## 2023-03-30 PROCEDURE — 3074F SYST BP LT 130 MM HG: CPT | Performed by: UROLOGY

## 2023-03-30 NOTE — PROGRESS NOTES
Thierry Rojas MD  Urology Clinic office visit    Patient:  Hannah Harris  YOB: 1980  Date: 3/30/2023    HISTORY OF PRESENT ILLNESS:   The patient is a 37 y.o. female who presents today for follow-up for the following problem(s):      1. Nephrolithiasis    2. OAB (overactive bladder)    3. Renal cyst    4. Pelvic pain    5. Hydronephrosis, right    6. Gross hematuria         Overall the problem(s) : are new  Associated Symptoms: No dysuria, gross hematuria. Pain Severity:      Summary of old records: seen [facility] in past for OAB  Hysterectomy    1 week of pain, right side  Now radiating to right lower abdomen  No heamturia, no fevers, +nausea   5 mm calcification at the right ureteropelvic junction    Additional History: Onset years  Pelvic pain, also after sex  Reports frequency and urgency  Tried Ditropan  Tried myrbetriq  Inconsistent Vesicare taking, LUTS unchanged  Lower urinary tract symptoms: urgency and frequency      New episode of stone 7/2022  Thinks she passed a stone in the meantime  Renal US as noted below  Denies pain or hematuria or UTI symptoms    I independently reviewed and verified the images and reports from:    US RENAL LIMITED    Result Date: 3/30/2023  PROCEDURE: US RENAL LIMITED CLINICAL INFORMATION: Ureterolithiasis, hydronephrosis TECHNIQUE: Ultrasound of the kidneys and urinary bladder was performed. Grayscale and color images were obtained. COMPARISON: Renal ultrasound 10/5/2022 FINDINGS: The right kidney measures 11.7 x 6.1 x 7.5 cm and the left kidney measures 11.4 x 5.6 x 6.6 cm. Renal cortical thickness is normal bilaterally. An avascular anechoic structure at the right upper kidney measures 2.4 cm. An avascular anechoic structure at the left mid kidney measures 1.3 cm. There are echogenic structures in the bilateral kidneys. The largest on the right measures 0.4 cm and the largest on the left measures 0.6 cm. There is mild right-sided hydronephrosis.  A

## 2023-04-24 ENCOUNTER — OFFICE VISIT (OUTPATIENT)
Dept: UROLOGY | Age: 43
End: 2023-04-24
Payer: MEDICAID

## 2023-04-24 ENCOUNTER — TELEPHONE (OUTPATIENT)
Dept: UROLOGY | Age: 43
End: 2023-04-24

## 2023-04-24 VITALS — HEIGHT: 64 IN | RESPIRATION RATE: 18 BRPM | WEIGHT: 221 LBS | BODY MASS INDEX: 37.73 KG/M2

## 2023-04-24 DIAGNOSIS — N20.0 NEPHROLITHIASIS: Primary | ICD-10-CM

## 2023-04-24 DIAGNOSIS — Z01.818 PRE-OP TESTING: ICD-10-CM

## 2023-04-24 DIAGNOSIS — N32.81 OAB (OVERACTIVE BLADDER): ICD-10-CM

## 2023-04-24 DIAGNOSIS — N13.30 HYDRONEPHROSIS, RIGHT: ICD-10-CM

## 2023-04-24 DIAGNOSIS — R31.0 GROSS HEMATURIA: ICD-10-CM

## 2023-04-24 DIAGNOSIS — N28.1 RENAL CYST: ICD-10-CM

## 2023-04-24 PROCEDURE — 99214 OFFICE O/P EST MOD 30 MIN: CPT | Performed by: NURSE PRACTITIONER

## 2023-04-24 NOTE — TELEPHONE ENCOUNTER
DO NOT TAKE  FISH OIL, MOBIC, IBUPROFEN, MOTRIN-LIKE DRUGS AND ANY MULTIVITAMINS OR OVER THE COUNTER SUPPLEMENTS 14 DAYS PRIOR TO SURGERY. HOLD THE ASPIRIN 5 DAYS PRIOR TO THE SURGERY    MUST HAVE AN ADULT OVER THE AGE OF 18 WITH YOU AT THE TIME OF THE DISCHARGE AND WITH YOU AT HOME AFTER THE PROCEDURE FOR 24 HOURS          Deloris Sands 1980 Diagnosis:     Surgical Physician: Dr. Coy Box have been scheduled for the procedure marked below:      Surgery: Cystoscopy, right ureteroscopy, laser lithotripsy, basket retrieval of stone fragments, and possible right ureteral stent placement          Date: 5/23/23     Anesthesia: Anesthesiologist (General/Spinal)     Place of Service: [address] [place] [address] Second Floor Same Day Surgery         Arrive to same day surgery by:  8:30 am  (Surgery time is subject to change)      INSTRUCTIONS AS MARKED BELOW:    1.  DO NOT eat or drink anything after midnight before surgery. 2.  We prefer you shower or bathe with an antibacterial soap (Dial) the morning of surgery. 3  Please bring a current medication list, photo ID and insurance card(s) with you  4. Okay to take Tylenol  5. If you take Glucophage, Metformin or Janumet, hold 48-hours prior to surgery  6  Take blood pressure or heart medication as directed, if taken in the morning take with a small sip of water  7. The office will call you in 1-2 days after your procedure to schedule a follow up. DATE SENSITIVE TESTING-DO ON THE DATE LISTED *WALK IN *NO APPOINTMENT    DO THE PRE OP URINE CULTURE AND EKG ON 5/11/23. ORDERS GIVEN.         Date: 4/24/2023

## 2023-04-24 NOTE — PROGRESS NOTES
[place] [address] [address]  Dept: [phone]  Loc: [phone]    Visit Date: 4/24/2023        HPI:     Nicole Simons is a 37 y.o. female who presents today for:  Chief Complaint   Patient presents with    Results     Review CT       HPI  Patient presents to urology clinic with history of kidney stones, OAB, renal cyst, right hydronephrosis and gross hematuria. Follows with Dr. Yeni Patrick. Here today to review CT scan results. 7mm stone noted in right proximal ureter with hydronephrosis which has been present since 2/14/23 . Patient endorses ongoing right side flank pain but has been tolerable. Attempted ToP at home without success. Jax Mohan reports ongoing urgency and frequency, not taking Vesicare. Denies gross hematuria, fever or chills. History of stone in the past that she has passed spontaneously. Discussed ureteroscopic intervention and patient would like to proceed. Summary of old records: seen [facility] in past for OAB  Hysterectomy     1 week of pain, right side  Now radiating to right lower abdomen  No heamturia, no fevers, +nausea  5 mm calcification at the right ureteropelvic junction     Additional History:    Onset years  Pelvic pain, also after sex  Reports frequency and urgency  Tried Ditropan  Tried myrbetriq  Inconsistent Vesicare taking, LUTS unchanged  Lower urinary tract symptoms: urgency and frequency    Current Outpatient Medications   Medication Sig Dispense Refill    fluticasone (FLONASE) 50 MCG/ACT nasal spray 2 sprays by Each Nostril route daily 1 each 1    ibuprofen (ADVIL;MOTRIN) 800 MG tablet Take 1 tablet by mouth 3 times daily (with meals) 90 tablet 0    rosuvastatin (CRESTOR) 20 MG tablet Take 1 tablet by mouth daily 90 tablet 3    omeprazole (PRILOSEC) 40 MG delayed release capsule take 1 capsule by mouth once daily at bedtime 90 capsule 1    sertraline (ZOLOFT) 100 MG

## 2023-04-24 NOTE — PATIENT INSTRUCTIONS
Post-op expectations were discussed; stent pain, urinary frequency and urgency secondary to the stent, dysuria which should improve 1-2 days after procedure, and intermittent hematuria can be expected as long as stent is in place.

## 2023-04-24 NOTE — TELEPHONE ENCOUNTER
Patient scheduled for surgery with Dr Travis Weiss on 5/23/23 per patient's request. Surgery consent on arrival. Patient to do pre op urine culture and EKG on 5/4/23. Surgery instructions given to the patient. Patient will have an adult over the age of 25 with them at discharge and 24 hours after procedure.

## 2023-04-24 NOTE — TELEPHONE ENCOUNTER
SURGERY [address] [facility], [address]      Phone *[phone]   Surgical Scheduling Direct Line Phone *[phone] Fax *[phone]      Chika Older 1980 female    [address] [facility]  [address] [id number]  Marital Status: Single         Home Phone: [phone]      Cell Phone:    Telephone Information:   Mobile [phone]          Surgeon: Dr. Reese Chatterjee Surgery Date: 5/23/23   Time: 10:30 am    Procedure:Cystoscopy, right ureteroscopy, laser lithotripsy, basket retrieval of stone fragments, and possible right ureteral stent placement     Diagnosis: Kidney Stone     Important Medical History:  In Epic    Special Inst/Equip:     CPT Codes:    32596  Latex Allergy: No     Cardiac Device:  No    Anesthesia:  General          Admission Type:  Same Day                        Admit Prior to Day of Surgery: No    Case Location:  Main OR            Preadmission Testing:  Phone Call          PAT Date and Time:______________________________________________________    PAT Confirmation #: ______________________________________________________    Post Op Visit: ___________________________________________________________    Need Preop Cardiac Clearance: Yes    Does Patient have Cardiologist/physician?      Dr Sim Mota Confirmation #: __________________________________________________    Jillyn Half: ________________________   Date: __________________________     Insurance Company Name: [facility]

## 2023-04-25 ENCOUNTER — TELEPHONE (OUTPATIENT)
Dept: UROLOGY | Age: 43
End: 2023-04-25

## 2023-04-25 NOTE — TELEPHONE ENCOUNTER
Patient scheduled for a Cystoscopy, right ureteroscopy, laser lithotripsy, basket retrieval of stone fragments, and possible right ureteral stent placement with Dr Rick Ny on 5/23/23.  We are asking for clearance

## 2023-04-26 ENCOUNTER — TELEPHONE (OUTPATIENT)
Dept: UROLOGY | Age: 43
End: 2023-04-26

## 2023-04-26 ENCOUNTER — PREP FOR PROCEDURE (OUTPATIENT)
Dept: UROLOGY | Age: 43
End: 2023-04-26

## 2023-04-26 LAB
BACTERIA UR CULT: ABNORMAL
ORGANISM: ABNORMAL

## 2023-04-26 NOTE — TELEPHONE ENCOUNTER
Patient advised of the urine results and no need for antibiotics. She voiced understanding. She will call if she develops any symptoms.

## 2023-04-26 NOTE — TELEPHONE ENCOUNTER
----- Message from MARÍA Epstein CNP sent at 4/26/2023  9:52 AM EDT -----  Patient is asymptomatic. Urine culture with mixed growth. No indication for antibiotic therapy at this time.

## 2023-04-27 ENCOUNTER — PREP FOR PROCEDURE (OUTPATIENT)
Dept: UROLOGY | Age: 43
End: 2023-04-27

## 2023-05-01 RX ORDER — SODIUM CHLORIDE 0.9 % (FLUSH) 0.9 %
5-40 SYRINGE (ML) INJECTION EVERY 12 HOURS SCHEDULED
Status: CANCELLED | OUTPATIENT
Start: 2023-05-01

## 2023-05-01 RX ORDER — SODIUM CHLORIDE 9 MG/ML
INJECTION, SOLUTION INTRAVENOUS PRN
Status: CANCELLED | OUTPATIENT
Start: 2023-05-01

## 2023-05-01 RX ORDER — SODIUM CHLORIDE 0.9 % (FLUSH) 0.9 %
5-40 SYRINGE (ML) INJECTION PRN
Status: CANCELLED | OUTPATIENT
Start: 2023-05-01

## 2023-05-05 ENCOUNTER — OFFICE VISIT (OUTPATIENT)
Dept: CARDIOLOGY CLINIC | Age: 43
End: 2023-05-05
Payer: MEDICAID

## 2023-05-05 VITALS
DIASTOLIC BLOOD PRESSURE: 97 MMHG | HEIGHT: 64 IN | HEART RATE: 63 BPM | BODY MASS INDEX: 37.36 KG/M2 | SYSTOLIC BLOOD PRESSURE: 164 MMHG | WEIGHT: 218.8 LBS

## 2023-05-05 DIAGNOSIS — I10 ESSENTIAL HYPERTENSION: Primary | ICD-10-CM

## 2023-05-05 DIAGNOSIS — R00.2 INTERMITTENT PALPITATIONS: ICD-10-CM

## 2023-05-05 DIAGNOSIS — E78.01 FAMILIAL HYPERCHOLESTEROLEMIA: ICD-10-CM

## 2023-05-05 PROCEDURE — 3077F SYST BP >= 140 MM HG: CPT | Performed by: NUCLEAR MEDICINE

## 2023-05-05 PROCEDURE — 93000 ELECTROCARDIOGRAM COMPLETE: CPT | Performed by: NUCLEAR MEDICINE

## 2023-05-05 PROCEDURE — 99214 OFFICE O/P EST MOD 30 MIN: CPT | Performed by: NUCLEAR MEDICINE

## 2023-05-05 PROCEDURE — 3080F DIAST BP >= 90 MM HG: CPT | Performed by: NUCLEAR MEDICINE

## 2023-05-10 RX ORDER — SODIUM CHLORIDE 0.9 % (FLUSH) 0.9 %
5-40 SYRINGE (ML) INJECTION PRN
Status: CANCELLED | OUTPATIENT
Start: 2023-05-10

## 2023-05-10 RX ORDER — SODIUM CHLORIDE 0.9 % (FLUSH) 0.9 %
5-40 SYRINGE (ML) INJECTION EVERY 12 HOURS SCHEDULED
Status: CANCELLED | OUTPATIENT
Start: 2023-05-10

## 2023-05-10 RX ORDER — SODIUM CHLORIDE 9 MG/ML
INJECTION, SOLUTION INTRAVENOUS PRN
Status: CANCELLED | OUTPATIENT
Start: 2023-05-10

## 2023-05-12 ENCOUNTER — HOSPITAL ENCOUNTER (OUTPATIENT)
Age: 43
Discharge: HOME | End: 2023-05-12
Payer: MEDICAID

## 2023-05-12 ENCOUNTER — HOSPITAL ENCOUNTER (OUTPATIENT)
Dept: GENERAL RADIOLOGY | Age: 43
Discharge: HOME | End: 2023-05-12
Payer: MEDICAID

## 2023-05-12 DIAGNOSIS — Z01.818 PRE-OP TESTING: ICD-10-CM

## 2023-05-12 DIAGNOSIS — N20.1 URETEROLITHIASIS: ICD-10-CM

## 2023-05-12 DIAGNOSIS — N20.0 NEPHROLITHIASIS: ICD-10-CM

## 2023-05-12 PROCEDURE — 93005 ELECTROCARDIOGRAM TRACING: CPT

## 2023-05-12 PROCEDURE — 87086 URINE CULTURE/COLONY COUNT: CPT

## 2023-05-12 PROCEDURE — 74018 RADEX ABDOMEN 1 VIEW: CPT

## 2023-05-13 LAB
BACTERIA UR CULT: ABNORMAL
EKG ATRIAL RATE: 59 BPM
EKG P AXIS: 44 DEGREES
EKG P-R INTERVAL: 158 MS
EKG Q-T INTERVAL: 416 MS
EKG QRS DURATION: 74 MS
EKG QTC CALCULATION (BAZETT): 411 MS
EKG R AXIS: 58 DEGREES
EKG T AXIS: 37 DEGREES
EKG VENTRICULAR RATE: 59 BPM
ORGANISM: ABNORMAL

## 2023-05-15 ENCOUNTER — TELEPHONE (OUTPATIENT)
Dept: UROLOGY | Age: 43
End: 2023-05-15

## 2023-05-15 DIAGNOSIS — Z01.818 PRE-OP TESTING: Primary | ICD-10-CM

## 2023-05-15 DIAGNOSIS — N20.0 KIDNEY STONES: ICD-10-CM

## 2023-05-15 NOTE — TELEPHONE ENCOUNTER
Please review urine culture on 5/12/23. Surgery with Dr Kaveh Eason on 5/23/23 for a [employer] Thanks.

## 2023-05-18 ENCOUNTER — HOSPITAL ENCOUNTER (OUTPATIENT)
Age: 43
Discharge: HOME | End: 2023-05-18
Payer: MEDICAID

## 2023-05-18 DIAGNOSIS — N20.0 KIDNEY STONES: ICD-10-CM

## 2023-05-18 DIAGNOSIS — Z01.818 PRE-OP TESTING: ICD-10-CM

## 2023-05-18 LAB
ANION GAP SERPL CALC-SCNC: 11 MEQ/L (ref 8–16)
BASOPHILS ABSOLUTE: 0 THOU/MM3 (ref 0–0.1)
BASOPHILS NFR BLD AUTO: 0.3 %
BUN SERPL-MCNC: 18 MG/DL (ref 7–22)
CALCIUM SERPL-MCNC: 10.6 MG/DL (ref 8.5–10.5)
CHLORIDE SERPL-SCNC: 105 MEQ/L (ref 98–111)
CO2 SERPL-SCNC: 26 MEQ/L (ref 23–33)
CREAT SERPL-MCNC: 0.7 MG/DL (ref 0.4–1.2)
DEPRECATED RDW RBC AUTO: 44.2 FL (ref 35–45)
EOSINOPHIL NFR BLD AUTO: 0.8 %
EOSINOPHILS ABSOLUTE: 0.1 THOU/MM3 (ref 0–0.4)
ERYTHROCYTE [DISTWIDTH] IN BLOOD BY AUTOMATED COUNT: 13.6 % (ref 11.5–14.5)
GFR SERPL CREATININE-BSD FRML MDRD: > 60 ML/MIN/1.73M2
GLUCOSE SERPL-MCNC: 117 MG/DL (ref 70–108)
HCT VFR BLD AUTO: 38.6 % (ref 37–47)
HGB BLD-MCNC: 12.4 GM/DL (ref 12–16)
IMM GRANULOCYTES # BLD AUTO: 0.03 THOU/MM3 (ref 0–0.07)
IMM GRANULOCYTES NFR BLD AUTO: 0.3 %
LYMPHOCYTES ABSOLUTE: 3.2 THOU/MM3 (ref 1–4.8)
LYMPHOCYTES NFR BLD AUTO: 28.5 %
MCH RBC QN AUTO: 28.4 PG (ref 26–33)
MCHC RBC AUTO-ENTMCNC: 32.1 GM/DL (ref 32.2–35.5)
MCV RBC AUTO: 88.3 FL (ref 81–99)
MONOCYTES ABSOLUTE: 0.7 THOU/MM3 (ref 0.4–1.3)
MONOCYTES NFR BLD AUTO: 6 %
NEUTROPHILS NFR BLD AUTO: 64.1 %
NRBC BLD AUTO-RTO: 0 /100 WBC
PLATELET # BLD AUTO: 448 THOU/MM3 (ref 130–400)
PMV BLD AUTO: 9.7 FL (ref 9.4–12.4)
POTASSIUM SERPL-SCNC: 4.5 MEQ/L (ref 3.5–5.2)
RBC # BLD AUTO: 4.37 MILL/MM3 (ref 4.2–5.4)
SEGMENTED NEUTROPHILS ABSOLUTE COUNT: 7.2 THOU/MM3 (ref 1.8–7.7)
SODIUM SERPL-SCNC: 142 MEQ/L (ref 135–145)
WBC # BLD AUTO: 11.2 THOU/MM3 (ref 4.8–10.8)

## 2023-05-18 PROCEDURE — 85025 COMPLETE CBC W/AUTO DIFF WBC: CPT

## 2023-05-18 PROCEDURE — 80048 BASIC METABOLIC PNL TOTAL CA: CPT

## 2023-05-18 PROCEDURE — 36415 COLL VENOUS BLD VENIPUNCTURE: CPT

## 2023-05-23 ENCOUNTER — ANESTHESIA (OUTPATIENT)
Dept: OPERATING ROOM | Age: 43
End: 2023-05-23
Payer: MEDICAID

## 2023-05-23 ENCOUNTER — APPOINTMENT (OUTPATIENT)
Dept: GENERAL RADIOLOGY | Age: 43
End: 2023-05-23
Attending: UROLOGY
Payer: MEDICAID

## 2023-05-23 ENCOUNTER — HOSPITAL ENCOUNTER (OUTPATIENT)
Age: 43
Setting detail: OUTPATIENT SURGERY
Discharge: HOME | End: 2023-05-23
Attending: UROLOGY | Admitting: UROLOGY
Payer: MEDICAID

## 2023-05-23 ENCOUNTER — ANESTHESIA EVENT (OUTPATIENT)
Dept: OPERATING ROOM | Age: 43
End: 2023-05-23
Payer: MEDICAID

## 2023-05-23 VITALS
TEMPERATURE: 96.8 F | RESPIRATION RATE: 16 BRPM | BODY MASS INDEX: 37.22 KG/M2 | DIASTOLIC BLOOD PRESSURE: 85 MMHG | OXYGEN SATURATION: 100 % | HEIGHT: 64 IN | SYSTOLIC BLOOD PRESSURE: 143 MMHG | WEIGHT: 218 LBS | HEART RATE: 73 BPM

## 2023-05-23 DIAGNOSIS — G89.18 POST-OP PAIN: Primary | ICD-10-CM

## 2023-05-23 PROCEDURE — 3209999900 FLUORO FOR SURGICAL PROCEDURES

## 2023-05-23 PROCEDURE — 6360000002 HC RX W HCPCS: Performed by: ANESTHESIOLOGY

## 2023-05-23 PROCEDURE — C2617 STENT, NON-COR, TEM W/O DEL: HCPCS | Performed by: UROLOGY

## 2023-05-23 PROCEDURE — 6360000002 HC RX W HCPCS: Performed by: UROLOGY

## 2023-05-23 PROCEDURE — 7100000000 HC PACU RECOVERY - FIRST 15 MIN: Performed by: UROLOGY

## 2023-05-23 PROCEDURE — 3600000003 HC SURGERY LEVEL 3 BASE: Performed by: UROLOGY

## 2023-05-23 PROCEDURE — C1758 CATHETER, URETERAL: HCPCS | Performed by: UROLOGY

## 2023-05-23 PROCEDURE — 2720000010 HC SURG SUPPLY STERILE: Performed by: UROLOGY

## 2023-05-23 PROCEDURE — 7100000001 HC PACU RECOVERY - ADDTL 15 MIN: Performed by: UROLOGY

## 2023-05-23 PROCEDURE — 7100000010 HC PHASE II RECOVERY - FIRST 15 MIN: Performed by: UROLOGY

## 2023-05-23 PROCEDURE — 3600000013 HC SURGERY LEVEL 3 ADDTL 15MIN: Performed by: UROLOGY

## 2023-05-23 PROCEDURE — 2580000003 HC RX 258: Performed by: UROLOGY

## 2023-05-23 PROCEDURE — 6360000002 HC RX W HCPCS

## 2023-05-23 PROCEDURE — 2709999900 HC NON-CHARGEABLE SUPPLY: Performed by: UROLOGY

## 2023-05-23 PROCEDURE — C1769 GUIDE WIRE: HCPCS | Performed by: UROLOGY

## 2023-05-23 PROCEDURE — 2500000003 HC RX 250 WO HCPCS: Performed by: NURSE ANESTHETIST, CERTIFIED REGISTERED

## 2023-05-23 PROCEDURE — 3700000001 HC ADD 15 MINUTES (ANESTHESIA): Performed by: UROLOGY

## 2023-05-23 PROCEDURE — 6360000002 HC RX W HCPCS: Performed by: NURSE ANESTHETIST, CERTIFIED REGISTERED

## 2023-05-23 PROCEDURE — 7100000011 HC PHASE II RECOVERY - ADDTL 15 MIN: Performed by: UROLOGY

## 2023-05-23 PROCEDURE — 3700000000 HC ANESTHESIA ATTENDED CARE: Performed by: UROLOGY

## 2023-05-23 PROCEDURE — C1747 HC ENDOSCOPE, SINGLE, URINARY TRACT: HCPCS | Performed by: UROLOGY

## 2023-05-23 PROCEDURE — 87081 CULTURE SCREEN ONLY: CPT

## 2023-05-23 PROCEDURE — 74018 RADEX ABDOMEN 1 VIEW: CPT

## 2023-05-23 DEVICE — URETERAL STENT
Type: IMPLANTABLE DEVICE | Site: URETER | Status: FUNCTIONAL
Brand: PERCUFLEX™ PLUS

## 2023-05-23 RX ORDER — FENTANYL CITRATE 50 UG/ML
INJECTION, SOLUTION INTRAMUSCULAR; INTRAVENOUS PRN
Status: DISCONTINUED | OUTPATIENT
Start: 2023-05-23 | End: 2023-05-23 | Stop reason: SDUPTHER

## 2023-05-23 RX ORDER — SODIUM CHLORIDE 0.9 % (FLUSH) 0.9 %
5-40 SYRINGE (ML) INJECTION PRN
Status: DISCONTINUED | OUTPATIENT
Start: 2023-05-23 | End: 2023-05-23 | Stop reason: SDUPTHER

## 2023-05-23 RX ORDER — GLYCOPYRROLATE 0.2 MG/ML
INJECTION INTRAMUSCULAR; INTRAVENOUS PRN
Status: DISCONTINUED | OUTPATIENT
Start: 2023-05-23 | End: 2023-05-23 | Stop reason: SDUPTHER

## 2023-05-23 RX ORDER — SODIUM CHLORIDE 0.9 % (FLUSH) 0.9 %
5-40 SYRINGE (ML) INJECTION EVERY 12 HOURS SCHEDULED
Status: DISCONTINUED | OUTPATIENT
Start: 2023-05-23 | End: 2023-05-23 | Stop reason: HOSPADM

## 2023-05-23 RX ORDER — SODIUM CHLORIDE 9 MG/ML
INJECTION, SOLUTION INTRAVENOUS PRN
Status: DISCONTINUED | OUTPATIENT
Start: 2023-05-23 | End: 2023-05-23 | Stop reason: HOSPADM

## 2023-05-23 RX ORDER — ONDANSETRON 4 MG/1
4 TABLET, ORALLY DISINTEGRATING ORAL 3 TIMES DAILY PRN
Qty: 21 TABLET | Refills: 0 | Status: SHIPPED | OUTPATIENT
Start: 2023-05-23

## 2023-05-23 RX ORDER — ONDANSETRON 2 MG/ML
INJECTION INTRAMUSCULAR; INTRAVENOUS PRN
Status: DISCONTINUED | OUTPATIENT
Start: 2023-05-23 | End: 2023-05-23 | Stop reason: SDUPTHER

## 2023-05-23 RX ORDER — FENTANYL CITRATE 50 UG/ML
50 INJECTION, SOLUTION INTRAMUSCULAR; INTRAVENOUS EVERY 5 MIN PRN
Status: DISCONTINUED | OUTPATIENT
Start: 2023-05-23 | End: 2023-05-23 | Stop reason: HOSPADM

## 2023-05-23 RX ORDER — SODIUM CHLORIDE 0.9 % (FLUSH) 0.9 %
5-40 SYRINGE (ML) INJECTION PRN
Status: DISCONTINUED | OUTPATIENT
Start: 2023-05-23 | End: 2023-05-23 | Stop reason: HOSPADM

## 2023-05-23 RX ORDER — MIDAZOLAM HYDROCHLORIDE 1 MG/ML
INJECTION INTRAMUSCULAR; INTRAVENOUS PRN
Status: DISCONTINUED | OUTPATIENT
Start: 2023-05-23 | End: 2023-05-23 | Stop reason: SDUPTHER

## 2023-05-23 RX ORDER — HYDRALAZINE HYDROCHLORIDE 20 MG/ML
INJECTION INTRAMUSCULAR; INTRAVENOUS
Status: COMPLETED
Start: 2023-05-23 | End: 2023-05-23

## 2023-05-23 RX ORDER — FENTANYL CITRATE 50 UG/ML
25 INJECTION, SOLUTION INTRAMUSCULAR; INTRAVENOUS EVERY 5 MIN PRN
Status: DISCONTINUED | OUTPATIENT
Start: 2023-05-23 | End: 2023-05-23 | Stop reason: HOSPADM

## 2023-05-23 RX ORDER — CIPROFLOXACIN 500 MG/1
500 TABLET, FILM COATED ORAL 2 TIMES DAILY
Qty: 10 TABLET | Refills: 0 | Status: SHIPPED | OUTPATIENT
Start: 2023-05-23 | End: 2023-05-28

## 2023-05-23 RX ORDER — SODIUM CHLORIDE 9 MG/ML
INJECTION, SOLUTION INTRAVENOUS PRN
Status: DISCONTINUED | OUTPATIENT
Start: 2023-05-23 | End: 2023-05-23 | Stop reason: SDUPTHER

## 2023-05-23 RX ORDER — OXYCODONE HYDROCHLORIDE AND ACETAMINOPHEN 5; 325 MG/1; MG/1
1 TABLET ORAL EVERY 6 HOURS PRN
Qty: 20 TABLET | Refills: 0 | Status: SHIPPED | OUTPATIENT
Start: 2023-05-23 | End: 2023-05-28

## 2023-05-23 RX ORDER — PHENAZOPYRIDINE HYDROCHLORIDE 200 MG/1
200 TABLET, FILM COATED ORAL 3 TIMES DAILY PRN
Qty: 9 TABLET | Refills: 1 | Status: SHIPPED | OUTPATIENT
Start: 2023-05-23 | End: 2023-05-26

## 2023-05-23 RX ORDER — LIDOCAINE HYDROCHLORIDE 20 MG/ML
INJECTION, SOLUTION INTRAVENOUS PRN
Status: DISCONTINUED | OUTPATIENT
Start: 2023-05-23 | End: 2023-05-23 | Stop reason: SDUPTHER

## 2023-05-23 RX ORDER — PROPOFOL 10 MG/ML
INJECTION, EMULSION INTRAVENOUS PRN
Status: DISCONTINUED | OUTPATIENT
Start: 2023-05-23 | End: 2023-05-23 | Stop reason: SDUPTHER

## 2023-05-23 RX ORDER — HYDRALAZINE HYDROCHLORIDE 20 MG/ML
10 INJECTION INTRAMUSCULAR; INTRAVENOUS EVERY 10 MIN PRN
Status: COMPLETED | OUTPATIENT
Start: 2023-05-23 | End: 2023-05-23

## 2023-05-23 RX ADMIN — MIDAZOLAM 2 MG: 1 INJECTION INTRAMUSCULAR; INTRAVENOUS at 10:12

## 2023-05-23 RX ADMIN — FENTANYL CITRATE 50 MCG: 50 INJECTION, SOLUTION INTRAMUSCULAR; INTRAVENOUS at 10:14

## 2023-05-23 RX ADMIN — HYDRALAZINE HYDROCHLORIDE 10 MG: 20 INJECTION INTRAMUSCULAR; INTRAVENOUS at 11:34

## 2023-05-23 RX ADMIN — FENTANYL CITRATE 100 MCG: 50 INJECTION, SOLUTION INTRAMUSCULAR; INTRAVENOUS at 10:38

## 2023-05-23 RX ADMIN — LIDOCAINE HYDROCHLORIDE 100 MG: 20 INJECTION INTRAVENOUS at 10:14

## 2023-05-23 RX ADMIN — PROPOFOL 200 MG: 10 INJECTION, EMULSION INTRAVENOUS at 10:14

## 2023-05-23 RX ADMIN — ONDANSETRON 4 MG: 2 INJECTION INTRAMUSCULAR; INTRAVENOUS at 10:22

## 2023-05-23 RX ADMIN — HYDRALAZINE HYDROCHLORIDE 10 MG: 20 INJECTION INTRAMUSCULAR; INTRAVENOUS at 11:44

## 2023-05-23 RX ADMIN — SODIUM CHLORIDE: 9 INJECTION, SOLUTION INTRAVENOUS at 09:36

## 2023-05-23 RX ADMIN — Medication 2000 MG: at 10:12

## 2023-05-23 RX ADMIN — GLYCOPYRROLATE 0.2 MG: 0.2 INJECTION INTRAMUSCULAR; INTRAVENOUS at 10:26

## 2023-05-23 RX ADMIN — FENTANYL CITRATE 50 MCG: 50 INJECTION, SOLUTION INTRAMUSCULAR; INTRAVENOUS at 10:24

## 2023-05-23 ASSESSMENT — PAIN - FUNCTIONAL ASSESSMENT: PAIN_FUNCTIONAL_ASSESSMENT: 0-10

## 2023-05-23 NOTE — H&P
History and Physical    Patient:  Anali Marie  MRN: [id number]  YOB: 1980    CHIEF COMPLAINT:  right ureteral stone, kidney stones    HISTORY OF PRESENT ILLNESS:   The patient is a 37 y.o. female who presents with as above, here for surgery    Patient's old records, notes and chart reviewed and summarized above. Past Medical History:    Past Medical History:   Diagnosis Date    Bell palsy     right side droop    Essential hypertension 2016    GERD (gastroesophageal reflux disease)     Hashimoto's thyroiditis 2016    HIGH CHOLESTEROL     Hyperlipidemia     Hypokalemia     Kidney stones     Prediabetes 2020    Subclinical hyperthyroidism 2016       Past Surgical History:    Past Surgical History:   Procedure Laterality Date     SECTION  ,     COLONOSCOPY  2021    1 polyp removed     DILATION AND CURETTAGE OF UTERUS      ENDOSCOPY, COLON, DIAGNOSTIC      HYSTERECTOMY, TOTAL ABDOMINAL (CERVIX REMOVED) N/A 9/10/2020    HYSTERECTOMY ABDOMINAL TOTAL, BS, POSS DANIELLE performed by Dianne Wilhelm MD at [place] [address] N/A 6/15/2020    DIAGNOSTIC LAPAROSCOPY, performed by Dianne Wilhelm MD at [facility]     Medications Prior to Admission:    Prior to Admission medications    Medication Sig Start Date End Date Taking?  Authorizing Provider   ketorolac (TORADOL) 10 MG tablet Take 1 tablet by mouth every 6 hours as needed for Pain 23   Sheldon Pizano DO   ondansetron (ZOFRAN-ODT) 4 MG disintegrating tablet Take 1 tablet by mouth 3 times daily as needed for Nausea or Vomiting 23   Sheldon Pizano DO   fluticasone [facility]) 50 MCG/ACT nasal spray 2 sprays by Each Nostril route daily 23   MARÍA Veag CNP   ibuprofen (ADVIL;MOTRIN) 800 MG tablet Take 1 tablet by mouth 3 times daily (with meals) 23   MARÍA Huber CNP   rosuvastatin (CRESTOR) 20 MG tablet Take 1 tablet by mouth daily 22

## 2023-05-23 NOTE — ANESTHESIA PRE PROCEDURE
revolving around [address] [facility] Lumbar radiculopathy M54.16    Lumbar facet arthropathy M47.816    Type 2 diabetes mellitus without complication, without long-term current use of insulin (HCC) E11.9    Pure hypercholesterolemia E78.00       Past Medical History:        Diagnosis Date    Bell palsy     right side droop    Essential hypertension 2016    GERD (gastroesophageal reflux disease)     Hashimoto's thyroiditis 2016    HIGH CHOLESTEROL     Hyperlipidemia     Hypokalemia     Kidney stones     Prediabetes 2020    Subclinical hyperthyroidism 2016       Past Surgical History:        Procedure Laterality Date     SECTION  ,     COLONOSCOPY  2021    1 polyp removed     DILATION AND CURETTAGE OF UTERUS      ENDOSCOPY, COLON, DIAGNOSTIC      HYSTERECTOMY, TOTAL ABDOMINAL (CERVIX REMOVED) N/A 9/10/2020    HYSTERECTOMY ABDOMINAL TOTAL, BS, POSS DANIELLE performed by Kimberlyn Soto MD at [place] [address] 6/15/2020    DIAGNOSTIC LAPAROSCOPY, performed by Kimberlyn Soto MD at [facility] History:    Social History     Tobacco Use    Smoking status: Never    Smokeless tobacco: Never   Substance Use Topics    Alcohol use: Yes     Comment: social                                Counseling given: Not Answered      Vital Signs (Current):   Vitals:    05/15/23 0858 23 0943   BP:  (!) 151/87   Pulse:  69   Resp:  18   Temp:  98.2 °F (36.8 °C)   TempSrc:  Temporal   SpO2:  99%   Weight: 218 lb (98.9 kg) 218 lb (98.9 kg)   Height: 5' 4\" (1.626 m) 5' 4\" (1.626 m)                                              BP Readings from Last 3 Encounters:   23 (!) 151/87   23 (!) 164/97   23 124/70       NPO Status: Time of last liquid consumption: 0800                        Time of last solid consumption:                         Date of last liquid consumption: 23                        Date of last solid food

## 2023-05-23 NOTE — PROGRESS NOTES
1057 Patient to PACU, oral airway in place with simple mask at 10L. Jaw thrust being performed per CRNA.    1100 Jaw thrust resumed per this RN, patient still obstructing airway. 1105 Patient continuing to obstruct her airway, not yet responding. 1108 NP Tube inserted per [facility]. Patient beginning to thrash in bed and awaken. 1115 Patient awake and talking to this RN, NP tube removed. 1120 Patient requesting bedpan, voided small amount of pink tinged urine. 1130 Patient resting in bed comfortably. Resp easy and unlabored. Denies pain. 1134 10mg Hydralazine given at this time. 1144 10mg Hydralazine given at this time. 1155 Patient using bedpan, voided pink tinged urine. 1204 Patient meets criteria for discharge from PACU at this time. Transported to [facility] in stable condition.      1206 Report given to [facility]

## 2023-05-23 NOTE — PROGRESS NOTES
ADMITTED TO [facility] AND ORIENTED TO UNIT. SCDS ON. FALL AND ALLERGY BANDS ON. PT VERBALIZED APPROVAL FOR FIRST NAME, LAST INITIAL AND PHYSICIAN NAME ON UNIT WHITEBOARD.

## 2023-05-23 NOTE — PROGRESS NOTES
Discharge instructions given to patient and family. verbalized understanding. Patient discharged via wheelchair. Patient requests a script for nausea medications.  Message sent to Dr. Amairani Patel

## 2023-05-23 NOTE — PROGRESS NOTES
Pt returned to [facility] room 17. Vitals and assessment as charted. 0.9 infusing, @700ml to count from PACU. Pt has crackers and water. Family at the bedside. Pt and family verbalized understanding of discharge criteria and call light use. Call light in reach.

## 2023-05-23 NOTE — ANESTHESIA POSTPROCEDURE EVALUATION
Department of Anesthesiology  Postprocedure Note    Patient: Shreya Frias  MRN: [id number]  YOB: 1980  Date of evaluation: 5/23/2023      Procedure Summary     Date: 05/23/23 Room / Location: [facility] 01 / Danilo Strickland    Anesthesia Start: 1012 Anesthesia Stop: 1107    Procedure: CYSTOSCOPY, RIGHT URETEROSCOPY, LASER LITHOTRIPSY,  RIGHT URETERAL STENT (Right) Diagnosis:       Kidney stone      (Kidney stone [N20.0])    Surgeons: Shira Torrez MD Responsible Provider: Donney Goodell, DO    Anesthesia Type: general ASA Status: 2          Anesthesia Type: No value filed. Baldemar Phase I: Baldemar Score: 10    Baldemar Phase II: Baldemar Score: 10      Anesthesia Post Evaluation    Comments: Sima Hayden 58  POST-ANESTHESIA NOTE       Name:  Shreya Frias                                         Age:  37 y.o.   MRN:  [id number]      Last Vitals:  BP (!) 143/85   Pulse 73   Temp 96.8 °F (36 °C) (Temporal)   Resp 16   Ht 5' 4\" (1.626 m)   Wt 218 lb (98.9 kg)   LMP 07/23/2020   SpO2 100%   BMI 37.42 kg/m²   Patient Vitals in the past 4 hrs:  05/23/23 1305, BP:(!) 143/85, Pulse:73, Resp:16, SpO2:100 %  05/23/23 1245, BP:135/85, Pulse:75, Resp:16, SpO2:100 %  05/23/23 1211, BP:(!) 150/96, Temp:96.8 °F (36 °C), Temp src:Temporal, Pulse:84, Resp:16, SpO2:100 %  05/23/23 1200, BP:(!) 162/87, Pulse:82, Resp:15, SpO2:98 %  05/23/23 1155, BP:(!) 160/88, Pulse:83, Resp:19, SpO2:96 %  05/23/23 1150, BP:(!) 165/90, Pulse:85, Resp:17, SpO2:98 %  05/23/23 1145, BP:(!) 174/97, Pulse:77, Resp:18, SpO2:98 %  05/23/23 1140, BP:(!) 183/106, Pulse:72, Resp:16, SpO2:97 %  05/23/23 1135, BP:(!) 182/100, Pulse:68, Resp:16, SpO2:96 %  05/23/23 1130, BP:(!) 181/102, Pulse:69, Resp:20, SpO2:96 %  05/23/23 1125, BP:(!) 187/103, Pulse:71, Resp:16, SpO2:96 %  05/23/23 1120, BP:(!) 152/85, Pulse:71, Resp:13, SpO2:100 %  05/23/23 1115, BP:(!) 155/95, Pulse:79, Resp:22, SpO2:100 %  05/23/23 1110, BP:(!) 151/84,

## 2023-05-24 LAB — MRSA SPEC QL CULT: NORMAL

## 2023-05-25 DIAGNOSIS — I10 ESSENTIAL HYPERTENSION: ICD-10-CM

## 2023-05-25 RX ORDER — METOPROLOL SUCCINATE 100 MG/1
TABLET, EXTENDED RELEASE ORAL
Qty: 90 TABLET | Refills: 3 | Status: SHIPPED | OUTPATIENT
Start: 2023-05-25

## 2023-05-25 NOTE — TELEPHONE ENCOUNTER
Recent Visits  Date Type Provider Dept   01/10/23 Office Visit Bayron Barker, MARÍA - CNP [facility]   11/29/22 Office Visit Bayron Barker, MARÍA - CNP [facility]   10/18/22 Office Visit Bayron Barker, MARÍA - CNP [facility]   09/27/22 Office Visit Bayron Barker, MARÍA - CNP [facility]   05/12/22 Office Visit Bayron Barker, MARÍA - CNP [facility]   04/14/22 Office Visit Bayron Barker, APRMAHENDRA - CNP [facility]   Showing recent visits within past 540 days with a meds authorizing provider and meeting all other requirements  Future Appointments  Date Type Provider Dept   07/10/23 Appointment MARÍA Abrams CNP [facility]   Showing future appointments within next 150 days with a meds authorizing provider and meeting all other requirements      Future Appointments   Date Time Provider Ravi Mccann   7/6/2023  9:15 AM Francisco J Fischer  [facility]   7/10/2023  2:00 PM Bayron Barker, APRN - [address] [facility] [address] [facility]   5/10/2024  8:45 AM [address], MD N [facility] [address] [facility]

## 2023-05-26 ENCOUNTER — TELEPHONE (OUTPATIENT)
Dept: CARDIOLOGY CLINIC | Age: 43
End: 2023-05-26

## 2023-05-26 NOTE — TELEPHONE ENCOUNTER
Pt LM on VM asking us to return call. Called pt back. No answer. LM for pt to return call if she still needs to talk to us.

## 2023-05-30 NOTE — OP NOTE
Operative Note      Patient: Isaac Bryant  YOB: 1980  MRN: [id number]    Date of Procedure: 5/23/2023    Pre Op diagnosis: Right ureteral stone    Post-Op Diagnosis: Same       Procedure(s):  CYSTOSCOPY, RIGHT URETEROSCOPY, LASER LITHOTRIPSY,  RIGHT URETERAL STENT    Surgeon(s):  Sowmya Nash MD    Assistant:   * No surgical staff found *    Anesthesia: General    Estimated Blood Loss (mL): Minimal    Complications: None    Specimens:   * No specimens in log *    Implants:  Implant Name Type Inv. Item Serial No.  Lot No. LRB No. Used Action   STENT URET 6FR L26CM HYDR+ PGTL Amedeo Deed  - [id number]  [facility] 6FR L26CM HYDR+ PGTL TAPR TIP GRAD BLDR MRK LO  [employer] [facility] UROLOGY- [id number] Right 1 Implanted         Drains: * No LDAs found *    Findings: right impacted ureteral stone with significant associated inflammation       Detailed Description of Procedure:     [id number]    INDICATIONS FOR PROCEDURE:  Isaac Bryant is a 37 y.o. female presents for Right sided ureteral calculus. After the risks, benefits, alternatives, of the procedure were discussed with the patient, informed consent was obtained. The patient elected to proceed. DETAILS OF THE PROCEDURE:  The patient was brought back from the preoperative holding area to the  operating suite, and was transferred to the operating table where the patient lay in  supine position. EPC's were in place, connected to the machine and the machine was turned on before induction. General endotracheal anesthesia was induced, and patient was prepped and draped in standard surgical fashion after being placed in dorsolithotomy position. A proper timeout was performed, preoperative antibiotics were given. We began by inserting a cystoscope with a 22 [place] sheath and 30 degree lens into the patient's urethral meatus and advancing into the bladder without complication.   A pan cystoscopy was preformed and the

## 2023-06-22 ENCOUNTER — TELEPHONE (OUTPATIENT)
Dept: UROLOGY | Age: 43
End: 2023-06-22

## 2023-06-24 ENCOUNTER — HOSPITAL ENCOUNTER (EMERGENCY)
Age: 43
Discharge: HOME | End: 2023-06-24
Payer: MEDICAID

## 2023-06-24 VITALS
TEMPERATURE: 97.9 F | RESPIRATION RATE: 18 BRPM | BODY MASS INDEX: 37.56 KG/M2 | HEIGHT: 64 IN | OXYGEN SATURATION: 98 % | SYSTOLIC BLOOD PRESSURE: 163 MMHG | HEART RATE: 83 BPM | WEIGHT: 220 LBS | DIASTOLIC BLOOD PRESSURE: 99 MMHG

## 2023-06-24 DIAGNOSIS — N30.01 ACUTE CYSTITIS WITH HEMATURIA: Primary | ICD-10-CM

## 2023-06-24 LAB
BILIRUB UR STRIP.AUTO-MCNC: NEGATIVE MG/DL
CHARACTER UR: CLEAR
COLOR: YELLOW
GLUCOSE UR QL STRIP.AUTO: NEGATIVE MG/DL
KETONES UR QL STRIP.AUTO: NEGATIVE
NITRITE UR QL STRIP.AUTO: NEGATIVE
PH UR STRIP.AUTO: 6 [PH] (ref 5–9)
PROT UR STRIP.AUTO-MCNC: 100 MG/DL
RBC #/AREA URNS HPF: ABNORMAL /[HPF]
SP GR UR STRIP.AUTO: 1.02 (ref 1–1.03)
UROBILINOGEN, URINE: 0.2 EU/DL (ref 0.2–1)
WBC #/AREA URNS HPF: ABNORMAL /[HPF]

## 2023-06-24 PROCEDURE — 87086 URINE CULTURE/COLONY COUNT: CPT

## 2023-06-24 PROCEDURE — 81003 URINALYSIS AUTO W/O SCOPE: CPT

## 2023-06-24 PROCEDURE — 99213 OFFICE O/P EST LOW 20 MIN: CPT

## 2023-06-24 RX ORDER — CIPROFLOXACIN 500 MG/1
500 TABLET, FILM COATED ORAL 2 TIMES DAILY
Qty: 14 TABLET | Refills: 0 | Status: SHIPPED | OUTPATIENT
Start: 2023-06-24 | End: 2023-07-01

## 2023-06-24 ASSESSMENT — ENCOUNTER SYMPTOMS
DIARRHEA: 0
ABDOMINAL PAIN: 0
NAUSEA: 0
VOMITING: 0

## 2023-06-24 ASSESSMENT — PAIN - FUNCTIONAL ASSESSMENT: PAIN_FUNCTIONAL_ASSESSMENT: NONE - DENIES PAIN

## 2023-06-24 NOTE — ED PROVIDER NOTES
220 lb (99.8 kg)   Height: 5' 4\" (1.626 m)       Medications - No data to display         PROCEDURES:  None    FINAL IMPRESSION      1. Acute cystitis with hematuria          DISPOSITION/ PLAN   Patient diagnosed with acute cystitis with hematuria. Patient educated to take antibiotic as prescribed, as well as Pyridium as needed for pain. Patient educated drink lots of water, avoid baths and pools. Patient educated to call urologist on Monday. Patient educated to return to emergency services for new or worsening symptoms such as flank pain, gross hematuria, fever, chills, chest pain, or shortness of breath. Patient denies any questions or concerns at this time.         PATIENT REFERRED TO:  MARÍA Rudolph CNP  [address] [facility]  / [place] [address]      DISCHARGE MEDICATIONS:  Discharge Medication List as of 6/24/2023  2:20 PM        START taking these medications    Details   ciprofloxacin (CIPRO) 500 MG tablet Take 1 tablet by mouth 2 times daily for 7 days, Disp-14 tablet, R-0Normal             Discharge Medication List as of 6/24/2023  2:20 PM          Discharge Medication List as of 6/24/2023  2:20 PM          MARÍA Krishnan CNP    (Please note that portions of this note were completed with a voice recognition program. Efforts were made to edit the dictations but occasionally words are mis-transcribed.)            MARÍA Krishnan CNP  06/24/23 [id number]

## 2023-06-24 NOTE — ED NOTES
Pt with complaints of burning on urination, urinary frequency and cloudy urine that started on Tuesday. Denies any blood in urine.      Zeenat Cohn, TRANG  92/54/12 [id number]

## 2023-06-25 LAB — BACTERIA UR CULT: NORMAL

## 2023-06-26 LAB
BACTERIA UR CULT: ABNORMAL
ORGANISM: ABNORMAL

## 2023-07-06 ENCOUNTER — TELEPHONE (OUTPATIENT)
Dept: UROLOGY | Age: 43
End: 2023-07-06

## 2023-07-06 ENCOUNTER — PROCEDURE VISIT (OUTPATIENT)
Dept: UROLOGY | Age: 43
End: 2023-07-06

## 2023-07-06 VITALS — HEIGHT: 64 IN | WEIGHT: 220 LBS | RESPIRATION RATE: 16 BRPM | BODY MASS INDEX: 37.56 KG/M2

## 2023-07-06 DIAGNOSIS — N20.0 NEPHROLITHIASIS: Primary | ICD-10-CM

## 2023-07-06 DIAGNOSIS — N32.81 OAB (OVERACTIVE BLADDER): ICD-10-CM

## 2023-07-06 DIAGNOSIS — N28.1 RENAL CYST: ICD-10-CM

## 2023-07-06 PROBLEM — E66.01 SEVERE OBESITY (BMI 35.0-39.9) WITH COMORBIDITY (HCC): Status: ACTIVE | Noted: 2023-07-06

## 2023-07-06 RX ORDER — CIPROFLOXACIN 500 MG/1
500 TABLET, FILM COATED ORAL 2 TIMES DAILY
Qty: 6 TABLET | Refills: 0 | Status: SHIPPED | OUTPATIENT
Start: 2023-07-06 | End: 2023-07-09

## 2023-07-06 RX ORDER — FLUCONAZOLE 150 MG/1
150 TABLET ORAL ONCE
Qty: 1 TABLET | Refills: 0 | Status: SHIPPED | OUTPATIENT
Start: 2023-07-06 | End: 2023-07-06

## 2023-07-06 NOTE — TELEPHONE ENCOUNTER
Patient scheduled for US RENAL LIMITED  at [facility] on 8/1/23 ARRIVAL  PM FOR A 2 PM SCAN TIME ARRIVE WELL HYDRATED.     Order mailed with instructions or given to the patient in the office

## 2023-07-06 NOTE — PROGRESS NOTES
Evan Aguayo MD  Urology Clinic office visit    Patient:  Mortimer Crumbly  YOB: 1980  Date: 7/6/2023    HISTORY OF PRESENT ILLNESS:   The patient is a 37 y.o. female who presents today for follow-up for the following problem(s):      1. Nephrolithiasis    2. OAB (overactive bladder)    3. Renal cyst         Overall the problem(s) : are new  Associated Symptoms: No dysuria, gross hematuria. Pain Severity:      Summary of old records: seen [facility] in past for OAB  Hysterectomy    1 week of pain, right side  Now radiating to right lower abdomen  No heamturia, no fevers, +nausea   5 mm calcification at the right ureteropelvic junction    Additional History: Onset years  Pelvic pain, also after sex  Reports frequency and urgency  Tried Ditropan  Tried myrbetriq  Inconsistent Vesicare taking, LUTS unchanged  Lower urinary tract symptoms: urgency and frequency      New episode of stone 7/2022  Thinks she passed a stone in the meantime  Renal US as noted below  Denies pain or hematuria or UTI symptoms    I independently reviewed and verified the images and reports from:    US RENAL LIMITED    Result Date: 3/30/2023  PROCEDURE: US RENAL LIMITED CLINICAL INFORMATION: Ureterolithiasis, hydronephrosis TECHNIQUE: Ultrasound of the kidneys and urinary bladder was performed. Grayscale and color images were obtained. COMPARISON: Renal ultrasound 10/5/2022 FINDINGS: The right kidney measures 11.7 x 6.1 x 7.5 cm and the left kidney measures 11.4 x 5.6 x 6.6 cm. Renal cortical thickness is normal bilaterally. An avascular anechoic structure at the right upper kidney measures 2.4 cm. An avascular anechoic structure at the left mid kidney measures 1.3 cm. There are echogenic structures in the bilateral kidneys. The largest on the right measures 0.4 cm and the largest on the left measures 0.6 cm. There is mild right-sided hydronephrosis.  A 0.7 cm echogenic structure is present at the right ureterovesicular

## 2023-07-10 ENCOUNTER — OFFICE VISIT (OUTPATIENT)
Dept: FAMILY MEDICINE CLINIC | Age: 43
End: 2023-07-10
Payer: MEDICAID

## 2023-07-10 VITALS
HEIGHT: 64 IN | WEIGHT: 217.4 LBS | TEMPERATURE: 98.5 F | OXYGEN SATURATION: 97 % | BODY MASS INDEX: 37.11 KG/M2 | DIASTOLIC BLOOD PRESSURE: 86 MMHG | HEART RATE: 63 BPM | SYSTOLIC BLOOD PRESSURE: 138 MMHG | RESPIRATION RATE: 16 BRPM

## 2023-07-10 DIAGNOSIS — E06.3 HASHIMOTO'S THYROIDITIS: ICD-10-CM

## 2023-07-10 DIAGNOSIS — E66.01 SEVERE OBESITY (BMI 35.0-39.9) WITH COMORBIDITY (HCC): ICD-10-CM

## 2023-07-10 DIAGNOSIS — Z00.8 ENCOUNTER FOR BIOMETRIC SCREENING: Primary | ICD-10-CM

## 2023-07-10 DIAGNOSIS — E11.9 TYPE 2 DIABETES, DIET CONTROLLED (HCC): ICD-10-CM

## 2023-07-10 DIAGNOSIS — I10 ESSENTIAL HYPERTENSION: ICD-10-CM

## 2023-07-10 PROBLEM — E11.22 TYPE 2 DIABETES MELLITUS WITH CHRONIC KIDNEY DISEASE (HCC): Status: ACTIVE | Noted: 2023-07-10

## 2023-07-10 LAB — HBA1C MFR BLD: 6.1 % (ref 4.3–5.7)

## 2023-07-10 PROCEDURE — 3044F HG A1C LEVEL LT 7.0%: CPT | Performed by: NURSE PRACTITIONER

## 2023-07-10 PROCEDURE — 3079F DIAST BP 80-89 MM HG: CPT | Performed by: NURSE PRACTITIONER

## 2023-07-10 PROCEDURE — 99214 OFFICE O/P EST MOD 30 MIN: CPT | Performed by: NURSE PRACTITIONER

## 2023-07-10 PROCEDURE — 3075F SYST BP GE 130 - 139MM HG: CPT | Performed by: NURSE PRACTITIONER

## 2023-07-10 SDOH — ECONOMIC STABILITY: INCOME INSECURITY: HOW HARD IS IT FOR YOU TO PAY FOR THE VERY BASICS LIKE FOOD, HOUSING, MEDICAL CARE, AND HEATING?: NOT HARD AT ALL

## 2023-07-10 SDOH — ECONOMIC STABILITY: FOOD INSECURITY: WITHIN THE PAST 12 MONTHS, YOU WORRIED THAT YOUR FOOD WOULD RUN OUT BEFORE YOU GOT MONEY TO BUY MORE.: NEVER TRUE

## 2023-07-10 SDOH — ECONOMIC STABILITY: FOOD INSECURITY: WITHIN THE PAST 12 MONTHS, THE FOOD YOU BOUGHT JUST DIDN'T LAST AND YOU DIDN'T HAVE MONEY TO GET MORE.: NEVER TRUE

## 2023-07-10 SDOH — ECONOMIC STABILITY: HOUSING INSECURITY
IN THE LAST 12 MONTHS, WAS THERE A TIME WHEN YOU DID NOT HAVE A STEADY PLACE TO SLEEP OR SLEPT IN A SHELTER (INCLUDING NOW)?: NO

## 2023-07-10 NOTE — PROGRESS NOTES
Chief Complaint   Patient presents with    Diabetes     6 month no concerns        History obtained from chart review and the patient. SUBJECTIVE:  Chichi Ron is a 37 y.o. female that presents today for follow up diabetes    Diabetes Type 2    Glucose control:   Does patient check blood glucoses at home? No  Report of hypoglycemia: no  Lab Results   Component Value Date    LABA1C 6.1 (H) 07/10/2023     No results found for: EAG    Symptoms  Polyuria, Polydipsia or Polyphagia? No  Chest Pain, SOB, or Palpitations? -  No  New Vision complaints? No  Paresthesias of the extremities? No    Medications  Current medication were reviewed. Compliant with medications? yes  Medication side effects? No  On ACE-I or ARB? No  On antiplatelet therapy? No  On Statin? Yes    Last Diabetic Eye Exam: needs    Exercise  Exercise? Starting to do more walking  Wt Readings from Last 3 Encounters:   07/10/23 217 lb 6.4 oz (98.6 kg)   07/06/23 220 lb (99.8 kg)   06/24/23 220 lb (99.8 kg)       Diet discipline?:  Low salt, fat, sugar diet? Not great, cut back on soda. Blood pressure control:  BP Readings from Last 3 Encounters:   07/10/23 138/86   06/24/23 (!) 163/99   05/23/23 (!) 143/85       Lab Results   Component Value Date    LABMICR 2.36 07/11/2017       Lab Results   Component Value Date    LDLCALC 112 12/06/2022     HTN    Does patient check BP regularly at home? - Yes  Current Medication regimen - Norvasc, Metoprolol, HCTZ  Tolerating medications well? - yes    Shortness of breath or chest pain? No  Headache or visual complaints? No  Neurologic changes like confusion? No  Extremity edema?  No    BP Readings from Last 3 Encounters:   07/10/23 138/86   06/24/23 (!) 163/99   05/23/23 (!) 143/85       Age/Gender Health Maintenance    Lipid   Lab Results   Component Value Date    CHOL 273 (H) 09/28/2022    CHOL 195 12/28/2019    CHOL 194 05/15/2019     Lab Results   Component Value Date    TRIG 110

## 2023-07-17 ENCOUNTER — HOSPITAL ENCOUNTER (OUTPATIENT)
Age: 43
Discharge: HOME | End: 2023-07-17
Payer: MEDICAID

## 2023-07-17 ENCOUNTER — TELEPHONE (OUTPATIENT)
Dept: FAMILY MEDICINE CLINIC | Age: 43
End: 2023-07-17

## 2023-07-17 DIAGNOSIS — E06.3 HASHIMOTO'S THYROIDITIS: ICD-10-CM

## 2023-07-17 DIAGNOSIS — Z00.8 ENCOUNTER FOR BIOMETRIC SCREENING: ICD-10-CM

## 2023-07-17 LAB
CHOLEST SERPL-MCNC: 188 MG/DL (ref 100–199)
FASTING: YES
GLUCOSE SERPL-MCNC: 115 MG/DL (ref 70–108)
HDLC SERPL-MCNC: 50 MG/DL
LDLC SERPL CALC-MCNC: 123 MG/DL
TRIGL SERPL-MCNC: 75 MG/DL (ref 0–199)
TSH SERPL DL<=0.005 MIU/L-ACNC: 0.41 UIU/ML (ref 0.4–4.2)

## 2023-07-17 PROCEDURE — 83718 ASSAY OF LIPOPROTEIN: CPT

## 2023-07-17 PROCEDURE — 82465 ASSAY BLD/SERUM CHOLESTEROL: CPT

## 2023-07-17 PROCEDURE — 82947 ASSAY GLUCOSE BLOOD QUANT: CPT

## 2023-07-17 PROCEDURE — 84443 ASSAY THYROID STIM HORMONE: CPT

## 2023-07-17 PROCEDURE — 84478 ASSAY OF TRIGLYCERIDES: CPT

## 2023-07-17 PROCEDURE — 36415 COLL VENOUS BLD VENIPUNCTURE: CPT

## 2023-07-17 NOTE — TELEPHONE ENCOUNTER
----- Message from MARÍA Choi CNP sent at 7/17/2023  9:42 AM EDT -----  Let Rikki Ford know her thyroid and Be Well Within labs are stable and in appropriate ranges.

## 2023-08-15 NOTE — PROGRESS NOTES
Renal Ultrasound which I have independently reviewed along with its accompanying report. The study demonstrates:  FINDINGS:          RIGHT KIDNEY:  Anechoic right renal cyst measures 2.3 cm. No required     imaging follow-up needed given high likelihood of benign nature. No     hydronephrosis. No shadowing calculus. No perinephric collection is     demonstrated. LEFT KIDNEY:  Subcentimeter anechoic cysts of the left kidney are likely     benign. No required imaging follow-up needed given high likelihood of     benign nature. No hydronephrosis. No shadowing calculus. No perinephric     collection is demonstrated. BLADDER:  Urinary bladder is not well visualized due to lack of     distention. Ureter jets are confirmed, however. IMPRESSION:          No hydronephrosis or shadowing calculi. Assessment/Plan:   1. Nephrolithiasis  - Doing well, s/p CYSTOSCOPY, RIGHT URETEROSCOPY, LASER LITHOTRIPSY,  RIGHT URETERAL STENT  - Will proceed on surveillance. - XR ABDOMEN (KUB) (SINGLE AP VIEW); Future    2. OAB (overactive bladder)  LUTS not bothersome. 3. Renal cyst  Stable, no workup indicated. -Patient has no other questions, comments, or concerns.   -They agree with and understand the plan of care. -The patient was encouraged to call the office or seek emergency care should this change.       MARÍA Lagos - CNP

## 2023-08-17 ENCOUNTER — OFFICE VISIT (OUTPATIENT)
Dept: UROLOGY | Age: 43
End: 2023-08-17
Payer: MEDICAID

## 2023-08-17 VITALS
SYSTOLIC BLOOD PRESSURE: 138 MMHG | DIASTOLIC BLOOD PRESSURE: 88 MMHG | BODY MASS INDEX: 38.12 KG/M2 | WEIGHT: 223.3 LBS | HEIGHT: 64 IN

## 2023-08-17 DIAGNOSIS — N28.1 RENAL CYST: ICD-10-CM

## 2023-08-17 DIAGNOSIS — N32.81 OAB (OVERACTIVE BLADDER): ICD-10-CM

## 2023-08-17 DIAGNOSIS — N20.0 NEPHROLITHIASIS: Primary | ICD-10-CM

## 2023-08-17 PROCEDURE — 99214 OFFICE O/P EST MOD 30 MIN: CPT | Performed by: NURSE PRACTITIONER

## 2023-08-17 PROCEDURE — 3075F SYST BP GE 130 - 139MM HG: CPT | Performed by: NURSE PRACTITIONER

## 2023-08-17 PROCEDURE — 3079F DIAST BP 80-89 MM HG: CPT | Performed by: NURSE PRACTITIONER

## 2023-08-24 DIAGNOSIS — I10 ESSENTIAL HYPERTENSION: ICD-10-CM

## 2023-08-24 LAB
CHOLEST SERPL-MCNC: 166 MG/DL (ref 0–199)
FASTING: YES
GLUCOSE SERPL-MCNC: 134 MG/DL (ref 74–109)
HBA1C MFR BLD HPLC: 6.2 % (ref 4.4–6.4)
HDLC SERPL-MCNC: 40 MG/DL (ref 40–90)
LDLC SERPL CALC-MCNC: 108 MG/DL
TRIGL SERPL-MCNC: 91 MG/DL (ref 0–199)

## 2023-08-24 RX ORDER — HYDROCHLOROTHIAZIDE 12.5 MG/1
CAPSULE, GELATIN COATED ORAL
Qty: 90 CAPSULE | Refills: 0 | Status: SHIPPED | OUTPATIENT
Start: 2023-08-24

## 2023-08-24 NOTE — TELEPHONE ENCOUNTER
Recent Visits  Date Type Provider Dept   07/10/23 Office Visit Clearance MARÍA Bae CNP [facility]   01/10/23 Office Visit Clearance Kathia, APRMAHENDRA - CNP [facility]   11/29/22 Office Visit Clearance Kathia, APRMAHENDRA - CNP [facility]   10/18/22 Office Visit Clearance [place], APRMAHENDRA - CNP [facility]   09/27/22 Office Visit Clearance Kathia APRMAHENDRA - CNP [facility]   05/12/22 Office Visit Clearance Kathia APRMAHENDRA - CNP [facility]   04/14/22 Office Visit Clearance Kathia MARÍA Menjivar CNP [facility]   Showing recent visits within past 540 days with a meds authorizing provider and meeting all other requirements  Future Appointments  No visits were found meeting these conditions.   Showing future appointments within next 150 days with a meds authorizing provider and meeting all other requirements

## 2023-09-21 DIAGNOSIS — I10 ESSENTIAL HYPERTENSION: ICD-10-CM

## 2023-09-21 RX ORDER — OMEPRAZOLE 40 MG/1
CAPSULE, DELAYED RELEASE ORAL
Qty: 90 CAPSULE | Refills: 1 | OUTPATIENT
Start: 2023-09-21

## 2023-09-21 RX ORDER — AMLODIPINE BESYLATE 10 MG/1
TABLET ORAL
Qty: 90 TABLET | Refills: 3 | OUTPATIENT
Start: 2023-09-21

## 2023-10-08 DIAGNOSIS — E11.9 TYPE 2 DIABETES MELLITUS WITHOUT COMPLICATION, WITHOUT LONG-TERM CURRENT USE OF INSULIN (HCC): ICD-10-CM

## 2023-10-09 RX ORDER — SEMAGLUTIDE 1.34 MG/ML
INJECTION, SOLUTION SUBCUTANEOUS
Qty: 1.5 ML | Refills: 0 | OUTPATIENT
Start: 2023-10-09

## 2023-10-09 NOTE — TELEPHONE ENCOUNTER
Recent Visits  Date Type Provider Dept   07/10/23 Office Visit Bruna Wallace, MARÍA - CNP [facility]   01/10/23 Office Visit Bruna Wallace, MARÍA - CNP [facility]   11/29/22 Office Visit Bruna Wallace, MARÍA - CNP [facility]   10/18/22 Office Visit Bruna Wallace, MARÍA - CNP [facility]   09/27/22 Office Visit Bruna Wallace, MARÍA - CNP [facility]   05/12/22 Office Visit Bruna Wallace, MARÍA - CNP [facility]   Showing recent visits within past 540 days with a meds authorizing provider and meeting all other requirements  Future Appointments  No visits were found meeting these conditions.   Showing future appointments within next 150 days with a meds authorizing provider and meeting all other requirements

## 2023-10-12 RX ORDER — OMEPRAZOLE 40 MG/1
40 CAPSULE, DELAYED RELEASE ORAL DAILY
Qty: 90 CAPSULE | Refills: 1 | Status: SHIPPED | OUTPATIENT
Start: 2023-10-12

## 2023-10-12 NOTE — TELEPHONE ENCOUNTER
Recent Visits  Date Type Provider Dept   07/10/23 Office Visit Xiao Smith, MARÍA - CNP [facility]   01/10/23 Office Visit Xiao Smith, APRN - CNP [facility]   11/29/22 Office Visit Xiao Smith, APRN - CNP [facility]   10/18/22 Office Visit Xiao Smith, APRN - CNP [facility]   09/27/22 Office Visit Xiao Smith, APRN - CNP [facility]   05/12/22 Office Visit Xiao Smith, APRN - CNP [facility]   Showing recent visits within past 540 days with a meds authorizing provider and meeting all other requirements  Future Appointments  No visits were found meeting these conditions.   Showing future appointments within next 150 days with a meds authorizing provider and meeting all other requirements

## 2023-10-18 DIAGNOSIS — I10 ESSENTIAL HYPERTENSION: ICD-10-CM

## 2023-10-18 RX ORDER — AMLODIPINE BESYLATE 10 MG/1
10 TABLET ORAL DAILY
Qty: 90 TABLET | Refills: 3 | Status: SHIPPED | OUTPATIENT
Start: 2023-10-18

## 2023-10-18 NOTE — TELEPHONE ENCOUNTER
Recent Visits  Date Type Provider Dept   07/10/23 Office Visit MARÍA Zazueta CNP [facility]   01/10/23 Office Visit MARÍA Zazueta CNP [facility]   11/29/22 Office Visit Elpidio Echavarria, MARÍA - CNP [facility]   10/18/22 Office Visit MARÍA Zazueta CNP [facility]   09/27/22 Office Visit MARÍA Zazueta CNP [facility]   05/12/22 Office Visit MARÍA Zazueta - CNP [facility]   Showing recent visits within past 540 days with a meds authorizing provider and meeting all other requirements  Future Appointments  No visits were found meeting these conditions.   Showing future appointments within next 150 days with a meds authorizing provider and meeting all other requirements

## 2023-10-25 DIAGNOSIS — R79.89 ELEVATED PLATELET COUNT: Primary | ICD-10-CM

## 2023-10-25 RX ORDER — ASPIRIN 81 MG/1
81 TABLET ORAL DAILY
Qty: 90 TABLET | Refills: 3 | Status: SHIPPED | OUTPATIENT
Start: 2023-10-25

## 2023-11-02 ENCOUNTER — OFFICE VISIT (OUTPATIENT)
Dept: FAMILY MEDICINE CLINIC | Age: 43
End: 2023-11-02
Payer: COMMERCIAL

## 2023-11-02 VITALS
HEIGHT: 64 IN | DIASTOLIC BLOOD PRESSURE: 86 MMHG | TEMPERATURE: 97.9 F | WEIGHT: 222.8 LBS | BODY MASS INDEX: 38.04 KG/M2 | OXYGEN SATURATION: 98 % | SYSTOLIC BLOOD PRESSURE: 128 MMHG | RESPIRATION RATE: 14 BRPM | HEART RATE: 62 BPM

## 2023-11-02 DIAGNOSIS — M48.061 SPINAL STENOSIS OF LUMBAR REGION WITHOUT NEUROGENIC CLAUDICATION: ICD-10-CM

## 2023-11-02 DIAGNOSIS — N39.0 ACUTE URINARY TRACT INFECTION: Primary | ICD-10-CM

## 2023-11-02 DIAGNOSIS — N18.2 TYPE 2 DIABETES MELLITUS WITH STAGE 2 CHRONIC KIDNEY DISEASE, WITHOUT LONG-TERM CURRENT USE OF INSULIN (HCC): ICD-10-CM

## 2023-11-02 DIAGNOSIS — I10 ESSENTIAL HYPERTENSION: ICD-10-CM

## 2023-11-02 DIAGNOSIS — E66.01 CLASS 2 SEVERE OBESITY DUE TO EXCESS CALORIES WITH SERIOUS COMORBIDITY AND BODY MASS INDEX (BMI) OF 38.0 TO 38.9 IN ADULT (HCC): ICD-10-CM

## 2023-11-02 DIAGNOSIS — E11.22 TYPE 2 DIABETES MELLITUS WITH STAGE 2 CHRONIC KIDNEY DISEASE, WITHOUT LONG-TERM CURRENT USE OF INSULIN (HCC): ICD-10-CM

## 2023-11-02 LAB
BILIRUBIN URINE: NEGATIVE
BLOOD URINE, POC: NEGATIVE
CHARACTER, URINE: CLEAR
COLOR, URINE: YELLOW
GLUCOSE URINE: NEGATIVE MG/DL
KETONES, URINE: NEGATIVE
LEUKOCYTE CLUMPS, URINE: ABNORMAL
NITRITE, URINE: NEGATIVE
PH, URINE: 5.5 (ref 5–9)
PROTEIN, URINE: NEGATIVE MG/DL
SPECIFIC GRAVITY, URINE: >= 1.03 (ref 1–1.03)
UROBILINOGEN, URINE: 0.2 EU/DL (ref 0–1)

## 2023-11-02 PROCEDURE — 3044F HG A1C LEVEL LT 7.0%: CPT | Performed by: NURSE PRACTITIONER

## 2023-11-02 PROCEDURE — 3074F SYST BP LT 130 MM HG: CPT | Performed by: NURSE PRACTITIONER

## 2023-11-02 PROCEDURE — 99214 OFFICE O/P EST MOD 30 MIN: CPT | Performed by: NURSE PRACTITIONER

## 2023-11-02 PROCEDURE — 3079F DIAST BP 80-89 MM HG: CPT | Performed by: NURSE PRACTITIONER

## 2023-11-02 RX ORDER — NITROFURANTOIN 25; 75 MG/1; MG/1
100 CAPSULE ORAL 2 TIMES DAILY
Qty: 14 CAPSULE | Refills: 0 | Status: SHIPPED | OUTPATIENT
Start: 2023-11-02 | End: 2023-11-09

## 2023-11-02 RX ORDER — PHENTERMINE HYDROCHLORIDE 37.5 MG/1
37.5 TABLET ORAL
Qty: 30 TABLET | Refills: 0 | Status: SHIPPED | OUTPATIENT
Start: 2023-11-02 | End: 2023-12-02

## 2023-11-02 NOTE — PROGRESS NOTES
patient questions answered. Pt voiced understanding.        Electronically signed by Claude Boyers, APRN - CNP on 11/2/2023 at 2:25 PM

## 2023-11-03 LAB
BACTERIA UR CULT: ABNORMAL
ORGANISM: ABNORMAL

## 2023-11-06 ENCOUNTER — TELEPHONE (OUTPATIENT)
Dept: FAMILY MEDICINE CLINIC | Age: 43
End: 2023-11-06

## 2023-11-06 NOTE — TELEPHONE ENCOUNTER
----- Message from MARÍA Phelps CNP sent at 11/6/2023  7:41 AM EST -----  Let Indra Chambers know her urine culture did not grow any specific bacteria. I would rec'd complete the course of the Abx as prescribed and let me know if things don't improve or if they worsen.

## 2023-11-30 ENCOUNTER — OFFICE VISIT (OUTPATIENT)
Dept: FAMILY MEDICINE CLINIC | Age: 43
End: 2023-11-30
Payer: COMMERCIAL

## 2023-11-30 VITALS
BODY MASS INDEX: 36.98 KG/M2 | WEIGHT: 216.6 LBS | OXYGEN SATURATION: 97 % | HEART RATE: 70 BPM | DIASTOLIC BLOOD PRESSURE: 70 MMHG | RESPIRATION RATE: 12 BRPM | TEMPERATURE: 97.3 F | SYSTOLIC BLOOD PRESSURE: 110 MMHG | HEIGHT: 64 IN

## 2023-11-30 DIAGNOSIS — U07.1 COVID-19 VIRUS INFECTION: Primary | ICD-10-CM

## 2023-11-30 DIAGNOSIS — E66.01 CLASS 2 SEVERE OBESITY DUE TO EXCESS CALORIES WITH SERIOUS COMORBIDITY AND BODY MASS INDEX (BMI) OF 38.0 TO 38.9 IN ADULT (HCC): ICD-10-CM

## 2023-11-30 PROCEDURE — 1036F TOBACCO NON-USER: CPT | Performed by: NURSE PRACTITIONER

## 2023-11-30 PROCEDURE — G8417 CALC BMI ABV UP PARAM F/U: HCPCS | Performed by: NURSE PRACTITIONER

## 2023-11-30 PROCEDURE — 3078F DIAST BP <80 MM HG: CPT | Performed by: NURSE PRACTITIONER

## 2023-11-30 PROCEDURE — G8427 DOCREV CUR MEDS BY ELIG CLIN: HCPCS | Performed by: NURSE PRACTITIONER

## 2023-11-30 PROCEDURE — G8484 FLU IMMUNIZE NO ADMIN: HCPCS | Performed by: NURSE PRACTITIONER

## 2023-11-30 PROCEDURE — 99214 OFFICE O/P EST MOD 30 MIN: CPT | Performed by: NURSE PRACTITIONER

## 2023-11-30 PROCEDURE — 3074F SYST BP LT 130 MM HG: CPT | Performed by: NURSE PRACTITIONER

## 2023-11-30 RX ORDER — PHENTERMINE HYDROCHLORIDE 37.5 MG/1
37.5 TABLET ORAL
Qty: 30 TABLET | Refills: 0 | Status: SHIPPED | OUTPATIENT
Start: 2023-11-30 | End: 2023-12-30

## 2023-12-01 DIAGNOSIS — F41.0 PANIC DISORDER WITHOUT AGORAPHOBIA WITH MODERATE PANIC ATTACKS: ICD-10-CM

## 2023-12-01 RX ORDER — SERTRALINE HYDROCHLORIDE 100 MG/1
TABLET, FILM COATED ORAL
Qty: 90 TABLET | Refills: 3 | Status: SHIPPED | OUTPATIENT
Start: 2023-12-01

## 2023-12-01 NOTE — TELEPHONE ENCOUNTER
Recent Visits  Date Type Provider Dept   11/30/23 Office Visit Rocio Mace, APRN - CNP [facility]   11/02/23 Office Visit Rocio Mace, APRN - CNP [facility]   07/10/23 Office Visit Rocio Mace, APRN - CNP [facility]   01/10/23 Office Visit Rocio Mace, APRN - CNP [facility]   11/29/22 Office Visit Rocio Mace, APRN - CNP [facility]   10/18/22 Office Visit Rocio Mace, APRN - CNP [facility]   09/27/22 Office Visit Rocio Mace, APRN - CNP [facility]   Showing recent visits within past 540 days with a meds authorizing provider and meeting all other requirements  Future Appointments  Date Type Provider Dept   01/30/24 Appointment Rocio Mace, APRN - CNP [facility]   Showing future appointments within next 150 days with a meds authorizing provider and meeting all other requirements

## 2024-01-03 ENCOUNTER — APPOINTMENT (OUTPATIENT)
Dept: CT IMAGING | Age: 44
End: 2024-01-03
Payer: COMMERCIAL

## 2024-01-03 ENCOUNTER — HOSPITAL ENCOUNTER (EMERGENCY)
Age: 44
Discharge: HOME | End: 2024-01-03
Payer: COMMERCIAL

## 2024-01-03 VITALS
TEMPERATURE: 97.6 F | WEIGHT: 212 LBS | RESPIRATION RATE: 20 BRPM | HEART RATE: 68 BPM | DIASTOLIC BLOOD PRESSURE: 66 MMHG | SYSTOLIC BLOOD PRESSURE: 114 MMHG | OXYGEN SATURATION: 100 % | BODY MASS INDEX: 36.39 KG/M2

## 2024-01-03 DIAGNOSIS — N28.9 RENAL LESION: ICD-10-CM

## 2024-01-03 DIAGNOSIS — R10.84 GENERALIZED ABDOMINAL PAIN: Primary | ICD-10-CM

## 2024-01-03 LAB
ALBUMIN SERPL BCG-MCNC: 4 G/DL (ref 3.5–5.1)
ALP SERPL-CCNC: 107 U/L (ref 38–126)
ALT SERPL W/O P-5'-P-CCNC: 26 U/L (ref 11–66)
ANION GAP SERPL CALC-SCNC: 12 MEQ/L (ref 8–16)
AST SERPL-CCNC: 19 U/L (ref 5–40)
BACTERIA: ABNORMAL
BASOPHILS ABSOLUTE: 0 THOU/MM3 (ref 0–0.1)
BASOPHILS NFR BLD AUTO: 0.2 %
BILIRUB CONJ SERPL-MCNC: < 0.2 MG/DL (ref 0–0.3)
BILIRUB SERPL-MCNC: 0.2 MG/DL (ref 0.3–1.2)
BILIRUB UR QL STRIP: NEGATIVE
BUN SERPL-MCNC: 18 MG/DL (ref 7–22)
CALCIUM SERPL-MCNC: 9.7 MG/DL (ref 8.5–10.5)
CASTS #/AREA URNS LPF: ABNORMAL /LPF
CASTS #/AREA URNS LPF: ABNORMAL /LPF
CHARACTER UR: CLEAR
CHARCOAL URNS QL MICRO: ABNORMAL
CHLORIDE SERPL-SCNC: 102 MEQ/L (ref 98–111)
CO2 SERPL-SCNC: 25 MEQ/L (ref 23–33)
COLOR UR: YELLOW
CREAT SERPL-MCNC: 0.7 MG/DL (ref 0.4–1.2)
CRYSTALS URNS QL MICRO: ABNORMAL
DEPRECATED RDW RBC AUTO: 42.5 FL (ref 35–45)
EOSINOPHIL NFR BLD AUTO: 0.4 %
EOSINOPHILS ABSOLUTE: 0.1 THOU/MM3 (ref 0–0.4)
EPITHELIAL CELLS, UA: ABNORMAL /HPF
ERYTHROCYTE [DISTWIDTH] IN BLOOD BY AUTOMATED COUNT: 13.2 % (ref 11.5–14.5)
GFR SERPL CREATININE-BSD FRML MDRD: > 60 ML/MIN/1.73M2
GLUCOSE SERPL-MCNC: 185 MG/DL (ref 70–108)
GLUCOSE UR QL STRIP.AUTO: NEGATIVE MG/DL
HCT VFR BLD AUTO: 39.7 % (ref 37–47)
HGB BLD-MCNC: 12.8 GM/DL (ref 12–16)
HGB UR QL STRIP.AUTO: NEGATIVE
IMM GRANULOCYTES # BLD AUTO: 0.06 THOU/MM3 (ref 0–0.07)
IMM GRANULOCYTES NFR BLD AUTO: 0.4 %
KETONES UR QL STRIP.AUTO: NEGATIVE
LEUKOCYTE ESTERASE UR QL STRIP.AUTO: NEGATIVE
LIPASE SERPL-CCNC: 37.3 U/L (ref 5.6–51.3)
LYMPHOCYTES ABSOLUTE: 3 THOU/MM3 (ref 1–4.8)
LYMPHOCYTES NFR BLD AUTO: 20.9 %
MAGNESIUM SERPL-MCNC: 1.9 MG/DL (ref 1.6–2.4)
MCH RBC QN AUTO: 28.3 PG (ref 26–33)
MCHC RBC AUTO-ENTMCNC: 32.2 GM/DL (ref 32.2–35.5)
MCV RBC AUTO: 87.6 FL (ref 81–99)
MONOCYTES ABSOLUTE: 0.6 THOU/MM3 (ref 0.4–1.3)
MONOCYTES NFR BLD AUTO: 4.2 %
NEUTROPHILS NFR BLD AUTO: 73.9 %
NITRITE UR QL STRIP.AUTO: NEGATIVE
NRBC BLD AUTO-RTO: 0 /100 WBC
NT-PROBNP SERPL IA-MCNC: 87 PG/ML (ref 0–124)
OSMOLALITY SERPL CALC.SUM OF ELEC: 284.2 MOSMOL/KG (ref 275–300)
PH UR STRIP.AUTO: 5.5 [PH] (ref 5–9)
PLATELET # BLD AUTO: 464 THOU/MM3 (ref 130–400)
PMV BLD AUTO: 9.6 FL (ref 9.4–12.4)
POTASSIUM SERPL-SCNC: 3.5 MEQ/L (ref 3.5–5.2)
PROT SERPL-MCNC: 7.6 G/DL (ref 6.1–8)
PROT UR STRIP.AUTO-MCNC: 100 MG/DL
RBC # BLD AUTO: 4.53 MILL/MM3 (ref 4.2–5.4)
RBC #/AREA URNS HPF: ABNORMAL /HPF
RENAL EPI CELLS #/AREA URNS HPF: ABNORMAL /[HPF]
SEGMENTED NEUTROPHILS ABSOLUTE COUNT: 10.5 THOU/MM3 (ref 1.8–7.7)
SODIUM SERPL-SCNC: 139 MEQ/L (ref 135–145)
SP GR UR REFRACT.AUTO: 1.02 (ref 1–1.03)
TROPONIN, HIGH SENSITIVITY: < 6 NG/L (ref 0–12)
UROBILINOGEN UR QL STRIP.AUTO: 0.2 EU/DL (ref 0–1)
WBC # BLD AUTO: 14.2 THOU/MM3 (ref 4.8–10.8)
WBC #/AREA URNS HPF: ABNORMAL /HPF
YEAST LIKE FUNGI URNS QL MICRO: ABNORMAL

## 2024-01-03 PROCEDURE — 96374 THER/PROPH/DIAG INJ IV PUSH: CPT

## 2024-01-03 PROCEDURE — 99285 EMERGENCY DEPT VISIT HI MDM: CPT

## 2024-01-03 PROCEDURE — 83880 ASSAY OF NATRIURETIC PEPTIDE: CPT

## 2024-01-03 PROCEDURE — 83690 ASSAY OF LIPASE: CPT

## 2024-01-03 PROCEDURE — 36415 COLL VENOUS BLD VENIPUNCTURE: CPT

## 2024-01-03 PROCEDURE — 6360000002 HC RX W HCPCS

## 2024-01-03 PROCEDURE — 83735 ASSAY OF MAGNESIUM: CPT

## 2024-01-03 PROCEDURE — 81001 URINALYSIS AUTO W/SCOPE: CPT

## 2024-01-03 PROCEDURE — 84484 ASSAY OF TROPONIN QUANT: CPT

## 2024-01-03 PROCEDURE — 2580000003 HC RX 258

## 2024-01-03 PROCEDURE — 80053 COMPREHEN METABOLIC PANEL: CPT

## 2024-01-03 PROCEDURE — 96375 TX/PRO/DX INJ NEW DRUG ADDON: CPT

## 2024-01-03 PROCEDURE — 6360000004 HC RX CONTRAST MEDICATION

## 2024-01-03 PROCEDURE — 87086 URINE CULTURE/COLONY COUNT: CPT

## 2024-01-03 PROCEDURE — 85025 COMPLETE CBC W/AUTO DIFF WBC: CPT

## 2024-01-03 PROCEDURE — 74177 CT ABD & PELVIS W/CONTRAST: CPT

## 2024-01-03 RX ORDER — DOCUSATE SODIUM 100 MG/1
100 CAPSULE, LIQUID FILLED ORAL 2 TIMES DAILY
Qty: 30 CAPSULE | Refills: 0 | Status: SHIPPED | OUTPATIENT
Start: 2024-01-03

## 2024-01-03 RX ORDER — KETOROLAC TROMETHAMINE 10 MG/1
10 TABLET, FILM COATED ORAL EVERY 6 HOURS PRN
Qty: 20 TABLET | Refills: 0 | Status: SHIPPED | OUTPATIENT
Start: 2024-01-03

## 2024-01-03 RX ORDER — KETOROLAC TROMETHAMINE 30 MG/ML
30 INJECTION, SOLUTION INTRAMUSCULAR; INTRAVENOUS ONCE
Status: COMPLETED | OUTPATIENT
Start: 2024-01-03 | End: 2024-01-03

## 2024-01-03 RX ORDER — ONDANSETRON 2 MG/ML
4 INJECTION INTRAMUSCULAR; INTRAVENOUS ONCE
Status: COMPLETED | OUTPATIENT
Start: 2024-01-03 | End: 2024-01-03

## 2024-01-03 RX ORDER — 0.9 % SODIUM CHLORIDE 0.9 %
1000 INTRAVENOUS SOLUTION INTRAVENOUS ONCE
Status: COMPLETED | OUTPATIENT
Start: 2024-01-03 | End: 2024-01-03

## 2024-01-03 RX ORDER — POLYETHYLENE GLYCOL 3350 17 G/17G
17 POWDER, FOR SOLUTION ORAL DAILY
Qty: 238 G | Refills: 0 | Status: SHIPPED | OUTPATIENT
Start: 2024-01-03 | End: 2024-01-17

## 2024-01-03 RX ADMIN — ONDANSETRON 4 MG: 2 INJECTION INTRAMUSCULAR; INTRAVENOUS at 06:40

## 2024-01-03 RX ADMIN — IOPAMIDOL 80 ML: 755 INJECTION, SOLUTION INTRAVENOUS at 07:16

## 2024-01-03 RX ADMIN — KETOROLAC TROMETHAMINE 30 MG: 30 INJECTION, SOLUTION INTRAMUSCULAR; INTRAVENOUS at 06:39

## 2024-01-03 RX ADMIN — SODIUM CHLORIDE 1000 ML: 9 INJECTION, SOLUTION INTRAVENOUS at 06:39

## 2024-01-03 ASSESSMENT — PAIN SCALES - GENERAL
PAINLEVEL_OUTOF10: 8
PAINLEVEL_OUTOF10: 4
PAINLEVEL_OUTOF10: 8

## 2024-01-03 ASSESSMENT — PAIN - FUNCTIONAL ASSESSMENT: PAIN_FUNCTIONAL_ASSESSMENT: 0-10

## 2024-01-03 NOTE — ED PROVIDER NOTES
[facility] EMERGENCY DEPT      EMERGENCY MEDICINE     Pt Name: Deloris Sands  MRN: [id number]  Birthdate 1980  Date of evaluation: 1/3/2024  Provider: MARÍA Vaughn CNP    CHIEF COMPLAINT       Chief Complaint   Patient presents with    Abdominal Pain     HISTORY OF PRESENT ILLNESS   Deloris Sands is a pleasant 43 y.o. female who presents to the emergency department from home by personal transportation with chief complaint of episodic abdominal pain.  First episode began approximately week ago, been having few episodes since then.  Described as twisting/cramping in mid abdomen, rated moderate-severe, no treatments tried.  Admits to history of this pain which spontaneously resolved years ago.  Concomitant urinary frequency without burning/urgency \"I have a doctor's appointment where I am going to get checked for UTI.\"  Denies changes in appetite or food/pain pattern.  Denies concomitant nausea/vomiting.  Admits to improvement of pain with having bowel movement, endorses increased frequency of bowel movement but denies any diarrhea.    Denies chest pain shortness of breath, nausea vomiting, fever or chills.    Abdominal surgical history of diagnostic laparoscopy in /hysterectomy in  and  x 2.    History obtained from patient  PASTMEDICAL HISTORY     Past Medical History:   Diagnosis Date    Bell palsy     right side droop    Essential hypertension 2016    GERD (gastroesophageal reflux disease)     Hashimoto's thyroiditis 2016    HIGH CHOLESTEROL     Hyperlipidemia     Hypokalemia     Kidney stones     Prediabetes 2020    Subclinical hyperthyroidism 2016       Patient Active Problem List   Diagnosis Code    Essential hypertension I10    Panic disorder without agoraphobia with moderate panic attacks F41.0    Hashimoto's thyroiditis E06.3    Family history of early CAD Z82.49    Intermittent palpitations R00.2    Diarrhea R19.7

## 2024-01-03 NOTE — DISCHARGE INSTRUCTIONS
Return to ER for uncontrolled pain.  Return for fever/chills.  Return for any other urgent/emergent medical concerns such as chest pain shortness of breath.  Return for any other urgent/emergent medical concerns.    Follow-up with your family doctor in a week or so if your symptoms worsen or fail to improve.  Follow-up with your family doctor for outpatient management and ongoing evaluation of your kidney lesions.    Please treat your pain with Toradol as ordered.  Treat your constipation symptoms with MiraLAX/Colace as prescribed.    Please optimize hydration, increase physical activity and fiber (fresh fruit and veggies) in your diet to help optimize bowel patterns.    I hope you are feeling better soon!

## 2024-01-03 NOTE — ED TRIAGE NOTES
Pt to the ED for abd x2 hours. Pt states she had a normal BM today. States she had a similar pain 1 week ago which resolved. Increased pain with palpation in LLQ.

## 2024-01-04 ENCOUNTER — NURSE ONLY (OUTPATIENT)
Dept: LAB | Age: 44
End: 2024-01-04

## 2024-01-04 LAB
BACTERIA UR CULT: ABNORMAL
ORGANISM: ABNORMAL

## 2024-01-07 LAB
C TRACH RRNA SPEC QL NAA+PROBE: NEGATIVE
N GONORRHOEA RRNA SPEC QL NAA+PROBE: NEGATIVE
SPEC CONTAINER SPEC: NORMAL
SPECIMEN SOURCE: NORMAL
SPECIMEN SOURCE: NORMAL
T VAGINALIS RRNA SPEC QL NAA+PROBE: NEGATIVE

## 2024-01-19 ENCOUNTER — PATIENT MESSAGE (OUTPATIENT)
Dept: FAMILY MEDICINE CLINIC | Age: 44
End: 2024-01-19

## 2024-01-19 DIAGNOSIS — N28.9 RENAL LESION: Primary | ICD-10-CM

## 2024-01-19 NOTE — TELEPHONE ENCOUNTER
From: Deloris Sands  To: Fortino Vaughn  Sent: 1/19/2024 8:11 AM EST  Subject: Appt    Good morning Fortino, I cancelled my appt for today and will just keep the one for the 30th. My issue is that I’m having abdominal pain and I’ve been having diarrhea and constipation. I went to the ER a couple weeks ago and they said they seen spots on my kidney and I needed to follow up with my PCP. Was just wondering if you can do anything without me coming in.    0

## 2024-01-30 ENCOUNTER — OFFICE VISIT (OUTPATIENT)
Dept: FAMILY MEDICINE CLINIC | Age: 44
End: 2024-01-30
Payer: COMMERCIAL

## 2024-01-30 VITALS
WEIGHT: 216.4 LBS | HEART RATE: 76 BPM | RESPIRATION RATE: 12 BRPM | TEMPERATURE: 97 F | BODY MASS INDEX: 36.95 KG/M2 | DIASTOLIC BLOOD PRESSURE: 82 MMHG | SYSTOLIC BLOOD PRESSURE: 132 MMHG | HEIGHT: 64 IN | OXYGEN SATURATION: 98 %

## 2024-01-30 DIAGNOSIS — E66.01 CLASS 2 SEVERE OBESITY DUE TO EXCESS CALORIES WITH SERIOUS COMORBIDITY AND BODY MASS INDEX (BMI) OF 38.0 TO 38.9 IN ADULT (HCC): Primary | ICD-10-CM

## 2024-01-30 DIAGNOSIS — M48.061 SPINAL STENOSIS OF LUMBAR REGION WITHOUT NEUROGENIC CLAUDICATION: ICD-10-CM

## 2024-01-30 PROCEDURE — 3079F DIAST BP 80-89 MM HG: CPT | Performed by: NURSE PRACTITIONER

## 2024-01-30 PROCEDURE — 1036F TOBACCO NON-USER: CPT | Performed by: NURSE PRACTITIONER

## 2024-01-30 PROCEDURE — 99214 OFFICE O/P EST MOD 30 MIN: CPT | Performed by: NURSE PRACTITIONER

## 2024-01-30 PROCEDURE — G8417 CALC BMI ABV UP PARAM F/U: HCPCS | Performed by: NURSE PRACTITIONER

## 2024-01-30 PROCEDURE — G8427 DOCREV CUR MEDS BY ELIG CLIN: HCPCS | Performed by: NURSE PRACTITIONER

## 2024-01-30 PROCEDURE — 3075F SYST BP GE 130 - 139MM HG: CPT | Performed by: NURSE PRACTITIONER

## 2024-01-30 PROCEDURE — G8484 FLU IMMUNIZE NO ADMIN: HCPCS | Performed by: NURSE PRACTITIONER

## 2024-01-30 RX ORDER — TIZANIDINE 4 MG/1
4 TABLET ORAL EVERY 8 HOURS PRN
Qty: 60 TABLET | Refills: 2 | Status: SHIPPED | OUTPATIENT
Start: 2024-01-30

## 2024-01-30 ASSESSMENT — PATIENT HEALTH QUESTIONNAIRE - PHQ9
SUM OF ALL RESPONSES TO PHQ QUESTIONS 1-9: 0
SUM OF ALL RESPONSES TO PHQ9 QUESTIONS 1 & 2: 0
SUM OF ALL RESPONSES TO PHQ QUESTIONS 1-9: 0
1. LITTLE INTEREST OR PLEASURE IN DOING THINGS: 0
2. FEELING DOWN, DEPRESSED OR HOPELESS: 0

## 2024-01-30 NOTE — PROGRESS NOTES
Monitoring Periodic Controlled Substance Monitoring   11/30/2023   2:35 PM No signs of potential drug abuse or diversion identified.     DISPOSITION    Return in about 3 months (around 4/30/2024) for diabetes, obesity.    Deloris released without restrictions.      PATIENT COUNSELING    Counseling was provided today regarding the following topics: Healthy eating habits, Regular exercise, substance abuse and healthy sleep habits.    Deloris received counseling on the following healthy behaviors: nutrition, exercise and medication adherence    Patient given educational materials on: See Attached    I have instructed Deloris to complete a self tracking handout on Blood Pressures  and instructed them to bring it with them to her next appointment.     Barriers to learning and self management: none    Discussed use, benefit, and side effects of prescribed medications.  Barriers to medication compliance addressed.  All patient questions answered.  Pt voiced understanding.       Electronically signed by MARÍA Aparicio CNP on 1/30/2024 at 2:26 PM

## 2024-02-06 ENCOUNTER — HOSPITAL ENCOUNTER (OUTPATIENT)
Dept: ULTRASOUND IMAGING | Age: 44
Discharge: HOME | End: 2024-02-06
Payer: COMMERCIAL

## 2024-02-06 DIAGNOSIS — N28.9 RENAL LESION: ICD-10-CM

## 2024-02-06 PROCEDURE — 76770 US EXAM ABDO BACK WALL COMP: CPT

## 2024-02-07 ENCOUNTER — TELEPHONE (OUTPATIENT)
Dept: FAMILY MEDICINE CLINIC | Age: 44
End: 2024-02-07

## 2024-02-07 NOTE — TELEPHONE ENCOUNTER
----- Message from MARÍA Aparicio - CNP sent at 2/7/2024  8:22 AM EST -----  Let Donna know her US of kidneys shows a simple cyst on the right kidney. She does have a small kidney stone in the right kidney as well. No repeat imaging at this point required. I wouldn't necessarily do anything about the stone as long as its not bothering her.

## 2024-02-26 ENCOUNTER — OFFICE VISIT (OUTPATIENT)
Dept: UROLOGY | Age: 44
End: 2024-02-26
Payer: COMMERCIAL

## 2024-02-26 VITALS — RESPIRATION RATE: 16 BRPM | HEIGHT: 64 IN | WEIGHT: 217 LBS | BODY MASS INDEX: 37.05 KG/M2

## 2024-02-26 DIAGNOSIS — N28.1 RENAL CYST: ICD-10-CM

## 2024-02-26 DIAGNOSIS — N32.81 OAB (OVERACTIVE BLADDER): ICD-10-CM

## 2024-02-26 DIAGNOSIS — N20.0 NEPHROLITHIASIS: Primary | ICD-10-CM

## 2024-02-26 LAB
BILIRUBIN, POC: NORMAL
BLOOD URINE, POC: NORMAL
CLARITY, POC: NORMAL
COLOR, POC: NORMAL
GLUCOSE URINE, POC: NORMAL
KETONES, POC: NORMAL
LEUKOCYTE EST, POC: NORMAL
NITRITE, POC: NORMAL
PH, POC: NORMAL
PROTEIN, POC: NORMAL
SPECIFIC GRAVITY, POC: NORMAL
UROBILINOGEN, POC: NORMAL

## 2024-02-26 PROCEDURE — G8484 FLU IMMUNIZE NO ADMIN: HCPCS | Performed by: NURSE PRACTITIONER

## 2024-02-26 PROCEDURE — 1036F TOBACCO NON-USER: CPT | Performed by: NURSE PRACTITIONER

## 2024-02-26 PROCEDURE — 81003 URINALYSIS AUTO W/O SCOPE: CPT | Performed by: NURSE PRACTITIONER

## 2024-02-26 PROCEDURE — 99213 OFFICE O/P EST LOW 20 MIN: CPT | Performed by: NURSE PRACTITIONER

## 2024-02-26 PROCEDURE — G8417 CALC BMI ABV UP PARAM F/U: HCPCS | Performed by: NURSE PRACTITIONER

## 2024-02-26 PROCEDURE — G8427 DOCREV CUR MEDS BY ELIG CLIN: HCPCS | Performed by: NURSE PRACTITIONER

## 2024-02-26 NOTE — PROGRESS NOTES
Med list reviewed by patient on mychart  
surveillance.   - POCT Urinalysis No Micro (Auto)  - XR ABDOMEN (KUB) (SINGLE AP VIEW); Future    2. OAB (overactive bladder)  Not bothersome at this time.     3. Renal cyst  Stable, no further workup indicated.          -Patient has no other questions, comments, or concerns.   -They agree with and understand the plan of care.   -The patient was encouraged to call the office or seek emergency care should this change.      Denisse Yadav, APRN - CNP

## 2024-03-08 DIAGNOSIS — I10 ESSENTIAL HYPERTENSION: ICD-10-CM

## 2024-03-08 RX ORDER — HYDROCHLOROTHIAZIDE 12.5 MG/1
CAPSULE, GELATIN COATED ORAL
Qty: 90 CAPSULE | Refills: 2 | Status: SHIPPED | OUTPATIENT
Start: 2024-03-08

## 2024-03-08 NOTE — TELEPHONE ENCOUNTER
Recent Visits  Date Type Provider Dept   01/30/24 Office Visit Fortino Vaughn APRN - CNP [facility]   11/30/23 Office Visit Fortino Vaughn APRN - CNP [facility]   11/02/23 Office Visit Fortino Vaughn APRN - CNP [facility]   07/10/23 Office Visit Fortino Vaughn APRN - CNP [facility]   01/10/23 Office Visit Fortino Vaughn APRN - CNP [facility]   11/29/22 Office Visit Fortino Vaughn APRN - CNP [facility]   10/18/22 Office Visit Fortino Vaughn APRN - CNP [facility]   09/27/22 Office Visit Fortino Vaughn APRN - CNP [facility]   Showing recent visits within past 540 days with a meds authorizing provider and meeting all other requirements  Future Appointments  Date Type Provider Dept   04/30/24 Appointment Fortino Vaughn APRN - CNP [facility]   Showing future appointments within next 150 days with a meds authorizing provider and meeting all other requirements

## 2024-04-04 ENCOUNTER — HOSPITAL ENCOUNTER (OUTPATIENT)
Dept: WOMENS IMAGING | Age: 44
Discharge: HOME | End: 2024-04-04
Payer: COMMERCIAL

## 2024-04-04 DIAGNOSIS — Z12.31 VISIT FOR SCREENING MAMMOGRAM: ICD-10-CM

## 2024-04-04 PROCEDURE — 77063 BREAST TOMOSYNTHESIS BI: CPT

## 2024-04-24 ENCOUNTER — HOSPITAL ENCOUNTER (EMERGENCY)
Age: 44
Discharge: HOME | End: 2024-04-24
Payer: COMMERCIAL

## 2024-04-24 VITALS
DIASTOLIC BLOOD PRESSURE: 103 MMHG | RESPIRATION RATE: 20 BRPM | HEART RATE: 75 BPM | OXYGEN SATURATION: 100 % | SYSTOLIC BLOOD PRESSURE: 161 MMHG | TEMPERATURE: 98.2 F

## 2024-04-24 DIAGNOSIS — J40 BRONCHITIS: Primary | ICD-10-CM

## 2024-04-24 DIAGNOSIS — Z20.818 EXPOSURE TO STREP THROAT: ICD-10-CM

## 2024-04-24 PROCEDURE — 99213 OFFICE O/P EST LOW 20 MIN: CPT

## 2024-04-24 PROCEDURE — 99213 OFFICE O/P EST LOW 20 MIN: CPT | Performed by: NURSE PRACTITIONER

## 2024-04-24 RX ORDER — PREDNISONE 20 MG/1
40 TABLET ORAL DAILY
Qty: 10 TABLET | Refills: 0 | Status: SHIPPED | OUTPATIENT
Start: 2024-04-24 | End: 2024-04-29

## 2024-04-24 RX ORDER — AMOXICILLIN AND CLAVULANATE POTASSIUM 875; 125 MG/1; MG/1
1 TABLET, FILM COATED ORAL 2 TIMES DAILY
Qty: 20 TABLET | Refills: 0 | Status: SHIPPED | OUTPATIENT
Start: 2024-04-24 | End: 2024-05-04

## 2024-04-24 RX ORDER — BENZONATATE 200 MG/1
200 CAPSULE ORAL 3 TIMES DAILY PRN
Qty: 30 CAPSULE | Refills: 0 | Status: SHIPPED | OUTPATIENT
Start: 2024-04-24 | End: 2024-05-01

## 2024-04-24 ASSESSMENT — ENCOUNTER SYMPTOMS
SORE THROAT: 0
DIARRHEA: 0
NAUSEA: 0
SINUS PRESSURE: 1
SHORTNESS OF BREATH: 0
VOMITING: 0
SINUS PAIN: 1
COUGH: 1
RHINORRHEA: 1
CHEST TIGHTNESS: 1

## 2024-04-24 NOTE — ED PROVIDER NOTES
[facility] URGENT CARE  Urgent Care Encounter       CHIEF COMPLAINT       Chief Complaint   Patient presents with    Cough    Otalgia     Left       Sinusitis       Nurses Notes reviewed and I agree except as noted in the HPI.  HISTORY OF PRESENT ILLNESS   Deloris Sands is a 44 y.o. female who presents to the [place] urgent care for evaluation of cough.  She reports her symptoms started roughly 3 to 4 days ago.  She reports congestion, rhinorrhea, postnasal drainage, sinus pressure, otalgia, cough and chest congestion.  Denies fever or chills.  Does report that a close relative had streptococcal pharyngitis.  Denies nausea, vomiting with diarrhea.    The history is provided by the patient. No  was used.       REVIEW OF SYSTEMS     Review of Systems   Constitutional:  Negative for activity change, appetite change, chills, fatigue and fever.   HENT:  Positive for congestion, ear pain, postnasal drip, rhinorrhea, sinus pressure and sinus pain. Negative for ear discharge and sore throat.    Respiratory:  Positive for cough and chest tightness. Negative for shortness of breath.    Cardiovascular:  Negative for chest pain.   Gastrointestinal:  Negative for diarrhea, nausea and vomiting.   Genitourinary:  Negative for dysuria.   Skin:  Negative for rash.   Allergic/Immunologic: Negative for environmental allergies and food allergies.   Neurological:  Negative for dizziness and headaches.       PAST MEDICAL HISTORY         Diagnosis Date    Bell palsy     right side droop    Essential hypertension 2016    GERD (gastroesophageal reflux disease)     Hashimoto's thyroiditis 2016    HIGH CHOLESTEROL     Hyperlipidemia     Hypokalemia     Kidney stones     Prediabetes 2020    Subclinical hyperthyroidism 2016       SURGICALHISTORY     Patient  has a past surgical history that includes  section (, ); Endoscopy, colon, diagnostic; Dilation and

## 2024-05-06 ENCOUNTER — PATIENT MESSAGE (OUTPATIENT)
Dept: FAMILY MEDICINE CLINIC | Age: 44
End: 2024-05-06

## 2024-05-06 RX ORDER — FLUCONAZOLE 150 MG/1
150 TABLET ORAL
Qty: 2 TABLET | Refills: 0 | Status: SHIPPED | OUTPATIENT
Start: 2024-05-06 | End: 2024-05-12

## 2024-05-06 NOTE — TELEPHONE ENCOUNTER
From: Deloris Sands  To: Fortino Vaughn  Sent: 5/6/2024 8:16 AM EDT  Subject: Prescription     Good morning Fortino, I was wondering if you could call me in a prescription for diflucan? I went to urgent care a couple weeks ago and they put me on amoxicillin

## 2024-05-09 RX ORDER — FLUCONAZOLE 150 MG/1
150 TABLET ORAL ONCE
Qty: 1 TABLET | Refills: 0 | Status: SHIPPED | OUTPATIENT
Start: 2024-05-09 | End: 2024-05-09

## 2024-05-10 ENCOUNTER — OFFICE VISIT (OUTPATIENT)
Dept: CARDIOLOGY CLINIC | Age: 44
End: 2024-05-10
Payer: COMMERCIAL

## 2024-05-10 VITALS
DIASTOLIC BLOOD PRESSURE: 88 MMHG | HEIGHT: 64 IN | HEART RATE: 85 BPM | WEIGHT: 217.8 LBS | SYSTOLIC BLOOD PRESSURE: 148 MMHG | BODY MASS INDEX: 37.18 KG/M2

## 2024-05-10 DIAGNOSIS — R00.2 INTERMITTENT PALPITATIONS: Primary | ICD-10-CM

## 2024-05-10 DIAGNOSIS — I10 PRIMARY HYPERTENSION: ICD-10-CM

## 2024-05-10 PROCEDURE — 1036F TOBACCO NON-USER: CPT | Performed by: NUCLEAR MEDICINE

## 2024-05-10 PROCEDURE — G8417 CALC BMI ABV UP PARAM F/U: HCPCS | Performed by: NUCLEAR MEDICINE

## 2024-05-10 PROCEDURE — 99213 OFFICE O/P EST LOW 20 MIN: CPT | Performed by: NUCLEAR MEDICINE

## 2024-05-10 PROCEDURE — 93000 ELECTROCARDIOGRAM COMPLETE: CPT | Performed by: NUCLEAR MEDICINE

## 2024-05-10 PROCEDURE — 3077F SYST BP >= 140 MM HG: CPT | Performed by: NUCLEAR MEDICINE

## 2024-05-10 PROCEDURE — G8427 DOCREV CUR MEDS BY ELIG CLIN: HCPCS | Performed by: NUCLEAR MEDICINE

## 2024-05-10 PROCEDURE — 3079F DIAST BP 80-89 MM HG: CPT | Performed by: NUCLEAR MEDICINE

## 2024-05-10 NOTE — PROGRESS NOTES
[facility]YS[facility] [place] [facility]  [facility] CARDIOLOGY  [address]  [address]  [facility] [address]  Dept: [phone]  Dept Fax: [phone]  Loc: [phone]    Visit Date: 5/10/2024    Deloris Sands is a 44 y.o. female who presents todayfor:  Chief Complaint   Patient presents with    Follow-up     1 year follow up.  Reports experiencing chest pain and numbness down left arm and leg. States it feels heavy.  Denies dizziness, swelling, or palpitations.    Hypertension     Seen for HTN   Seems stable  Higher at times  Some chest pain at times  Intermittent in nature  Some arm pain   Some leg pain   No dizziness  No syncope      HPI:  HPI  Past Medical History:   Diagnosis Date    Bell palsy     right side droop    Essential hypertension 2016    GERD (gastroesophageal reflux disease)     Hashimoto's thyroiditis 2016    HIGH CHOLESTEROL     Hyperlipidemia     Hypokalemia     Kidney stones     Prediabetes 2020    Subclinical hyperthyroidism 2016      Past Surgical History:   Procedure Laterality Date     SECTION  2014    COLONOSCOPY  2021    1 polyp removed     CYSTOSCOPY Right 2023    CYSTOSCOPY, RIGHT URETEROSCOPY, LASER LITHOTRIPSY,  RIGHT URETERAL STENT performed by Seamus Zepeda MD at [facility] OR    DILATION AND CURETTAGE OF UTERUS      ENDOSCOPY, COLON, DIAGNOSTIC      HYSTERECTOMY, TOTAL ABDOMINAL (CERVIX REMOVED) N/A 09/10/2020    HYSTERECTOMY ABDOMINAL TOTAL, BS, POSS DANIELLE performed by Julia Cisneros MD at [facility] OR    LAPAROSCOPY N/A 06/15/2020    DIAGNOSTIC LAPAROSCOPY, performed by Julia Cisneros MD at [facility] OR     Family History   Problem Relation Age of Onset    High Blood Pressure Mother     Diabetes Mother     Heart Disease Mother     High Blood Pressure Father     Diabetes Father     Breast Cancer Maternal Aunt 50        reoccurred at 67     Social History     Tobacco Use    Smoking status: Never

## 2024-05-23 ENCOUNTER — TELEPHONE (OUTPATIENT)
Dept: FAMILY MEDICINE CLINIC | Age: 44
End: 2024-05-23

## 2024-05-23 NOTE — TELEPHONE ENCOUNTER
FMLA forms were received for pt. Pt's last appointment was 1/30/24.   Please advise if pt is needing an appointment before FMLA forms can be completed.    There is a my chart message regarding FMLA forms. Waiting on a response from pt to get more information on what forms are needed for.

## 2024-08-09 ENCOUNTER — HOSPITAL ENCOUNTER (EMERGENCY)
Age: 44
Discharge: HOME | End: 2024-08-09
Payer: COMMERCIAL

## 2024-08-09 VITALS
SYSTOLIC BLOOD PRESSURE: 154 MMHG | TEMPERATURE: 97.2 F | HEART RATE: 75 BPM | RESPIRATION RATE: 18 BRPM | WEIGHT: 220 LBS | HEIGHT: 64 IN | OXYGEN SATURATION: 100 % | DIASTOLIC BLOOD PRESSURE: 94 MMHG | BODY MASS INDEX: 37.56 KG/M2

## 2024-08-09 DIAGNOSIS — R35.0 URINARY FREQUENCY: Primary | ICD-10-CM

## 2024-08-09 LAB
BILIRUB UR STRIP.AUTO-MCNC: NEGATIVE MG/DL
CHARACTER UR: CLEAR
COLOR, UA: YELLOW
GLUCOSE UR QL STRIP.AUTO: NEGATIVE MG/DL
KETONES UR QL STRIP.AUTO: NEGATIVE
NITRITE UR QL STRIP.AUTO: NEGATIVE
PH UR STRIP.AUTO: 5.5 [PH] (ref 5–9)
PROT UR STRIP.AUTO-MCNC: >= 300 MG/DL
RBC #/AREA URNS HPF: NEGATIVE /[HPF]
SP GR UR STRIP.AUTO: >= 1.03 (ref 1–1.03)
UROBILINOGEN, URINE: 0.2 EU/DL (ref 0.2–1)
WBC #/AREA URNS HPF: NEGATIVE /[HPF]

## 2024-08-09 PROCEDURE — 99213 OFFICE O/P EST LOW 20 MIN: CPT

## 2024-08-09 PROCEDURE — 81003 URINALYSIS AUTO W/O SCOPE: CPT

## 2024-08-09 PROCEDURE — 96372 THER/PROPH/DIAG INJ SC/IM: CPT

## 2024-08-09 PROCEDURE — 6360000002 HC RX W HCPCS

## 2024-08-09 RX ORDER — KETOROLAC TROMETHAMINE 30 MG/ML
30 INJECTION, SOLUTION INTRAMUSCULAR; INTRAVENOUS ONCE
Status: COMPLETED | OUTPATIENT
Start: 2024-08-09 | End: 2024-08-09

## 2024-08-09 RX ORDER — NITROFURANTOIN 25; 75 MG/1; MG/1
100 CAPSULE ORAL 2 TIMES DAILY
Qty: 10 CAPSULE | Refills: 0 | Status: SHIPPED | OUTPATIENT
Start: 2024-08-09 | End: 2024-08-14

## 2024-08-09 RX ADMIN — KETOROLAC TROMETHAMINE 30 MG: 30 INJECTION, SOLUTION INTRAMUSCULAR at 11:53

## 2024-08-09 ASSESSMENT — ENCOUNTER SYMPTOMS
SHORTNESS OF BREATH: 0
BACK PAIN: 1
ABDOMINAL PAIN: 1

## 2024-08-09 ASSESSMENT — PAIN DESCRIPTION - LOCATION
LOCATION_2: BACK
LOCATION: ABDOMEN

## 2024-08-09 ASSESSMENT — PAIN - FUNCTIONAL ASSESSMENT: PAIN_FUNCTIONAL_ASSESSMENT: 0-10

## 2024-08-09 ASSESSMENT — PAIN DESCRIPTION - ORIENTATION
ORIENTATION: LEFT;LOWER
ORIENTATION_2: LOWER

## 2024-08-09 ASSESSMENT — PAIN DESCRIPTION - DESCRIPTORS
DESCRIPTORS: SHARP;ACHING;TENDER
DESCRIPTORS_2: ACHING;SQUEEZING

## 2024-08-09 ASSESSMENT — PAIN SCALES - GENERAL: PAINLEVEL_OUTOF10: 8

## 2024-08-09 ASSESSMENT — PAIN DESCRIPTION - INTENSITY: RATING_2: 8

## 2024-08-09 NOTE — DISCHARGE INSTRUCTIONS
Increase water intake, frequent hand washing.  Tylenol / Ibuprofen as needed for pain.  Follow up with PCP in 3-5 days if no improvement or sooner with worsening symptoms.

## 2024-08-09 NOTE — ED PROVIDER NOTES
[facility] URGENT CARE  Urgent Care Encounter      CHIEF COMPLAINT       Chief Complaint   Patient presents with    Abdominal Pain     lower    Back Pain     lower    Urinary Frequency    Urinary Urgency       Nurses Notes reviewed and I agree except as noted in the HPI.  HISTORY OF PRESENT ILLNESS   Deloris Sands is a 44 y.o. female who presents to urgent care with complaints of lower abdominal pain and back discomfort with urinary frequency. Patient reports symptoms have been ongoing for 2 days. Reports she has history of kidney stones as well and is unsure if that is an issue. Reports she has been taking over the counter Azo with relief. Reports her symptoms feel like a UTI.     REVIEW OF SYSTEMS     Review of Systems   Constitutional:  Negative for fever.   HENT:  Negative for congestion.    Respiratory:  Negative for shortness of breath.    Cardiovascular:  Negative for chest pain.   Gastrointestinal:  Positive for abdominal pain.   Genitourinary:  Positive for dysuria, frequency and urgency.   Musculoskeletal:  Positive for back pain.   Neurological:  Negative for dizziness and seizures.       PAST MEDICAL HISTORY         Diagnosis Date    Bell palsy     right side droop    Essential hypertension 2016    GERD (gastroesophageal reflux disease)     Hashimoto's thyroiditis 2016    HIGH CHOLESTEROL     Hyperlipidemia     Hypokalemia     Kidney stones     Prediabetes 2020    Subclinical hyperthyroidism 2016       SURGICAL HISTORY     Patient  has a past surgical history that includes  section (, ); Endoscopy, colon, diagnostic; Dilation and curettage of uterus (); laparoscopy (N/A, 06/15/2020); Colonoscopy (2021); Hysterectomy, total abdominal (N/A, 09/10/2020); and Cystoscopy (Right, 2023).    CURRENT MEDICATIONS       Discharge Medication List as of 2024 11:43 AM        CONTINUE these medications which have NOT CHANGED    Details  Normocephalic and atraumatic.   Cardiovascular:      Rate and Rhythm: Normal rate.   Pulmonary:      Effort: Pulmonary effort is normal.   Abdominal:      General: There are no signs of injury.      Tenderness: There is no abdominal tenderness. There is no right CVA tenderness or left CVA tenderness.   Skin:     General: Skin is warm and dry.      Capillary Refill: Capillary refill takes less than 2 seconds.   Neurological:      General: No focal deficit present.      Mental Status: She is alert and oriented to person, place, and time.   Psychiatric:         Mood and Affect: Mood normal.         DIAGNOSTIC RESULTS   Labs:  Results for orders placed or performed during the hospital encounter of 08/09/24   Urinalysis   Result Value Ref Range    Glucose, Ur Negative NEGATIVE mg/dl    Bilirubin, Urine Negative NEGATIVE    Ketones, Urine Negative NEGATIVE    Specific Gravity, UA >=1.030 1.002 - 1.030    Blood, Urine Negative NEGATIVE    pH, Urine 5.50 5.0 - 9.0    Protein, UA >= 300 (A) NEGATIVE mg/dl    Urobilinogen, Urine 0.20 0.2 - 1.0 eu/dl    Nitrite, Urine Negative NEGATIVE    Leukocyte Esterase, Urine Negative NEGATIVE    Color, UA Yellow STRAW-YELLOW    Character, Urine Clear CLEAR-SL CLOUD       IMAGING:  No orders to display      URGENT CARE COURSE:     Vitals:    08/09/24 1111   BP: (!) 154/94   Pulse: 75   Resp: 18   Temp: 97.2 °F (36.2 °C)   TempSrc: Temporal   SpO2: 100%   Weight: 99.8 kg (220 lb)   Height: 1.626 m (5' 4\")       Medications   ketorolac (TORADOL) injection 30 mg (30 mg IntraMUSCular Given 8/9/24 1153)     PROCEDURES:  None  FINAL IMPRESSION      1. Urinary frequency        DISPOSITION/PLAN   DISPOSITION Decision To Discharge 08/09/2024 11:42:26 AM    Urinalysis is not consistent with UTI. Discussed with patient based on her symptoms, I would be willing to try a few days of antibiotics to see if her symptoms improve. Discussed with patient ultimately I do want her to follow up with her

## 2024-08-14 ENCOUNTER — OFFICE VISIT (OUTPATIENT)
Dept: FAMILY MEDICINE CLINIC | Age: 44
End: 2024-08-14
Payer: COMMERCIAL

## 2024-08-14 ENCOUNTER — HOSPITAL ENCOUNTER (OUTPATIENT)
Dept: CT IMAGING | Age: 44
Discharge: HOME | End: 2024-08-14
Payer: COMMERCIAL

## 2024-08-14 VITALS
WEIGHT: 225.6 LBS | DIASTOLIC BLOOD PRESSURE: 78 MMHG | BODY MASS INDEX: 38.51 KG/M2 | SYSTOLIC BLOOD PRESSURE: 132 MMHG | RESPIRATION RATE: 20 BRPM | OXYGEN SATURATION: 99 % | HEIGHT: 64 IN | HEART RATE: 78 BPM

## 2024-08-14 DIAGNOSIS — N20.0 NEPHROLITHIASIS: ICD-10-CM

## 2024-08-14 DIAGNOSIS — R10.30 LOWER ABDOMINAL PAIN: ICD-10-CM

## 2024-08-14 DIAGNOSIS — E11.22 TYPE 2 DIABETES MELLITUS WITH STAGE 2 CHRONIC KIDNEY DISEASE, WITHOUT LONG-TERM CURRENT USE OF INSULIN (HCC): ICD-10-CM

## 2024-08-14 DIAGNOSIS — N18.2 TYPE 2 DIABETES MELLITUS WITH STAGE 2 CHRONIC KIDNEY DISEASE, WITHOUT LONG-TERM CURRENT USE OF INSULIN (HCC): ICD-10-CM

## 2024-08-14 DIAGNOSIS — R10.30 LOWER ABDOMINAL PAIN: Primary | ICD-10-CM

## 2024-08-14 LAB
BILIRUBIN, URINE: NEGATIVE
BLOOD URINE, POC: NEGATIVE
CHARACTER, URINE: CLEAR
COLOR, UA: YELLOW
GLUCOSE URINE: NEGATIVE MG/DL
KETONES, URINE: NEGATIVE
LEUKOCYTE CLUMPS, URINE: NEGATIVE
MICROALB/CREAT RATIO POC: ABNORMAL MG/G
MICROALBUMIN/CREAT UR-RTO: 150 MG/L
NITRITE, URINE: NEGATIVE
PH, URINE: 5 (ref 5–9)
POC CREATININE, URINE: 300 MG/DL
PROTEIN, URINE: 100 MG/DL
SPECIFIC GRAVITY UA: >= 1.03 (ref 1–1.03)
UROBILINOGEN, URINE: 0.2 EU/DL (ref 0–1)

## 2024-08-14 PROCEDURE — 3046F HEMOGLOBIN A1C LEVEL >9.0%: CPT | Performed by: NURSE PRACTITIONER

## 2024-08-14 PROCEDURE — 99214 OFFICE O/P EST MOD 30 MIN: CPT | Performed by: NURSE PRACTITIONER

## 2024-08-14 PROCEDURE — G8427 DOCREV CUR MEDS BY ELIG CLIN: HCPCS | Performed by: NURSE PRACTITIONER

## 2024-08-14 PROCEDURE — G8417 CALC BMI ABV UP PARAM F/U: HCPCS | Performed by: NURSE PRACTITIONER

## 2024-08-14 PROCEDURE — 3075F SYST BP GE 130 - 139MM HG: CPT | Performed by: NURSE PRACTITIONER

## 2024-08-14 PROCEDURE — 2022F DILAT RTA XM EVC RTNOPTHY: CPT | Performed by: NURSE PRACTITIONER

## 2024-08-14 PROCEDURE — 74176 CT ABD & PELVIS W/O CONTRAST: CPT

## 2024-08-14 PROCEDURE — 1036F TOBACCO NON-USER: CPT | Performed by: NURSE PRACTITIONER

## 2024-08-14 PROCEDURE — 3078F DIAST BP <80 MM HG: CPT | Performed by: NURSE PRACTITIONER

## 2024-08-14 SDOH — ECONOMIC STABILITY: FOOD INSECURITY: WITHIN THE PAST 12 MONTHS, YOU WORRIED THAT YOUR FOOD WOULD RUN OUT BEFORE YOU GOT MONEY TO BUY MORE.: NEVER TRUE

## 2024-08-14 SDOH — ECONOMIC STABILITY: INCOME INSECURITY: HOW HARD IS IT FOR YOU TO PAY FOR THE VERY BASICS LIKE FOOD, HOUSING, MEDICAL CARE, AND HEATING?: NOT HARD AT ALL

## 2024-08-14 SDOH — ECONOMIC STABILITY: FOOD INSECURITY: WITHIN THE PAST 12 MONTHS, THE FOOD YOU BOUGHT JUST DIDN'T LAST AND YOU DIDN'T HAVE MONEY TO GET MORE.: NEVER TRUE

## 2024-08-14 NOTE — PROGRESS NOTES
Date    TSH 0.407 07/17/2023     Lab Results   Component Value Date     01/03/2024    K 3.5 01/03/2024     01/03/2024    CO2 25 01/03/2024    BUN 18 01/03/2024    CREATININE 0.7 01/03/2024    GLUCOSE 185 (H) 01/03/2024    CALCIUM 9.7 01/03/2024    BILITOT 0.2 (L) 01/03/2024    ALKPHOS 107 01/03/2024    AST 19 01/03/2024    ALT 26 01/03/2024    LABGLOM >60 01/03/2024       Lab Results   Component Value Date    WBC 14.2 (H) 01/03/2024    HGB 12.8 01/03/2024    HCT 39.7 01/03/2024    MCV 87.6 01/03/2024     (H) 01/03/2024     CT abdomen/pelvis 1/3/24  IMPRESSION:     1. Mild bowel wall thickening involving the descending colon and sigmoid. This can indicate mild colitis versus incomplete distention.  2. Nonobstructing right renal calculi.  3. Small low-attenuation lesions in each kidney. A renal ultrasound is recommended to determine if these are solid or cystic.  4. Fatty infiltration of the liver.    Renal US 4/4/24  IMPRESSION:     1. Mild bowel wall thickening involving the descending colon and sigmoid. This can indicate mild colitis versus incomplete distention.  2. Nonobstructing right renal calculi.  3. Small low-attenuation lesions in each kidney. A renal ultrasound is recommended to determine if these are solid or cystic.  4. Fatty infiltration of the liver.        PHYSICAL EXAM:  Vitals:    08/14/24 1119   BP: 132/78   Site: Right Upper Arm   Position: Sitting   Pulse: 78   Resp: 20   SpO2: 99%   Weight: 102.3 kg (225 lb 9.6 oz)   Height: 1.626 m (5' 4\")     Body mass index is 38.72 kg/m².  Pain Score:   6      VS Reviewed  General Appearance: A&O x 3, No acute distress,well developed and well- nourished  Head: normocephalic and atraumatic, small tender firm contusion left occipital  Eyes: pupils equal, round, and reactive to light, extraocular eye movements intact, conjunctivae and eye lids without erythema  Neck: supple and non-tender without mass, mild thyromegaly but no thyroid nodules,

## 2024-08-16 ENCOUNTER — TELEPHONE (OUTPATIENT)
Dept: FAMILY MEDICINE CLINIC | Age: 44
End: 2024-08-16

## 2024-08-16 LAB
BACTERIA UR CULT: ABNORMAL
ORGANISM: ABNORMAL

## 2024-08-16 NOTE — TELEPHONE ENCOUNTER
----- Message from MARÍA Escalona CNP sent at 8/16/2024 10:19 AM EDT -----  Let Donna know her urine culture did not grow any bacteria.

## 2024-08-18 ENCOUNTER — HOSPITAL ENCOUNTER (EMERGENCY)
Age: 44
Discharge: HOME | End: 2024-08-19
Attending: EMERGENCY MEDICINE
Payer: COMMERCIAL

## 2024-08-18 ENCOUNTER — APPOINTMENT (OUTPATIENT)
Dept: CT IMAGING | Age: 44
End: 2024-08-18
Payer: COMMERCIAL

## 2024-08-18 DIAGNOSIS — N83.8 OVARIAN MASS, LEFT: ICD-10-CM

## 2024-08-18 DIAGNOSIS — R10.32 LEFT LOWER QUADRANT ABDOMINAL PAIN: Primary | ICD-10-CM

## 2024-08-18 LAB
BACTERIA URNS QL MICRO: ABNORMAL /HPF
BASOPHILS ABSOLUTE: 0 THOU/MM3 (ref 0–0.1)
BASOPHILS NFR BLD AUTO: 0.3 %
BILIRUB UR QL STRIP.AUTO: NEGATIVE
CASTS #/AREA URNS LPF: ABNORMAL /LPF
CASTS 2: ABNORMAL /LPF
CHARACTER UR: CLEAR
COLOR, UA: YELLOW
CRYSTALS URNS MICRO: ABNORMAL
DEPRECATED RDW RBC AUTO: 42.1 FL (ref 35–45)
EOSINOPHIL NFR BLD AUTO: 0.6 %
EOSINOPHILS ABSOLUTE: 0.1 THOU/MM3 (ref 0–0.4)
EPITHELIAL CELLS, UA: ABNORMAL /HPF
ERYTHROCYTE [DISTWIDTH] IN BLOOD BY AUTOMATED COUNT: 13.5 % (ref 11.5–14.5)
GLUCOSE UR QL STRIP.AUTO: NEGATIVE MG/DL
HCT VFR BLD AUTO: 37.2 % (ref 37–47)
HGB BLD-MCNC: 12.2 GM/DL (ref 12–16)
HGB UR QL STRIP.AUTO: NEGATIVE
IMM GRANULOCYTES # BLD AUTO: 0.02 THOU/MM3 (ref 0–0.07)
IMM GRANULOCYTES NFR BLD AUTO: 0.2 %
KETONES UR QL STRIP.AUTO: NEGATIVE
LYMPHOCYTES ABSOLUTE: 2.9 THOU/MM3 (ref 1–4.8)
LYMPHOCYTES NFR BLD AUTO: 26.4 %
MCH RBC QN AUTO: 28.4 PG (ref 26–33)
MCHC RBC AUTO-ENTMCNC: 32.8 GM/DL (ref 32.2–35.5)
MCV RBC AUTO: 86.7 FL (ref 81–99)
MISCELLANEOUS 2: ABNORMAL
MONOCYTES ABSOLUTE: 0.7 THOU/MM3 (ref 0.4–1.3)
MONOCYTES NFR BLD AUTO: 6.8 %
NEUTROPHILS ABSOLUTE: 7.1 THOU/MM3 (ref 1.8–7.7)
NEUTROPHILS NFR BLD AUTO: 65.7 %
NITRITE UR QL STRIP: NEGATIVE
NRBC BLD AUTO-RTO: 0 /100 WBC
PH UR STRIP.AUTO: 6.5 [PH] (ref 5–9)
PLATELET # BLD AUTO: 495 THOU/MM3 (ref 130–400)
PMV BLD AUTO: 10 FL (ref 9.4–12.4)
PROT UR STRIP.AUTO-MCNC: 30 MG/DL
RBC # BLD AUTO: 4.29 MILL/MM3 (ref 4.2–5.4)
RBC URINE: ABNORMAL /HPF
RENAL EPI CELLS #/AREA URNS HPF: ABNORMAL /[HPF]
SP GR UR REFRACT.AUTO: 1.02 (ref 1–1.03)
UROBILINOGEN, URINE: 0.2 EU/DL (ref 0–1)
WBC # BLD AUTO: 10.8 THOU/MM3 (ref 4.8–10.8)
WBC #/AREA URNS HPF: ABNORMAL /HPF
WBC #/AREA URNS HPF: NEGATIVE /[HPF]
YEAST LIKE FUNGI URNS QL MICRO: ABNORMAL

## 2024-08-18 PROCEDURE — 83605 ASSAY OF LACTIC ACID: CPT

## 2024-08-18 PROCEDURE — 6360000002 HC RX W HCPCS: Performed by: STUDENT IN AN ORGANIZED HEALTH CARE EDUCATION/TRAINING PROGRAM

## 2024-08-18 PROCEDURE — 80053 COMPREHEN METABOLIC PANEL: CPT

## 2024-08-18 PROCEDURE — 2580000003 HC RX 258: Performed by: STUDENT IN AN ORGANIZED HEALTH CARE EDUCATION/TRAINING PROGRAM

## 2024-08-18 PROCEDURE — 99285 EMERGENCY DEPT VISIT HI MDM: CPT

## 2024-08-18 PROCEDURE — 96374 THER/PROPH/DIAG INJ IV PUSH: CPT

## 2024-08-18 PROCEDURE — 85025 COMPLETE CBC W/AUTO DIFF WBC: CPT

## 2024-08-18 PROCEDURE — 96375 TX/PRO/DX INJ NEW DRUG ADDON: CPT

## 2024-08-18 PROCEDURE — 74177 CT ABD & PELVIS W/CONTRAST: CPT

## 2024-08-18 PROCEDURE — 83690 ASSAY OF LIPASE: CPT

## 2024-08-18 PROCEDURE — 82248 BILIRUBIN DIRECT: CPT

## 2024-08-18 PROCEDURE — 36415 COLL VENOUS BLD VENIPUNCTURE: CPT

## 2024-08-18 PROCEDURE — 81001 URINALYSIS AUTO W/SCOPE: CPT

## 2024-08-18 RX ORDER — MORPHINE SULFATE 4 MG/ML
4 INJECTION, SOLUTION INTRAMUSCULAR; INTRAVENOUS ONCE
Status: COMPLETED | OUTPATIENT
Start: 2024-08-18 | End: 2024-08-18

## 2024-08-18 RX ORDER — ONDANSETRON 2 MG/ML
4 INJECTION INTRAMUSCULAR; INTRAVENOUS ONCE
Status: COMPLETED | OUTPATIENT
Start: 2024-08-18 | End: 2024-08-18

## 2024-08-18 RX ORDER — 0.9 % SODIUM CHLORIDE 0.9 %
500 INTRAVENOUS SOLUTION INTRAVENOUS ONCE
Status: COMPLETED | OUTPATIENT
Start: 2024-08-18 | End: 2024-08-19

## 2024-08-18 RX ADMIN — MORPHINE SULFATE 4 MG: 4 INJECTION, SOLUTION INTRAMUSCULAR; INTRAVENOUS at 23:14

## 2024-08-18 RX ADMIN — ONDANSETRON 4 MG: 2 INJECTION INTRAMUSCULAR; INTRAVENOUS at 23:13

## 2024-08-18 RX ADMIN — SODIUM CHLORIDE 500 ML: 9 INJECTION, SOLUTION INTRAVENOUS at 23:13

## 2024-08-18 ASSESSMENT — PAIN SCALES - GENERAL
PAINLEVEL_OUTOF10: 8
PAINLEVEL_OUTOF10: 8

## 2024-08-18 ASSESSMENT — PAIN - FUNCTIONAL ASSESSMENT: PAIN_FUNCTIONAL_ASSESSMENT: 0-10

## 2024-08-19 VITALS
OXYGEN SATURATION: 99 % | HEART RATE: 98 BPM | SYSTOLIC BLOOD PRESSURE: 158 MMHG | WEIGHT: 225 LBS | BODY MASS INDEX: 38.41 KG/M2 | RESPIRATION RATE: 16 BRPM | HEIGHT: 64 IN | TEMPERATURE: 97.9 F | DIASTOLIC BLOOD PRESSURE: 92 MMHG

## 2024-08-19 LAB
ALBUMIN SERPL BCG-MCNC: 4.1 G/DL (ref 3.5–5.1)
ALP SERPL-CCNC: 99 U/L (ref 38–126)
ALT SERPL W/O P-5'-P-CCNC: 25 U/L (ref 11–66)
ANION GAP SERPL CALC-SCNC: 12 MEQ/L (ref 8–16)
AST SERPL-CCNC: 28 U/L (ref 5–40)
BILIRUB CONJ SERPL-MCNC: < 0.1 MG/DL (ref 0.1–13.8)
BILIRUB SERPL-MCNC: < 0.2 MG/DL (ref 0.3–1.2)
BUN SERPL-MCNC: 17 MG/DL (ref 7–22)
CALCIUM SERPL-MCNC: 9.5 MG/DL (ref 8.5–10.5)
CHLORIDE SERPL-SCNC: 102 MEQ/L (ref 98–111)
CO2 SERPL-SCNC: 24 MEQ/L (ref 23–33)
CREAT SERPL-MCNC: 0.7 MG/DL (ref 0.4–1.2)
GFR SERPL CREATININE-BSD FRML MDRD: > 90 ML/MIN/1.73M2
GLUCOSE SERPL-MCNC: 101 MG/DL (ref 70–108)
LACTIC ACID, SEPSIS: 0.9 MMOL/L (ref 0.5–1.9)
LIPASE SERPL-CCNC: 40.3 U/L (ref 5.6–51.3)
OSMOLALITY SERPL CALC.SUM OF ELEC: 277.4 MOSMOL/KG (ref 275–300)
POTASSIUM SERPL-SCNC: 4.1 MEQ/L (ref 3.5–5.2)
PROT SERPL-MCNC: 7.3 G/DL (ref 6.1–8)
SODIUM SERPL-SCNC: 138 MEQ/L (ref 135–145)

## 2024-08-19 PROCEDURE — 6360000004 HC RX CONTRAST MEDICATION: Performed by: EMERGENCY MEDICINE

## 2024-08-19 PROCEDURE — 6360000002 HC RX W HCPCS: Performed by: STUDENT IN AN ORGANIZED HEALTH CARE EDUCATION/TRAINING PROGRAM

## 2024-08-19 PROCEDURE — 96375 TX/PRO/DX INJ NEW DRUG ADDON: CPT

## 2024-08-19 PROCEDURE — 74177 CT ABD & PELVIS W/CONTRAST: CPT

## 2024-08-19 RX ORDER — HYDROCODONE BITARTRATE AND ACETAMINOPHEN 5; 325 MG/1; MG/1
1 TABLET ORAL EVERY 8 HOURS
Qty: 9 TABLET | Refills: 0 | Status: SHIPPED | OUTPATIENT
Start: 2024-08-19 | End: 2024-08-22

## 2024-08-19 RX ORDER — DIPHENHYDRAMINE HYDROCHLORIDE 50 MG/ML
25 INJECTION INTRAMUSCULAR; INTRAVENOUS ONCE
Status: COMPLETED | OUTPATIENT
Start: 2024-08-19 | End: 2024-08-19

## 2024-08-19 RX ADMIN — DIPHENHYDRAMINE HYDROCHLORIDE 25 MG: 50 INJECTION INTRAMUSCULAR; INTRAVENOUS at 02:15

## 2024-08-19 RX ADMIN — IOPAMIDOL 80 ML: 755 INJECTION, SOLUTION INTRAVENOUS at 00:00

## 2024-08-19 ASSESSMENT — PAIN SCALES - GENERAL: PAINLEVEL_OUTOF10: 6

## 2024-08-19 NOTE — ED NOTES
Pt resting in bed w/ blankets and lights dimmed. Pt updated on POC. Pt denies further needs at this time. Vitals collected. Pt breathing easy and unlabored. Call light in reach.

## 2024-08-19 NOTE — ED PROVIDER NOTES
500 mL (0 mLs IntraVENous Stopped 8/19/24 0210)   morphine (PF) injection 4 mg (4 mg IntraVENous Given 8/18/24 2314)   ondansetron (ZOFRAN) injection 4 mg (4 mg IntraVENous Given 8/18/24 2313)   iopamidol (ISOVUE-370) 76 % injection 80 mL (80 mLs IntraVENous Given 8/19/24 0000)   diphenhydrAMINE (BENADRYL) injection 25 mg (25 mg IntraVENous Given 8/19/24 0215)         PROCEDURES: (None if blank)  Procedures:     CRITICAL CARE: (None if blank)      DISCHARGE PRESCRIPTIONS: (None if blank)  Discharge Medication List as of 8/19/2024  2:12 AM        START taking these medications    Details   HYDROcodone-acetaminophen (NORCO) 5-325 MG per tablet Take 1 tablet by mouth every 8 (eight) hours for 3 days. Intended supply: 3 days. Take lowest dose possible to manage pain Max Daily Amount: 3 tablets, Disp-9 tablet, R-0Print             FINAL IMPRESSION      1. Left lower quadrant abdominal pain    2. Ovarian mass, left          DISPOSITION/PLAN   DISPOSITION Decision To Discharge 08/19/2024 02:28:54 AM  Condition at Disposition: Data Unavailable      OUTPATIENT FOLLOW UP THE PATIENT:  Sandra Hooks MD  [address] [place] [address]  [phone]    Call on 8/19/2024        Jl Talbot MD

## 2024-08-19 NOTE — ED NOTES
Pt to ED via private vehicle w/ report of abdominal pain. Pt reports that she went to urgent care earlier last week and then to her primary care on Thursday for these symptoms. Pt reports that she got a CT scan of her abdomen and they found a cyst of her left ovary. Pt reports that the pain feels like a twisting motion and that the began started on the left side but is now across her whole lower abdomen. Pt reports hx of ovarian cysts. Call light in reach.

## 2024-08-19 NOTE — DISCHARGE INSTRUCTIONS
Come back to the emergency department if severe chest pain, severe shortness of breath, persistence of fever, intractable vomiting, severe dizziness or lightheadedness, fainting.  Please call tomorrow to OB office for reschedule your appointment  Read the documents attached.

## 2024-08-20 ENCOUNTER — HOSPITAL ENCOUNTER (OUTPATIENT)
Age: 44
Discharge: HOME | End: 2024-08-20
Payer: COMMERCIAL

## 2024-08-20 DIAGNOSIS — N18.2 TYPE 2 DIABETES MELLITUS WITH STAGE 2 CHRONIC KIDNEY DISEASE, WITHOUT LONG-TERM CURRENT USE OF INSULIN (HCC): ICD-10-CM

## 2024-08-20 DIAGNOSIS — E11.22 TYPE 2 DIABETES MELLITUS WITH STAGE 2 CHRONIC KIDNEY DISEASE, WITHOUT LONG-TERM CURRENT USE OF INSULIN (HCC): ICD-10-CM

## 2024-08-20 LAB
CANCER AG125 SERPL-ACNC: 6 U/ML (ref 0–38)
CHOLESTEROL, FASTING: 220 MG/DL (ref 100–199)
DEPRECATED MEAN GLUCOSE BLD GHB EST-ACNC: 129 MG/DL (ref 70–126)
HBA1C MFR BLD HPLC: 6.3 % (ref 4.4–6.4)
HDLC SERPL-MCNC: 42 MG/DL
LDLC SERPL CALC-MCNC: 158 MG/DL
TRIGLYCERIDE, FASTING: 99 MG/DL (ref 0–199)

## 2024-08-20 PROCEDURE — 36415 COLL VENOUS BLD VENIPUNCTURE: CPT

## 2024-08-20 PROCEDURE — 83036 HEMOGLOBIN GLYCOSYLATED A1C: CPT

## 2024-08-20 PROCEDURE — 80061 LIPID PANEL: CPT

## 2024-08-20 PROCEDURE — 86304 IMMUNOASSAY TUMOR CA 125: CPT

## 2024-08-21 ENCOUNTER — TELEPHONE (OUTPATIENT)
Dept: FAMILY MEDICINE CLINIC | Age: 44
End: 2024-08-21

## 2024-08-21 RX ORDER — OMEPRAZOLE 40 MG/1
40 CAPSULE, DELAYED RELEASE ORAL DAILY
Qty: 90 CAPSULE | Refills: 1 | Status: SHIPPED | OUTPATIENT
Start: 2024-08-21

## 2024-08-21 NOTE — TELEPHONE ENCOUNTER
Recent Visits  Date Type Provider Dept   08/14/24 Office Visit Fortino Vaughn APRN - CNP [facility]   01/30/24 Office Visit Fortino Vaughn APRN - CNP [facility]   11/30/23 Office Visit Fortino Vaughn APRN - CNP [facility]   11/02/23 Office Visit Fortino Vauhgn APRN - CNP [facility]   07/10/23 Office Visit Fortino Vaughn APRN - CNP [facility]   Showing recent visits within past 540 days with a meds authorizing provider and meeting all other requirements  Future Appointments  No visits were found meeting these conditions.  Showing future appointments within next 150 days with a meds authorizing provider and meeting all other requirements

## 2024-08-21 NOTE — TELEPHONE ENCOUNTER
----- Message from MARÍA Escalona CNP sent at 8/21/2024  8:48 AM EDT -----  Let Donna know her A1C was 6.3 which is still in the prediabetic range. Her cholesterol is a little higher than normal. Her LDL was 158. Is she still taking the Crestor 20 mg consistently? If so, we may need to increase the dose.

## 2024-08-21 NOTE — TELEPHONE ENCOUNTER
Patient informed of no medication changes as this time and to work on taking her medications correctly, verbally understood.

## 2024-08-21 NOTE — TELEPHONE ENCOUNTER
Called patient and informed of message.   Patient verbalized understanding.   No questions or concerns at this time.    Patient states she has been inconsistent with all her medications. States misses doses, forgets to take them. In the last week has not taken the Crestor and has been inconsistent before this as well.     Please advise, thank you.

## 2024-10-03 DIAGNOSIS — I10 ESSENTIAL HYPERTENSION: ICD-10-CM

## 2024-10-03 RX ORDER — METOPROLOL SUCCINATE 100 MG/1
100 TABLET, EXTENDED RELEASE ORAL DAILY
Qty: 90 TABLET | Refills: 3 | Status: SHIPPED | OUTPATIENT
Start: 2024-10-03

## 2024-10-03 NOTE — TELEPHONE ENCOUNTER
Recent Visits  Date Type Provider Dept   08/14/24 Office Visit Fortino Vaughn APRN - CNP [facility]   01/30/24 Office Visit Fortino Vaughn APRN - CNP [facility]   11/30/23 Office Visit Fortino Vaughn APRN - CNP [facility]   11/02/23 Office Visit Fortino Vaughn APRN - CNP [facility]   07/10/23 Office Visit Fortino Vaughn APRN - CNP [facility]   Showing recent visits within past 540 days with a meds authorizing provider and meeting all other requirements  Future Appointments  No visits were found meeting these conditions.  Showing future appointments within next 150 days with a meds authorizing provider and meeting all other requirements

## 2024-10-12 ENCOUNTER — HOSPITAL ENCOUNTER (OUTPATIENT)
Age: 44
Discharge: HOME | End: 2024-10-12
Payer: COMMERCIAL

## 2024-10-12 LAB
BASOPHILS ABSOLUTE: 0 THOU/MM3 (ref 0–0.1)
BASOPHILS NFR BLD AUTO: 0.3 %
DEPRECATED RDW RBC AUTO: 41.2 FL (ref 35–45)
EOSINOPHIL NFR BLD AUTO: 0.6 %
EOSINOPHILS ABSOLUTE: 0.1 THOU/MM3 (ref 0–0.4)
ERYTHROCYTE [DISTWIDTH] IN BLOOD BY AUTOMATED COUNT: 13.2 % (ref 11.5–14.5)
HCT VFR BLD AUTO: 36.1 % (ref 37–47)
HGB BLD-MCNC: 11.8 GM/DL (ref 12–16)
IMM GRANULOCYTES # BLD AUTO: 0.03 THOU/MM3 (ref 0–0.07)
IMM GRANULOCYTES NFR BLD AUTO: 0.3 %
LYMPHOCYTES ABSOLUTE: 3.1 THOU/MM3 (ref 1–4.8)
LYMPHOCYTES NFR BLD AUTO: 30.1 %
MCH RBC QN AUTO: 28.2 PG (ref 26–33)
MCHC RBC AUTO-ENTMCNC: 32.7 GM/DL (ref 32.2–35.5)
MCV RBC AUTO: 86.2 FL (ref 81–99)
MONOCYTES ABSOLUTE: 0.7 THOU/MM3 (ref 0.4–1.3)
MONOCYTES NFR BLD AUTO: 6.6 %
NEUTROPHILS ABSOLUTE: 6.4 THOU/MM3 (ref 1.8–7.7)
NEUTROPHILS NFR BLD AUTO: 62.1 %
NRBC BLD AUTO-RTO: 0 /100 WBC
PLATELET # BLD AUTO: 412 THOU/MM3 (ref 130–400)
PMV BLD AUTO: 9.9 FL (ref 9.4–12.4)
RBC # BLD AUTO: 4.19 MILL/MM3 (ref 4.2–5.4)
WBC # BLD AUTO: 10.3 THOU/MM3 (ref 4.8–10.8)

## 2024-10-12 PROCEDURE — 36415 COLL VENOUS BLD VENIPUNCTURE: CPT

## 2024-10-12 PROCEDURE — 85025 COMPLETE CBC W/AUTO DIFF WBC: CPT

## 2024-10-13 LAB
EKG ATRIAL RATE: 66 BPM
EKG P AXIS: -9 DEGREES
EKG P-R INTERVAL: 140 MS
EKG Q-T INTERVAL: 392 MS
EKG QRS DURATION: 72 MS
EKG QTC CALCULATION (BAZETT): 410 MS
EKG R AXIS: 31 DEGREES
EKG T AXIS: 16 DEGREES
EKG VENTRICULAR RATE: 66 BPM

## 2024-10-14 ENCOUNTER — PREP FOR PROCEDURE (OUTPATIENT)
Dept: OBGYN | Age: 44
End: 2024-10-14

## 2024-10-14 RX ORDER — SODIUM CHLORIDE, SODIUM LACTATE, POTASSIUM CHLORIDE, CALCIUM CHLORIDE 600; 310; 30; 20 MG/100ML; MG/100ML; MG/100ML; MG/100ML
INJECTION, SOLUTION INTRAVENOUS CONTINUOUS
Status: CANCELLED | OUTPATIENT
Start: 2024-10-14

## 2024-10-14 RX ORDER — ONDANSETRON 2 MG/ML
8 INJECTION INTRAMUSCULAR; INTRAVENOUS EVERY 8 HOURS PRN
Status: CANCELLED | OUTPATIENT
Start: 2024-10-14

## 2024-10-14 RX ORDER — SODIUM CHLORIDE 0.9 % (FLUSH) 0.9 %
5-40 SYRINGE (ML) INJECTION PRN
Status: CANCELLED | OUTPATIENT
Start: 2024-10-14

## 2024-10-14 RX ORDER — SODIUM CHLORIDE 9 MG/ML
INJECTION, SOLUTION INTRAVENOUS PRN
Status: CANCELLED | OUTPATIENT
Start: 2024-10-14

## 2024-10-14 RX ORDER — SODIUM CHLORIDE 0.9 % (FLUSH) 0.9 %
5-40 SYRINGE (ML) INJECTION EVERY 12 HOURS SCHEDULED
Status: CANCELLED | OUTPATIENT
Start: 2024-10-14

## 2024-10-17 ENCOUNTER — ANESTHESIA (OUTPATIENT)
Dept: OPERATING ROOM | Age: 44
End: 2024-10-17
Payer: COMMERCIAL

## 2024-10-17 ENCOUNTER — ANESTHESIA EVENT (OUTPATIENT)
Dept: OPERATING ROOM | Age: 44
End: 2024-10-17
Payer: COMMERCIAL

## 2024-10-17 ENCOUNTER — HOSPITAL ENCOUNTER (INPATIENT)
Age: 44
LOS: 3 days | Discharge: HOME | DRG: 513 | End: 2024-10-20
Attending: OBSTETRICS & GYNECOLOGY | Admitting: OBSTETRICS & GYNECOLOGY
Payer: COMMERCIAL

## 2024-10-17 ENCOUNTER — APPOINTMENT (OUTPATIENT)
Dept: GENERAL RADIOLOGY | Age: 44
DRG: 513 | End: 2024-10-17
Attending: OBSTETRICS & GYNECOLOGY
Payer: COMMERCIAL

## 2024-10-17 DIAGNOSIS — N83.8 OVARIAN MASS, LEFT: ICD-10-CM

## 2024-10-17 LAB
HCT VFR BLD AUTO: 32.4 % (ref 37–47)
HGB BLD-MCNC: 10.5 GM/DL (ref 12–16)

## 2024-10-17 PROCEDURE — 85018 HEMOGLOBIN: CPT

## 2024-10-17 PROCEDURE — 7100000001 HC PACU RECOVERY - ADDTL 15 MIN: Performed by: OBSTETRICS & GYNECOLOGY

## 2024-10-17 PROCEDURE — 3700000000 HC ANESTHESIA ATTENDED CARE: Performed by: OBSTETRICS & GYNECOLOGY

## 2024-10-17 PROCEDURE — 88307 TISSUE EXAM BY PATHOLOGIST: CPT

## 2024-10-17 PROCEDURE — 6360000002 HC RX W HCPCS: Performed by: OBSTETRICS & GYNECOLOGY

## 2024-10-17 PROCEDURE — 0UT10ZZ RESECTION OF LEFT OVARY, OPEN APPROACH: ICD-10-PCS | Performed by: OBSTETRICS & GYNECOLOGY

## 2024-10-17 PROCEDURE — 3600000013 HC SURGERY LEVEL 3 ADDTL 15MIN: Performed by: OBSTETRICS & GYNECOLOGY

## 2024-10-17 PROCEDURE — 2500000003 HC RX 250 WO HCPCS: Performed by: NURSE ANESTHETIST, CERTIFIED REGISTERED

## 2024-10-17 PROCEDURE — 3600000003 HC SURGERY LEVEL 3 BASE: Performed by: OBSTETRICS & GYNECOLOGY

## 2024-10-17 PROCEDURE — 2580000003 HC RX 258: Performed by: NURSE ANESTHETIST, CERTIFIED REGISTERED

## 2024-10-17 PROCEDURE — 36415 COLL VENOUS BLD VENIPUNCTURE: CPT

## 2024-10-17 PROCEDURE — 7100000000 HC PACU RECOVERY - FIRST 15 MIN: Performed by: OBSTETRICS & GYNECOLOGY

## 2024-10-17 PROCEDURE — 6370000000 HC RX 637 (ALT 250 FOR IP): Performed by: OBSTETRICS & GYNECOLOGY

## 2024-10-17 PROCEDURE — 2580000003 HC RX 258: Performed by: OBSTETRICS & GYNECOLOGY

## 2024-10-17 PROCEDURE — 2709999900 HC NON-CHARGEABLE SUPPLY: Performed by: OBSTETRICS & GYNECOLOGY

## 2024-10-17 PROCEDURE — 7100000010 HC PHASE II RECOVERY - FIRST 15 MIN: Performed by: OBSTETRICS & GYNECOLOGY

## 2024-10-17 PROCEDURE — 0WJJ4ZZ INSPECTION OF PELVIC CAVITY, PERCUTANEOUS ENDOSCOPIC APPROACH: ICD-10-PCS | Performed by: OBSTETRICS & GYNECOLOGY

## 2024-10-17 PROCEDURE — 74018 RADEX ABDOMEN 1 VIEW: CPT

## 2024-10-17 PROCEDURE — 7100000011 HC PHASE II RECOVERY - ADDTL 15 MIN: Performed by: OBSTETRICS & GYNECOLOGY

## 2024-10-17 PROCEDURE — 3700000001 HC ADD 15 MINUTES (ANESTHESIA): Performed by: OBSTETRICS & GYNECOLOGY

## 2024-10-17 PROCEDURE — 0UN10ZZ RELEASE LEFT OVARY, OPEN APPROACH: ICD-10-PCS | Performed by: OBSTETRICS & GYNECOLOGY

## 2024-10-17 PROCEDURE — 6360000002 HC RX W HCPCS: Performed by: NURSE ANESTHETIST, CERTIFIED REGISTERED

## 2024-10-17 PROCEDURE — 1200000000 HC SEMI PRIVATE

## 2024-10-17 PROCEDURE — 85014 HEMATOCRIT: CPT

## 2024-10-17 RX ORDER — LIDOCAINE HYDROCHLORIDE 20 MG/ML
INJECTION, SOLUTION INTRAVENOUS
Status: DISCONTINUED | OUTPATIENT
Start: 2024-10-17 | End: 2024-10-17 | Stop reason: SDUPTHER

## 2024-10-17 RX ORDER — OXYCODONE HYDROCHLORIDE 5 MG/1
5 TABLET ORAL EVERY 4 HOURS PRN
Status: DISCONTINUED | OUTPATIENT
Start: 2024-10-17 | End: 2024-10-20 | Stop reason: HOSPADM

## 2024-10-17 RX ORDER — FENTANYL CITRATE 50 UG/ML
INJECTION, SOLUTION INTRAMUSCULAR; INTRAVENOUS
Status: DISCONTINUED | OUTPATIENT
Start: 2024-10-17 | End: 2024-10-17 | Stop reason: SDUPTHER

## 2024-10-17 RX ORDER — HYDROMORPHONE HYDROCHLORIDE 2 MG/ML
INJECTION, SOLUTION INTRAMUSCULAR; INTRAVENOUS; SUBCUTANEOUS
Status: DISCONTINUED | OUTPATIENT
Start: 2024-10-17 | End: 2024-10-17 | Stop reason: SDUPTHER

## 2024-10-17 RX ORDER — ONDANSETRON 2 MG/ML
8 INJECTION INTRAMUSCULAR; INTRAVENOUS EVERY 8 HOURS PRN
Status: DISCONTINUED | OUTPATIENT
Start: 2024-10-17 | End: 2024-10-17 | Stop reason: HOSPADM

## 2024-10-17 RX ORDER — ONDANSETRON 4 MG/1
4 TABLET, ORALLY DISINTEGRATING ORAL EVERY 8 HOURS PRN
Status: DISCONTINUED | OUTPATIENT
Start: 2024-10-17 | End: 2024-10-20 | Stop reason: HOSPADM

## 2024-10-17 RX ORDER — ROSUVASTATIN CALCIUM 20 MG/1
20 TABLET, COATED ORAL DAILY
Status: DISCONTINUED | OUTPATIENT
Start: 2024-10-17 | End: 2024-10-20 | Stop reason: HOSPADM

## 2024-10-17 RX ORDER — DOCUSATE SODIUM 100 MG/1
100 CAPSULE, LIQUID FILLED ORAL 2 TIMES DAILY
Status: DISCONTINUED | OUTPATIENT
Start: 2024-10-17 | End: 2024-10-20 | Stop reason: HOSPADM

## 2024-10-17 RX ORDER — ONDANSETRON 2 MG/ML
4 INJECTION INTRAMUSCULAR; INTRAVENOUS EVERY 6 HOURS PRN
Status: DISCONTINUED | OUTPATIENT
Start: 2024-10-17 | End: 2024-10-20 | Stop reason: HOSPADM

## 2024-10-17 RX ORDER — SODIUM CHLORIDE 0.9 % (FLUSH) 0.9 %
5-40 SYRINGE (ML) INJECTION EVERY 12 HOURS SCHEDULED
Status: DISCONTINUED | OUTPATIENT
Start: 2024-10-17 | End: 2024-10-17 | Stop reason: HOSPADM

## 2024-10-17 RX ORDER — ZOLPIDEM TARTRATE 5 MG/1
10 TABLET ORAL NIGHTLY PRN
Status: DISCONTINUED | OUTPATIENT
Start: 2024-10-17 | End: 2024-10-20 | Stop reason: HOSPADM

## 2024-10-17 RX ORDER — ONDANSETRON 2 MG/ML
INJECTION INTRAMUSCULAR; INTRAVENOUS
Status: DISCONTINUED | OUTPATIENT
Start: 2024-10-17 | End: 2024-10-17 | Stop reason: SDUPTHER

## 2024-10-17 RX ORDER — MIDAZOLAM HYDROCHLORIDE 1 MG/ML
INJECTION INTRAMUSCULAR; INTRAVENOUS
Status: DISCONTINUED | OUTPATIENT
Start: 2024-10-17 | End: 2024-10-17 | Stop reason: SDUPTHER

## 2024-10-17 RX ORDER — SODIUM CHLORIDE 0.9 % (FLUSH) 0.9 %
5-40 SYRINGE (ML) INJECTION PRN
Status: DISCONTINUED | OUTPATIENT
Start: 2024-10-17 | End: 2024-10-20 | Stop reason: HOSPADM

## 2024-10-17 RX ORDER — SODIUM CHLORIDE 0.9 % (FLUSH) 0.9 %
5-40 SYRINGE (ML) INJECTION PRN
Status: DISCONTINUED | OUTPATIENT
Start: 2024-10-17 | End: 2024-10-17 | Stop reason: HOSPADM

## 2024-10-17 RX ORDER — METOPROLOL SUCCINATE 100 MG/1
100 TABLET, EXTENDED RELEASE ORAL DAILY
Status: DISCONTINUED | OUTPATIENT
Start: 2024-10-17 | End: 2024-10-20 | Stop reason: HOSPADM

## 2024-10-17 RX ORDER — HYDROCHLOROTHIAZIDE 12.5 MG/1
12.5 CAPSULE ORAL DAILY
Status: DISCONTINUED | OUTPATIENT
Start: 2024-10-17 | End: 2024-10-20 | Stop reason: HOSPADM

## 2024-10-17 RX ORDER — BUPIVACAINE HYDROCHLORIDE 5 MG/ML
INJECTION, SOLUTION PERINEURAL PRN
Status: DISCONTINUED | OUTPATIENT
Start: 2024-10-17 | End: 2024-10-17 | Stop reason: ALTCHOICE

## 2024-10-17 RX ORDER — PANTOPRAZOLE SODIUM 40 MG/1
40 TABLET, DELAYED RELEASE ORAL
Status: DISCONTINUED | OUTPATIENT
Start: 2024-10-18 | End: 2024-10-20 | Stop reason: HOSPADM

## 2024-10-17 RX ORDER — FENTANYL CITRATE 50 UG/ML
50 INJECTION, SOLUTION INTRAMUSCULAR; INTRAVENOUS EVERY 5 MIN PRN
Status: DISCONTINUED | OUTPATIENT
Start: 2024-10-17 | End: 2024-10-17 | Stop reason: HOSPADM

## 2024-10-17 RX ORDER — AMLODIPINE BESYLATE 10 MG/1
10 TABLET ORAL DAILY
Status: DISCONTINUED | OUTPATIENT
Start: 2024-10-17 | End: 2024-10-20 | Stop reason: HOSPADM

## 2024-10-17 RX ORDER — OXYCODONE HYDROCHLORIDE 5 MG/1
10 TABLET ORAL EVERY 4 HOURS PRN
Status: DISCONTINUED | OUTPATIENT
Start: 2024-10-17 | End: 2024-10-20 | Stop reason: HOSPADM

## 2024-10-17 RX ORDER — SODIUM CHLORIDE 9 MG/ML
INJECTION, SOLUTION INTRAVENOUS
Status: DISCONTINUED | OUTPATIENT
Start: 2024-10-17 | End: 2024-10-17 | Stop reason: SDUPTHER

## 2024-10-17 RX ORDER — PROCHLORPERAZINE EDISYLATE 5 MG/ML
10 INJECTION INTRAMUSCULAR; INTRAVENOUS EVERY 6 HOURS PRN
Status: DISCONTINUED | OUTPATIENT
Start: 2024-10-17 | End: 2024-10-20 | Stop reason: HOSPADM

## 2024-10-17 RX ORDER — MORPHINE SULFATE 4 MG/ML
4 INJECTION, SOLUTION INTRAMUSCULAR; INTRAVENOUS
Status: DISCONTINUED | OUTPATIENT
Start: 2024-10-17 | End: 2024-10-20 | Stop reason: HOSPADM

## 2024-10-17 RX ORDER — NALOXONE HYDROCHLORIDE 0.4 MG/ML
INJECTION, SOLUTION INTRAMUSCULAR; INTRAVENOUS; SUBCUTANEOUS PRN
Status: DISCONTINUED | OUTPATIENT
Start: 2024-10-17 | End: 2024-10-17 | Stop reason: HOSPADM

## 2024-10-17 RX ORDER — SODIUM CHLORIDE 0.9 % (FLUSH) 0.9 %
5-40 SYRINGE (ML) INJECTION EVERY 12 HOURS SCHEDULED
Status: DISCONTINUED | OUTPATIENT
Start: 2024-10-17 | End: 2024-10-20 | Stop reason: HOSPADM

## 2024-10-17 RX ORDER — CEFAZOLIN SODIUM 1 G/3ML
INJECTION, POWDER, FOR SOLUTION INTRAMUSCULAR; INTRAVENOUS
Status: DISCONTINUED | OUTPATIENT
Start: 2024-10-17 | End: 2024-10-17 | Stop reason: SDUPTHER

## 2024-10-17 RX ORDER — ROCURONIUM BROMIDE 10 MG/ML
INJECTION, SOLUTION INTRAVENOUS
Status: DISCONTINUED | OUTPATIENT
Start: 2024-10-17 | End: 2024-10-17 | Stop reason: SDUPTHER

## 2024-10-17 RX ORDER — MORPHINE SULFATE 2 MG/ML
2 INJECTION, SOLUTION INTRAMUSCULAR; INTRAVENOUS
Status: DISCONTINUED | OUTPATIENT
Start: 2024-10-17 | End: 2024-10-20 | Stop reason: HOSPADM

## 2024-10-17 RX ORDER — IBUPROFEN 800 MG/1
800 TABLET, FILM COATED ORAL EVERY 8 HOURS
Status: DISCONTINUED | OUTPATIENT
Start: 2024-10-18 | End: 2024-10-20 | Stop reason: HOSPADM

## 2024-10-17 RX ORDER — KETOROLAC TROMETHAMINE 30 MG/ML
30 INJECTION, SOLUTION INTRAMUSCULAR; INTRAVENOUS EVERY 6 HOURS
Status: COMPLETED | OUTPATIENT
Start: 2024-10-17 | End: 2024-10-18

## 2024-10-17 RX ORDER — SODIUM CHLORIDE 9 MG/ML
INJECTION, SOLUTION INTRAVENOUS PRN
Status: DISCONTINUED | OUTPATIENT
Start: 2024-10-17 | End: 2024-10-17 | Stop reason: HOSPADM

## 2024-10-17 RX ORDER — SODIUM CHLORIDE, SODIUM LACTATE, POTASSIUM CHLORIDE, CALCIUM CHLORIDE 600; 310; 30; 20 MG/100ML; MG/100ML; MG/100ML; MG/100ML
INJECTION, SOLUTION INTRAVENOUS CONTINUOUS
Status: DISCONTINUED | OUTPATIENT
Start: 2024-10-17 | End: 2024-10-20 | Stop reason: HOSPADM

## 2024-10-17 RX ORDER — SODIUM CHLORIDE, SODIUM LACTATE, POTASSIUM CHLORIDE, CALCIUM CHLORIDE 600; 310; 30; 20 MG/100ML; MG/100ML; MG/100ML; MG/100ML
INJECTION, SOLUTION INTRAVENOUS CONTINUOUS
Status: DISCONTINUED | OUTPATIENT
Start: 2024-10-17 | End: 2024-10-17 | Stop reason: HOSPADM

## 2024-10-17 RX ORDER — CEPHALEXIN 500 MG/1
500 CAPSULE ORAL EVERY 8 HOURS SCHEDULED
Status: COMPLETED | OUTPATIENT
Start: 2024-10-17 | End: 2024-10-19

## 2024-10-17 RX ORDER — METRONIDAZOLE 500 MG/1
500 TABLET ORAL EVERY 8 HOURS SCHEDULED
Status: DISCONTINUED | OUTPATIENT
Start: 2024-10-17 | End: 2024-10-20 | Stop reason: HOSPADM

## 2024-10-17 RX ORDER — SODIUM CHLORIDE 9 MG/ML
INJECTION, SOLUTION INTRAVENOUS PRN
Status: DISCONTINUED | OUTPATIENT
Start: 2024-10-17 | End: 2024-10-20 | Stop reason: HOSPADM

## 2024-10-17 RX ORDER — FENTANYL CITRATE 50 UG/ML
25 INJECTION, SOLUTION INTRAMUSCULAR; INTRAVENOUS EVERY 5 MIN PRN
Status: DISCONTINUED | OUTPATIENT
Start: 2024-10-17 | End: 2024-10-17 | Stop reason: HOSPADM

## 2024-10-17 RX ORDER — KETOROLAC TROMETHAMINE 30 MG/ML
INJECTION, SOLUTION INTRAMUSCULAR; INTRAVENOUS
Status: DISCONTINUED | OUTPATIENT
Start: 2024-10-17 | End: 2024-10-17 | Stop reason: SDUPTHER

## 2024-10-17 RX ORDER — PROPOFOL 10 MG/ML
INJECTION, EMULSION INTRAVENOUS
Status: DISCONTINUED | OUTPATIENT
Start: 2024-10-17 | End: 2024-10-17 | Stop reason: SDUPTHER

## 2024-10-17 RX ORDER — ACETAMINOPHEN 500 MG
1000 TABLET ORAL EVERY 8 HOURS
Status: DISCONTINUED | OUTPATIENT
Start: 2024-10-17 | End: 2024-10-20 | Stop reason: HOSPADM

## 2024-10-17 RX ADMIN — DOCUSATE SODIUM 100 MG: 100 CAPSULE, LIQUID FILLED ORAL at 20:37

## 2024-10-17 RX ADMIN — PROPOFOL 200 MG: 10 INJECTION, EMULSION INTRAVENOUS at 07:38

## 2024-10-17 RX ADMIN — KETOROLAC TROMETHAMINE 30 MG: 30 INJECTION, SOLUTION INTRAMUSCULAR at 22:40

## 2024-10-17 RX ADMIN — METOPROLOL SUCCINATE 100 MG: 100 TABLET, EXTENDED RELEASE ORAL at 14:09

## 2024-10-17 RX ADMIN — CEPHALEXIN 500 MG: 500 CAPSULE ORAL at 22:40

## 2024-10-17 RX ADMIN — HYDROMORPHONE HYDROCHLORIDE 1 MG: 2 INJECTION INTRAMUSCULAR; INTRAVENOUS; SUBCUTANEOUS at 08:25

## 2024-10-17 RX ADMIN — ONDANSETRON 4 MG: 2 INJECTION INTRAMUSCULAR; INTRAVENOUS at 09:25

## 2024-10-17 RX ADMIN — MORPHINE SULFATE 4 MG: 4 INJECTION, SOLUTION INTRAMUSCULAR; INTRAVENOUS at 14:09

## 2024-10-17 RX ADMIN — HYDROCHLOROTHIAZIDE 12.5 MG: 12.5 CAPSULE ORAL at 14:08

## 2024-10-17 RX ADMIN — HYDROMORPHONE HYDROCHLORIDE 1 MG: 2 INJECTION INTRAMUSCULAR; INTRAVENOUS; SUBCUTANEOUS at 08:20

## 2024-10-17 RX ADMIN — KETOROLAC TROMETHAMINE 30 MG: 30 INJECTION, SOLUTION INTRAMUSCULAR at 16:53

## 2024-10-17 RX ADMIN — SODIUM CHLORIDE, POTASSIUM CHLORIDE, SODIUM LACTATE AND CALCIUM CHLORIDE: 600; 310; 30; 20 INJECTION, SOLUTION INTRAVENOUS at 20:38

## 2024-10-17 RX ADMIN — ROCURONIUM BROMIDE 50 MG: 10 INJECTION INTRAVENOUS at 07:38

## 2024-10-17 RX ADMIN — OXYCODONE 10 MG: 5 TABLET ORAL at 19:28

## 2024-10-17 RX ADMIN — SODIUM CHLORIDE: 9 INJECTION, SOLUTION INTRAVENOUS at 07:33

## 2024-10-17 RX ADMIN — ROCURONIUM BROMIDE 50 MG: 10 INJECTION INTRAVENOUS at 08:26

## 2024-10-17 RX ADMIN — CEFAZOLIN 2 G: 1 INJECTION, POWDER, FOR SOLUTION INTRAMUSCULAR; INTRAVENOUS at 09:13

## 2024-10-17 RX ADMIN — SERTRALINE 50 MG: 50 TABLET, FILM COATED ORAL at 14:09

## 2024-10-17 RX ADMIN — PROCHLORPERAZINE EDISYLATE 10 MG: 5 INJECTION INTRAMUSCULAR; INTRAVENOUS at 21:00

## 2024-10-17 RX ADMIN — LIDOCAINE HYDROCHLORIDE 50 MG: 20 INJECTION, SOLUTION INTRAVENOUS at 07:38

## 2024-10-17 RX ADMIN — AMLODIPINE BESYLATE 10 MG: 10 TABLET ORAL at 14:08

## 2024-10-17 RX ADMIN — ACETAMINOPHEN 500 MG TABLET 1000 MG: at 19:28

## 2024-10-17 RX ADMIN — DOCUSATE SODIUM 100 MG: 100 CAPSULE, LIQUID FILLED ORAL at 14:13

## 2024-10-17 RX ADMIN — TIZANIDINE 4 MG: 4 TABLET ORAL at 22:49

## 2024-10-17 RX ADMIN — MORPHINE SULFATE 4 MG: 4 INJECTION, SOLUTION INTRAMUSCULAR; INTRAVENOUS at 21:00

## 2024-10-17 RX ADMIN — CEPHALEXIN 500 MG: 500 CAPSULE ORAL at 14:09

## 2024-10-17 RX ADMIN — ONDANSETRON 4 MG: 2 INJECTION INTRAMUSCULAR; INTRAVENOUS at 19:27

## 2024-10-17 RX ADMIN — KETOROLAC TROMETHAMINE 30 MG: 30 INJECTION, SOLUTION INTRAMUSCULAR; INTRAVENOUS at 10:10

## 2024-10-17 RX ADMIN — METRONIDAZOLE 500 MG: 500 TABLET ORAL at 14:09

## 2024-10-17 RX ADMIN — ROSUVASTATIN CALCIUM 20 MG: 20 TABLET, FILM COATED ORAL at 20:37

## 2024-10-17 RX ADMIN — METRONIDAZOLE 500 MG: 500 TABLET ORAL at 22:40

## 2024-10-17 RX ADMIN — SODIUM CHLORIDE, POTASSIUM CHLORIDE, SODIUM LACTATE AND CALCIUM CHLORIDE: 600; 310; 30; 20 INJECTION, SOLUTION INTRAVENOUS at 12:40

## 2024-10-17 RX ADMIN — FENTANYL CITRATE 100 MCG: 50 INJECTION, SOLUTION INTRAMUSCULAR; INTRAVENOUS at 07:33

## 2024-10-17 RX ADMIN — OXYCODONE 10 MG: 5 TABLET ORAL at 13:03

## 2024-10-17 RX ADMIN — MIDAZOLAM 2 MG: 1 INJECTION INTRAMUSCULAR; INTRAVENOUS at 07:33

## 2024-10-17 ASSESSMENT — PAIN SCALES - GENERAL
PAINLEVEL_OUTOF10: 6
PAINLEVEL_OUTOF10: 7
PAINLEVEL_OUTOF10: 7
PAINLEVEL_OUTOF10: 5
PAINLEVEL_OUTOF10: 8
PAINLEVEL_OUTOF10: 8
PAINLEVEL_OUTOF10: 7
PAINLEVEL_OUTOF10: 6
PAINLEVEL_OUTOF10: 7
PAINLEVEL_OUTOF10: 8
PAINLEVEL_OUTOF10: 5
PAINLEVEL_OUTOF10: 5

## 2024-10-17 ASSESSMENT — PAIN DESCRIPTION - LOCATION
LOCATION: ABDOMEN

## 2024-10-17 ASSESSMENT — PAIN DESCRIPTION - DESCRIPTORS
DESCRIPTORS: SHARP
DESCRIPTORS: SHARP;SORE
DESCRIPTORS: SORE;TENDER
DESCRIPTORS: CRAMPING;ACHING
DESCRIPTORS: SHARP
DESCRIPTORS: SORE;ACHING
DESCRIPTORS: ACHING;SHARP

## 2024-10-17 ASSESSMENT — PAIN DESCRIPTION - ORIENTATION
ORIENTATION: RIGHT;LEFT;MID
ORIENTATION: RIGHT;MID;LEFT
ORIENTATION: MID
ORIENTATION: MID;LEFT;RIGHT
ORIENTATION: LOWER
ORIENTATION: LOWER;MID
ORIENTATION: MID

## 2024-10-17 ASSESSMENT — PAIN - FUNCTIONAL ASSESSMENT
PAIN_FUNCTIONAL_ASSESSMENT: ACTIVITIES ARE NOT PREVENTED
PAIN_FUNCTIONAL_ASSESSMENT: ACTIVITIES ARE NOT PREVENTED
PAIN_FUNCTIONAL_ASSESSMENT: PREVENTS OR INTERFERES SOME ACTIVE ACTIVITIES AND ADLS
PAIN_FUNCTIONAL_ASSESSMENT: ACTIVITIES ARE NOT PREVENTED

## 2024-10-17 ASSESSMENT — PAIN DESCRIPTION - PAIN TYPE: TYPE: SURGICAL PAIN

## 2024-10-17 NOTE — ANESTHESIA POSTPROCEDURE EVALUATION
Department of Anesthesiology  Postprocedure Note    Patient: Deloris Sands  MRN: [id number]  YOB: 1980  Date of evaluation: 10/17/2024    Procedure Summary       Date: 10/17/24 Room / Location: [address]    Anesthesia Start: 0733 Anesthesia Stop: 1042    Procedure: Diagnostic Laparoscopic, Exporatory Laparotomy, Extensive Lysis of adhesions requiring General Surgery and Second Gynecological Surgeon, Left Oophorectomy, Over sewing small bowel serosal lacerations Diagnosis:       Ovarian mass, left      (Ovarian mass, left [N83.8])    Surgeons: Sandra Hooks MD Responsible Provider: Drew Hinojosa DO    Anesthesia Type: general ASA Status: 2            Anesthesia Type: No value filed.    Baldemar Phase I: Baldemar Score: 7    Baldemar Phase II:      Anesthesia Post Evaluation    Patient location during evaluation: bedside  Patient participation: complete - patient participated  Level of consciousness: responsive to light touch  Pain score: 2  Airway patency: patent  Nausea & Vomiting: no nausea and no vomiting  Cardiovascular status: hemodynamically stable  Respiratory status: acceptable  Hydration status: euvolemic  Pain management: adequate        No notable events documented.

## 2024-10-17 NOTE — ANESTHESIA PRE PROCEDURE
Department of Anesthesiology  Preprocedure Note       Name:  Deloris Sands   Age:  44 y.o.  :  1980                                          MRN:  [id number]         Date:  10/17/2024      Surgeon: Surgeon(s):  Sandra Hooks MD    Procedure: Procedure(s):  Laparoscopic Left Oophorectomy, Poss Left Salpingo-Oophorectomy and Right Salpingo Oophorectomy    Medications prior to admission:   Prior to Admission medications    Medication Sig Start Date End Date Taking? Authorizing Provider   metoprolol succinate (TOPROL XL) 100 MG extended release tablet Take 1 tablet by mouth daily 10/3/24  Yes Paris Hunter APRN - CNP   omeprazole (PRILOSEC) 40 MG delayed release capsule Take 1 capsule by mouth daily 24  Yes Fortino Vaughn APRN - CNP   tiZANidine (ZANAFLEX) 4 MG tablet Take 1 tablet by mouth every 8 hours as needed (back pain) 24  Yes Fortino Vaughn APRN - CNP   sertraline (ZOLOFT) 100 MG tablet take 1 tablet by mouth once daily 23  Yes Fortino Vaughn APRN - CNP   rosuvastatin (CRESTOR) 20 MG tablet take 1 tablet by mouth once daily 10/23/23  Yes Fortino Vaughn APRN - CNP   amLODIPine (NORVASC) 10 MG tablet Take 1 tablet by mouth daily 10/18/23  Yes Fortino Vaughn APRN - MISSAEL   hydroCHLOROthiazide 12.5 MG capsule take 1 capsule by mouth once daily 3/8/24   Fortino Vaughn APRN - CNP   aspirin 81 MG EC tablet Take 1 tablet by mouth daily 10/25/23   Wellington Wong APRN - CNP       Current medications:    Current Facility-Administered Medications   Medication Dose Route Frequency Provider Last Rate Last Admin   • lactated ringers IV soln infusion   IntraVENous Continuous Sandra Hooks MD       • sodium chloride flush 0.9 % injection 5-40 mL  5-40 mL IntraVENous 2 times per day Sandra Hooks MD       • sodium chloride flush 0.9 % injection 5-40 mL  5-40 mL IntraVENous PRN Sandra Hooks MD       • 0.9 % sodium chloride infusion   IntraVENous PRN

## 2024-10-17 NOTE — BRIEF OP NOTE
GYN Brief Postoperative Note  ______________________________________________________________    Patient: Deloris Sands  YOB: 1980  MRN: [id number]  Date of Procedure: 10/17/2024    Pre-Op Diagnosis: Ovarian mass, left [N83.8]    Post-Op Diagnosis: Same       Procedure(s):  Diagnostic Laparoscopic, Exporatory Laparotomy, Extensive Lysis of adhesions requiring General Surgery and Second Gynecological Surgeon, Left Oophorectomy, Over sewing small bowel serosal lacerations    Anesthesia: Anesthesia type not filed in the log.    Surgeon(s):  Sandra Hooks MD Herrick, Tammy, MD Wisser, Jeffrey M, DO    Staff:  Scrub Person First: Claritza Hernandez RN  Scrub Person Second: Anastacia Adair RN     Estimated Blood Loss: 100cc  Complications: Other: extensive sm bowel adhesions throughout and to the abd wall    Specimens:   ID Type Source Tests Collected by Time Destination   A : Left Ovary Tissue Ovary SURGICAL PATHOLOGY Sandra Hooks MD 10/17/2024 0939        Implants:  * No implants in log *      Drains:   Urinary Catheter 10/17/24 (Active)       Findings: enlarged cystic left ovary and extensive sm bowel adhesions    Sandra Hooks MD  Date: 10/17/2024  Time: 10:19 AM

## 2024-10-17 NOTE — H&P
[address] [address]                            PREOPERATIVE H&P      PATIENT NAME: YOLY RICHMOND        : 1980  MED REC NO: [id number]                       ROOM: [facility]  ACCOUNT NO: [id number]                       ADMIT DATE: 10/17/2024  PROVIDER: Sandra Hooks MD      DATE OF SURGERY:  10/17/2024.    CHIEF COMPLAINT:  Left lower quadrant pain.    HISTORY OF PRESENT ILLNESS:  The patient is a 44-year-old status post total abdominal hysterectomy and bilateral salpingectomy, who has a left ovarian cyst that is painful.  She reports the pain between a 6 and 8/10 consistently.  Her CT scan shows a 7 cm cyst, and her CA-125 is normal, and she desires definitive therapy.  We discussed the risks and benefits of the procedure including bleeding, infection, injury to bowel and bladder, and a significant chance of having to do an open laparotomy instead of laparoscopy.  She desires a bilateral oophorectomy if we have to open, but if we are able to do the procedure laparoscopically and the right ovary looks normal, she would like to maintain that ovary.    PAST MEDICAL HISTORY:  Anxiety, endometriosis, hypercholesterolemia, hypertension, nephrolithiasis, and obesity.    PAST SURGICAL HISTORY:  Total abdominal hysterectomy with bilateral salpingectomy, lithotripsy, ureteral stent, and  sections.    MEDICATIONS:  Amlodipine, hydrochlorothiazide, metoprolol, omeprazole, lovastatin, and Zoloft.    ALLERGIES:  BACTRIM.      PHYSICAL EXAMINATION:  GENERAL:  Obese black female in no acute distress.   HEENT:  Benign.  LUNGS:  Clear.   HEART:  Regular.   ABDOMEN:  Soft and nontender.   EXTREMITIES:  No clubbing, cyanosis, or edema.  NEUROLOGIC:  Grossly intact.   GENITOURINARY:  External female genitalia within normal limits.  Vagina within normal limits.  Uterus absent and left adnexa tender.    ASSESSMENT:  Left

## 2024-10-17 NOTE — PROGRESS NOTES
1035-Patient in Phase I. Report received from CRNA and surgical RN, placed on bedside monitor. Vital signs obtained and stable. Oral airway in place along with 4L NC    1037-oral airway removed by CRNA. Pt waking up. Dressing dry and intact to lower abdomen, 2 port sites intact with bandaides. Mauricio catheter in place draining clear yellow urine. Peripad in place with no drainage. Iv infusing at KVO    1040-Pt on 4L Nc, pt restless, reoriented    1045-pt on 3L NC, drowsy at this time    1050-pt resting with eyes closed. Ice pack on abdomen for comfort.     1055-cooling device applied to patient per her comfort.     1100-Pt taking in ice chips, tolerating well. Placed on 2 L NC    1105-pt resting with eyes closed. Report called to Alanna DUNLAP on 6A. Transport request placed    1110-family at bedside and updated on plan of care. Criteria for Phase I met.     1120-Pt resting with eyes closed. Remains on 2L NC    1135-Pt continues to rest with eyes closed and on 2L NC    1150-Patient transported to 6A05 via stretcher with RN and transport. Patient went in stable condition. Clothes and phone sent and  took other belongings.Pt transferred to her 6A bed and repositioned, fan applied for comfort. Medical cart transferred with the patient.

## 2024-10-17 NOTE — H&P
[facility]  History and Physical Update    Pt Name: Deloris Sands  MRN: [id number]  YOB: 1980  Date of evaluation: 10/17/2024    [x] I have examined the patient and reviewed the H&P/Consult and there are no changes to the patient or plans.    [] I have examined the patient and reviewed the H&P/Consult and have noted the following changes:     45 yo with vary symptomatic left ovarian mass  Pt with previous JAMAL /BS  Plan for laparoscopic LO. With oporr open laparotomy. If open will also remove right ovary if technically possible  Discussion with the patient and/ or family for proposed care, treatment, services; benefits, risks, side effects; likelihood of achieving goals and potential problems that may occur during recuperation was had and all questions were answered.  Discussion with the patient and/ or family of reasonable alternatives to the proposed care, treatment, services and the discussion of the risks, benefits, side effects related to the alternatives and the risk related to not receiving the proposed care treatment services was also had and all questions were answered.    If this is for an elective surgical procedure then The patient was counseled at length about the risks of devi Covid-19 during their perioperative period and any recovery window from their procedure.  The patient was made aware that devi Covid-19  may worsen their prognosis for recovering from their procedure  and lend to a higher morbidity and/or mortality risk.  All material risks, benefits, and reasonable alternatives including postponing the procedure were discussed. The patient  does wish to proceed with the procedure at this time.             Sandra Hooks MD,MD  Electronically signed 10/17/2024 at 7:30 AM

## 2024-10-18 LAB
ANISOCYTOSIS BLD QL SMEAR: PRESENT
BASOPHILS ABSOLUTE: 0 THOU/MM3 (ref 0–0.1)
BASOPHILS NFR BLD AUTO: 0.1 %
DEPRECATED RDW RBC AUTO: 43.9 FL (ref 35–45)
EOSINOPHIL NFR BLD AUTO: 0 %
EOSINOPHILS ABSOLUTE: 0 THOU/MM3 (ref 0–0.4)
ERYTHROCYTE [DISTWIDTH] IN BLOOD BY AUTOMATED COUNT: 13.4 % (ref 11.5–14.5)
HCT VFR BLD AUTO: 29.8 % (ref 37–47)
HGB BLD-MCNC: 9.4 GM/DL (ref 12–16)
IMM GRANULOCYTES # BLD AUTO: 0.08 THOU/MM3 (ref 0–0.07)
IMM GRANULOCYTES NFR BLD AUTO: 0.5 %
LYMPHOCYTES ABSOLUTE: 1.2 THOU/MM3 (ref 1–4.8)
LYMPHOCYTES NFR BLD AUTO: 8 %
MCH RBC QN AUTO: 28.3 PG (ref 26–33)
MCHC RBC AUTO-ENTMCNC: 31.5 GM/DL (ref 32.2–35.5)
MCV RBC AUTO: 89.8 FL (ref 81–99)
MONOCYTES ABSOLUTE: 1.2 THOU/MM3 (ref 0.4–1.3)
MONOCYTES NFR BLD AUTO: 7.8 %
NEUTROPHILS ABSOLUTE: 12.4 THOU/MM3 (ref 1.8–7.7)
NEUTROPHILS NFR BLD AUTO: 83.6 %
NRBC BLD AUTO-RTO: 0 /100 WBC
PLATELET # BLD AUTO: 432 THOU/MM3 (ref 130–400)
PMV BLD AUTO: 10 FL (ref 9.4–12.4)
RBC # BLD AUTO: 3.32 MILL/MM3 (ref 4.2–5.4)
SCAN OF BLOOD SMEAR: NORMAL
WBC # BLD AUTO: 14.8 THOU/MM3 (ref 4.8–10.8)

## 2024-10-18 PROCEDURE — 2580000003 HC RX 258: Performed by: OBSTETRICS & GYNECOLOGY

## 2024-10-18 PROCEDURE — 6360000002 HC RX W HCPCS: Performed by: OBSTETRICS & GYNECOLOGY

## 2024-10-18 PROCEDURE — 85025 COMPLETE CBC W/AUTO DIFF WBC: CPT

## 2024-10-18 PROCEDURE — 44603 SUTURE SMALL INTESTINE: CPT | Performed by: SURGERY

## 2024-10-18 PROCEDURE — 6370000000 HC RX 637 (ALT 250 FOR IP): Performed by: OBSTETRICS & GYNECOLOGY

## 2024-10-18 PROCEDURE — 36415 COLL VENOUS BLD VENIPUNCTURE: CPT

## 2024-10-18 PROCEDURE — 1200000000 HC SEMI PRIVATE

## 2024-10-18 RX ORDER — CALCIUM CARBONATE 500 MG/1
500 TABLET, CHEWABLE ORAL 3 TIMES DAILY PRN
Status: DISCONTINUED | OUTPATIENT
Start: 2024-10-18 | End: 2024-10-20 | Stop reason: HOSPADM

## 2024-10-18 RX ORDER — DIPHENHYDRAMINE HCL 25 MG
25 TABLET ORAL EVERY 4 HOURS PRN
Status: DISCONTINUED | OUTPATIENT
Start: 2024-10-18 | End: 2024-10-20 | Stop reason: HOSPADM

## 2024-10-18 RX ORDER — IBUPROFEN 600 MG/1
600 TABLET, FILM COATED ORAL EVERY 6 HOURS PRN
Qty: 60 TABLET | Refills: 3 | Status: SHIPPED | OUTPATIENT
Start: 2024-10-18

## 2024-10-18 RX ORDER — OXYCODONE AND ACETAMINOPHEN 5; 325 MG/1; MG/1
1 TABLET ORAL EVERY 6 HOURS PRN
Qty: 28 TABLET | Refills: 0 | Status: SHIPPED | OUTPATIENT
Start: 2024-10-18 | End: 2024-10-25

## 2024-10-18 RX ADMIN — CEPHALEXIN 500 MG: 500 CAPSULE ORAL at 05:57

## 2024-10-18 RX ADMIN — KETOROLAC TROMETHAMINE 30 MG: 30 INJECTION, SOLUTION INTRAMUSCULAR at 03:22

## 2024-10-18 RX ADMIN — ACETAMINOPHEN 500 MG TABLET 1000 MG: at 19:48

## 2024-10-18 RX ADMIN — OXYCODONE 10 MG: 5 TABLET ORAL at 05:57

## 2024-10-18 RX ADMIN — METRONIDAZOLE 500 MG: 500 TABLET ORAL at 05:57

## 2024-10-18 RX ADMIN — DOCUSATE SODIUM 100 MG: 100 CAPSULE, LIQUID FILLED ORAL at 07:30

## 2024-10-18 RX ADMIN — DOCUSATE SODIUM 100 MG: 100 CAPSULE, LIQUID FILLED ORAL at 19:48

## 2024-10-18 RX ADMIN — SODIUM CHLORIDE, POTASSIUM CHLORIDE, SODIUM LACTATE AND CALCIUM CHLORIDE: 600; 310; 30; 20 INJECTION, SOLUTION INTRAVENOUS at 04:49

## 2024-10-18 RX ADMIN — PANTOPRAZOLE SODIUM 40 MG: 40 TABLET, DELAYED RELEASE ORAL at 05:57

## 2024-10-18 RX ADMIN — SERTRALINE 50 MG: 50 TABLET, FILM COATED ORAL at 07:30

## 2024-10-18 RX ADMIN — DIPHENHYDRAMINE HYDROCHLORIDE 25 MG: 25 TABLET ORAL at 13:32

## 2024-10-18 RX ADMIN — KETOROLAC TROMETHAMINE 30 MG: 30 INJECTION, SOLUTION INTRAMUSCULAR at 10:53

## 2024-10-18 RX ADMIN — ROSUVASTATIN CALCIUM 20 MG: 20 TABLET, FILM COATED ORAL at 19:49

## 2024-10-18 RX ADMIN — CEPHALEXIN 500 MG: 500 CAPSULE ORAL at 13:32

## 2024-10-18 RX ADMIN — ANTACID TABLETS 500 MG: 500 TABLET, CHEWABLE ORAL at 16:32

## 2024-10-18 RX ADMIN — CEPHALEXIN 500 MG: 500 CAPSULE ORAL at 21:29

## 2024-10-18 RX ADMIN — SODIUM CHLORIDE, PRESERVATIVE FREE 10 ML: 5 INJECTION INTRAVENOUS at 19:49

## 2024-10-18 RX ADMIN — SODIUM CHLORIDE, PRESERVATIVE FREE 10 ML: 5 INJECTION INTRAVENOUS at 07:31

## 2024-10-18 RX ADMIN — IBUPROFEN 800 MG: 800 TABLET, FILM COATED ORAL at 16:32

## 2024-10-18 RX ADMIN — MORPHINE SULFATE 4 MG: 4 INJECTION, SOLUTION INTRAMUSCULAR; INTRAVENOUS at 01:01

## 2024-10-18 RX ADMIN — TIZANIDINE 4 MG: 4 TABLET ORAL at 21:28

## 2024-10-18 RX ADMIN — METRONIDAZOLE 500 MG: 500 TABLET ORAL at 21:29

## 2024-10-18 RX ADMIN — ACETAMINOPHEN 500 MG TABLET 1000 MG: at 03:23

## 2024-10-18 RX ADMIN — ACETAMINOPHEN 500 MG TABLET 1000 MG: at 10:53

## 2024-10-18 RX ADMIN — METRONIDAZOLE 500 MG: 500 TABLET ORAL at 13:32

## 2024-10-18 ASSESSMENT — PAIN SCALES - GENERAL
PAINLEVEL_OUTOF10: 6
PAINLEVEL_OUTOF10: 2
PAINLEVEL_OUTOF10: 5
PAINLEVEL_OUTOF10: 5
PAINLEVEL_OUTOF10: 7
PAINLEVEL_OUTOF10: 6
PAINLEVEL_OUTOF10: 5
PAINLEVEL_OUTOF10: 7
PAINLEVEL_OUTOF10: 5
PAINLEVEL_OUTOF10: 5
PAINLEVEL_OUTOF10: 8

## 2024-10-18 ASSESSMENT — PAIN DESCRIPTION - PAIN TYPE
TYPE: SURGICAL PAIN
TYPE: CHRONIC PAIN
TYPE: SURGICAL PAIN

## 2024-10-18 ASSESSMENT — PAIN - FUNCTIONAL ASSESSMENT
PAIN_FUNCTIONAL_ASSESSMENT: ACTIVITIES ARE NOT PREVENTED

## 2024-10-18 ASSESSMENT — PAIN DESCRIPTION - DESCRIPTORS
DESCRIPTORS: ACHING
DESCRIPTORS: SORE;TENDER;ACHING
DESCRIPTORS: ACHING
DESCRIPTORS: ACHING;SORE
DESCRIPTORS: TENDER;SORE
DESCRIPTORS: SPASM
DESCRIPTORS: ACHING;SORE
DESCRIPTORS: SORE;ACHING

## 2024-10-18 ASSESSMENT — PAIN DESCRIPTION - ORIENTATION
ORIENTATION: MID;LOWER
ORIENTATION: MID;UPPER
ORIENTATION: LOWER
ORIENTATION: MID;UPPER
ORIENTATION: MID;UPPER

## 2024-10-18 ASSESSMENT — PAIN DESCRIPTION - FREQUENCY
FREQUENCY: INTERMITTENT
FREQUENCY: INTERMITTENT
FREQUENCY: CONTINUOUS

## 2024-10-18 ASSESSMENT — PAIN DESCRIPTION - LOCATION
LOCATION: ABDOMEN

## 2024-10-18 ASSESSMENT — PAIN DESCRIPTION - ONSET
ONSET: ON-GOING
ONSET: GRADUAL

## 2024-10-18 NOTE — CARE COORDINATION
Case Management Assessment Initial Evaluation    Date/Time of Evaluation: 10/18/2024 8:05 AM  Assessment Completed by: Paris Peres RN    If patient is discharged prior to next notation, then this note serves as note for discharge by case management.    Patient Name: Deloris Sands                   YOB: 1980  Diagnosis: Ovarian mass, left [N83.8]                   Date / Time: 10/17/2024  6:27 AM  Location: [facility]     Patient Admission Status: Inpatient   Readmission Risk Low 0-14, Mod 15-19), High > 20: Readmission Risk Score: 9.5    Current PCP: Fortino Vaughn, MARÍA - CNP  Health Care Decision Makers:   Primary Decision Maker: Ashlyn Sands - Brother/Sister - [phone]    Additional Case Management Notes: From PACU, WBC 14.8, pain and nausea control, Keflex, Flagyl, Protonix.     Procedures:   10/17 Diagnostic laparoscopy, laparotomy, extensive lysis of adhesions, left oophorectomy, intraoperative consultation with General Surgery/Dr. Genaro Cheema     Imaging:   10/17 KUB No radiopaque foreign body..     Patient Goals/Plan/Treatment Preferences: Met with Deloris. She currently lives at home with her family. Plan is to return home at discharge. She denies need for DME  and declines HH.        10/18/24 1113   Service Assessment   Patient Orientation Alert and Oriented   Cognition Alert   History Provided By Patient   Primary Caregiver Self   Support Systems Children   Patient's Healthcare Decision Maker is: Patient Declined (Legal Next of Kin Remains as Decision Maker)   PCP Verified by CM Yes   Prior Functional Level Independent in ADLs/IADLs   Current Functional Level Independent in ADLs/IADLs   Can patient return to prior living arrangement Yes   Ability to make needs known: Good   Family able to assist with home care needs: Yes   Would you like for me to discuss the discharge plan with any other family members/significant others, and if so, who? No   Financial

## 2024-10-18 NOTE — PROGRESS NOTES
Department of Obstetrics and Gynecology  Attending Post Op Progress Note      SUBJECTIVE:  POD 0    OBJECTIVE: Feels quite sore. She is not passing flatus. She is not nauseated. Her pain is poorly controlled.    VITALS:BP (!) 146/85   Pulse 74   Temp 97.9 °F (36.6 °C) (Oral)   Resp 18   Ht 1.626 m (5' 4\")   Wt 96.4 kg (212 lb 8.4 oz)   LMP 07/23/2020   SpO2 98%   BMI 36.48 kg/m²     ABDOMEN:  soft, non-distended  INCISION:covered    DATA:    CBC:    Lab Results   Component Value Date    HGB 10.5 (L) 10/17/2024    HCT 32.4 (L) 10/17/2024         ASSESSMENT & PLAN:    Doing well x pain POD 0  Ketorolac, tylenol and ambien.      Sandra Hoosk MD 10/17/2024 10:13 PM

## 2024-10-18 NOTE — PROGRESS NOTES
Department of Obstetrics and Gynecology  Attending Post Op Progress Note      SUBJECTIVE:  POD 1    OBJECTIVE: Feels fairly well. She is not passing flatus. She is not nauseated. Her pain is well controlled.    VITALS:BP (!) 92/50   Pulse 77   Temp 97.7 °F (36.5 °C) (Oral)   Resp 16   Ht 1.626 m (5' 4\")   Wt 96.4 kg (212 lb 8.4 oz)   LMP 07/23/2020   SpO2 99%   BMI 36.48 kg/m²     ABDOMEN:  absent bowel sounds, soft, non-distended  INCISION:covered    DATA:    CBC:    Lab Results   Component Value Date    HGB 9.4 (L) 10/18/2024    HCT 29.8 (L) 10/18/2024         ASSESSMENT & PLAN:      Pt reports feellling much better this am. Has been out of bed both last night and today.      Slow return to bowel function to be expected.    Remove IV, advance diet, PO pain meds.      Sandra Hooks MD 10/18/2024 9:16 AM

## 2024-10-18 NOTE — BRIEF OP NOTE
Brief Postoperative Note      Patient: Deloris Sands  YOB: 1980  MRN: [id number]    Date of Procedure: 10/17/2024    Pre-Op Diagnosis Codes:      * Ovarian mass, left [N83.8]    Post-Op Diagnosis: intra-abdominal adhesions       Procedure(s):  Diagnostic Laparoscopic, Exporatory Laparotomy, Extensive Lysis of adhesions requiring General Surgery and Second Gynecological Surgeon, Left Oophorectomy, Over sewing small bowel serosal lacerations    Surgeon(s):  Christine Peterson MD Wisser, Jeffrey M, DO Stallkamp, Vanessa L, MD    Assistant:  * No surgical staff found *    Anesthesia: General    Estimated Blood Loss (mL): 20    Complications: None    Specimens:   ID Type Source Tests Collected by Time Destination   A : Left Ovary Tissue Ovary SURGICAL PATHOLOGY (Canceled) Sandra Hooks MD 10/17/2024 0939        Implants:  * No implants in log *      Drains:   [REMOVED] Urinary Catheter 10/17/24 (Removed)   $ Urethral catheter insertion Inserted for procedure 10/17/24 1053   Catheter Indications Perioperative use for selected surgical procedures 10/17/24 2000   Site Assessment Pink;No urethral drainage 10/17/24 2000   Urine Color Yellow 10/17/24 2000   Urine Appearance Clear 10/17/24 2000   Collection Container Standard 10/17/24 2000   Securement Method Securing device (Describe) 10/17/24 2000   Catheter Care  Perineal wipes 10/17/24 2000   Catheter Best Practices  Drainage tube clipped to bed;Catheter secured to thigh;Tamper seal intact;Bag below bladder;Bag not on floor;Lack of dependent loop in tubing;Drainage bag less than half full 10/17/24 2000   Status Draining 10/17/24 2000   Output (mL) 100 mL 10/18/24 0728       Findings:  Infection Present At Time Of Surgery (PATOS) (choose all levels that have infection present):  No infection present    Electronically signed by Genaro Cheema DO on 10/18/2024 at 2:37 PM

## 2024-10-19 PROCEDURE — 6370000000 HC RX 637 (ALT 250 FOR IP): Performed by: OBSTETRICS & GYNECOLOGY

## 2024-10-19 PROCEDURE — 2580000003 HC RX 258: Performed by: OBSTETRICS & GYNECOLOGY

## 2024-10-19 PROCEDURE — 6360000002 HC RX W HCPCS: Performed by: OBSTETRICS & GYNECOLOGY

## 2024-10-19 PROCEDURE — 1200000000 HC SEMI PRIVATE

## 2024-10-19 RX ADMIN — ONDANSETRON 4 MG: 2 INJECTION INTRAMUSCULAR; INTRAVENOUS at 21:59

## 2024-10-19 RX ADMIN — ZOLPIDEM TARTRATE 10 MG: 5 TABLET ORAL at 22:02

## 2024-10-19 RX ADMIN — IBUPROFEN 800 MG: 800 TABLET, FILM COATED ORAL at 08:56

## 2024-10-19 RX ADMIN — ACETAMINOPHEN 500 MG TABLET 1000 MG: at 18:04

## 2024-10-19 RX ADMIN — METRONIDAZOLE 500 MG: 500 TABLET ORAL at 22:02

## 2024-10-19 RX ADMIN — SODIUM CHLORIDE, PRESERVATIVE FREE 10 ML: 5 INJECTION INTRAVENOUS at 08:56

## 2024-10-19 RX ADMIN — ONDANSETRON 4 MG: 2 INJECTION INTRAMUSCULAR; INTRAVENOUS at 12:01

## 2024-10-19 RX ADMIN — HYDROCHLOROTHIAZIDE 12.5 MG: 12.5 CAPSULE ORAL at 08:56

## 2024-10-19 RX ADMIN — PANTOPRAZOLE SODIUM 40 MG: 40 TABLET, DELAYED RELEASE ORAL at 06:09

## 2024-10-19 RX ADMIN — ACETAMINOPHEN 500 MG TABLET 1000 MG: at 06:09

## 2024-10-19 RX ADMIN — IBUPROFEN 800 MG: 800 TABLET, FILM COATED ORAL at 00:00

## 2024-10-19 RX ADMIN — ACETAMINOPHEN 500 MG TABLET 1000 MG: at 12:04

## 2024-10-19 RX ADMIN — DIPHENHYDRAMINE HYDROCHLORIDE 25 MG: 25 TABLET ORAL at 01:04

## 2024-10-19 RX ADMIN — AMLODIPINE BESYLATE 10 MG: 10 TABLET ORAL at 08:56

## 2024-10-19 RX ADMIN — SERTRALINE 50 MG: 50 TABLET, FILM COATED ORAL at 08:56

## 2024-10-19 RX ADMIN — METRONIDAZOLE 500 MG: 500 TABLET ORAL at 06:10

## 2024-10-19 RX ADMIN — DOCUSATE SODIUM 100 MG: 100 CAPSULE, LIQUID FILLED ORAL at 08:56

## 2024-10-19 RX ADMIN — SODIUM CHLORIDE, PRESERVATIVE FREE 5 ML: 5 INJECTION INTRAVENOUS at 19:10

## 2024-10-19 RX ADMIN — ANTACID TABLETS 500 MG: 500 TABLET, CHEWABLE ORAL at 00:00

## 2024-10-19 RX ADMIN — CEPHALEXIN 500 MG: 500 CAPSULE ORAL at 06:10

## 2024-10-19 RX ADMIN — METRONIDAZOLE 500 MG: 500 TABLET ORAL at 14:19

## 2024-10-19 RX ADMIN — ROSUVASTATIN CALCIUM 20 MG: 20 TABLET, FILM COATED ORAL at 19:08

## 2024-10-19 RX ADMIN — METOPROLOL SUCCINATE 100 MG: 100 TABLET, EXTENDED RELEASE ORAL at 08:56

## 2024-10-19 RX ADMIN — DOCUSATE SODIUM 100 MG: 100 CAPSULE, LIQUID FILLED ORAL at 19:08

## 2024-10-19 RX ADMIN — IBUPROFEN 800 MG: 800 TABLET, FILM COATED ORAL at 16:15

## 2024-10-19 ASSESSMENT — PAIN DESCRIPTION - ONSET
ONSET: ON-GOING
ONSET: ON-GOING
ONSET: GRADUAL

## 2024-10-19 ASSESSMENT — PAIN DESCRIPTION - ORIENTATION
ORIENTATION: MID;LOWER
ORIENTATION: MID
ORIENTATION: MID
ORIENTATION: MID;LOWER

## 2024-10-19 ASSESSMENT — PAIN - FUNCTIONAL ASSESSMENT
PAIN_FUNCTIONAL_ASSESSMENT: ACTIVITIES ARE NOT PREVENTED

## 2024-10-19 ASSESSMENT — PAIN DESCRIPTION - FREQUENCY
FREQUENCY: CONTINUOUS
FREQUENCY: INTERMITTENT
FREQUENCY: CONTINUOUS

## 2024-10-19 ASSESSMENT — PAIN SCALES - GENERAL
PAINLEVEL_OUTOF10: 4
PAINLEVEL_OUTOF10: 4
PAINLEVEL_OUTOF10: 5
PAINLEVEL_OUTOF10: 4
PAINLEVEL_OUTOF10: 6
PAINLEVEL_OUTOF10: 4
PAINLEVEL_OUTOF10: 4
PAINLEVEL_OUTOF10: 5

## 2024-10-19 ASSESSMENT — PAIN DESCRIPTION - DESCRIPTORS
DESCRIPTORS: ACHING
DESCRIPTORS: ACHING
DESCRIPTORS: SORE;ACHING
DESCRIPTORS: ACHING

## 2024-10-19 ASSESSMENT — PAIN DESCRIPTION - LOCATION
LOCATION: ABDOMEN

## 2024-10-19 ASSESSMENT — PAIN DESCRIPTION - PAIN TYPE
TYPE: ACUTE PAIN
TYPE: SURGICAL PAIN
TYPE: SURGICAL PAIN

## 2024-10-19 NOTE — OP NOTE
[address] [address]                            OPERATIVE REPORT      PATIENT NAME: YOLY RICHMOND        : 1980  MED REC NO: [id number]                       ROOM: [facility]  ACCOUNT NO: [id number]                       ADMIT DATE: 10/17/2024  PROVIDER: Genaro Cheema DO      DATE OF PROCEDURE:  10/17/2024    SURGEON:  Genaro Cheema DO    PREOPERATIVE DIAGNOSIS:  Left ovarian cyst.    POSTOPERATIVE DIAGNOSES:  Left ovarian cyst with intraabdominal adhesions and serosal disruption of the small bowel.    PROCEDURES PERFORMED:  Adhesiolysis and repair of 3 serosal disruptions without full-thickness perforations of the small bowel.    ASSISTANT:  Sandra Hooks MD.    ANESTHESIA:  General.    ESTIMATED BLOOD LOSS:  20 mL.    SPECIMENS:  None.    COMPLICATIONS:  None immediately appreciated.    DISCUSSION:  The patient is a 44-year-old female who had been consented and taken to the operating room by Dr. Sandra Hooks to undergo a laparoscopic left oophorectomy.  At the time of the operation, she had dense adhesions present within the area.  She was converted to an open procedure at which time Dr. Peterson was also assisting.  My presence requested in the operating room due to the dense intraabdominal adhesions and a couple of serosal disruptions of the small bowel that occurred during adhesiolysis.  During visual inspection, the patient did have intraabdominal adhesions primarily present within the pelvis where sigmoid colon attached up against the left ovarian mass.  The small bowel had been mobilized off the anterior abdominal wall and encountering a couple of serosal disruptions without injury down into the muscularis that would require a repair.    DESCRIPTION OF PROCEDURE:  The patient had already been placed under general anesthesia in order to undergo the operation.  Initially laparoscopically converted to open 
fascia.  We were able to enter the peritoneum, but again because of extensive adhesions, the peritoneum was only able to be opened about 1 cm and then extensive lysis of adhesions from the small bowel to the anterior abdominal wall needed to occur.  There were some small serosal tears, but nothing new.  There was no entry into the small bowel.  This lysis of adhesions took approximately 30 minutes before we were able to access the pelvis.  At this point, Dr. Cheema was able to join and was able to repair the serosal tears on the small bowel and proceeded to continue with lysis of adhesions around the ovary, freeing up the ovary eventually and isolating the infundibulopelvic ligaments.  It was then doubly clamped and then free tie ligated and suture ligated.  Once this was removed, the bowel was inspected and found to be hemostatic and without injury.  The 3 areas that Dr. Cheema repaired were intact, and the right pelvic sidewall was inspected, but the ovary was not able to be palpated in the peritoneum, appeared that it was retroperitoneal and deemed unsafe to try to dissect it out as it was not clearly defined.  Imaging had not suggested that there was any mass or cyst and so we decided not to pursue a right oophorectomy.  During the process, we did use the Fred O as well as other retractors.  An x-ray was performed at the end of the procedure to assure that there were no instruments or sponges left behind as the procedure went from laparoscopic surgery to open surgery in a quick fashion.  Once we decided not to remove the right ovary and hemostasis was assured, the peritoneum was closed with 3-0 Vicryl.  The fascia was inspected, found to be hemostatic and closed with looped PDS from both angles, tied in the middle.  The subcutaneous tissue was closed with 3-0 Vicryl to close the dead space and the skin was closed with staples.  Sponge, lap, and needle counts were correct, and a bandage was placed.  An x-ray

## 2024-10-19 NOTE — PROGRESS NOTES
Gyn Progress Note    SUBJECTIVE:  Post op Day# 2 S/P L/S converted to open KWABENA and LSO with General surgery assistance.    She did well overnight. Pain adequately controlled. Belching but no flatus passed, no BP but feeling some rectal pressure. Tolerating liquid diet with minimal appetite.    OBJECTIVE:    VITALS:  /86   Pulse 83   Temp 98.5 °F (36.9 °C) (Oral)   Resp 16   Ht 1.626 m (5' 4\")   Wt 96.4 kg (212 lb 8.4 oz)   LMP 07/23/2020   SpO2 100%   BMI 36.48 kg/m²     Intake/Output Summary (Last 24 hours) at 10/19/2024 1238  Last data filed at 10/18/2024 1949  Gross per 24 hour   Intake 490 ml   Output --   Net 490 ml       Physical Exam  CONSTITUTIONAL:  awake, alert, cooperative, no apparent distress, and appears stated age  ABDOMEN:  softly distended, appropriately ttp    DATA:  CBC   Lab Results   Component Value Date    WBC 14.8 (H) 10/18/2024    HGB 9.4 (L) 10/18/2024    HCT 29.8 (L) 10/18/2024     (H) 10/18/2024        ASSESSMENT AND PLAN:  POD#2  - Ambulation encouraged. She has walked the halls this morning.  - Waiting return of bowel function  - Continue current pain regiment.   - will consider movantik tomorrow if ok with Gen Surg    Christine Peterson MD

## 2024-10-20 VITALS
DIASTOLIC BLOOD PRESSURE: 79 MMHG | BODY MASS INDEX: 36.28 KG/M2 | SYSTOLIC BLOOD PRESSURE: 125 MMHG | HEART RATE: 95 BPM | RESPIRATION RATE: 18 BRPM | OXYGEN SATURATION: 99 % | WEIGHT: 212.52 LBS | HEIGHT: 64 IN | TEMPERATURE: 98.7 F

## 2024-10-20 PROCEDURE — 2580000003 HC RX 258: Performed by: OBSTETRICS & GYNECOLOGY

## 2024-10-20 PROCEDURE — 6370000000 HC RX 637 (ALT 250 FOR IP): Performed by: OBSTETRICS & GYNECOLOGY

## 2024-10-20 RX ADMIN — METRONIDAZOLE 500 MG: 500 TABLET ORAL at 05:54

## 2024-10-20 RX ADMIN — AMLODIPINE BESYLATE 10 MG: 10 TABLET ORAL at 08:36

## 2024-10-20 RX ADMIN — HYDROCHLOROTHIAZIDE 12.5 MG: 12.5 CAPSULE ORAL at 08:36

## 2024-10-20 RX ADMIN — ACETAMINOPHEN 500 MG TABLET 1000 MG: at 10:20

## 2024-10-20 RX ADMIN — IBUPROFEN 800 MG: 800 TABLET, FILM COATED ORAL at 08:36

## 2024-10-20 RX ADMIN — SODIUM CHLORIDE, PRESERVATIVE FREE 10 ML: 5 INJECTION INTRAVENOUS at 08:36

## 2024-10-20 RX ADMIN — PANTOPRAZOLE SODIUM 40 MG: 40 TABLET, DELAYED RELEASE ORAL at 05:55

## 2024-10-20 RX ADMIN — SERTRALINE 50 MG: 50 TABLET, FILM COATED ORAL at 08:36

## 2024-10-20 RX ADMIN — DOCUSATE SODIUM 100 MG: 100 CAPSULE, LIQUID FILLED ORAL at 08:36

## 2024-10-20 RX ADMIN — ACETAMINOPHEN 500 MG TABLET 1000 MG: at 02:44

## 2024-10-20 RX ADMIN — METOPROLOL SUCCINATE 100 MG: 100 TABLET, EXTENDED RELEASE ORAL at 08:36

## 2024-10-20 ASSESSMENT — PAIN - FUNCTIONAL ASSESSMENT
PAIN_FUNCTIONAL_ASSESSMENT: ACTIVITIES ARE NOT PREVENTED

## 2024-10-20 ASSESSMENT — PAIN DESCRIPTION - ORIENTATION
ORIENTATION: MID;LOWER

## 2024-10-20 ASSESSMENT — PAIN DESCRIPTION - DESCRIPTORS
DESCRIPTORS: ACHING
DESCRIPTORS: ACHING
DESCRIPTORS: SORE;ACHING

## 2024-10-20 ASSESSMENT — PAIN SCALES - GENERAL
PAINLEVEL_OUTOF10: 5
PAINLEVEL_OUTOF10: 4
PAINLEVEL_OUTOF10: 4
PAINLEVEL_OUTOF10: 5

## 2024-10-20 ASSESSMENT — PAIN DESCRIPTION - FREQUENCY
FREQUENCY: CONTINUOUS
FREQUENCY: CONTINUOUS

## 2024-10-20 ASSESSMENT — PAIN DESCRIPTION - LOCATION
LOCATION: ABDOMEN

## 2024-10-20 ASSESSMENT — PAIN DESCRIPTION - PAIN TYPE
TYPE: SURGICAL PAIN
TYPE: SURGICAL PAIN

## 2024-10-20 ASSESSMENT — PAIN DESCRIPTION - ONSET
ONSET: ON-GOING
ONSET: ON-GOING

## 2024-10-20 NOTE — PLAN OF CARE
Problem: Chronic Conditions and Co-morbidities  Goal: Patient's chronic conditions and co-morbidity symptoms are monitored and maintained or improved  10/19/2024 1041 by Hannah Abdalla RN  Outcome: Progressing  Flowsheets (Taken 10/19/2024 0155 by Airam Pozo RN)  Care Plan - Patient's Chronic Conditions and Co-Morbidity Symptoms are Monitored and Maintained or Improved: Monitor and assess patient's chronic conditions and comorbid symptoms for stability, deterioration, or improvement  10/19/2024 0155 by Airam Pozo RN  Outcome: Progressing  Flowsheets (Taken 10/19/2024 0155)  Care Plan - Patient's Chronic Conditions and Co-Morbidity Symptoms are Monitored and Maintained or Improved: Monitor and assess patient's chronic conditions and comorbid symptoms for stability, deterioration, or improvement     Problem: Discharge Planning  Goal: Discharge to home or other facility with appropriate resources  10/19/2024 1041 by Hannah Abdalla RN  Outcome: Progressing  Flowsheets (Taken 10/19/2024 0155 by Airam Pozo RN)  Discharge to home or other facility with appropriate resources:   Identify barriers to discharge with patient and caregiver   Identify discharge learning needs (meds, wound care, etc)  10/19/2024 0155 by Airam Pozo RN  Outcome: Progressing  Flowsheets (Taken 10/19/2024 0155)  Discharge to home or other facility with appropriate resources:   Identify barriers to discharge with patient and caregiver   Identify discharge learning needs (meds, wound care, etc)     Problem: Pain  Goal: Verbalizes/displays adequate comfort level or baseline comfort level  10/19/2024 1041 by Hannah Abdalla RN  Outcome: Progressing  Flowsheets (Taken 10/19/2024 0155 by Airam Pozo RN)  Verbalizes/displays adequate comfort level or baseline comfort level:   Encourage patient to monitor pain and request assistance   Administer analgesics based on type and severity of pain and evaluate response   Consider cultural and 
  Problem: Chronic Conditions and Co-morbidities  Goal: Patient's chronic conditions and co-morbidity symptoms are monitored and maintained or improved  10/20/2024 0714 by Hannah Abdalla RN  Outcome: Progressing  Flowsheets (Taken 10/19/2024 1917 by De Leon, Shantel, RN)  Care Plan - Patient's Chronic Conditions and Co-Morbidity Symptoms are Monitored and Maintained or Improved:   Monitor and assess patient's chronic conditions and comorbid symptoms for stability, deterioration, or improvement   Collaborate with multidisciplinary team to address chronic and comorbid conditions and prevent exacerbation or deterioration  10/19/2024 1917 by Shantel De Leon RN  Outcome: Progressing  Flowsheets (Taken 10/19/2024 1917)  Care Plan - Patient's Chronic Conditions and Co-Morbidity Symptoms are Monitored and Maintained or Improved:   Monitor and assess patient's chronic conditions and comorbid symptoms for stability, deterioration, or improvement   Collaborate with multidisciplinary team to address chronic and comorbid conditions and prevent exacerbation or deterioration     Problem: Discharge Planning  Goal: Discharge to home or other facility with appropriate resources  10/20/2024 0714 by Hannah Abdalla RN  Outcome: Progressing  Flowsheets (Taken 10/19/2024 1917 by Shantel De Leon RN)  Discharge to home or other facility with appropriate resources:   Identify barriers to discharge with patient and caregiver   Identify discharge learning needs (meds, wound care, etc)   Arrange for needed discharge resources and transportation as appropriate   Arrange for interpreters to assist at discharge as needed  10/19/2024 1917 by Shantel De Leon RN  Outcome: Progressing  Flowsheets (Taken 10/19/2024 1917)  Discharge to home or other facility with appropriate resources:   Identify barriers to discharge with patient and caregiver   Identify discharge learning needs (meds, wound care, etc)   Arrange for needed discharge 
  Problem: Chronic Conditions and Co-morbidities  Goal: Patient's chronic conditions and co-morbidity symptoms are monitored and maintained or improved  Outcome: Progressing  Flowsheets (Taken 10/17/2024 2000)  Care Plan - Patient's Chronic Conditions and Co-Morbidity Symptoms are Monitored and Maintained or Improved:   Monitor and assess patient's chronic conditions and comorbid symptoms for stability, deterioration, or improvement   Collaborate with multidisciplinary team to address chronic and comorbid conditions and prevent exacerbation or deterioration   Update acute care plan with appropriate goals if chronic or comorbid symptoms are exacerbated and prevent overall improvement and discharge     Problem: Discharge Planning  Goal: Discharge to home or other facility with appropriate resources  Outcome: Progressing  Flowsheets (Taken 10/17/2024 2000)  Discharge to home or other facility with appropriate resources:   Identify barriers to discharge with patient and caregiver   Arrange for needed discharge resources and transportation as appropriate   Identify discharge learning needs (meds, wound care, etc)   Refer to discharge planning if patient needs post-hospital services based on physician order or complex needs related to functional status, cognitive ability or social support system     Problem: Pain  Goal: Verbalizes/displays adequate comfort level or baseline comfort level  Outcome: Progressing  Flowsheets (Taken 10/17/2024 2148)  Verbalizes/displays adequate comfort level or baseline comfort level:   Encourage patient to monitor pain and request assistance   Administer analgesics based on type and severity of pain and evaluate response   Assess pain using appropriate pain scale   Implement non-pharmacological measures as appropriate and evaluate response   Notify Licensed Independent Practitioner if interventions unsuccessful or patient reports new pain     Problem: Safety - Adult  Goal: Free from fall 
  Problem: Chronic Conditions and Co-morbidities  Goal: Patient's chronic conditions and co-morbidity symptoms are monitored and maintained or improved  Outcome: Progressing  Flowsheets (Taken 10/19/2024 0155)  Care Plan - Patient's Chronic Conditions and Co-Morbidity Symptoms are Monitored and Maintained or Improved: Monitor and assess patient's chronic conditions and comorbid symptoms for stability, deterioration, or improvement     Problem: Discharge Planning  Goal: Discharge to home or other facility with appropriate resources  Outcome: Progressing  Flowsheets (Taken 10/19/2024 0155)  Discharge to home or other facility with appropriate resources:   Identify barriers to discharge with patient and caregiver   Identify discharge learning needs (meds, wound care, etc)     Problem: Pain  Goal: Verbalizes/displays adequate comfort level or baseline comfort level  Outcome: Progressing  Flowsheets (Taken 10/19/2024 0155)  Verbalizes/displays adequate comfort level or baseline comfort level:   Encourage patient to monitor pain and request assistance   Administer analgesics based on type and severity of pain and evaluate response   Consider cultural and social influences on pain and pain management   Assess pain using appropriate pain scale     Problem: Safety - Adult  Goal: Free from fall injury  Outcome: Progressing  Flowsheets (Taken 10/19/2024 0155)  Free From Fall Injury: Instruct family/caregiver on patient safety     
or complex needs related to functional status, cognitive ability or social support system  10/17/2024 2148 by Kalyan Wilder RN  Outcome: Progressing  Flowsheets (Taken 10/17/2024 2000)  Discharge to home or other facility with appropriate resources:   Identify barriers to discharge with patient and caregiver   Arrange for needed discharge resources and transportation as appropriate   Identify discharge learning needs (meds, wound care, etc)   Refer to discharge planning if patient needs post-hospital services based on physician order or complex needs related to functional status, cognitive ability or social support system     Problem: Pain  Goal: Verbalizes/displays adequate comfort level or baseline comfort level  10/18/2024 0853 by Peace Alonzo RN  Outcome: Progressing  Flowsheets (Taken 10/17/2024 2148 by Kalyan Wilder RN)  Verbalizes/displays adequate comfort level or baseline comfort level:   Encourage patient to monitor pain and request assistance   Administer analgesics based on type and severity of pain and evaluate response   Assess pain using appropriate pain scale   Implement non-pharmacological measures as appropriate and evaluate response   Notify Licensed Independent Practitioner if interventions unsuccessful or patient reports new pain  10/17/2024 2148 by Kalyan Wilder RN  Outcome: Progressing  Flowsheets (Taken 10/17/2024 2148)  Verbalizes/displays adequate comfort level or baseline comfort level:   Encourage patient to monitor pain and request assistance   Administer analgesics based on type and severity of pain and evaluate response   Assess pain using appropriate pain scale   Implement non-pharmacological measures as appropriate and evaluate response   Notify Licensed Independent Practitioner if interventions unsuccessful or patient reports new pain     Problem: Safety - Adult  Goal: Free from fall injury  10/18/2024 0853 by Peace Alonzo RN  Outcome: 
ventilation and oxygenation  10/19/2024 1917 by Shantel De Leon RN  Outcome: Progressing  Flowsheets (Taken 10/19/2024 1917)  Achieves optimal ventilation and oxygenation:   Assess for changes in respiratory status   Position to facilitate oxygenation and minimize respiratory effort   Assess for changes in mentation and behavior   Oxygen supplementation based on oxygen saturation or arterial blood gases     Problem: Gastrointestinal - Adult  Goal: Maintains or returns to baseline bowel function  10/19/2024 1917 by Shantel De Leon RN  Outcome: Progressing  Flowsheets (Taken 10/19/2024 1917)  Maintains or returns to baseline bowel function:   Assess bowel function   Encourage oral fluids to ensure adequate hydration   Administer ordered medications as needed   Administer IV fluids as ordered to ensure adequate hydration     Problem: Infection - Adult  Goal: Absence of infection at discharge  10/19/2024 1917 by Shantel De Leon RN  Outcome: Progressing  Flowsheets (Taken 10/19/2024 1917)  Absence of infection at discharge:   Assess and monitor for signs and symptoms of infection   Monitor lab/diagnostic results   Monitor all insertion sites i.e., indwelling lines, tubes and drains   Care plan reviewed with Pt. Pt verbalizes understanding of plan of care and contributes to goal setting.

## 2024-10-20 NOTE — PROGRESS NOTES
Gyn Progress Note    SUBJECTIVE:  Post op Day# 3 S/P L/S converted to open KWABENA and LSO  She is up in the chair and has been ambulating the halls. Tolerating a regular diet and passing flatus.     OBJECTIVE:    VITALS:  /79   Pulse 95   Temp 98.7 °F (37.1 °C) (Oral)   Resp 18   Ht 1.626 m (5' 4\")   Wt 96.4 kg (212 lb 8.4 oz)   LMP 07/23/2020   SpO2 99%   BMI 36.48 kg/m²     Intake/Output Summary (Last 24 hours) at 10/20/2024 1143  Last data filed at 10/20/2024 1119  Gross per 24 hour   Intake 1095 ml   Output --   Net 1095 ml       Physical Exam  CONSTITUTIONAL:  awake, alert, cooperative, no apparent distress, and appears stated age  ABDOMEN:  binder in place    DATA:  CBC   Lab Results   Component Value Date    WBC 14.8 (H) 10/18/2024    HGB 9.4 (L) 10/18/2024    HCT 29.8 (L) 10/18/2024     (H) 10/18/2024        ASSESSMENT AND PLAN:  POD#3  - Doing well.   - Plan D/C home today.     Christine Peterson MD

## 2024-10-20 NOTE — DISCHARGE INSTRUCTIONS
without talking to your doctor.   Do not share them.   Plan ahead for refills so you do not run out.       Lifestyle Changes    You and your doctor will plan lifestyle changes that will aid in your recovery. Some issues that may affect you include:   You will no longer have monthly periods, and you can no longer get pregnant. Birth control is not necessary.   If your ovaries are removed, you may experience menopausal symptoms, which can be controlled with medicine.   You may have a change in your sexual response. If your uterus has been removed, uterine contractions you may have felt during orgasm will no longer occur.   If the ovaries have been removed, vaginal dryness may be a problem. Estrogen can help relieve this.   You may enjoy sex more because you no longer feel pain from the condition.   If you experience a sense of loss or feel depressed after your surgery, it is important to talk to your doctor about these feelings since they can be treated.   Follow-up   Schedule a follow-up appointment as directed by your doctor.   If you have had a subtotal hysterectomy (meaning you still have your cervix), continue to have regular Pap smears .    If you had this procedure because of cancer, other treatment, such as radiation or hormonal therapy, may be used as well.    Call Your Doctor If Any of the Following Occurs   It is important for you to monitor your recovery once you leave the hospital. That way, you can alert your doctor to any problems immediately. If any of the following occurs, call your doctor:   Signs of infection, including fever and chills   Redness, swelling, increasing pain, excessive bleeding, leakage, or any discharge from the incision site   Incision opens up   Nausea and/or vomiting that you cannot control with the medicines you were given after surgery, or which persist for more than two days after discharge from the hospital   Dizziness or fainting   Cough, shortness of breath, or chest pain

## 2024-10-20 NOTE — PROGRESS NOTES
.went over d/c instructions, f/u appointments, and medications with pt and S/O. pt sent home with [facility] soap and incentive spirometer and instructed to keep using. answered all questions at this time and all stated understanding. pt to be discharged home with s/o.

## 2024-10-20 NOTE — DISCHARGE SUMMARY
These medications were sent to [facility] - [place], [place] - [address] - P [phone] - F [phone]  [address], [facility] [address]      Phone: [phone]   ibuprofen 600 MG tablet  oxyCODONE-acetaminophen 5-325 MG per tablet          Follow-up with Dr. Hooks this week and Dr. Cheema as needed.     Signed:  Christine Peterson MD

## 2024-10-21 ENCOUNTER — TELEPHONE (OUTPATIENT)
Dept: FAMILY MEDICINE CLINIC | Age: 44
End: 2024-10-21

## 2024-10-21 NOTE — TELEPHONE ENCOUNTER
Care Transitions Initial Follow Up Call    Outreach made within 2 business days of discharge: Yes    Patient: Deloris Sands Patient : 1980   MRN: [id number]  Reason for Admission: Ovarian mass, left   Discharge Date: 10/20/24       Spoke with: Donna    Discharge department/facility: [facility] Interactive Patient Contact:  Was patient able to fill all prescriptions: Yes  Was patient instructed to bring all medications to the follow-up visit: Yes  Is patient taking all medications as directed in the discharge summary? Yes  Does patient understand their discharge instructions: Yes  Does patient have questions or concerns that need addressed prior to 7-14 day follow up office visit: no    Additional needs identified to be addressed with provider  No needs identified             Scheduled appointment with PCP within 7-14 days    Follow Up  Future Appointments   Date Time Provider Department Center   2025  2:15 PM Denisse Yadav APRN - CNP [facility]     Future Appointments   Date Time Provider Department Center   10/23/2024  2:20 PM Fortino Vaughn APRN - CNP [facility]   2025  2:15 PM Denisse Yadav APRN - CNP [facility]       Jodi Quiros MA

## 2024-10-28 ENCOUNTER — APPOINTMENT (OUTPATIENT)
Dept: CT IMAGING | Age: 44
End: 2024-10-28
Payer: COMMERCIAL

## 2024-10-28 ENCOUNTER — HOSPITAL ENCOUNTER (INPATIENT)
Age: 44
LOS: 4 days | Discharge: HOME | End: 2024-11-01
Attending: STUDENT IN AN ORGANIZED HEALTH CARE EDUCATION/TRAINING PROGRAM | Admitting: OBSTETRICS & GYNECOLOGY
Payer: COMMERCIAL

## 2024-10-28 DIAGNOSIS — T81.40XA POSTOPERATIVE INFECTION, UNSPECIFIED TYPE, INITIAL ENCOUNTER: Primary | ICD-10-CM

## 2024-10-28 LAB
ALBUMIN SERPL BCG-MCNC: 3.7 G/DL (ref 3.5–5.1)
ALP SERPL-CCNC: 117 U/L (ref 38–126)
ALT SERPL W/O P-5'-P-CCNC: 11 U/L (ref 11–66)
ANION GAP SERPL CALC-SCNC: 10 MEQ/L (ref 8–16)
AST SERPL-CCNC: 16 U/L (ref 5–40)
BASOPHILS ABSOLUTE: 0.1 THOU/MM3 (ref 0–0.1)
BASOPHILS NFR BLD AUTO: 0.2 %
BILIRUB SERPL-MCNC: 0.5 MG/DL (ref 0.3–1.2)
BUN SERPL-MCNC: 9 MG/DL (ref 7–22)
CALCIUM SERPL-MCNC: 9.4 MG/DL (ref 8.5–10.5)
CHLORIDE SERPL-SCNC: 98 MEQ/L (ref 98–111)
CO2 SERPL-SCNC: 27 MEQ/L (ref 23–33)
CREAT SERPL-MCNC: 0.6 MG/DL (ref 0.4–1.2)
DEPRECATED RDW RBC AUTO: 42.6 FL (ref 35–45)
EKG ATRIAL RATE: 71 BPM
EKG P AXIS: 49 DEGREES
EKG P-R INTERVAL: 142 MS
EKG Q-T INTERVAL: 406 MS
EKG QRS DURATION: 74 MS
EKG QTC CALCULATION (BAZETT): 441 MS
EKG R AXIS: 31 DEGREES
EKG T AXIS: -7 DEGREES
EKG VENTRICULAR RATE: 71 BPM
EOSINOPHIL NFR BLD AUTO: 0.2 %
EOSINOPHILS ABSOLUTE: 0.1 THOU/MM3 (ref 0–0.4)
ERYTHROCYTE [DISTWIDTH] IN BLOOD BY AUTOMATED COUNT: 13.7 % (ref 11.5–14.5)
GFR SERPL CREATININE-BSD FRML MDRD: > 90 ML/MIN/1.73M2
GLUCOSE SERPL-MCNC: 113 MG/DL (ref 70–108)
HCT VFR BLD AUTO: 26 % (ref 37–47)
HGB BLD-MCNC: 8.5 GM/DL (ref 12–16)
IMM GRANULOCYTES # BLD AUTO: 0.22 THOU/MM3 (ref 0–0.07)
IMM GRANULOCYTES NFR BLD AUTO: 0.8 %
LACTATE SERPL-SCNC: 1.6 MMOL/L (ref 0.5–2)
LYMPHOCYTES ABSOLUTE: 2 THOU/MM3 (ref 1–4.8)
LYMPHOCYTES NFR BLD AUTO: 7.3 %
MCH RBC QN AUTO: 28.6 PG (ref 26–33)
MCHC RBC AUTO-ENTMCNC: 32.7 GM/DL (ref 32.2–35.5)
MCV RBC AUTO: 87.5 FL (ref 81–99)
MONOCYTES ABSOLUTE: 1 THOU/MM3 (ref 0.4–1.3)
MONOCYTES NFR BLD AUTO: 3.7 %
NEUTROPHILS ABSOLUTE: 23.8 THOU/MM3 (ref 1.8–7.7)
NEUTROPHILS NFR BLD AUTO: 87.8 %
NRBC BLD AUTO-RTO: 0 /100 WBC
OSMOLALITY SERPL CALC.SUM OF ELEC: 269.6 MOSMOL/KG (ref 275–300)
PLATELET # BLD AUTO: 682 THOU/MM3 (ref 130–400)
PLATELET BLD QL SMEAR: ABNORMAL
PMV BLD AUTO: 9.1 FL (ref 9.4–12.4)
POLYCHROMASIA BLD QL SMEAR: ABNORMAL
POTASSIUM SERPL-SCNC: 3.5 MEQ/L (ref 3.5–5.2)
PROT SERPL-MCNC: 7.2 G/DL (ref 6.1–8)
RBC # BLD AUTO: 2.97 MILL/MM3 (ref 4.2–5.4)
SCAN OF BLOOD SMEAR: NORMAL
SODIUM SERPL-SCNC: 135 MEQ/L (ref 135–145)
VARIANT LYMPHS BLD QL SMEAR: ABNORMAL %
WBC # BLD AUTO: 27.1 THOU/MM3 (ref 4.8–10.8)

## 2024-10-28 PROCEDURE — 83605 ASSAY OF LACTIC ACID: CPT

## 2024-10-28 PROCEDURE — 85025 COMPLETE CBC W/AUTO DIFF WBC: CPT

## 2024-10-28 PROCEDURE — 6370000000 HC RX 637 (ALT 250 FOR IP): Performed by: OBSTETRICS & GYNECOLOGY

## 2024-10-28 PROCEDURE — 1200000003 HC TELEMETRY R&B

## 2024-10-28 PROCEDURE — 99285 EMERGENCY DEPT VISIT HI MDM: CPT

## 2024-10-28 PROCEDURE — 80053 COMPREHEN METABOLIC PANEL: CPT

## 2024-10-28 PROCEDURE — 2580000003 HC RX 258

## 2024-10-28 PROCEDURE — 96365 THER/PROPH/DIAG IV INF INIT: CPT

## 2024-10-28 PROCEDURE — 87040 BLOOD CULTURE FOR BACTERIA: CPT

## 2024-10-28 PROCEDURE — 6360000002 HC RX W HCPCS

## 2024-10-28 PROCEDURE — 6360000004 HC RX CONTRAST MEDICATION: Performed by: STUDENT IN AN ORGANIZED HEALTH CARE EDUCATION/TRAINING PROGRAM

## 2024-10-28 PROCEDURE — 36415 COLL VENOUS BLD VENIPUNCTURE: CPT

## 2024-10-28 PROCEDURE — 74177 CT ABD & PELVIS W/CONTRAST: CPT

## 2024-10-28 PROCEDURE — 93005 ELECTROCARDIOGRAM TRACING: CPT

## 2024-10-28 PROCEDURE — 96367 TX/PROPH/DG ADDL SEQ IV INF: CPT

## 2024-10-28 RX ORDER — ENOXAPARIN SODIUM 100 MG/ML
40 INJECTION SUBCUTANEOUS DAILY
Status: DISCONTINUED | OUTPATIENT
Start: 2024-10-29 | End: 2024-11-01 | Stop reason: HOSPADM

## 2024-10-28 RX ORDER — ONDANSETRON 4 MG/1
4 TABLET, ORALLY DISINTEGRATING ORAL EVERY 8 HOURS PRN
Status: DISCONTINUED | OUTPATIENT
Start: 2024-10-28 | End: 2024-11-01 | Stop reason: HOSPADM

## 2024-10-28 RX ORDER — IOPAMIDOL 755 MG/ML
80 INJECTION, SOLUTION INTRAVASCULAR
Status: COMPLETED | OUTPATIENT
Start: 2024-10-28 | End: 2024-10-28

## 2024-10-28 RX ORDER — MORPHINE SULFATE 4 MG/ML
4 INJECTION, SOLUTION INTRAMUSCULAR; INTRAVENOUS
Status: DISCONTINUED | OUTPATIENT
Start: 2024-10-28 | End: 2024-11-01 | Stop reason: HOSPADM

## 2024-10-28 RX ORDER — ONDANSETRON 2 MG/ML
4 INJECTION INTRAMUSCULAR; INTRAVENOUS EVERY 6 HOURS PRN
Status: DISCONTINUED | OUTPATIENT
Start: 2024-10-28 | End: 2024-11-01 | Stop reason: HOSPADM

## 2024-10-28 RX ORDER — SODIUM CHLORIDE 0.9 % (FLUSH) 0.9 %
5-40 SYRINGE (ML) INJECTION EVERY 12 HOURS SCHEDULED
Status: DISCONTINUED | OUTPATIENT
Start: 2024-10-28 | End: 2024-11-01 | Stop reason: HOSPADM

## 2024-10-28 RX ORDER — MORPHINE SULFATE 2 MG/ML
2 INJECTION, SOLUTION INTRAMUSCULAR; INTRAVENOUS
Status: DISCONTINUED | OUTPATIENT
Start: 2024-10-28 | End: 2024-11-01 | Stop reason: HOSPADM

## 2024-10-28 RX ORDER — SODIUM CHLORIDE 9 MG/ML
INJECTION, SOLUTION INTRAVENOUS PRN
Status: DISCONTINUED | OUTPATIENT
Start: 2024-10-28 | End: 2024-11-01 | Stop reason: HOSPADM

## 2024-10-28 RX ORDER — ACETAMINOPHEN 325 MG/1
650 TABLET ORAL EVERY 4 HOURS PRN
Status: DISCONTINUED | OUTPATIENT
Start: 2024-10-28 | End: 2024-11-01 | Stop reason: HOSPADM

## 2024-10-28 RX ORDER — HYDROCODONE BITARTRATE AND ACETAMINOPHEN 5; 325 MG/1; MG/1
1 TABLET ORAL EVERY 4 HOURS PRN
Status: DISCONTINUED | OUTPATIENT
Start: 2024-10-28 | End: 2024-11-01 | Stop reason: HOSPADM

## 2024-10-28 RX ORDER — ONDANSETRON 2 MG/ML
4 INJECTION INTRAMUSCULAR; INTRAVENOUS ONCE
Status: DISCONTINUED | OUTPATIENT
Start: 2024-10-28 | End: 2024-11-01 | Stop reason: HOSPADM

## 2024-10-28 RX ORDER — SODIUM CHLORIDE 0.9 % (FLUSH) 0.9 %
5-40 SYRINGE (ML) INJECTION PRN
Status: DISCONTINUED | OUTPATIENT
Start: 2024-10-28 | End: 2024-11-01 | Stop reason: HOSPADM

## 2024-10-28 RX ORDER — HYDROCODONE BITARTRATE AND ACETAMINOPHEN 5; 325 MG/1; MG/1
2 TABLET ORAL EVERY 4 HOURS PRN
Status: DISCONTINUED | OUTPATIENT
Start: 2024-10-28 | End: 2024-11-01 | Stop reason: HOSPADM

## 2024-10-28 RX ADMIN — VANCOMYCIN HYDROCHLORIDE 2250 MG: 1 INJECTION, POWDER, LYOPHILIZED, FOR SOLUTION INTRAVENOUS at 18:52

## 2024-10-28 RX ADMIN — PIPERACILLIN AND TAZOBACTAM 4500 MG: 4; .5 INJECTION, POWDER, FOR SOLUTION INTRAVENOUS at 17:48

## 2024-10-28 RX ADMIN — HYDROCODONE BITARTRATE AND ACETAMINOPHEN 2 TABLET: 5; 325 TABLET ORAL at 21:41

## 2024-10-28 RX ADMIN — IOPAMIDOL 80 ML: 755 INJECTION, SOLUTION INTRAVENOUS at 17:20

## 2024-10-28 ASSESSMENT — PAIN SCALES - GENERAL
PAINLEVEL_OUTOF10: 0
PAINLEVEL_OUTOF10: 0
PAINLEVEL_OUTOF10: 7

## 2024-10-28 ASSESSMENT — PAIN - FUNCTIONAL ASSESSMENT
PAIN_FUNCTIONAL_ASSESSMENT: NONE - DENIES PAIN

## 2024-10-28 ASSESSMENT — PAIN DESCRIPTION - LOCATION: LOCATION: ABDOMEN

## 2024-10-28 NOTE — ED NOTES
Pt ambulated to bathroom without difficulty. Pt medicated per MAR. Becky DUNLAP changed pt dressing on abd. Pt changed into clean gown. Pt states no needs at this time. Call light in reach, family at bedside. VSS

## 2024-10-28 NOTE — ED PROVIDER NOTES
[facility] EMERGENCY DEPT  EMERGENCY DEPARTMENT ENCOUNTER          Pt Name: Deloris Sands  MRN: [id number]  Birthdate 1980  Date of evaluation: 10/28/2024  Physician: Julia Eastman MD  Supervising Attending Physician: Hilton Whitney DO       CHIEF COMPLAINT       Chief Complaint   Patient presents with    Post-op Problem         HISTORY OF PRESENT ILLNESS    HPI  Deloris Sands is a 44 y.o. female past medical history of diabetes, hypertension and high cholesterol who presents to the emergency department from home for evaluation of purulent drainage coming from her navel onset today.  Patient relates that she was taking a nap and when she woke up and looked in the mirror there was a large area of staining on her shirt.  When she looked at her belly she noticed that she had purulent drainage coming from her bellybutton.  She relates that she also feels like there is a hard spot in her abdomen just below her belly.  She has no abdominal pain currently.  Patient relates that she had a left ovarian mass and left ovary removed on 10/17/2024 [facility].  States that she had to have general surgery involved because she had so many adhesions.  Patient relates seeing MAIRA Yan, on this past Thursday and had some staples removed.  Everything was okay at that time.  Patient relates that she has had a decreased appetite and some nausea since her surgery.  Has not been eating or drinking as well since then.  Her last normal bowel movement was today.  Her last meal was around 10:00 AM this morning.  Denies fever, chills, headache, lightheadedness, dizziness, vision changes, tinnitus, cough, congestion, sore throat, neck pain/stiffness, chest pain, shortness of breath, abdominal pain, nausea, vomiting, constipation, diarrhea, dysuria, blood in the urine or stool, numbness/tingling/weakness in extremities.    The patient has no other acute complaints at this

## 2024-10-28 NOTE — ED TRIAGE NOTES
Pt presents to ED with c/o post op problem following a left ovary removal 10/17. Pt states today she woke up from a nap today with fluid seeping from naval. Pt states prior to nap she had slight pressure in her abdomen, and now pressure has decreased. Pt states she is more sensitive to palpation than usual. Pt states she saw surgeon this AM, who thought the area was a hematoma and instructed her to use a heat pack. Pt states they tried to use scopes during the procedure, scopes \"failed, and they had to open me up.\" Surgical site clean, no inflammation noted, marino intact. Naval is seeping purulent fluid. Pt denies pain and does not seem to be in distress. Pt states cold chills and nausea.

## 2024-10-28 NOTE — ED NOTES
Pt resting on cot. No distress noted. RR even and unlabored. Pt states that she is feeling really warm. Pt temp checked and does not have a fever. Pt denies the need for zofran at this time. Call light in reach. Pt updated on poc.

## 2024-10-29 LAB
ANION GAP SERPL CALC-SCNC: 11 MEQ/L (ref 8–16)
BASOPHILS ABSOLUTE: 0 THOU/MM3 (ref 0–0.1)
BASOPHILS NFR BLD AUTO: 0.2 %
BUN SERPL-MCNC: 9 MG/DL (ref 7–22)
CALCIUM SERPL-MCNC: 8.7 MG/DL (ref 8.5–10.5)
CHLORIDE SERPL-SCNC: 100 MEQ/L (ref 98–111)
CO2 SERPL-SCNC: 26 MEQ/L (ref 23–33)
CREAT SERPL-MCNC: 0.6 MG/DL (ref 0.4–1.2)
DEPRECATED RDW RBC AUTO: 43.6 FL (ref 35–45)
EOSINOPHIL NFR BLD AUTO: 0.7 %
EOSINOPHILS ABSOLUTE: 0.1 THOU/MM3 (ref 0–0.4)
ERYTHROCYTE [DISTWIDTH] IN BLOOD BY AUTOMATED COUNT: 13.6 % (ref 11.5–14.5)
GFR SERPL CREATININE-BSD FRML MDRD: > 90 ML/MIN/1.73M2
GLUCOSE SERPL-MCNC: 118 MG/DL (ref 70–108)
HCT VFR BLD AUTO: 25.6 % (ref 37–47)
HGB BLD-MCNC: 8.2 GM/DL (ref 12–16)
IMM GRANULOCYTES # BLD AUTO: 0.1 THOU/MM3 (ref 0–0.07)
IMM GRANULOCYTES NFR BLD AUTO: 0.6 %
LYMPHOCYTES ABSOLUTE: 2.2 THOU/MM3 (ref 1–4.8)
LYMPHOCYTES NFR BLD AUTO: 12.9 %
MCH RBC QN AUTO: 28.4 PG (ref 26–33)
MCHC RBC AUTO-ENTMCNC: 32 GM/DL (ref 32.2–35.5)
MCV RBC AUTO: 88.6 FL (ref 81–99)
MONOCYTES ABSOLUTE: 0.8 THOU/MM3 (ref 0.4–1.3)
MONOCYTES NFR BLD AUTO: 4.9 %
NEUTROPHILS ABSOLUTE: 13.6 THOU/MM3 (ref 1.8–7.7)
NEUTROPHILS NFR BLD AUTO: 80.7 %
NRBC BLD AUTO-RTO: 0 /100 WBC
PLATELET # BLD AUTO: 595 THOU/MM3 (ref 130–400)
PMV BLD AUTO: 8.9 FL (ref 9.4–12.4)
POTASSIUM SERPL-SCNC: 4 MEQ/L (ref 3.5–5.2)
RBC # BLD AUTO: 2.89 MILL/MM3 (ref 4.2–5.4)
SODIUM SERPL-SCNC: 137 MEQ/L (ref 135–145)
WBC # BLD AUTO: 16.9 THOU/MM3 (ref 4.8–10.8)

## 2024-10-29 PROCEDURE — 6360000002 HC RX W HCPCS: Performed by: OBSTETRICS & GYNECOLOGY

## 2024-10-29 PROCEDURE — 36415 COLL VENOUS BLD VENIPUNCTURE: CPT

## 2024-10-29 PROCEDURE — 2580000003 HC RX 258: Performed by: OBSTETRICS & GYNECOLOGY

## 2024-10-29 PROCEDURE — 85025 COMPLETE CBC W/AUTO DIFF WBC: CPT

## 2024-10-29 PROCEDURE — 80048 BASIC METABOLIC PNL TOTAL CA: CPT

## 2024-10-29 PROCEDURE — 6370000000 HC RX 637 (ALT 250 FOR IP): Performed by: OBSTETRICS & GYNECOLOGY

## 2024-10-29 PROCEDURE — 1200000003 HC TELEMETRY R&B

## 2024-10-29 RX ORDER — DOCUSATE SODIUM 100 MG/1
100 CAPSULE, LIQUID FILLED ORAL 2 TIMES DAILY
Status: DISCONTINUED | OUTPATIENT
Start: 2024-10-29 | End: 2024-11-01 | Stop reason: HOSPADM

## 2024-10-29 RX ADMIN — PIPERACILLIN AND TAZOBACTAM 3375 MG: 3; .375 INJECTION, POWDER, FOR SOLUTION INTRAVENOUS at 16:46

## 2024-10-29 RX ADMIN — PIPERACILLIN AND TAZOBACTAM 3375 MG: 3; .375 INJECTION, POWDER, FOR SOLUTION INTRAVENOUS at 08:29

## 2024-10-29 RX ADMIN — PIPERACILLIN AND TAZOBACTAM 3375 MG: 3; .375 INJECTION, POWDER, FOR SOLUTION INTRAVENOUS at 23:58

## 2024-10-29 RX ADMIN — DOCUSATE SODIUM 100 MG: 100 CAPSULE, LIQUID FILLED ORAL at 20:55

## 2024-10-29 RX ADMIN — PIPERACILLIN AND TAZOBACTAM 3375 MG: 3; .375 INJECTION, POWDER, FOR SOLUTION INTRAVENOUS at 00:13

## 2024-10-29 RX ADMIN — SODIUM CHLORIDE, PRESERVATIVE FREE 10 ML: 5 INJECTION INTRAVENOUS at 08:30

## 2024-10-29 RX ADMIN — SODIUM CHLORIDE, PRESERVATIVE FREE 10 ML: 5 INJECTION INTRAVENOUS at 20:55

## 2024-10-29 RX ADMIN — ENOXAPARIN SODIUM 40 MG: 100 INJECTION SUBCUTANEOUS at 08:30

## 2024-10-29 ASSESSMENT — PAIN SCALES - GENERAL
PAINLEVEL_OUTOF10: 5
PAINLEVEL_OUTOF10: 0

## 2024-10-29 ASSESSMENT — PAIN DESCRIPTION - LOCATION: LOCATION: ABDOMEN

## 2024-10-29 NOTE — H&P
OB/GYN History and Physical  [facility]    Patient Name: Deloris Sands     Patient : 1980  Room/Bed: [facility]  Admission Date/Time: 10/28/2024  3:53 PM  Primary Care Physician: Fortino Vaughn APRN - CNP        CC:   Chief Complaint   Patient presents with    Post-op Problem     Pus from umbilicus           HPI: Deloris Sands is a 44 y.o. female  presents  pm , c/o greenish fluid from umbilical incision. Patient's last menstrual period was 2020.     Patient is a 44yoF with Mhx significant for prediabetes, HLD, and prior JAMAL with bilateral salpingectomy in . She underwent exploratory laparotomy on 10/17 for symptomatic left ovarian mass (7cm on CT with normal ) with extensive lysis of adhesions, left oophorectomy, and repair of small bowel lacerations by Dr. Sandra Hooks, Dr. Christine Peterson, and Dr. Genaro Cheema. The retroperitoneal R ovary was left intact. She stay inpatient from 10/17-10/20 following surgery.     Patient presented to the ED 10/28/24 with abdominal tenderness and purulent drainage from umbilicus. Labs significant for leukocytosis (27.1) with increased neutrophils (23.8), anemia (Hgb 8.5), and thrombocytosis (Plt 682). CT A/P (10/28/24) with loculated fluid collection suspicious for abscess of the L pericolic gutter and cul-de-sac as well as edema of the periumbilical region suspicious for cellulitis. Patient received Vancomycin and Zosyn in the ED as well as Norco.      Today. Vital signs stable with low grade temperature of 99.3 noted overnight. Patient states she overall feels well, but she decided to come to the hospital because the drainage worried her. She had been napping and woke to find a greenish-brown liquid coming out of her umbilicus. She denies fever, abdominal pain, and nausea. She has urinated multiple tines but has not had a bowel movement yet.    REVIEW OF SYSTEMS:   A minimum of an eleven point review  recognition technology.** Electronically signed by Dr. Marvin Cannon      ASSESSMENT & PLAN:    Deloris Sands is a 44 y.o. female   With a periumbilical abscess 8 days from surery for a left ovarian cyst.    On zosyn (and received 1 dose of Vancomycin in the ED)  Await cultures,  Plan for at least 24-48 hours iv abx then transition to oral.   May open abscess further if drainage continues.    Patient Active Problem List    Diagnosis Date Noted    Type 2 diabetes mellitus without complication, without long-term current use of insulin (HCC) 10/18/2022     Priority: Medium    Pure hypercholesterolemia 10/18/2022     Priority: Medium    Lumbar facet arthropathy 2022     Priority: Medium    Acquired spondylolisthesis 2022     Priority: Medium    Head revolving around 2022     Priority: Medium    Lumbar radiculopathy 2022     Priority: Medium    Postoperative infection, initial encounter 10/28/2024    Ovarian mass, left 10/17/2024    Type 2 diabetes mellitus with chronic kidney disease 07/10/2023    Severe obesity (BMI 35.0-39.9) with comorbidity 2023    Spinal stenosis of lumbar region without neurogenic claudication 2021    Herniated lumbar intervertebral disc 2021    Pelvic pain 09/10/2020    S/P hysterectomy 09/10/2020    Prediabetes 2020    Right Ramirez's palsy 2019    Diarrhea     Hyperlipidemia     Reactive thrombocytosis     Obesity (BMI 30-39.9)     Family history of early CAD 2017    Intermittent palpitations 2017    Hashimoto's thyroiditis 2016    Panic disorder without agoraphobia with moderate panic attacks 2016    Essential hypertension 2016       Sandra Hooks MD    10/29/2024, 7:48 PM

## 2024-10-29 NOTE — ACP (ADVANCE CARE PLANNING)
Advance Care Planning     Advance Care Planning Inpatient Note  Spiritual Care Department    Today's Date: 10/29/2024       Unit: [facility]       Received request from IDT members. Upon review of chart and communication with care team, Spiritual Health  will defer Advance Care Plaining with Patient at this time.Patient  was/were present in the room during visit.    Goals of ACP Conversation:  Discussed Advanced Care Planing     Health Care Decision Makers:     Primary Decision Maker: Ashlyn Hermosillo, Brother          Summary:  No Decision Maker named by patient at this time.    Advance Care Planning Documents (Patient Wishes):   None    Assessment:  Deloris is a 44 year old female resting in her bed at time of this visit. She has two nieces in the room visiting. Patient told me she works here at [employer].During this visit, we discussed ACP and Living WIll Document and patient concluded she will reach back to Spiritual Care team to when she is ready. Patient is aware that spiritual Care staff will return to complete Spiritual Consult. {    Interventions:  This  encouraged ongoing ACP Conversation with future decision maker and loved ones.      Care Preferences Communicated:   No      Outcomes/Plan:  ACP discussion is held with patient during this visit.      Electronically signed by Fredi Sargent MDiv., [facility] on 100/29/2024 at 4:13 PM

## 2024-10-29 NOTE — ED NOTES
Patient resting in bed. Respirations easy and unlabored. No distress noted. Call light within reach. Denies needs. Boxed lunch provided. Family at bedside

## 2024-10-29 NOTE — PROGRESS NOTES
Pharmacy Note - Extended Infusion Beta-Lactam Dose Adjustment    Piperacillin/Tazobactam 3375 mg q6h intermittent infusion ordered for the treatment of Intra-abdominal Infection. Per [facility] Extended Infusion Beta-Lactam Policy, this will be changed to 4500 mg loading dose followed by 3375 mg q8h extended infusion    Estimated Creatinine Clearance: Estimated Creatinine Clearance: 132 mL/min (based on SCr of 0.6 mg/dL).    Dialysis Status, ADELFO, CKD: Normal    BMI: Body mass index is 36.22 kg/m².    Rationale for Adjustment: Dose adjusted per [facility] Extended Infusion Policy based on renal function and indication. The above medication is renally eliminated and demonstrates time-dependent effects on bacterial eradication. Extended-infusion dosing strategy aims to enhance microbiologic and clinical efficacy.     Pharmacy will monitor renal function daily and adjust dose as necessary.      Please call with any questions.    Thank you,  Christine Guidry [facility], BCPS, [facility]  10/28/2024     8:36 PM

## 2024-10-29 NOTE — PLAN OF CARE
Problem: Chronic Conditions and Co-morbidities  Goal: Patient's chronic conditions and co-morbidity symptoms are monitored and maintained or improved  Outcome: Progressing  Flowsheets (Taken 10/28/2024 2231)  Care Plan - Patient's Chronic Conditions and Co-Morbidity Symptoms are Monitored and Maintained or Improved:   Monitor and assess patient's chronic conditions and comorbid symptoms for stability, deterioration, or improvement   Collaborate with multidisciplinary team to address chronic and comorbid conditions and prevent exacerbation or deterioration   Update acute care plan with appropriate goals if chronic or comorbid symptoms are exacerbated and prevent overall improvement and discharge     Problem: Discharge Planning  Goal: Discharge to home or other facility with appropriate resources  Outcome: Progressing  Flowsheets (Taken 10/28/2024 2231)  Discharge to home or other facility with appropriate resources:   Identify barriers to discharge with patient and caregiver   Arrange for needed discharge resources and transportation as appropriate   Identify discharge learning needs (meds, wound care, etc)     Problem: Safety - Adult  Goal: Free from fall injury  Outcome: Progressing  Flowsheets (Taken 10/28/2024 2231)  Free From Fall Injury: Instruct family/caregiver on patient safety

## 2024-10-29 NOTE — PROGRESS NOTES
This  visited Jaylyn, a 43 year old female laying in her bed awake, calm, alert, approachable, coping and hopeful. Patient have rwo of her nieces in the room with her during this visit. This visit is in response to a Spiritual Consult to discuss ACP document with patient. Please see ACP note for detail on this visit.

## 2024-10-29 NOTE — PLAN OF CARE
Problem: Chronic Conditions and Co-morbidities  Goal: Patient's chronic conditions and co-morbidity symptoms are monitored and maintained or improved  Outcome: Progressing  Flowsheets (Taken 10/28/2024 2231 by Leeroy Hameed RN)  Care Plan - Patient's Chronic Conditions and Co-Morbidity Symptoms are Monitored and Maintained or Improved:   Monitor and assess patient's chronic conditions and comorbid symptoms for stability, deterioration, or improvement   Collaborate with multidisciplinary team to address chronic and comorbid conditions and prevent exacerbation or deterioration   Update acute care plan with appropriate goals if chronic or comorbid symptoms are exacerbated and prevent overall improvement and discharge     Problem: Discharge Planning  Goal: Discharge to home or other facility with appropriate resources  Outcome: Progressing  Flowsheets (Taken 10/28/2024 2231 by Leeroy Hameed, RN)  Discharge to home or other facility with appropriate resources:   Identify barriers to discharge with patient and caregiver   Arrange for needed discharge resources and transportation as appropriate   Identify discharge learning needs (meds, wound care, etc)     Problem: Safety - Adult  Goal: Free from fall injury  Outcome: Progressing  Flowsheets (Taken 10/28/2024 2231 by Leeroy Hameed, RN)  Free From Fall Injury: Instruct family/caregiver on patient safety

## 2024-10-29 NOTE — PROGRESS NOTES
GYN PROGRESS NOTE    HD # 1    SUBJECTIVE  Patient is a 44yoF with Mhx significant for prediabetes, HLD, and prior JAMAL with bilateral salpingectomy in 2020. She underwent exploratory laparotomy on 10/17 for symptomatic left ovarian mass (7cm on CT with normal ) with extensive lysis of adhesions, left oophorectomy, and repair of small bowel lacerations by Dr. Sandra Hooks, Dr. Christine Peterson, and Dr. Genaro Cheema. The retroperitoneal R ovary was left intact. She stay inpatient from 10/17-10/20 following surgery.    Patient presented to the ED 10/28/24 with abdominal tenderness and purulent drainage from umbilicus. Labs significant for leukocytosis (27.1) with increased neutrophils (23.8), anemia (Hgb 8.5), and thrombocytosis (Plt 682). CT A/P (10/28/24) with loculated fluid collection suspicious for abscess of the L pericolic gutter and cul-de-sac as well as edema of the periumbilical region suspicious for cellulitis. Patient received Vancomycin and Zosyn in the ED as well as Norco.     Today. Vital signs stable with low grade temperature of 99.3 noted overnight. Patient states she overall feels well, but she decided to come to the hospital because the drainage worried her. She had been napping and woke to find a greenish-brown liquid coming out of her umbilicus. She denies fever, abdominal pain, and nausea. She has urinated multiple tines but has not had a bowel movement yet.    OBJECTIVE  /79   Pulse 64   Temp 97.7 °F (36.5 °C) (Oral)   Resp 16   Ht 1.626 m (5' 4\")   Wt 95.7 kg (211 lb)   LMP 07/23/2020   SpO2 99%   BMI 36.22 kg/m²     CARDIO: Regular rate and rhythm  LUNGS: No respiratory distress, no rhonchi, no rales  ABD: Soft, nontender, no guarding. Umbilicus covered with white bandage, no drainage noted, however brownish discoloration of hospital gown where it lays across the umbilicus  EXTREMITIES: No edema, no tenderness to palpation    Lab Results   Component Value Date    WBC

## 2024-10-29 NOTE — CARE COORDINATION
Case Management Assessment Initial Evaluation    Date/Time of Evaluation: 10/29/2024 7:26 AM  Assessment Completed by: Pavithra Martinez RN    If patient is discharged prior to next notation, then this note serves as note for discharge by case management.    Patient Name: Deloris Sands                   YOB: 1980  Diagnosis: Postoperative infection, initial encounter [T81.40XA]  Postoperative infection, unspecified type, initial encounter [T81.40XA]                   Date / Time: 10/28/2024  3:53 PM  Location: [facility]     Patient Admission Status: Inpatient   Readmission Risk Low 0-14, Mod 15-19), High > 20: Readmission Risk Score: 13.3    Current PCP: Fortino Vaughn, MARÍA - CNP  Health Care Decision Makers:   Primary Decision Maker: Ashlyn Sands - Brother/Sister - [phone]    Additional Case Management Notes: Presented with post-op concerns. Abdominal pressure and fluid leaking from naval. Surgeon had evaluated earlier in day and felt there was a hematoma. GYN following. IV zosyn. Pain control. PRN zofran.     Procedures: none    Imaging:   10/28 CT abdomen/pelvis:   1. Ill-defined complex hyperdense fluid collection in the anterior omentum   in the periumbilical region with pockets of air and loculated fluid   collection along the left pericolic gutter suspicious for early abscess.  2. High-density non loculated fluid in the left oophorectomy bed and   minimal peripheral enhancement of the fluid in the cul-de-sac can be   followed up to exclude phlegmon or developing early abscess.  3. Subcutaneous stranding/edema and air in the periumbilical region can be   correlated for cellulitis with phlegmon not excluded.  4. Mild wall thickening of the sigmoid colon likely related to reactive   colitis.  5. Nonobstructing right renal stone.    Patient Goals/Plan/Treatment Preferences: Spoke with Donna, plans home with family. She is independent and does not anticipate discharge needs.

## 2024-10-29 NOTE — CARE COORDINATION
10/29/24 1136   Readmission Assessment   Number of Days since last admission? 8-30 days   Previous Disposition Home with Family   Who is being Interviewed Patient   What was the patient's/caregiver's perception as to why they think they needed to return back to the hospital? Other (Comment)  (post op concerns)   Did you visit your Primary Care Physician after you left the hospital, before you returned this time? No   Why weren't you able to visit your PCP? Other (Comment)  (readmitted)   Did you see a specialist, such as Cardiac, Pulmonary, Orthopedic Physician, etc. after you left the hospital? Yes   Who advised the patient to return to the hospital? Physician   Does the patient report anything that got in the way of taking their medications? No   In our efforts to provide the best possible care to you and others like you, can you think of anything that we could have done to help you after you left the hospital the first time, so that you might not have needed to return so soon? Other (Comment)  (n/a)

## 2024-10-30 LAB
BASOPHILS ABSOLUTE: 0 THOU/MM3 (ref 0–0.1)
BASOPHILS NFR BLD AUTO: 0.2 %
DEPRECATED RDW RBC AUTO: 43.9 FL (ref 35–45)
EOSINOPHIL NFR BLD AUTO: 1.1 %
EOSINOPHILS ABSOLUTE: 0.1 THOU/MM3 (ref 0–0.4)
ERYTHROCYTE [DISTWIDTH] IN BLOOD BY AUTOMATED COUNT: 13.8 % (ref 11.5–14.5)
HCT VFR BLD AUTO: 25.6 % (ref 37–47)
HGB BLD-MCNC: 8.3 GM/DL (ref 12–16)
IMM GRANULOCYTES # BLD AUTO: 0.1 THOU/MM3 (ref 0–0.07)
IMM GRANULOCYTES NFR BLD AUTO: 0.8 %
LYMPHOCYTES ABSOLUTE: 2.4 THOU/MM3 (ref 1–4.8)
LYMPHOCYTES NFR BLD AUTO: 18.6 %
MCH RBC QN AUTO: 28.6 PG (ref 26–33)
MCHC RBC AUTO-ENTMCNC: 32.4 GM/DL (ref 32.2–35.5)
MCV RBC AUTO: 88.3 FL (ref 81–99)
MONOCYTES ABSOLUTE: 0.7 THOU/MM3 (ref 0.4–1.3)
MONOCYTES NFR BLD AUTO: 5.3 %
NEUTROPHILS ABSOLUTE: 9.5 THOU/MM3 (ref 1.8–7.7)
NEUTROPHILS NFR BLD AUTO: 74 %
NRBC BLD AUTO-RTO: 0 /100 WBC
PLATELET # BLD AUTO: 628 THOU/MM3 (ref 130–400)
PMV BLD AUTO: 9.1 FL (ref 9.4–12.4)
RBC # BLD AUTO: 2.9 MILL/MM3 (ref 4.2–5.4)
WBC # BLD AUTO: 12.9 THOU/MM3 (ref 4.8–10.8)

## 2024-10-30 PROCEDURE — 87205 SMEAR GRAM STAIN: CPT

## 2024-10-30 PROCEDURE — 6360000002 HC RX W HCPCS: Performed by: OBSTETRICS & GYNECOLOGY

## 2024-10-30 PROCEDURE — 2580000003 HC RX 258: Performed by: OBSTETRICS & GYNECOLOGY

## 2024-10-30 PROCEDURE — 6370000000 HC RX 637 (ALT 250 FOR IP): Performed by: OBSTETRICS & GYNECOLOGY

## 2024-10-30 PROCEDURE — 87186 SC STD MICRODIL/AGAR DIL: CPT

## 2024-10-30 PROCEDURE — 85025 COMPLETE CBC W/AUTO DIFF WBC: CPT

## 2024-10-30 PROCEDURE — 1200000003 HC TELEMETRY R&B

## 2024-10-30 PROCEDURE — 87070 CULTURE OTHR SPECIMN AEROBIC: CPT

## 2024-10-30 PROCEDURE — 87075 CULTR BACTERIA EXCEPT BLOOD: CPT

## 2024-10-30 PROCEDURE — 36415 COLL VENOUS BLD VENIPUNCTURE: CPT

## 2024-10-30 PROCEDURE — 87077 CULTURE AEROBIC IDENTIFY: CPT

## 2024-10-30 RX ADMIN — SODIUM CHLORIDE, PRESERVATIVE FREE 5 ML: 5 INJECTION INTRAVENOUS at 21:31

## 2024-10-30 RX ADMIN — DOCUSATE SODIUM 100 MG: 100 CAPSULE, LIQUID FILLED ORAL at 21:31

## 2024-10-30 RX ADMIN — PIPERACILLIN AND TAZOBACTAM 3375 MG: 3; .375 INJECTION, POWDER, FOR SOLUTION INTRAVENOUS at 16:12

## 2024-10-30 RX ADMIN — SODIUM CHLORIDE, PRESERVATIVE FREE 10 ML: 5 INJECTION INTRAVENOUS at 08:37

## 2024-10-30 RX ADMIN — DOCUSATE SODIUM 100 MG: 100 CAPSULE, LIQUID FILLED ORAL at 08:36

## 2024-10-30 RX ADMIN — ENOXAPARIN SODIUM 40 MG: 100 INJECTION SUBCUTANEOUS at 08:36

## 2024-10-30 RX ADMIN — PIPERACILLIN AND TAZOBACTAM 3375 MG: 3; .375 INJECTION, POWDER, FOR SOLUTION INTRAVENOUS at 08:44

## 2024-10-30 RX ADMIN — ACETAMINOPHEN 650 MG: 325 TABLET ORAL at 08:36

## 2024-10-30 ASSESSMENT — PAIN DESCRIPTION - ORIENTATION
ORIENTATION: MID;RIGHT;LEFT
ORIENTATION: MID

## 2024-10-30 ASSESSMENT — PAIN DESCRIPTION - DESCRIPTORS
DESCRIPTORS: DISCOMFORT;SORE;OTHER (COMMENT)
DESCRIPTORS: ACHING;SORE;TIGHTNESS
DESCRIPTORS: ACHING;DISCOMFORT

## 2024-10-30 ASSESSMENT — PAIN SCALES - GENERAL
PAINLEVEL_OUTOF10: 0
PAINLEVEL_OUTOF10: 5
PAINLEVEL_OUTOF10: 5
PAINLEVEL_OUTOF10: 0
PAINLEVEL_OUTOF10: 5

## 2024-10-30 ASSESSMENT — PAIN DESCRIPTION - LOCATION
LOCATION: ABDOMEN

## 2024-10-30 ASSESSMENT — ENCOUNTER SYMPTOMS: NAUSEA: 1

## 2024-10-30 NOTE — PROGRESS NOTES
GYN PROGRESS NOTE    HD #2    SUBJECTIVE  Hospital Course  Patient is a 44yoF with Mhx significant for prediabetes, HLD, and prior JAMAL with bilateral salpingectomy in 2020 who underwent exploratory laparotomy on 10/17 for symptomatic left ovarian mass (7cm on CT with normal ) with extensive lysis of adhesions, left oophorectomy, and repair of small bowel lacerations by Dr. Sandra Hooks, Dr. Christine Peterson, and Dr. Genaro Cheema. The retroperitoneal R ovary was left intact. She stayed inpatient from 10/17-10/20 following surgery. She presented to the ED 10/28/24 with abdominal tenderness and purulent drainage from umbilicus. Labs significant for leukocytosis (27.1) with increased neutrophils (23.8), anemia (Hgb 8.5), and thrombocytosis (Plt 682). CT A/P (10/28/24) with loculated fluid collection suspicious for abscess of the L pericolic gutter and cul-de-sac as well as edema of the periumbilical region suspicious for cellulitis. Patient received Vancomycin and Zosyn in the ED as well as [place]     10/30/24  Today, Donna is feeling well aside from abdominal pressure with bowel movements. She has been up ambulating. Vital signs stabble aside from some mildly elevated BP. She says the pressure is constant and worsens with palpation of the abdomen, just below the umbilicus. She is afebrile today. When shown the [place] stool chart, she points to consistency 4 and 5. Suspect the pressure sensation may be due to the abscess more than constipation, however, RN states she will check a BM later to check consistency. Patient has not taken any Norco since 10/28. Today is day 2 of IV antibiotics. Dressing changed while seeing patient.     OBJECTIVE  BP (!) 142/86   Pulse 81   Temp 98.4 °F (36.9 °C) (Oral)   Resp 18   Ht 1.626 m (5' 4\")   Wt 95.7 kg (211 lb)   LMP 07/23/2020   SpO2 98%   BMI 36.22 kg/m²     ABD: Tender to palpation of the midling lower abdomen without erythema however firm area noted just below

## 2024-10-30 NOTE — CARE COORDINATION
10/30/24, 2:21 PM EDT    DISCHARGE ON GOING EVALUATION    [facility]  Location: -11/[facility]-A Reason for admit: Postoperative infection, initial encounter [T81.40XA]  Postoperative infection, unspecified type, initial encounter [T81.40XA]     Procedures: none    Imaging since last note: none    Barriers to Discharge: day 2 of IV Zosyn. Dressing care daily prn, strict stool counts/document consistency.    PCP: Fortino Vaughn APRN - CNP  Readmission Risk Score: 13.7    Patient Goals/Plan/Treatment Preferences: planning home with family and denied needs.

## 2024-10-30 NOTE — PLAN OF CARE
Problem: Chronic Conditions and Co-morbidities  Goal: Patient's chronic conditions and co-morbidity symptoms are monitored and maintained or improved  10/29/2024 2128 by Joaquina De Oliveira RN  Outcome: Progressing  Flowsheets (Taken 10/29/2024 2128)  Care Plan - Patient's Chronic Conditions and Co-Morbidity Symptoms are Monitored and Maintained or Improved:   Monitor and assess patient's chronic conditions and comorbid symptoms for stability, deterioration, or improvement   Collaborate with multidisciplinary team to address chronic and comorbid conditions and prevent exacerbation or deterioration   Update acute care plan with appropriate goals if chronic or comorbid symptoms are exacerbated and prevent overall improvement and discharge     Problem: Discharge Planning  Goal: Discharge to home or other facility with appropriate resources  10/29/2024 2128 by Joaquina De Oliveira RN  Outcome: Progressing  Flowsheets (Taken 10/29/2024 2128)  Discharge to home or other facility with appropriate resources:   Identify barriers to discharge with patient and caregiver   Arrange for needed discharge resources and transportation as appropriate   Identify discharge learning needs (meds, wound care, etc)     Problem: Safety - Adult  Goal: Free from fall injury  10/29/2024 2128 by Joaquina De Oliveira RN  Outcome: Progressing  Flowsheets (Taken 10/29/2024 2128)  Free From Fall Injury: Instruct family/caregiver on patient safety     Care plan reviewed with patient.  Patient verbalizes understanding of the plan of care and contributes to goal setting.

## 2024-10-30 NOTE — PLAN OF CARE
Problem: Discharge Planning  Goal: Discharge to home or other facility with appropriate resources  10/30/2024 1119 by Maeve Viramontes LPN  Outcome: Progressing  Flowsheets (Taken 10/30/2024 0927)  Discharge to home or other facility with appropriate resources:   Identify barriers to discharge with patient and caregiver   Arrange for needed discharge resources and transportation as appropriate   Identify discharge learning needs (meds, wound care, etc)   Refer to discharge planning if patient needs post-hospital services based on physician order or complex needs related to functional status, cognitive ability or social support system  Plan for patient to go home at discharge       Problem: Safety - Adult  Goal: Free from fall injury  10/30/2024 1119 by Maeve Viramontes LPN  Outcome: Progressing  Pt is independent and walks in halls       Problem: Pain  Goal: Verbalizes/displays adequate comfort level or baseline comfort level  Outcome: Progressing  Reposition frequently to alleviate pain.  Pt accepted Tylenol for a pain level of 5/10 in abd, pt was satisfied with results.      Problem: Gastrointestinal - Adult  Goal: Maintains or returns to baseline bowel function  Outcome: Progressing  Flowsheets (Taken 10/30/2024 0927)  Maintains or returns to baseline bowel function:   Assess bowel function   Encourage oral fluids to ensure adequate hydration   Administer ordered medications as needed   Encourage mobilization and activity  Pt feels she may be constipated but is having small BMs, this may be related to abd abscess making her feel bloated.      Problem: Infection - Adult  Goal: Absence of infection at discharge  Recent Flowsheet Documentation  Taken 10/30/2024 0927 by Maeve Viramontes LPN  Absence of infection at discharge:   Assess and monitor for signs and symptoms of infection   Monitor lab/diagnostic results   Monitor all insertion sites i.e., indwelling lines, tubes and drains   Administer medications as

## 2024-10-31 LAB
BASOPHILS ABSOLUTE: 0 THOU/MM3 (ref 0–0.1)
BASOPHILS NFR BLD AUTO: 0.4 %
DEPRECATED RDW RBC AUTO: 44.2 FL (ref 35–45)
EOSINOPHIL NFR BLD AUTO: 1.3 %
EOSINOPHILS ABSOLUTE: 0.1 THOU/MM3 (ref 0–0.4)
ERYTHROCYTE [DISTWIDTH] IN BLOOD BY AUTOMATED COUNT: 13.8 % (ref 11.5–14.5)
HCT VFR BLD AUTO: 26.9 % (ref 37–47)
HGB BLD-MCNC: 8.5 GM/DL (ref 12–16)
IMM GRANULOCYTES # BLD AUTO: 0.06 THOU/MM3 (ref 0–0.07)
IMM GRANULOCYTES NFR BLD AUTO: 0.5 %
LYMPHOCYTES ABSOLUTE: 2.4 THOU/MM3 (ref 1–4.8)
LYMPHOCYTES NFR BLD AUTO: 21.2 %
MCH RBC QN AUTO: 28.1 PG (ref 26–33)
MCHC RBC AUTO-ENTMCNC: 31.6 GM/DL (ref 32.2–35.5)
MCV RBC AUTO: 88.8 FL (ref 81–99)
MONOCYTES ABSOLUTE: 0.7 THOU/MM3 (ref 0.4–1.3)
MONOCYTES NFR BLD AUTO: 6.3 %
NEUTROPHILS ABSOLUTE: 8.1 THOU/MM3 (ref 1.8–7.7)
NEUTROPHILS NFR BLD AUTO: 70.3 %
NRBC BLD AUTO-RTO: 0 /100 WBC
PLATELET # BLD AUTO: 686 THOU/MM3 (ref 130–400)
PMV BLD AUTO: 9.2 FL (ref 9.4–12.4)
RBC # BLD AUTO: 3.03 MILL/MM3 (ref 4.2–5.4)
WBC # BLD AUTO: 11.5 THOU/MM3 (ref 4.8–10.8)

## 2024-10-31 PROCEDURE — 2580000003 HC RX 258: Performed by: OBSTETRICS & GYNECOLOGY

## 2024-10-31 PROCEDURE — 6370000000 HC RX 637 (ALT 250 FOR IP)

## 2024-10-31 PROCEDURE — 6360000002 HC RX W HCPCS: Performed by: OBSTETRICS & GYNECOLOGY

## 2024-10-31 PROCEDURE — 1200000003 HC TELEMETRY R&B

## 2024-10-31 PROCEDURE — 36415 COLL VENOUS BLD VENIPUNCTURE: CPT

## 2024-10-31 PROCEDURE — 6370000000 HC RX 637 (ALT 250 FOR IP): Performed by: OBSTETRICS & GYNECOLOGY

## 2024-10-31 PROCEDURE — 85025 COMPLETE CBC W/AUTO DIFF WBC: CPT

## 2024-10-31 RX ADMIN — ACETAMINOPHEN 650 MG: 325 TABLET ORAL at 20:10

## 2024-10-31 RX ADMIN — SODIUM CHLORIDE, PRESERVATIVE FREE 10 ML: 5 INJECTION INTRAVENOUS at 20:10

## 2024-10-31 RX ADMIN — AMOXICILLIN AND CLAVULANATE POTASSIUM 1 TABLET: 875; 125 TABLET, FILM COATED ORAL at 12:18

## 2024-10-31 RX ADMIN — SODIUM CHLORIDE, PRESERVATIVE FREE 10 ML: 5 INJECTION INTRAVENOUS at 07:54

## 2024-10-31 RX ADMIN — ACETAMINOPHEN 650 MG: 325 TABLET ORAL at 13:48

## 2024-10-31 RX ADMIN — PIPERACILLIN AND TAZOBACTAM 3375 MG: 3; .375 INJECTION, POWDER, FOR SOLUTION INTRAVENOUS at 00:01

## 2024-10-31 RX ADMIN — AMOXICILLIN AND CLAVULANATE POTASSIUM 1 TABLET: 875; 125 TABLET, FILM COATED ORAL at 20:09

## 2024-10-31 RX ADMIN — ACETAMINOPHEN 650 MG: 325 TABLET ORAL at 00:07

## 2024-10-31 RX ADMIN — DOCUSATE SODIUM 100 MG: 100 CAPSULE, LIQUID FILLED ORAL at 20:10

## 2024-10-31 RX ADMIN — ENOXAPARIN SODIUM 40 MG: 100 INJECTION SUBCUTANEOUS at 07:53

## 2024-10-31 RX ADMIN — PIPERACILLIN AND TAZOBACTAM 3375 MG: 3; .375 INJECTION, POWDER, FOR SOLUTION INTRAVENOUS at 08:06

## 2024-10-31 ASSESSMENT — PAIN DESCRIPTION - DESCRIPTORS
DESCRIPTORS: ACHING
DESCRIPTORS: ACHING;SORE;TENDER
DESCRIPTORS: ACHING
DESCRIPTORS: ACHING;SORE;TENDER

## 2024-10-31 ASSESSMENT — PAIN - FUNCTIONAL ASSESSMENT
PAIN_FUNCTIONAL_ASSESSMENT: ACTIVITIES ARE NOT PREVENTED

## 2024-10-31 ASSESSMENT — PAIN DESCRIPTION - ORIENTATION
ORIENTATION: MID
ORIENTATION: MID;RIGHT;LEFT
ORIENTATION: MID

## 2024-10-31 ASSESSMENT — PAIN SCALES - GENERAL
PAINLEVEL_OUTOF10: 4
PAINLEVEL_OUTOF10: 4
PAINLEVEL_OUTOF10: 5
PAINLEVEL_OUTOF10: 0
PAINLEVEL_OUTOF10: 4
PAINLEVEL_OUTOF10: 4
PAINLEVEL_OUTOF10: 0
PAINLEVEL_OUTOF10: 5
PAINLEVEL_OUTOF10: 4
PAINLEVEL_OUTOF10: 5

## 2024-10-31 ASSESSMENT — PAIN DESCRIPTION - LOCATION
LOCATION: ABDOMEN

## 2024-10-31 ASSESSMENT — PAIN DESCRIPTION - FREQUENCY: FREQUENCY: CONTINUOUS

## 2024-10-31 ASSESSMENT — PAIN DESCRIPTION - PAIN TYPE
TYPE: ACUTE PAIN;SURGICAL PAIN
TYPE: SURGICAL PAIN

## 2024-10-31 ASSESSMENT — PAIN DESCRIPTION - ONSET: ONSET: ON-GOING

## 2024-10-31 NOTE — PLAN OF CARE
Problem: Chronic Conditions and Co-morbidities  Goal: Patient's chronic conditions and co-morbidity symptoms are monitored and maintained or improved  Outcome: Progressing  Flowsheets  Taken 10/30/2024 2219 by Mary Gleason RN  Care Plan - Patient's Chronic Conditions and Co-Morbidity Symptoms are Monitored and Maintained or Improved:   Monitor and assess patient's chronic conditions and comorbid symptoms for stability, deterioration, or improvement   Collaborate with multidisciplinary team to address chronic and comorbid conditions and prevent exacerbation or deterioration   Update acute care plan with appropriate goals if chronic or comorbid symptoms are exacerbated and prevent overall improvement and discharge  Taken 10/30/2024 0927 by Maeve Viramontes LPN  Care Plan - Patient's Chronic Conditions and Co-Morbidity Symptoms are Monitored and Maintained or Improved: Monitor and assess patient's chronic conditions and comorbid symptoms for stability, deterioration, or improvement     Problem: Discharge Planning  Goal: Discharge to home or other facility with appropriate resources  10/30/2024 2219 by Mary Gleason RN  Outcome: Progressing  Flowsheets (Taken 10/30/2024 2219)  Discharge to home or other facility with appropriate resources:   Identify barriers to discharge with patient and caregiver   Arrange for needed discharge resources and transportation as appropriate   Identify discharge learning needs (meds, wound care, etc)  10/30/2024 1119 by Maeve Viramontes LPN  Outcome: Progressing  Flowsheets (Taken 10/30/2024 0927)  Discharge to home or other facility with appropriate resources:   Identify barriers to discharge with patient and caregiver   Arrange for needed discharge resources and transportation as appropriate   Identify discharge learning needs (meds, wound care, etc)   Refer to discharge planning if patient needs post-hospital services based on physician order or complex needs related to

## 2024-10-31 NOTE — CARE COORDINATION
10/31/24, 2:56 PM EDT    DISCHARGE ON GOING EVALUATION    Deloris [facility] day: 3  Location: -11/011-A Reason for admit: Postoperative infection, initial encounter [T81.40XA]  Postoperative infection, unspecified type, initial encounter [T81.40XA]     Procedures: none    Imaging since last note: na    Barriers to Discharge: stopped IV Zosyn. oral Augmentin started. WBC 11.5 (12.9)Remove sutures.     PCP: Fortino Vaughn, MARÍA - CNP  Readmission Risk Score: 13.7    Patient Goals/Plan/Treatment Preferences: Planning home on Friday with family; declined needs.

## 2024-10-31 NOTE — PLAN OF CARE
Problem: Chronic Conditions and Co-morbidities  Goal: Patient's chronic conditions and co-morbidity symptoms are monitored and maintained or improved  Outcome: Progressing  Flowsheets (Taken 10/31/2024 1406)  Care Plan - Patient's Chronic Conditions and Co-Morbidity Symptoms are Monitored and Maintained or Improved:   Monitor and assess patient's chronic conditions and comorbid symptoms for stability, deterioration, or improvement   Collaborate with multidisciplinary team to address chronic and comorbid conditions and prevent exacerbation or deterioration     Problem: Discharge Planning  Goal: Discharge to home or other facility with appropriate resources  Outcome: Progressing  Flowsheets (Taken 10/31/2024 1406)  Discharge to home or other facility with appropriate resources:   Identify barriers to discharge with patient and caregiver   Arrange for needed discharge resources and transportation as appropriate     Problem: Safety - Adult  Goal: Free from fall injury  Outcome: Progressing  Flowsheets (Taken 10/31/2024 1406)  Free From Fall Injury: Instruct family/caregiver on patient safety     Problem: Pain  Goal: Verbalizes/displays adequate comfort level or baseline comfort level  Outcome: Progressing  Flowsheets (Taken 10/31/2024 1406)  Verbalizes/displays adequate comfort level or baseline comfort level:   Encourage patient to monitor pain and request assistance   Assess pain using appropriate pain scale   Administer analgesics based on type and severity of pain and evaluate response   Implement non-pharmacological measures as appropriate and evaluate response     Problem: Skin/Tissue Integrity - Adult  Goal: Incisions, wounds, or drain sites healing without S/S of infection  Outcome: Progressing  Flowsheets (Taken 10/31/2024 1406)  Incisions, Wounds, or Drain Sites Healing Without Sign and Symptoms of Infection: TWICE DAILY: Assess and document dressing/incision, wound bed, drain sites and surrounding tissue

## 2024-10-31 NOTE — PROGRESS NOTES
GYN PROGRESS NOTE    HD #3    SUBJECTIVE  10/31/24  Donna is feeling well today. She still has some abdominal pressure similar to yesterday. She is afebrile today with normal pulse and slightly elevated blood pressure. She had a bowel movement this morning and has been urinating. Pain is controlled with Tylenol.     Wound culture (collected 10/30) with preliminary results of moderate neutrophils but no bacteria. Leukocytosis continues to improve - was 27.1 on admission (10/28/24), decreased to 11.5 today (10/31/24). Patient has tolerated regular diet. Wound probed and flushed - much less purulent discharge today. Patient odes have an allergy to Bactrim (had swelling of the tongue but denies shortness of breath).     Hospital Course  Patient is a 44yoF with Mhx significant for prediabetes, HLD, and prior JAMAL with bilateral salpingectomy in 2020 who underwent exploratory laparotomy on 10/17 for symptomatic left ovarian mass (7cm on CT with normal ) with extensive lysis of adhesions, left oophorectomy, and repair of small bowel lacerations by Dr. Sandra Hooks, Dr. Christine Peterson, and Dr. Genaro Cheema. The retroperitoneal R ovary was left intact. She stayed inpatient from 10/17-10/20 following surgery. She presented to the ED 10/28/24 with abdominal tenderness and purulent drainage from umbilicus. Labs significant for leukocytosis (27.1) with increased neutrophils (23.8), anemia (Hgb 8.5), and thrombocytosis (Plt 682). CT A/P (10/28/24) with loculated fluid collection suspicious for abscess of the L pericolic gutter and cul-de-sac as well as edema of the periumbilical region suspicious for cellulitis. Patient received Vancomycin and Zosyn in the ED as well as [place]. She was subsequently started on Zosyn (10/28-10/30) with improvement in leukocytosis.    OBJECTIVE  BP (!) 140/91   Pulse 75   Temp 98.3 °F (36.8 °C) (Oral)   Resp 16   Ht 1.626 m (5' 4\")   Wt 95.7 kg (211 lb)   LMP 07/23/2020   SpO2 96%

## 2024-11-01 VITALS
HEART RATE: 69 BPM | RESPIRATION RATE: 16 BRPM | HEIGHT: 64 IN | TEMPERATURE: 98.2 F | BODY MASS INDEX: 36.02 KG/M2 | WEIGHT: 211 LBS | OXYGEN SATURATION: 100 % | DIASTOLIC BLOOD PRESSURE: 76 MMHG | SYSTOLIC BLOOD PRESSURE: 128 MMHG

## 2024-11-01 LAB
BASOPHILS ABSOLUTE: 0 THOU/MM3 (ref 0–0.1)
BASOPHILS NFR BLD AUTO: 0.2 %
DEPRECATED RDW RBC AUTO: 44 FL (ref 35–45)
EOSINOPHIL NFR BLD AUTO: 1.4 %
EOSINOPHILS ABSOLUTE: 0.1 THOU/MM3 (ref 0–0.4)
ERYTHROCYTE [DISTWIDTH] IN BLOOD BY AUTOMATED COUNT: 13.9 % (ref 11.5–14.5)
HCT VFR BLD AUTO: 25.7 % (ref 37–47)
HGB BLD-MCNC: 8.2 GM/DL (ref 12–16)
IMM GRANULOCYTES # BLD AUTO: 0.06 THOU/MM3 (ref 0–0.07)
IMM GRANULOCYTES NFR BLD AUTO: 0.6 %
LYMPHOCYTES ABSOLUTE: 2.2 THOU/MM3 (ref 1–4.8)
LYMPHOCYTES NFR BLD AUTO: 23.2 %
MCH RBC QN AUTO: 28.2 PG (ref 26–33)
MCHC RBC AUTO-ENTMCNC: 31.9 GM/DL (ref 32.2–35.5)
MCV RBC AUTO: 88.3 FL (ref 81–99)
MONOCYTES ABSOLUTE: 0.6 THOU/MM3 (ref 0.4–1.3)
MONOCYTES NFR BLD AUTO: 6.8 %
NEUTROPHILS ABSOLUTE: 6.4 THOU/MM3 (ref 1.8–7.7)
NEUTROPHILS NFR BLD AUTO: 67.8 %
NRBC BLD AUTO-RTO: 0 /100 WBC
PLATELET # BLD AUTO: 643 THOU/MM3 (ref 130–400)
PMV BLD AUTO: 8.9 FL (ref 9.4–12.4)
RBC # BLD AUTO: 2.91 MILL/MM3 (ref 4.2–5.4)
WBC # BLD AUTO: 9.4 THOU/MM3 (ref 4.8–10.8)

## 2024-11-01 PROCEDURE — 2580000003 HC RX 258: Performed by: OBSTETRICS & GYNECOLOGY

## 2024-11-01 PROCEDURE — 6370000000 HC RX 637 (ALT 250 FOR IP): Performed by: OBSTETRICS & GYNECOLOGY

## 2024-11-01 PROCEDURE — 2500000003 HC RX 250 WO HCPCS

## 2024-11-01 PROCEDURE — 6360000002 HC RX W HCPCS: Performed by: OBSTETRICS & GYNECOLOGY

## 2024-11-01 PROCEDURE — 85025 COMPLETE CBC W/AUTO DIFF WBC: CPT

## 2024-11-01 PROCEDURE — 36415 COLL VENOUS BLD VENIPUNCTURE: CPT

## 2024-11-01 PROCEDURE — 6370000000 HC RX 637 (ALT 250 FOR IP)

## 2024-11-01 RX ORDER — LIDOCAINE HYDROCHLORIDE AND EPINEPHRINE 10; 10 MG/ML; UG/ML
10 INJECTION, SOLUTION INFILTRATION; PERINEURAL ONCE
Status: COMPLETED | OUTPATIENT
Start: 2024-11-01 | End: 2024-11-01

## 2024-11-01 RX ORDER — SIMETHICONE 80 MG
80 TABLET,CHEWABLE ORAL EVERY 6 HOURS PRN
Status: DISCONTINUED | OUTPATIENT
Start: 2024-11-01 | End: 2024-11-01 | Stop reason: HOSPADM

## 2024-11-01 RX ADMIN — ACETAMINOPHEN 650 MG: 325 TABLET ORAL at 08:54

## 2024-11-01 RX ADMIN — SODIUM CHLORIDE, PRESERVATIVE FREE 10 ML: 5 INJECTION INTRAVENOUS at 08:55

## 2024-11-01 RX ADMIN — ACETAMINOPHEN 650 MG: 325 TABLET ORAL at 03:40

## 2024-11-01 RX ADMIN — LIDOCAINE HYDROCHLORIDE,EPINEPHRINE BITARTRATE 10 ML: 10; .01 INJECTION, SOLUTION INFILTRATION; PERINEURAL at 14:33

## 2024-11-01 RX ADMIN — AMOXICILLIN AND CLAVULANATE POTASSIUM 1 TABLET: 875; 125 TABLET, FILM COATED ORAL at 08:55

## 2024-11-01 RX ADMIN — ACETAMINOPHEN 650 MG: 325 TABLET ORAL at 16:46

## 2024-11-01 RX ADMIN — ENOXAPARIN SODIUM 40 MG: 100 INJECTION SUBCUTANEOUS at 08:55

## 2024-11-01 RX ADMIN — SIMETHICONE 80 MG: 80 TABLET, CHEWABLE ORAL at 13:39

## 2024-11-01 ASSESSMENT — PAIN DESCRIPTION - DESCRIPTORS
DESCRIPTORS: CRAMPING
DESCRIPTORS: ACHING;DISCOMFORT;CRAMPING
DESCRIPTORS: ACHING

## 2024-11-01 ASSESSMENT — PAIN DESCRIPTION - PAIN TYPE
TYPE: ACUTE PAIN;SURGICAL PAIN
TYPE: SURGICAL PAIN

## 2024-11-01 ASSESSMENT — PAIN DESCRIPTION - LOCATION
LOCATION: ABDOMEN

## 2024-11-01 ASSESSMENT — PAIN SCALES - GENERAL
PAINLEVEL_OUTOF10: 5
PAINLEVEL_OUTOF10: 0
PAINLEVEL_OUTOF10: 5
PAINLEVEL_OUTOF10: 6
PAINLEVEL_OUTOF10: 6

## 2024-11-01 ASSESSMENT — PAIN DESCRIPTION - ORIENTATION
ORIENTATION: LOWER
ORIENTATION: LOWER
ORIENTATION: MID

## 2024-11-01 ASSESSMENT — PAIN DESCRIPTION - FREQUENCY
FREQUENCY: CONTINUOUS
FREQUENCY: CONTINUOUS

## 2024-11-01 ASSESSMENT — PAIN - FUNCTIONAL ASSESSMENT
PAIN_FUNCTIONAL_ASSESSMENT: PREVENTS OR INTERFERES SOME ACTIVE ACTIVITIES AND ADLS
PAIN_FUNCTIONAL_ASSESSMENT: ACTIVITIES ARE NOT PREVENTED
PAIN_FUNCTIONAL_ASSESSMENT: ACTIVITIES ARE NOT PREVENTED

## 2024-11-01 ASSESSMENT — PAIN DESCRIPTION - ONSET
ONSET: ON-GOING
ONSET: ON-GOING

## 2024-11-01 NOTE — DISCHARGE SUMMARY
Gynecology Discharge Summary        Pt Name: Deloris Sands  MRN: [id number] Acct #: [id number]  YOB: 1980      Reasons for Admission on 10/28/2024  3:53 PM  Postoperative infection, initial encounter [T81.40XA]  Postoperative infection, unspecified type, initial encounter [T81.40XA]    Hospital Course  Patient is a 44yoF with Mhx significant for prediabetes, HLD, and prior JAMAL with bilateral salpingectomy in 2020 who underwent exploratory laparotomy on 10/17 for symptomatic left ovarian mass (7cm on CT with normal ) with extensive lysis of adhesions, left oophorectomy, and repair of small bowel lacerations by Dr. Sandra Hooks, Dr. Christine Peterson, and Dr. Genaro Cheema. The retroperitoneal R ovary was left intact. She stayed inpatient from 10/17-10/20 following surgery. She presented to the ED 10/28/24 with abdominal tenderness and purulent drainage from umbilicus. Labs on arrival significant for leukocytosis (27.1) with increased neutrophils (23.8), anemia (Hgb 8.5), and thrombocytosis (Plt 682). CT A/P (10/28/24) with loculated fluid collection suspicious for abscess of the L pericolic gutter and cul-de-sac as well as edema of the periumbilical region suspicious for cellulitis. Patient received Vancomycin and Zosyn in the ED as well as [place]. She was subsequently started on Zosyn (10/28-10/30) with improvement in leukocytosis. After 48hr of IV antibiotics, leukocytosis had almost resolved, patient was afebrile, and much less draiange was coming from the wound, so patient was switched oral antibiotics. Augmentin was chosen as monotherapy as patient is allergic to Bactrim. On day of discharge, leukocytosis had resolved (WBC 9.4) with Hgb stable at 8.2, and patient had improved clinically. Staples were removed from the Pfannenstiel incision of the lower abdomen on 11/1/24, and the umbilical incision was managed with appropriate wound care. Patient discharged home after umbilicus was  file.    Assessment and Plan  Thrombocytosis and Leukocytosis due to Pelvic Abscess following exploratory laparoscopy on 10/21/24  - Umbilical wound probed with minimal bloody drainage noted. Wound packed with 1 strip of gauze  - Staples removed from Pfannenstiel incision of lower abdomen and replaced with steri strips  - To continue Augmentin 875-125mg BID to complete 14 day course (10/31/24-11/14/24)  - F/u on Monday with Dr. Hooks at [facility]    Electronically signed by Khadijah George DO on 11/1/2024 at 2:52 PM

## 2024-11-01 NOTE — CARE COORDINATION
11/1/24, 1:50 PM EDT    Patient goals/plan/ treatment preferences discussed by  and .  Patient goals/plan/ treatment preferences reviewed with patient/ family.  Patient/ family verbalize understanding of discharge plan and are in agreement with goal/plan/treatment preferences.  Understanding was demonstrated using the teach back method.  AVS provided by RN at time of discharge, which includes all necessary medical information pertaining to the patients current course of illness, treatment, post-discharge goals of care, and treatment preferences. Has been ambulating in halls. Plan discharge later today if still doing well.     Services At/After Discharge: None

## 2024-11-01 NOTE — PROGRESS NOTES
GYN PROGRESS NOTE    HD #4    SUBJECTIVE  11/1/24  Donna feels ready to go home today. She still feels slightly bloated, but pain is improving and no drainage seen on outer bandage today. Vitals signs still stable, and patient is afebrile. Hgb 9.4 today with Hgb stable at 8.2. She states her fiance is planning to take her home.     Hospital Course  Patient is a 44yoF with Mhx significant for prediabetes, HLD, and prior JAMAL with bilateral salpingectomy in 2020 who underwent exploratory laparotomy on 10/17 for symptomatic left ovarian mass (7cm on CT with normal ) with extensive lysis of adhesions, left oophorectomy, and repair of small bowel lacerations by Dr. Sandra Hooks, Dr. Christine Peterson, and Dr. Genaro Cheema. The retroperitoneal R ovary was left intact. She stayed inpatient from 10/17-10/20 following surgery. She presented to the ED 10/28/24 with abdominal tenderness and purulent drainage from umbilicus. Labs significant for leukocytosis (27.1) with increased neutrophils (23.8), anemia (Hgb 8.5), and thrombocytosis (Plt 682). CT A/P (10/28/24) with loculated fluid collection suspicious for abscess of the L pericolic gutter and cul-de-sac as well as edema of the periumbilical region suspicious for cellulitis. Patient received Vancomycin and Zosyn in the ED as well as [place]. She was subsequently started on Zosyn (10/28-10/30) with improvement in leukocytosis. After 48hr of IV antibiotics, leukocytosis had almost resolved, patient was afebrile, and much less draiange was coming from the wound, so patient was switched oral antibiotics. Augmentin was chosen as monotherapy as patient is allergic to Bactrim.       OBJECTIVE  BP (!) 152/87   Pulse 64   Temp 97.5 °F (36.4 °C) (Oral)   Resp 16   Ht 1.626 m (5' 4\")   Wt 95.7 kg (211 lb)   LMP 07/23/2020   SpO2 100%   BMI 36.22 kg/m²     ABD: Mild tenderness to palpation of the abdomen diffusely.  INCISION: Does have incision in umbilicus (where drainage  is from) but covered currently and horizontal incision in lower midline abdomen held with staples  EXTREMITIES: No lower extremity edema    Lab Results   Component Value Date    WBC 11.5 (H) 10/31/2024    HGB 8.5 (L) 10/31/2024    HCT 26.9 (L) 10/31/2024    MCV 88.8 10/31/2024     (H) 10/31/2024     Lab Results   Component Value Date     10/29/2024    K 4.0 10/29/2024     10/29/2024    CO2 26 10/29/2024    BUN 9 10/29/2024    CREATININE 0.6 10/29/2024    GLUCOSE 118 (H) 10/29/2024    CALCIUM 8.7 10/29/2024    BILITOT 0.5 10/28/2024    ALKPHOS 117 10/28/2024    AST 16 10/28/2024    ALT 11 10/28/2024    LABGLOM > 90 10/29/2024             Intake/Output Summary (Last 24 hours) at 11/1/2024 0705  Last data filed at 11/1/2024 0631  Gross per 24 hour   Intake 1040 ml   Output 0 ml   Net 1040 ml       ASSESSMENT/PLAN:   - Remove staples later today  - To continue oral antibiotics after discharge and follow-up with OBGYN in about 1 week  - Plan for discharge today

## 2024-11-01 NOTE — PLAN OF CARE
Problem: Chronic Conditions and Co-morbidities  Goal: Patient's chronic conditions and co-morbidity symptoms are monitored and maintained or improved  Outcome: Adequate for Discharge     Problem: Discharge Planning  Goal: Discharge to home or other facility with appropriate resources  Outcome: Adequate for Discharge     Problem: Safety - Adult  Goal: Free from fall injury  Outcome: Adequate for Discharge     Problem: Pain  Goal: Verbalizes/displays adequate comfort level or baseline comfort level  Outcome: Adequate for Discharge     Problem: Skin/Tissue Integrity - Adult  Goal: Incisions, wounds, or drain sites healing without S/S of infection  Outcome: Adequate for Discharge     Problem: Gastrointestinal - Adult  Goal: Maintains or returns to baseline bowel function  Outcome: Adequate for Discharge     Problem: Infection - Adult  Goal: Absence of infection at discharge  Outcome: Adequate for Discharge  Goal: Absence of infection during hospitalization  Outcome: Adequate for Discharge

## 2024-11-02 LAB
BACTERIA BLD AEROBE CULT: NORMAL
BACTERIA BLD AEROBE CULT: NORMAL
BACTERIA SPEC AEROBE CULT: ABNORMAL
BACTERIA SPEC AEROBE CULT: ABNORMAL
BACTERIA SPEC ANAEROBE CULT: ABNORMAL
GRAM STN SPEC: ABNORMAL
ORGANISM: ABNORMAL

## 2024-11-04 ENCOUNTER — TELEPHONE (OUTPATIENT)
Dept: FAMILY MEDICINE CLINIC | Age: 44
End: 2024-11-04

## 2024-11-04 NOTE — TELEPHONE ENCOUNTER
Care Transitions Initial Follow Up Call    Outreach made within 2 business days of discharge: Yes    Patient: Deloris Sands Patient : 1980   MRN: [id number]  Reason for Admission: Postoperative infection, initial encounter   Discharge Date: 24       Spoke with: Donna    Discharge department/facility: [facility] Interactive Patient Contact:  Was patient able to fill all prescriptions: Yes  Was patient instructed to bring all medications to the follow-up visit: Yes  Is patient taking all medications as directed in the discharge summary? Yes  Does patient understand their discharge instructions: Yes  Does patient have questions or concerns that need addressed prior to 7-14 day follow up office visit: no    Additional needs identified to be addressed with provider  No needs identified             Scheduled appointment with PCP within 7-14 days    Follow Up  Future Appointments   Date Time Provider Department Center   2024  9:40 AM Fortino Vaughn APRN - CNP [facility]   2025  2:15 PM Denisse Yadav APRN - CNP [facility]       Jodi Quiros MA

## 2024-11-08 ENCOUNTER — OFFICE VISIT (OUTPATIENT)
Dept: FAMILY MEDICINE CLINIC | Age: 44
End: 2024-11-08

## 2024-11-08 VITALS
HEIGHT: 64 IN | DIASTOLIC BLOOD PRESSURE: 82 MMHG | WEIGHT: 213.2 LBS | SYSTOLIC BLOOD PRESSURE: 130 MMHG | OXYGEN SATURATION: 99 % | BODY MASS INDEX: 36.4 KG/M2 | TEMPERATURE: 96.8 F | HEART RATE: 70 BPM | RESPIRATION RATE: 12 BRPM

## 2024-11-08 DIAGNOSIS — T81.40XA POSTOPERATIVE INFECTION, UNSPECIFIED TYPE, INITIAL ENCOUNTER: Primary | ICD-10-CM

## 2024-11-08 DIAGNOSIS — T36.95XA ANTIBIOTIC-ASSOCIATED DIARRHEA: ICD-10-CM

## 2024-11-08 DIAGNOSIS — Z09 HOSPITAL DISCHARGE FOLLOW-UP: ICD-10-CM

## 2024-11-08 DIAGNOSIS — K52.1 ANTIBIOTIC-ASSOCIATED DIARRHEA: ICD-10-CM

## 2024-11-08 NOTE — PROGRESS NOTES
progress note was verified by me to be accurate at the time of visit  Patient's past medical, surgical, social and family history were reviewed and updated      All patient questions answered.  Patient voiced understanding.

## 2024-12-18 ENCOUNTER — HOSPITAL ENCOUNTER (EMERGENCY)
Age: 44
Discharge: HOME | End: 2024-12-19
Attending: STUDENT IN AN ORGANIZED HEALTH CARE EDUCATION/TRAINING PROGRAM
Payer: COMMERCIAL

## 2024-12-18 DIAGNOSIS — B34.9 VIRAL ILLNESS: Primary | ICD-10-CM

## 2024-12-18 PROCEDURE — 99285 EMERGENCY DEPT VISIT HI MDM: CPT

## 2024-12-18 PROCEDURE — 87636 SARSCOV2 & INF A&B AMP PRB: CPT

## 2024-12-18 ASSESSMENT — PAIN SCALES - GENERAL: PAINLEVEL_OUTOF10: 8

## 2024-12-19 ENCOUNTER — APPOINTMENT (OUTPATIENT)
Dept: CT IMAGING | Age: 44
End: 2024-12-19
Payer: COMMERCIAL

## 2024-12-19 ENCOUNTER — APPOINTMENT (OUTPATIENT)
Dept: GENERAL RADIOLOGY | Age: 44
End: 2024-12-19
Payer: COMMERCIAL

## 2024-12-19 VITALS
HEART RATE: 78 BPM | SYSTOLIC BLOOD PRESSURE: 118 MMHG | DIASTOLIC BLOOD PRESSURE: 59 MMHG | OXYGEN SATURATION: 99 % | BODY MASS INDEX: 36.9 KG/M2 | TEMPERATURE: 98.3 F | WEIGHT: 215 LBS | RESPIRATION RATE: 16 BRPM

## 2024-12-19 LAB
ALBUMIN SERPL BCG-MCNC: 4 G/DL (ref 3.5–5.1)
ALP SERPL-CCNC: 121 U/L (ref 38–126)
ALT SERPL W/O P-5'-P-CCNC: 35 U/L (ref 11–66)
ANION GAP SERPL CALC-SCNC: 14 MEQ/L (ref 8–16)
AST SERPL-CCNC: 39 U/L (ref 5–40)
BACTERIA URNS QL MICRO: ABNORMAL /HPF
BASOPHILS ABSOLUTE: 0 THOU/MM3 (ref 0–0.1)
BASOPHILS NFR BLD AUTO: 0.1 %
BILIRUB CONJ SERPL-MCNC: < 0.1 MG/DL (ref 0.1–13.8)
BILIRUB SERPL-MCNC: 0.3 MG/DL (ref 0.3–1.2)
BILIRUB UR QL STRIP.AUTO: NEGATIVE
BUN SERPL-MCNC: 13 MG/DL (ref 7–22)
CALCIUM SERPL-MCNC: 9.3 MG/DL (ref 8.5–10.5)
CASTS #/AREA URNS LPF: ABNORMAL /LPF
CASTS 2: ABNORMAL /LPF
CHARACTER UR: CLEAR
CHLORIDE SERPL-SCNC: 104 MEQ/L (ref 98–111)
CO2 SERPL-SCNC: 20 MEQ/L (ref 23–33)
COLOR, UA: YELLOW
CREAT SERPL-MCNC: 0.6 MG/DL (ref 0.4–1.2)
CRYSTALS URNS MICRO: ABNORMAL
DEPRECATED RDW RBC AUTO: 41.1 FL (ref 35–45)
EOSINOPHIL NFR BLD AUTO: 0.5 %
EOSINOPHILS ABSOLUTE: 0 THOU/MM3 (ref 0–0.4)
EPITHELIAL CELLS, UA: ABNORMAL /HPF
ERYTHROCYTE [DISTWIDTH] IN BLOOD BY AUTOMATED COUNT: 13.4 % (ref 11.5–14.5)
FLUAV RNA RESP QL NAA+PROBE: NOT DETECTED
FLUBV RNA RESP QL NAA+PROBE: NOT DETECTED
GFR SERPL CREATININE-BSD FRML MDRD: > 90 ML/MIN/1.73M2
GLUCOSE SERPL-MCNC: 130 MG/DL (ref 70–108)
GLUCOSE UR QL STRIP.AUTO: NEGATIVE MG/DL
HCT VFR BLD AUTO: 35.4 % (ref 37–47)
HGB BLD-MCNC: 11.5 GM/DL (ref 12–16)
HGB UR QL STRIP.AUTO: NEGATIVE
IMM GRANULOCYTES # BLD AUTO: 0.04 THOU/MM3 (ref 0–0.07)
IMM GRANULOCYTES NFR BLD AUTO: 0.4 %
KETONES UR QL STRIP.AUTO: NEGATIVE
LACTATE SERPL-SCNC: 0.9 MMOL/L (ref 0.5–2)
LIPASE SERPL-CCNC: 25.1 U/L (ref 5.6–51.3)
LYMPHOCYTES ABSOLUTE: 0.6 THOU/MM3 (ref 1–4.8)
LYMPHOCYTES NFR BLD AUTO: 6.1 %
MCH RBC QN AUTO: 27.3 PG (ref 26–33)
MCHC RBC AUTO-ENTMCNC: 32.5 GM/DL (ref 32.2–35.5)
MCV RBC AUTO: 83.9 FL (ref 81–99)
MISCELLANEOUS 2: ABNORMAL
MONOCYTES ABSOLUTE: 0.7 THOU/MM3 (ref 0.4–1.3)
MONOCYTES NFR BLD AUTO: 7.2 %
NEUTROPHILS ABSOLUTE: 8.4 THOU/MM3 (ref 1.8–7.7)
NEUTROPHILS NFR BLD AUTO: 85.7 %
NITRITE UR QL STRIP: NEGATIVE
NRBC BLD AUTO-RTO: 0 /100 WBC
OSMOLALITY SERPL CALC.SUM OF ELEC: 277.5 MOSMOL/KG (ref 275–300)
PH UR STRIP.AUTO: 5 [PH] (ref 5–9)
PLATELET # BLD AUTO: 400 THOU/MM3 (ref 130–400)
PMV BLD AUTO: 9.9 FL (ref 9.4–12.4)
POTASSIUM SERPL-SCNC: 3.5 MEQ/L (ref 3.5–5.2)
PROT SERPL-MCNC: 7.3 G/DL (ref 6.1–8)
PROT UR STRIP.AUTO-MCNC: 30 MG/DL
RBC # BLD AUTO: 4.22 MILL/MM3 (ref 4.2–5.4)
RBC URINE: ABNORMAL /HPF
RENAL EPI CELLS #/AREA URNS HPF: ABNORMAL /[HPF]
SARS-COV-2 RNA RESP QL NAA+PROBE: NOT DETECTED
SODIUM SERPL-SCNC: 138 MEQ/L (ref 135–145)
SP GR UR REFRACT.AUTO: 1.02 (ref 1–1.03)
TROPONIN, HIGH SENSITIVITY: 7 NG/L (ref 0–12)
TROPONIN, HIGH SENSITIVITY: 7 NG/L (ref 0–12)
UROBILINOGEN, URINE: 0.2 EU/DL (ref 0–1)
WBC # BLD AUTO: 9.8 THOU/MM3 (ref 4.8–10.8)
WBC #/AREA URNS HPF: ABNORMAL /HPF
WBC #/AREA URNS HPF: NEGATIVE /[HPF]
YEAST LIKE FUNGI URNS QL MICRO: ABNORMAL

## 2024-12-19 PROCEDURE — 6360000004 HC RX CONTRAST MEDICATION: Performed by: STUDENT IN AN ORGANIZED HEALTH CARE EDUCATION/TRAINING PROGRAM

## 2024-12-19 PROCEDURE — 80053 COMPREHEN METABOLIC PANEL: CPT

## 2024-12-19 PROCEDURE — 74177 CT ABD & PELVIS W/CONTRAST: CPT

## 2024-12-19 PROCEDURE — 83690 ASSAY OF LIPASE: CPT

## 2024-12-19 PROCEDURE — 85025 COMPLETE CBC W/AUTO DIFF WBC: CPT

## 2024-12-19 PROCEDURE — 71046 X-RAY EXAM CHEST 2 VIEWS: CPT

## 2024-12-19 PROCEDURE — 81001 URINALYSIS AUTO W/SCOPE: CPT

## 2024-12-19 PROCEDURE — 82248 BILIRUBIN DIRECT: CPT

## 2024-12-19 PROCEDURE — 83605 ASSAY OF LACTIC ACID: CPT

## 2024-12-19 PROCEDURE — 36415 COLL VENOUS BLD VENIPUNCTURE: CPT

## 2024-12-19 PROCEDURE — 84484 ASSAY OF TROPONIN QUANT: CPT

## 2024-12-19 PROCEDURE — 6370000000 HC RX 637 (ALT 250 FOR IP): Performed by: EMERGENCY MEDICINE

## 2024-12-19 RX ORDER — IOPAMIDOL 755 MG/ML
80 INJECTION, SOLUTION INTRAVASCULAR
Status: COMPLETED | OUTPATIENT
Start: 2024-12-19 | End: 2024-12-19

## 2024-12-19 RX ORDER — ACETAMINOPHEN 500 MG
1000 TABLET ORAL ONCE
Status: COMPLETED | OUTPATIENT
Start: 2024-12-19 | End: 2024-12-19

## 2024-12-19 RX ADMIN — IOPAMIDOL 80 ML: 755 INJECTION, SOLUTION INTRAVENOUS at 03:05

## 2024-12-19 RX ADMIN — ACETAMINOPHEN 1000 MG: 500 TABLET ORAL at 00:28

## 2024-12-19 ASSESSMENT — PAIN SCALES - GENERAL: PAINLEVEL_OUTOF10: 8

## 2024-12-19 NOTE — ED TRIAGE NOTES
Pt to rm 05 per intake c/o generalized body aches, fever, heart racing and diarrhea. Pt states she took some motrin around 2100 tonight- reports diarrhea x 5 today, upper ABD pain and nausea. On arrival pt noted febrile 100.5, denies known sick contacts. EKG and assessment complete.

## 2024-12-19 NOTE — ED PROVIDER NOTES
[facility] EMERGENCY DEPT  EMERGENCY DEPARTMENT ENCOUNTER          Pt Name: Deloris Sands  MRN: [id number]  Birthdate 1980  Date of evaluation: 2024  Physician: Trey Bonner DO      CHIEF COMPLAINT       Chief Complaint   Patient presents with    Tachycardia    Generalized Body Aches    Diarrhea         HISTORY OF PRESENT ILLNESS    HPI  Deloris Sands is a 44 y.o. female who presents to the emergency department from home, by private vehicle for evaluation of generalized bodyaches, fever, diarrhea, heart racing.  Patient reports symptoms started approximately 5 days ago.  Patient denies known sick contacts.  Patient reports Motrin at home with some improvement of symptoms.  Denies any chest pain or shortness of breath.  The patient has no other acute complaints at this time.      REVIEW OF SYSTEMS   Review of Systems   All other systems reviewed and are negative.        PAST MEDICAL AND SURGICAL HISTORY     Past Medical History:   Diagnosis Date    Bell palsy     right side droop    Essential hypertension 2016    GERD (gastroesophageal reflux disease)     Hashimoto's thyroiditis 2016    HIGH CHOLESTEROL     Hyperlipidemia     Hypokalemia     Kidney stones     Prediabetes 2020    Subclinical hyperthyroidism 2016     Past Surgical History:   Procedure Laterality Date     SECTION  ,     COLONOSCOPY  2021    1 polyp removed     CYSTOSCOPY Right 2023    CYSTOSCOPY, RIGHT URETEROSCOPY, LASER LITHOTRIPSY,  RIGHT URETERAL STENT performed by Seamus Zepeda MD at [facility] OR    DILATION AND CURETTAGE OF UTERUS      ENDOSCOPY, COLON, DIAGNOSTIC      HYSTERECTOMY, TOTAL ABDOMINAL (CERVIX REMOVED) N/A 09/10/2020    HYSTERECTOMY ABDOMINAL TOTAL, BS, POSS DANIELLE performed by Julia Cisneros MD at [facility] OR    LAPAROSCOPY N/A 06/15/2020    DIAGNOSTIC LAPAROSCOPY, performed by Julia Cisneros MD at [facility] OR    OVARY SURGERY N/A

## 2024-12-19 NOTE — ED NOTES
Pt appears to be sleeping in bed. Chest rising and falling symmetrically. Vitals assessed. RR easy and unlabored. Lab work collected.

## 2024-12-19 NOTE — ED NOTES
Pt resting in bed back from CT. Vitals assessed. RR easy and unlabored. Report from Jodi DUNLAP. Assumed care at this time.

## 2024-12-20 LAB
EKG ATRIAL RATE: 103 BPM
EKG P AXIS: 58 DEGREES
EKG P-R INTERVAL: 128 MS
EKG Q-T INTERVAL: 334 MS
EKG QRS DURATION: 66 MS
EKG QTC CALCULATION (BAZETT): 437 MS
EKG R AXIS: 46 DEGREES
EKG T AXIS: 30 DEGREES
EKG VENTRICULAR RATE: 103 BPM

## 2025-02-10 ENCOUNTER — OFFICE VISIT (OUTPATIENT)
Dept: FAMILY MEDICINE CLINIC | Age: 45
End: 2025-02-10
Payer: COMMERCIAL

## 2025-02-10 VITALS
DIASTOLIC BLOOD PRESSURE: 82 MMHG | HEART RATE: 60 BPM | RESPIRATION RATE: 12 BRPM | SYSTOLIC BLOOD PRESSURE: 120 MMHG | TEMPERATURE: 97.2 F | BODY MASS INDEX: 36.4 KG/M2 | WEIGHT: 213.2 LBS | OXYGEN SATURATION: 100 % | HEIGHT: 64 IN

## 2025-02-10 DIAGNOSIS — R07.9 CHEST PAIN, UNSPECIFIED TYPE: ICD-10-CM

## 2025-02-10 DIAGNOSIS — N18.2 TYPE 2 DIABETES MELLITUS WITH STAGE 2 CHRONIC KIDNEY DISEASE, WITHOUT LONG-TERM CURRENT USE OF INSULIN (HCC): Primary | ICD-10-CM

## 2025-02-10 DIAGNOSIS — E11.22 TYPE 2 DIABETES MELLITUS WITH STAGE 2 CHRONIC KIDNEY DISEASE, WITHOUT LONG-TERM CURRENT USE OF INSULIN (HCC): Primary | ICD-10-CM

## 2025-02-10 LAB — HBA1C MFR BLD: 6.3 % (ref 4.3–5.7)

## 2025-02-10 PROCEDURE — 3079F DIAST BP 80-89 MM HG: CPT | Performed by: NURSE PRACTITIONER

## 2025-02-10 PROCEDURE — G8417 CALC BMI ABV UP PARAM F/U: HCPCS | Performed by: NURSE PRACTITIONER

## 2025-02-10 PROCEDURE — G8427 DOCREV CUR MEDS BY ELIG CLIN: HCPCS | Performed by: NURSE PRACTITIONER

## 2025-02-10 PROCEDURE — 3044F HG A1C LEVEL LT 7.0%: CPT | Performed by: NURSE PRACTITIONER

## 2025-02-10 PROCEDURE — 2022F DILAT RTA XM EVC RTNOPTHY: CPT | Performed by: NURSE PRACTITIONER

## 2025-02-10 PROCEDURE — 1036F TOBACCO NON-USER: CPT | Performed by: NURSE PRACTITIONER

## 2025-02-10 PROCEDURE — 3074F SYST BP LT 130 MM HG: CPT | Performed by: NURSE PRACTITIONER

## 2025-02-10 PROCEDURE — 99214 OFFICE O/P EST MOD 30 MIN: CPT | Performed by: NURSE PRACTITIONER

## 2025-02-10 ASSESSMENT — PATIENT HEALTH QUESTIONNAIRE - PHQ9
SUM OF ALL RESPONSES TO PHQ QUESTIONS 1-9: 0
1. LITTLE INTEREST OR PLEASURE IN DOING THINGS: NOT AT ALL
SUM OF ALL RESPONSES TO PHQ QUESTIONS 1-9: 0
SUM OF ALL RESPONSES TO PHQ9 QUESTIONS 1 & 2: 0
2. FEELING DOWN, DEPRESSED OR HOPELESS: NOT AT ALL

## 2025-02-10 NOTE — PROGRESS NOTES
(TOPROL XL) 100 MG extended release tablet Take 1 tablet by mouth daily 90 tablet 3    omeprazole (PRILOSEC) 40 MG delayed release capsule Take 1 capsule by mouth daily 90 capsule 1    hydroCHLOROthiazide 12.5 MG capsule take 1 capsule by mouth once daily 90 capsule 2    sertraline (ZOLOFT) 100 MG tablet take 1 tablet by mouth once daily 90 tablet 3    aspirin 81 MG EC tablet Take 1 tablet by mouth daily 90 tablet 3    rosuvastatin (CRESTOR) 20 MG tablet take 1 tablet by mouth once daily 90 tablet 3    amLODIPine (NORVASC) 10 MG tablet Take 1 tablet by mouth daily 90 tablet 3     No current facility-administered medications for this visit.     No orders of the defined types were placed in this encounter.            All medications reviewed and reconciled, including OTC and herbal medications. Updated list given to patient.       Patient Active Problem List   Diagnosis    Essential hypertension    Panic disorder without agoraphobia with moderate panic attacks    Hashimoto's thyroiditis    Family history of early CAD    Intermittent palpitations    Diarrhea    Hyperlipidemia    Reactive thrombocytosis    Obesity (BMI 30-39.9)    Right Bell's palsy    Prediabetes    Pelvic pain    S/P hysterectomy    Spinal stenosis of lumbar region without neurogenic claudication    Herniated lumbar intervertebral disc    Acquired spondylolisthesis    Head revolving around    Lumbar radiculopathy    Lumbar facet arthropathy    Type 2 diabetes mellitus without complication, without long-term current use of insulin ([facility])    Pure hypercholesterolemia    Severe obesity (BMI 35.0-39.9) with comorbidity    Type 2 diabetes mellitus with chronic kidney disease    Ovarian mass, left    Postoperative infection, initial encounter       Past Medical History:   Diagnosis Date    Bell palsy 2020    right side droop    Essential hypertension 11/07/2016    GERD (gastroesophageal reflux disease)     Hashimoto's thyroiditis 12/22/2016    HIGH

## 2025-02-24 ENCOUNTER — HOSPITAL ENCOUNTER (OUTPATIENT)
Age: 45
Discharge: HOME | End: 2025-02-26
Payer: COMMERCIAL

## 2025-02-24 VITALS — WEIGHT: 215 LBS | BODY MASS INDEX: 36.7 KG/M2 | HEIGHT: 64 IN

## 2025-02-24 DIAGNOSIS — R07.9 CHEST PAIN, UNSPECIFIED TYPE: ICD-10-CM

## 2025-02-24 LAB
ECHO BSA: 2.1 M2
STRESS ANGINA INDEX: 0
STRESS BASELINE DIAS BP: 85 MMHG
STRESS BASELINE HR: 64 BPM
STRESS BASELINE ST DEPRESSION: 0 MM
STRESS BASELINE SYS BP: 175 MMHG
STRESS ESTIMATED WORKLOAD: 7.3 METS
STRESS EXERCISE DUR MIN: 6 MIN
STRESS EXERCISE DUR SEC: 6 SEC
STRESS PEAK DIAS BP: 110 MMHG
STRESS PEAK SYS BP: 200 MMHG
STRESS PERCENT HR ACHIEVED: 86 %
STRESS POST PEAK HR: 150 BPM
STRESS RATE PRESSURE PRODUCT: NORMAL BPM*MMHG
STRESS SR DUKE TREADMILL SCORE: 6
STRESS ST DEPRESSION: 0 MM
STRESS STAGE 1 BP: NORMAL MMHG
STRESS STAGE 1 DURATION: 3 MIN:SEC
STRESS STAGE 1 HR: 125 BPM
STRESS STAGE 2 BP: NORMAL MMHG
STRESS STAGE 2 DURATION: 3 MIN:SEC
STRESS STAGE 2 HR: 150 BPM
STRESS STAGE RECOVERY 1 BP: NORMAL MMHG
STRESS STAGE RECOVERY 1 DURATION: 2 MIN:SEC
STRESS STAGE RECOVERY 1 HR: 150 BPM
STRESS STAGE RECOVERY 2 BP: NORMAL MMHG
STRESS STAGE RECOVERY 2 DURATION: 1 MIN:SEC
STRESS STAGE RECOVERY 2 HR: 85 BPM
STRESS STAGE RECOVERY 3 BP: NORMAL MMHG
STRESS STAGE RECOVERY 3 DURATION: 1 MIN:SEC
STRESS STAGE RECOVERY 3 HR: 88 BPM
STRESS STAGE RECOVERY 4 BP: NORMAL MMHG
STRESS STAGE RECOVERY 4 DURATION: 1 MIN:SEC
STRESS STAGE RECOVERY 4 HR: 90 BPM
STRESS STANDING DIAS BP: 93 MMHG
STRESS STANDING HR: 68 BPM
STRESS STANDING SYS BP: 189 MMHG
STRESS TARGET HR: 174 BPM

## 2025-02-24 PROCEDURE — 93018 CV STRESS TEST I&R ONLY: CPT | Performed by: INTERNAL MEDICINE

## 2025-02-24 PROCEDURE — 93016 CV STRESS TEST SUPVJ ONLY: CPT | Performed by: INTERNAL MEDICINE

## 2025-02-24 PROCEDURE — 93017 CV STRESS TEST TRACING ONLY: CPT

## 2025-02-25 ENCOUNTER — TELEPHONE (OUTPATIENT)
Dept: FAMILY MEDICINE CLINIC | Age: 45
End: 2025-02-25

## 2025-02-25 NOTE — TELEPHONE ENCOUNTER
----- Message from MARÍA Escalona CNP sent at 2/25/2025  8:37 AM EST -----  Let Donna know she passed her stress test, so that is good.

## 2025-02-25 NOTE — TELEPHONE ENCOUNTER
Pt informed that she passed her stress test . Pt voiced understanding with no further questions at this time.

## 2025-04-09 ENCOUNTER — HOSPITAL ENCOUNTER (OUTPATIENT)
Dept: WOMENS IMAGING | Age: 45
Discharge: HOME | End: 2025-04-09
Payer: COMMERCIAL

## 2025-04-09 DIAGNOSIS — Z12.31 VISIT FOR SCREENING MAMMOGRAM: ICD-10-CM

## 2025-04-09 PROCEDURE — 77063 BREAST TOMOSYNTHESIS BI: CPT

## 2025-04-11 RX ORDER — OMEPRAZOLE 40 MG/1
40 CAPSULE, DELAYED RELEASE ORAL DAILY
Qty: 90 CAPSULE | Refills: 1 | Status: SHIPPED | OUTPATIENT
Start: 2025-04-11

## 2025-04-11 NOTE — TELEPHONE ENCOUNTER
Recent Visits  Date Type Provider Dept   02/10/25 Office Visit Fortino Vaughn APRN - CNP [facility]   11/08/24 Office Visit Fortino Vaughn APRN - CNP [facility]   08/14/24 Office Visit Fortino Vaughn APRN - CNP [facility]   01/30/24 Office Visit Fortino Vaughn APRN - CNP [facility]   11/30/23 Office Visit Fortino Vaughn APRN - CNP [facility]   11/02/23 Office Visit Fortino Vaughn APRN - CNP [facility]   Showing recent visits within past 540 days with a meds authorizing provider and meeting all other requirements  Future Appointments  Date Type Provider Dept   08/12/25 Appointment Fortino Vaughn APRN - CNP [facility]   Showing future appointments within next 150 days with a meds authorizing provider and meeting all other requirements

## 2025-04-15 ENCOUNTER — HOSPITAL ENCOUNTER (OUTPATIENT)
Dept: GENERAL RADIOLOGY | Age: 45
Discharge: HOME | End: 2025-04-15
Payer: COMMERCIAL

## 2025-04-15 ENCOUNTER — HOSPITAL ENCOUNTER (OUTPATIENT)
Age: 45
Discharge: HOME | End: 2025-04-15
Payer: COMMERCIAL

## 2025-04-15 DIAGNOSIS — N20.0 NEPHROLITHIASIS: ICD-10-CM

## 2025-04-15 DIAGNOSIS — N20.0 NEPHROLITHIASIS: Primary | ICD-10-CM

## 2025-04-15 PROCEDURE — 74018 RADEX ABDOMEN 1 VIEW: CPT

## 2025-04-21 ENCOUNTER — HOSPITAL ENCOUNTER (EMERGENCY)
Age: 45
Discharge: HOME | End: 2025-04-21
Payer: COMMERCIAL

## 2025-04-21 VITALS
SYSTOLIC BLOOD PRESSURE: 127 MMHG | OXYGEN SATURATION: 100 % | TEMPERATURE: 98.9 F | DIASTOLIC BLOOD PRESSURE: 80 MMHG | HEART RATE: 71 BPM | RESPIRATION RATE: 16 BRPM

## 2025-04-21 DIAGNOSIS — B30.9 ACUTE VIRAL CONJUNCTIVITIS OF BOTH EYES: Primary | ICD-10-CM

## 2025-04-21 PROCEDURE — 99213 OFFICE O/P EST LOW 20 MIN: CPT

## 2025-04-21 RX ORDER — OLOPATADINE HYDROCHLORIDE 1 MG/ML
1 SOLUTION/ DROPS OPHTHALMIC 2 TIMES DAILY
Qty: 5 ML | Refills: 0 | Status: SHIPPED | OUTPATIENT
Start: 2025-04-21 | End: 2025-05-21

## 2025-04-21 RX ORDER — PREDNISONE 20 MG/1
20 TABLET ORAL 2 TIMES DAILY
Qty: 10 TABLET | Refills: 0 | Status: SHIPPED | OUTPATIENT
Start: 2025-04-21 | End: 2025-04-26

## 2025-04-21 ASSESSMENT — ENCOUNTER SYMPTOMS
EYE REDNESS: 1
RHINORRHEA: 1
EYE ITCHING: 1
COUGH: 0

## 2025-04-21 NOTE — ED PROVIDER NOTES
[facility] URGENT CARE  Urgent Care Encounter      CHIEF COMPLAINT       Chief Complaint   Patient presents with    Conjunctivitis     Bilat onset yesterday       Nurses Notes reviewed and I agree except as noted in the HPI.  HISTORY OF PRESENT ILLNESS   Deloris Sands is a 45 y.o. female who presents to urgent care with complaints of bilateral eye redness, itching, and swelling. Patient reports symptoms have been ongoing for a few days. Denies visual disturbance. Denies using any over the counter medications for her symptoms. Patient reports she does have environmental allergies.    REVIEW OF SYSTEMS     Review of Systems   HENT:  Positive for congestion and rhinorrhea.    Eyes:  Positive for redness and itching. Negative for visual disturbance.   Respiratory:  Negative for cough.    Neurological:  Negative for dizziness, seizures and headaches.       PAST MEDICAL HISTORY         Diagnosis Date    Bell palsy     right side droop    Essential hypertension 2016    GERD (gastroesophageal reflux disease)     Hashimoto's thyroiditis 2016    HIGH CHOLESTEROL     Hyperlipidemia     Hypokalemia     Kidney stones     Prediabetes 2020    Subclinical hyperthyroidism 2016       SURGICAL HISTORY     Patient  has a past surgical history that includes  section (, ); Endoscopy, colon, diagnostic; Dilation and curettage of uterus (); laparoscopy (N/A, 06/15/2020); Colonoscopy (2021); Hysterectomy, total abdominal (N/A, 09/10/2020); Cystoscopy (Right, 2023); and Ovary surgery (N/A, 10/17/2024).    CURRENT MEDICATIONS       Discharge Medication List as of 2025  8:49 AM        CONTINUE these medications which have NOT CHANGED    Details   omeprazole (PRILOSEC) 40 MG delayed release capsule TAKE ONE CAPSULE BY MOUTH DAILY, Disp-90 capsule, R-1Normal      metoprolol succinate (TOPROL XL) 100 MG extended release tablet Take 1 tablet by mouth daily, Disp-90 tablet,

## 2025-05-07 ENCOUNTER — PATIENT MESSAGE (OUTPATIENT)
Dept: FAMILY MEDICINE CLINIC | Age: 45
End: 2025-05-07

## 2025-05-07 RX ORDER — IBUPROFEN 800 MG/1
800 TABLET, FILM COATED ORAL 4 TIMES DAILY PRN
Qty: 120 TABLET | Refills: 1 | Status: SHIPPED | OUTPATIENT
Start: 2025-05-07

## 2025-06-04 ENCOUNTER — APPOINTMENT (OUTPATIENT)
Dept: GENERAL RADIOLOGY | Age: 45
End: 2025-06-04
Payer: COMMERCIAL

## 2025-06-04 ENCOUNTER — HOSPITAL ENCOUNTER (EMERGENCY)
Age: 45
Discharge: HOME | End: 2025-06-04
Payer: COMMERCIAL

## 2025-06-04 VITALS
HEART RATE: 67 BPM | OXYGEN SATURATION: 99 % | TEMPERATURE: 99 F | SYSTOLIC BLOOD PRESSURE: 151 MMHG | RESPIRATION RATE: 19 BRPM | DIASTOLIC BLOOD PRESSURE: 89 MMHG

## 2025-06-04 DIAGNOSIS — M62.838 SPASM OF MUSCLE: ICD-10-CM

## 2025-06-04 DIAGNOSIS — K59.00 CONSTIPATION, UNSPECIFIED CONSTIPATION TYPE: Primary | ICD-10-CM

## 2025-06-04 LAB
BILIRUB UR STRIP.AUTO-MCNC: NEGATIVE MG/DL
CHARACTER UR: CLEAR
COLOR, UA: YELLOW
GLUCOSE UR QL STRIP.AUTO: NEGATIVE MG/DL
KETONES UR QL STRIP.AUTO: 15
NITRITE UR QL STRIP.AUTO: NEGATIVE
PH UR STRIP.AUTO: 5.5 [PH] (ref 5–9)
PROT UR STRIP.AUTO-MCNC: 100 MG/DL
RBC #/AREA URNS HPF: NEGATIVE /[HPF]
SP GR UR STRIP.AUTO: >= 1.03 (ref 1–1.03)
UROBILINOGEN, URINE: 0.2 EU/DL (ref 0.2–1)
WBC #/AREA URNS HPF: NEGATIVE /[HPF]

## 2025-06-04 PROCEDURE — 99213 OFFICE O/P EST LOW 20 MIN: CPT

## 2025-06-04 PROCEDURE — 74018 RADEX ABDOMEN 1 VIEW: CPT

## 2025-06-04 PROCEDURE — 81003 URINALYSIS AUTO W/O SCOPE: CPT

## 2025-06-04 RX ORDER — POLYETHYLENE GLYCOL 3350 17 G/17G
17 POWDER, FOR SOLUTION ORAL 2 TIMES DAILY
Qty: 510 G | Refills: 0 | Status: SHIPPED | OUTPATIENT
Start: 2025-06-04 | End: 2025-07-04

## 2025-06-04 RX ORDER — CYCLOBENZAPRINE HCL 10 MG
10 TABLET ORAL 3 TIMES DAILY PRN
Qty: 21 TABLET | Refills: 0 | Status: SHIPPED | OUTPATIENT
Start: 2025-06-04 | End: 2025-06-14

## 2025-06-04 NOTE — ED PROVIDER NOTES
[facility] URGENT CARE  UrgentCare Encounter      CHIEFCOMPLAINT       Chief Complaint   Patient presents with    Cystitis       Nurses Notes reviewed and I agree except as noted in the HPI.  HISTORY OF PRESENT ILLNESS   Deloris Sands is a 45 y.o. female who presents today with lower abdominal pain radiating around to lower back.  Along with urinary frequency and burning.  States she also has diarrhea.  States this been going on for approximately 2 to 3 days.    REVIEW OF SYSTEMS     Review of Systems   Constitutional: Negative.    Respiratory: Negative.     Cardiovascular: Negative.    Gastrointestinal:  Positive for abdominal pain and diarrhea.   Genitourinary:  Positive for dysuria, frequency and urgency.   Musculoskeletal: Negative.    Skin: Negative.    Neurological: Negative.    Psychiatric/Behavioral: Negative.         PAST MEDICAL HISTORY         Diagnosis Date    Bell palsy     right side droop    Essential hypertension 2016    GERD (gastroesophageal reflux disease)     Hashimoto's thyroiditis 2016    HIGH CHOLESTEROL     Hyperlipidemia     Hypokalemia     Kidney stones     Prediabetes 2020    Subclinical hyperthyroidism 2016       SURGICAL HISTORY     Patient  has a past surgical history that includes  section (, ); Endoscopy, colon, diagnostic; Dilation and curettage of uterus (); laparoscopy (N/A, 06/15/2020); Colonoscopy (2021); Hysterectomy, total abdominal (N/A, 09/10/2020); Cystoscopy (Right, 2023); and Ovary surgery (N/A, 10/17/2024).    CURRENT MEDICATIONS       Discharge Medication List as of 2025  4:17 PM        CONTINUE these medications which have NOT CHANGED    Details   ibuprofen (ADVIL;MOTRIN) 800 MG tablet Take 1 tablet by mouth 4 times daily as needed for Pain, Disp-120 tablet, R-1Normal      omeprazole (PRILOSEC) 40 MG delayed release capsule TAKE ONE CAPSULE BY MOUTH DAILY, Disp-90 capsule, R-1Normal      metoprolol  mouth 2 times daily, Disp-510 g, R-0Normal      cyclobenzaprine (FLEXERIL) 10 MG tablet Take 1 tablet by mouth 3 times daily as needed for Muscle spasms, Disp-21 tablet, R-0Normal           Discharge Medication List as of 6/4/2025  4:17 PM          MARÍA Irving CNP, Randi, APRN - CNP  06/05/25 [id number]

## 2025-06-04 NOTE — DISCHARGE INSTRUCTIONS
Rest.  Take MiraLAX as directed for constipation.  Use Flexeril as needed for muscle spasms.  Follow-up with primary care provider for any new or worsening symptoms Go to emergency department for any other symptoms or concerns deemed emergent.

## 2025-06-04 NOTE — ED NOTES
Pt to [facility] with c/o urinary frequency and burning. Pt reports she also is having diarrhea. Pt reports having a lot of urgency as well. Pt reports symptoms began a couple days ago. Pt NASIMA.      Dasia Guo, RN  06/04/25 [id number]

## 2025-06-05 ASSESSMENT — ENCOUNTER SYMPTOMS
RESPIRATORY NEGATIVE: 1
DIARRHEA: 1
ABDOMINAL PAIN: 1

## 2025-07-16 ENCOUNTER — OFFICE VISIT (OUTPATIENT)
Dept: FAMILY MEDICINE CLINIC | Age: 45
End: 2025-07-16

## 2025-07-16 ENCOUNTER — PATIENT MESSAGE (OUTPATIENT)
Dept: FAMILY MEDICINE CLINIC | Age: 45
End: 2025-07-16

## 2025-07-16 VITALS
HEIGHT: 64 IN | BODY MASS INDEX: 37.25 KG/M2 | DIASTOLIC BLOOD PRESSURE: 120 MMHG | RESPIRATION RATE: 12 BRPM | WEIGHT: 218.2 LBS | OXYGEN SATURATION: 98 % | SYSTOLIC BLOOD PRESSURE: 166 MMHG | TEMPERATURE: 96.8 F | HEART RATE: 76 BPM

## 2025-07-16 DIAGNOSIS — F43.21 GRIEF: ICD-10-CM

## 2025-07-16 DIAGNOSIS — S00.86XA INSECT BITE OF OTHER PART OF HEAD, INITIAL ENCOUNTER: Primary | ICD-10-CM
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
## 2025-07-16 DIAGNOSIS — F43.0 STRESS REACTION: ICD-10-CM

## 2025-07-16 RX ORDER — PREDNISONE 20 MG/1
40 TABLET ORAL DAILY
Qty: 6 TABLET | Refills: 0 | Status: SHIPPED | OUTPATIENT
Start: 2025-07-16 | End: 2025-07-19
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
## 2025-07-28 DIAGNOSIS — I10 ESSENTIAL HYPERTENSION: ICD-10-CM

## 2025-07-28 DIAGNOSIS — R79.89 ELEVATED PLATELET COUNT: ICD-10-CM

## 2025-07-28 RX ORDER — AMLODIPINE BESYLATE 10 MG/1
10 TABLET ORAL DAILY
Qty: 90 TABLET | Refills: 3 | Status: SHIPPED | OUTPATIENT
Start: 2025-07-28

## 2025-07-28 RX ORDER — ASPIRIN 81 MG/1
81 TABLET, COATED ORAL DAILY
Qty: 90 TABLET | Refills: 3 | Status: SHIPPED | OUTPATIENT
Start: 2025-07-28

## 2025-07-28 NOTE — TELEPHONE ENCOUNTER
Recent Visits  Date Type Provider Dept   07/16/25 Office Visit Fortino Vaughn APRN - CNP [facility]   02/10/25 Office Visit Fortino Vaughn APRN - CNP [facility]   11/08/24 Office Visit Fortino Vaughn APRN - CNP [facility]   08/14/24 Office Visit Fortino Vaughn APRN - CNP [facility]   Showing recent visits within past 540 days with a meds authorizing provider and meeting all other requirements  Future Appointments  Date Type Provider Dept   08/12/25 Appointment Fortino Vaughn APRN - CNP [facility]   Showing future appointments within next 150 days with a meds authorizing provider and meeting all other requirements

## 2025-08-12 ENCOUNTER — OFFICE VISIT (OUTPATIENT)
Dept: FAMILY MEDICINE CLINIC | Age: 45
End: 2025-08-12

## 2025-08-12 VITALS
OXYGEN SATURATION: 98 % | SYSTOLIC BLOOD PRESSURE: 126 MMHG | WEIGHT: 221.2 LBS | DIASTOLIC BLOOD PRESSURE: 86 MMHG | TEMPERATURE: 98.2 F | HEART RATE: 83 BPM | HEIGHT: 64 IN | RESPIRATION RATE: 14 BRPM | BODY MASS INDEX: 37.76 KG/M2

## 2025-08-12 DIAGNOSIS — J06.9 ACUTE UPPER RESPIRATORY INFECTION: ICD-10-CM

## 2025-08-12 DIAGNOSIS — N18.2 TYPE 2 DIABETES MELLITUS WITH STAGE 2 CHRONIC KIDNEY DISEASE, WITHOUT LONG-TERM CURRENT USE OF INSULIN (HCC): Primary | ICD-10-CM

## 2025-08-12 DIAGNOSIS — E11.22 TYPE 2 DIABETES MELLITUS WITH STAGE 2 CHRONIC KIDNEY DISEASE, WITHOUT LONG-TERM CURRENT USE OF INSULIN (HCC): Primary | ICD-10-CM

## 2025-08-12 LAB — HBA1C MFR BLD: 6.2 % (ref 4.3–5.7)

## 2025-08-12 RX ORDER — LORATADINE PSEUDOEPHEDRINE SULFATE 10; 240 MG/1; MG/1
1 TABLET, EXTENDED RELEASE ORAL DAILY
Qty: 14 TABLET | Refills: 0 | Status: SHIPPED | OUTPATIENT
Start: 2025-08-12

## 2025-08-12 RX ORDER — FLUTICASONE PROPIONATE 50 MCG
2 SPRAY, SUSPENSION (ML) NASAL DAILY
Qty: 16 G | Refills: 0 | Status: SHIPPED | OUTPATIENT
Start: 2025-08-12

## 2025-08-14 DIAGNOSIS — F41.0 PANIC DISORDER WITHOUT AGORAPHOBIA WITH MODERATE PANIC ATTACKS: ICD-10-CM

## 2025-08-14 RX ORDER — SERTRALINE HYDROCHLORIDE 100 MG/1
100 TABLET, FILM COATED ORAL DAILY
Qty: 90 TABLET | Refills: 3 | Status: SHIPPED | OUTPATIENT
Start: 2025-08-14

## 2025-08-19 ENCOUNTER — HOSPITAL ENCOUNTER (OUTPATIENT)
Dept: OTHER | Age: 45
Discharge: HOME | End: 2025-08-19
Payer: COMMERCIAL

## 2025-08-19 ENCOUNTER — RESULTS FOLLOW-UP (OUTPATIENT)
Dept: FAMILY MEDICINE CLINIC | Age: 45
End: 2025-08-19

## 2025-08-19 DIAGNOSIS — N18.2 TYPE 2 DIABETES MELLITUS WITH STAGE 2 CHRONIC KIDNEY DISEASE, WITHOUT LONG-TERM CURRENT USE OF INSULIN (HCC): Primary | ICD-10-CM

## 2025-08-19 DIAGNOSIS — I10 ESSENTIAL HYPERTENSION: ICD-10-CM

## 2025-08-19 DIAGNOSIS — E11.22 TYPE 2 DIABETES MELLITUS WITH STAGE 2 CHRONIC KIDNEY DISEASE, WITHOUT LONG-TERM CURRENT USE OF INSULIN (HCC): Primary | ICD-10-CM

## 2025-08-19 DIAGNOSIS — N18.2 TYPE 2 DIABETES MELLITUS WITH STAGE 2 CHRONIC KIDNEY DISEASE, WITHOUT LONG-TERM CURRENT USE OF INSULIN (HCC): ICD-10-CM

## 2025-08-19 DIAGNOSIS — E11.22 TYPE 2 DIABETES MELLITUS WITH STAGE 2 CHRONIC KIDNEY DISEASE, WITHOUT LONG-TERM CURRENT USE OF INSULIN (HCC): ICD-10-CM

## 2025-08-19 LAB
ALBUMIN SERPL BCG-MCNC: 3.9 G/DL (ref 3.4–4.9)
ALP SERPL-CCNC: 103 U/L (ref 38–126)
ALT SERPL W/O P-5'-P-CCNC: 22 U/L (ref 10–35)
ANION GAP SERPL CALC-SCNC: 9 MEQ/L (ref 8–16)
AST SERPL-CCNC: 23 U/L (ref 10–35)
BASOPHILS ABSOLUTE: 0 THOU/MM3 (ref 0–0.1)
BASOPHILS NFR BLD AUTO: 0.4 %
BILIRUB SERPL-MCNC: 0.4 MG/DL (ref 0.3–1.2)
BUN SERPL-MCNC: 12 MG/DL (ref 8–23)
CALCIUM SERPL-MCNC: 9.8 MG/DL (ref 8.5–10.5)
CHLORIDE SERPL-SCNC: 103 MEQ/L (ref 98–111)
CHOLESTEROL, FASTING: 215 MG/DL (ref 100–199)
CO2 SERPL-SCNC: 27 MEQ/L (ref 22–29)
CREAT SERPL-MCNC: 0.7 MG/DL (ref 0.5–0.9)
CREAT UR-MCNC: 187 MG/DL
DEPRECATED RDW RBC AUTO: 43.8 FL (ref 35–45)
EOSINOPHIL NFR BLD AUTO: 1.3 %
EOSINOPHILS ABSOLUTE: 0.1 THOU/MM3 (ref 0–0.4)
ERYTHROCYTE [DISTWIDTH] IN BLOOD BY AUTOMATED COUNT: 14.1 % (ref 11.5–14.5)
GFR SERPL CREATININE-BSD FRML MDRD: > 90 ML/MIN/1.73M2
GLUCOSE SERPL-MCNC: 122 MG/DL (ref 74–109)
HCT VFR BLD AUTO: 37.7 % (ref 37–47)
HDLC SERPL-MCNC: 50 MG/DL
HGB BLD-MCNC: 12.4 GM/DL (ref 12–16)
IMM GRANULOCYTES # BLD AUTO: 0.05 THOU/MM3 (ref 0–0.07)
IMM GRANULOCYTES NFR BLD AUTO: 0.5 %
LDLC SERPL CALC-MCNC: 143 MG/DL
LYMPHOCYTES ABSOLUTE: 2.8 THOU/MM3 (ref 1–4.8)
LYMPHOCYTES NFR BLD AUTO: 26.8 %
MCH RBC QN AUTO: 28.2 PG (ref 26–33)
MCHC RBC AUTO-ENTMCNC: 32.9 GM/DL (ref 32.2–35.5)
MCV RBC AUTO: 85.7 FL (ref 81–99)
MICROALBUMIN UR-MCNC: 110 MG/DL
MICROALBUMIN/CREAT RATIO PNL UR: 588 MG/G (ref 0–30)
MONOCYTES ABSOLUTE: 0.6 THOU/MM3 (ref 0.4–1.3)
MONOCYTES NFR BLD AUTO: 5.8 %
NEUTROPHILS ABSOLUTE: 6.7 THOU/MM3 (ref 1.8–7.7)
NEUTROPHILS NFR BLD AUTO: 65.2 %
NRBC BLD AUTO-RTO: 0 /100 WBC
PLATELET # BLD AUTO: 444 THOU/MM3 (ref 130–400)
PMV BLD AUTO: 9.6 FL (ref 9.4–12.4)
POTASSIUM SERPL-SCNC: 3.9 MEQ/L (ref 3.5–5.2)
PROT SERPL-MCNC: 6.9 G/DL (ref 6.4–8.3)
RBC # BLD AUTO: 4.4 MILL/MM3 (ref 4.2–5.4)
SODIUM SERPL-SCNC: 139 MEQ/L (ref 135–145)
TRIGLYCERIDE, FASTING: 110 MG/DL (ref 0–199)
WBC # BLD AUTO: 10.3 THOU/MM3 (ref 4.8–10.8)

## 2025-08-19 PROCEDURE — 85025 COMPLETE CBC W/AUTO DIFF WBC: CPT

## 2025-08-19 PROCEDURE — 82043 UR ALBUMIN QUANTITATIVE: CPT

## 2025-08-19 PROCEDURE — 80053 COMPREHEN METABOLIC PANEL: CPT

## 2025-08-19 PROCEDURE — 36415 COLL VENOUS BLD VENIPUNCTURE: CPT

## 2025-08-19 PROCEDURE — 82570 ASSAY OF URINE CREATININE: CPT

## 2025-08-19 PROCEDURE — 80061 LIPID PANEL: CPT

## 2025-08-19 RX ORDER — ENALAPRIL MALEATE 2.5 MG/1
2.5 TABLET ORAL DAILY
Qty: 30 TABLET | Refills: 3 | Status: SHIPPED | OUTPATIENT
Start: 2025-08-19

## 2025-08-26 DIAGNOSIS — E78.00 PURE HYPERCHOLESTEROLEMIA: ICD-10-CM

## 2025-08-26 RX ORDER — ROSUVASTATIN CALCIUM 20 MG/1
20 TABLET, COATED ORAL DAILY
Qty: 90 TABLET | Refills: 3 | Status: SHIPPED | OUTPATIENT
Start: 2025-08-26

## 2025-09-02 RX ORDER — IBUPROFEN 800 MG/1
800 TABLET, FILM COATED ORAL 4 TIMES DAILY PRN
Qty: 120 TABLET | Refills: 1 | Status: SHIPPED | OUTPATIENT
Start: 2025-09-02

## 2025-09-03 ENCOUNTER — LAB (OUTPATIENT)
Dept: FAMILY MEDICINE CLINIC | Age: 45
End: 2025-09-03

## 2025-09-03 ENCOUNTER — TELEPHONE (OUTPATIENT)
Dept: FAMILY MEDICINE CLINIC | Age: 45
End: 2025-09-03

## 2025-09-03 VITALS — HEART RATE: 60 BPM | DIASTOLIC BLOOD PRESSURE: 84 MMHG | SYSTOLIC BLOOD PRESSURE: 138 MMHG

## 2025-09-03 DIAGNOSIS — E11.22 TYPE 2 DIABETES MELLITUS WITH STAGE 2 CHRONIC KIDNEY DISEASE, WITHOUT LONG-TERM CURRENT USE OF INSULIN (HCC): ICD-10-CM

## 2025-09-03 DIAGNOSIS — I10 ESSENTIAL HYPERTENSION: ICD-10-CM

## 2025-09-03 DIAGNOSIS — N18.2 TYPE 2 DIABETES MELLITUS WITH STAGE 2 CHRONIC KIDNEY DISEASE, WITHOUT LONG-TERM CURRENT USE OF INSULIN (HCC): ICD-10-CM

## 2025-09-03 RX ORDER — ENALAPRIL MALEATE 2.5 MG/1
2.5 TABLET ORAL DAILY
Qty: 90 TABLET | Refills: 3 | Status: SHIPPED | OUTPATIENT
Start: 2025-09-03

## 2025-09-05 ENCOUNTER — RESULTS FOLLOW-UP (OUTPATIENT)
Dept: FAMILY MEDICINE CLINIC | Age: 45
End: 2025-09-05

## 2025-09-05 ENCOUNTER — HOSPITAL ENCOUNTER (OUTPATIENT)
Dept: OTHER | Age: 45
Discharge: HOME | End: 2025-09-05
Payer: COMMERCIAL

## 2025-09-05 DIAGNOSIS — E11.22 TYPE 2 DIABETES MELLITUS WITH STAGE 2 CHRONIC KIDNEY DISEASE, WITHOUT LONG-TERM CURRENT USE OF INSULIN (HCC): ICD-10-CM

## 2025-09-05 DIAGNOSIS — I10 ESSENTIAL HYPERTENSION: ICD-10-CM

## 2025-09-05 DIAGNOSIS — N18.2 TYPE 2 DIABETES MELLITUS WITH STAGE 2 CHRONIC KIDNEY DISEASE, WITHOUT LONG-TERM CURRENT USE OF INSULIN (HCC): ICD-10-CM

## 2025-09-05 LAB
ANION GAP SERPL CALC-SCNC: 10 MEQ/L (ref 8–16)
BUN SERPL-MCNC: 18 MG/DL (ref 8–23)
CALCIUM SERPL-MCNC: 9.5 MG/DL (ref 8.5–10.5)
CHLORIDE SERPL-SCNC: 101 MEQ/L (ref 98–111)
CO2 SERPL-SCNC: 27 MEQ/L (ref 22–29)
CREAT SERPL-MCNC: 0.8 MG/DL (ref 0.5–0.9)
GFR SERPL CREATININE-BSD FRML MDRD: > 90 ML/MIN/1.73M2
GLUCOSE SERPL-MCNC: 107 MG/DL (ref 74–109)
POTASSIUM SERPL-SCNC: 4.1 MEQ/L (ref 3.5–5.2)
SODIUM SERPL-SCNC: 138 MEQ/L (ref 135–145)

## 2025-09-05 PROCEDURE — 80048 BASIC METABOLIC PNL TOTAL CA: CPT

## 2025-09-05 PROCEDURE — 36415 COLL VENOUS BLD VENIPUNCTURE: CPT

## (undated) DEVICE — GLOVE SURG SZ 65 THK91MIL LTX FREE SYN POLYISOPRENE

## (undated) DEVICE — HYPODERMIC SAFETY NEEDLE: Brand: MAGELLAN

## (undated) DEVICE — PACK PROCEDURE SURG SET UP SRMC

## (undated) DEVICE — APPLICATOR MEDICATED 26 CC SOLUTION HI LT ORNG CHLORAPREP

## (undated) DEVICE — SEALER ENDOSCP L37CM NANO COAT BLNT TIP LAP DIV

## (undated) DEVICE — PAD,SANITARY,11 IN,MAXI,W/WINGS,N-STRL: Brand: MEDLINE

## (undated) DEVICE — SUTURE VCRL SZ 4-0 L27IN ABSRB UD L19MM FS-2 3/8 CIR REV J422H

## (undated) DEVICE — GOWN,SIRUS,NON REINFRCD,LARGE,SET IN SL: Brand: MEDLINE

## (undated) DEVICE — Z DISCONTINUED BY MEDLINE USE 2711682 TRAY SKIN PREP DRY W/ PREM GLV

## (undated) DEVICE — 3M™ STERI-STRIP™ REINFORCED ADHESIVE SKIN CLOSURES, R1547, 1/2 IN X 4 IN (12 MM X 100 MM), 6 STRIPS/ENVELOPE: Brand: 3M™ STERI-STRIP™

## (undated) DEVICE — CONTAINER,SPECIMEN,PNEU TUBE,4OZ,OR STRL: Brand: MEDLINE

## (undated) DEVICE — MARKER,SKIN,WI/RULER AND LABELS: Brand: MEDLINE

## (undated) DEVICE — KIT,ANTI FOG,W/SPONGE & FLUID,SOFT PACK: Brand: MEDLINE

## (undated) DEVICE — 3M™ STERI-STRIP™ COMPOUND BENZOIN TINCTURE 40 BAGS/CARTON 4 CARTONS/CASE C1544: Brand: 3M™ STERI-STRIP™

## (undated) DEVICE — TROCAR: Brand: KII FIOS FIRST ENTRY

## (undated) DEVICE — DRESSING ANITMICROBIAL FOAM OPTIFOAM POSTOP STRP 35 X 10 IN

## (undated) DEVICE — GOWN,AURORA,NON-REINFORCED,2XL: Brand: MEDLINE

## (undated) DEVICE — BAG SPEC REM 224ML W4XL6IN DIA10MM 1 HND GYN DISP ENDOPCH

## (undated) DEVICE — 1LYRTR 16FR10ML 100%SILI SNAP: Brand: MEDLINE INDUSTRIES, INC.

## (undated) DEVICE — COVER,LIGHT HANDLE,FLX,2/PK: Brand: MEDLINE INDUSTRIES, INC.

## (undated) DEVICE — INTENDED FOR TISSUE SEPARATION, AND OTHER PROCEDURES THAT REQUIRE A SHARP SURGICAL BLADE TO PUNCTURE OR CUT.: Brand: BARD-PARKER ® CARBON RIB-BACK BLADES

## (undated) DEVICE — CYSTO: Brand: MEDLINE INDUSTRIES, INC.

## (undated) DEVICE — SURE SET SINGLE BASIN-LF: Brand: MEDLINE INDUSTRIES, INC.

## (undated) DEVICE — TROCAR: Brand: KII SHIELDED BLADED ACCESS SYSTEM

## (undated) DEVICE — DRAINBAG,ANTI-REFLUX TOWER,L/F,2000ML,LL: Brand: MEDLINE

## (undated) DEVICE — Z DISCONTINUED USE 2273272 SOLUTION SURG PREP SCRUB POV IOD 7.5% 4 OZ

## (undated) DEVICE — PREP SOL PVP IODINE 4%  4 OZ/BTL

## (undated) DEVICE — YANKAUER,BULB TIP,W/O VENT,RIGID,STERILE: Brand: MEDLINE

## (undated) DEVICE — TUBING, SUCTION, 1/4" X 20', STRAIGHT: Brand: MEDLINE INDUSTRIES, INC.

## (undated) DEVICE — SYSTEM PMP SGL ACT W/ 10CC VAC SYR 1 W VLV FOR ENDOSCP SURG

## (undated) DEVICE — LINER SUCT CANSTR 1500CC SEMI RIG W/ POR HYDROPHOBIC SHUT

## (undated) DEVICE — SYRINGE MED 10ML LUERLOCK TIP W/O SFTY DISP

## (undated) DEVICE — GLOVE SURG SZ 6 THK91MIL LTX FREE SYN POLYISOPRENE ANTI

## (undated) DEVICE — AGENT HEMSTAT 3GM OXIDIZED REGENERATED CELOS ABSRB FOR CONT (ORDER MULTIPLES OF 5EA)

## (undated) DEVICE — SINGLE-USE DIGITAL FLEXIBLE URETEROSCOPE: Brand: LITHOVUE

## (undated) DEVICE — SOLUTION ANTIFOG VIS SYS CLEARIFY LAPSCP

## (undated) DEVICE — Z DUPLICATE USE 2431315 SET INSUF TBNG HI FLO W/ SMK EVAC FOR PNEUMOCLEAR

## (undated) DEVICE — PUMP SUC IRR TBNG L10FT W/ HNDPC ASSEMB STRYKEFLOW 2

## (undated) DEVICE — BANDAGE ADH W1XL3IN NAT FAB WVN FLX DURABLE N ADH PD SEAL

## (undated) DEVICE — GAUZE,SPONGE,8"X4",12PLY,XRAY,STRL,LF: Brand: MEDLINE

## (undated) DEVICE — CATHETER,FOLEY,SILI-ELAST,LTX,16FR,10ML: Brand: MEDLINE

## (undated) DEVICE — INSUFFLATION NEEDLE TO ESTABLISH PNEUMOPERITONEUM.: Brand: INSUFFLATION NEEDLE

## (undated) DEVICE — 6619 2 PTNT ISO SYS INCISE AREA&LT;(&GT;&&LT;)&GT;P: Brand: STERI-DRAPE™ IOBAN™ 2

## (undated) DEVICE — SOLUTION SURG PREP POV IOD 7.5% 4 OZ

## (undated) DEVICE — TUBING INSUFFLATOR HEAT HUMIDIFIED SMK EVAC SET PNEUMOCLEAR

## (undated) DEVICE — GLOVE ORANGE PI 7 1/2   MSG9075

## (undated) DEVICE — GLOVE ORANGE PI 7   MSG9070

## (undated) DEVICE — PACK,LAPAROSCOPY,PK II,SIRUS: Brand: MEDLINE

## (undated) DEVICE — SYRINGE MED 30ML STD CLR PLAS LUERLOCK TIP N CTRL DISP

## (undated) DEVICE — TAPE ADH W1INXL10YD PLAS TRNSPAR H2O RESIST HYPOALRG CURAD

## (undated) DEVICE — PAD,NON-ADHERENT,3X8,STERILE,LF,1/PK: Brand: MEDLINE

## (undated) DEVICE — SOLUTION IRRIG 3000ML 0.9% SOD CHL USP UROMATIC PLAS CONT

## (undated) DEVICE — STRIP,CLOSURE,WOUND,MEDI-STRIP,1/2X4: Brand: MEDLINE

## (undated) DEVICE — GLOVE ORANGE PI 8   MSG9080

## (undated) DEVICE — GUIDEWIRE URO L150CM DIA0.035IN STIFF NIT HYDRPHLC STR TIP

## (undated) DEVICE — SPONGE LAP W18XL18IN WHT COT 4 PLY FLD STRUNG RADPQ DISP ST

## (undated) DEVICE — Z DISCONTINUED GLOVE SURG SZ 7 L12IN FNGR THK94MIL TRNSLUC YEL LTX HYDRGEL

## (undated) DEVICE — RED RUBBER ROBINSON URETHRAL CATHETER, RADIOPAQUE, SMOOTH ROUNDED TIP, 14 FR (4.7 MM): Brand: DOVER

## (undated) DEVICE — Device

## (undated) DEVICE — TOTAL TRAY, DB, 100% SILI FOLEY, 16FR 10: Brand: MEDLINE

## (undated) DEVICE — GLOVE ORANGE PI 8 1/2   MSG9085

## (undated) DEVICE — BREAST HERNIA PACK: Brand: MEDLINE INDUSTRIES, INC.

## (undated) DEVICE — DUAL LUMEN URETERAL CATHETER

## (undated) DEVICE — ADAPTER URO SCP UROLOK LL

## (undated) DEVICE — SUTURE VCRL SZ 0 L27IN ABSRB UD L36MM CT-1 1/2 CIR J260H

## (undated) DEVICE — JELLY,LUBE,STERILE,FLIP TOP,TUBE,2-OZ: Brand: MEDLINE

## (undated) DEVICE — SPONGE,PEANUT,XRAY,ST,SM,3/8",5/CARD: Brand: MEDLINE INDUSTRIES, INC.

## (undated) DEVICE — LARGE, DISPOSABLE ALEXIS O C-SECTION PROTECTOR - RETRACTOR: Brand: ALEXIS ® O C-SECTION PROTECTOR - RETRACTOR

## (undated) DEVICE — SUTURE ABSORBABLE BRAIDED 2-0 CT-1 27 IN UD VICRYL J259H